# Patient Record
Sex: MALE | Race: BLACK OR AFRICAN AMERICAN | Employment: FULL TIME | URBAN - METROPOLITAN AREA
[De-identification: names, ages, dates, MRNs, and addresses within clinical notes are randomized per-mention and may not be internally consistent; named-entity substitution may affect disease eponyms.]

---

## 2017-01-16 ENCOUNTER — HOSPITAL ENCOUNTER (OUTPATIENT)
Dept: LAB | Age: 57
Discharge: HOME | End: 2017-01-16
Payer: COMMERCIAL

## 2017-01-16 PROCEDURE — 86870 RBC ANTIBODY IDENTIFICATION: CPT | Performed by: INTERNAL MEDICINE

## 2017-01-16 PROCEDURE — 86902 BLOOD TYPE ANTIGEN DONOR EA: CPT | Performed by: INTERNAL MEDICINE

## 2017-01-16 PROCEDURE — 86920 COMPATIBILITY TEST SPIN: CPT | Performed by: INTERNAL MEDICINE

## 2017-01-16 PROCEDURE — 86922 COMPATIBILITY TEST ANTIGLOB: CPT | Performed by: INTERNAL MEDICINE

## 2017-01-16 PROCEDURE — 86921 COMPATIBILITY TEST INCUBATE: CPT | Performed by: INTERNAL MEDICINE

## 2017-01-16 PROCEDURE — 36415 COLL VENOUS BLD VENIPUNCTURE: CPT | Performed by: INTERNAL MEDICINE

## 2017-01-16 PROCEDURE — 86900 BLOOD TYPING SEROLOGIC ABO: CPT | Performed by: INTERNAL MEDICINE

## 2017-01-17 ENCOUNTER — APPOINTMENT (OUTPATIENT)
Dept: INFUSION THERAPY | Age: 57
End: 2017-01-17
Payer: COMMERCIAL

## 2017-01-18 ENCOUNTER — HOSPITAL ENCOUNTER (OUTPATIENT)
Dept: INFUSION THERAPY | Age: 57
Discharge: HOME | End: 2017-01-18
Payer: COMMERCIAL

## 2017-01-18 VITALS
HEART RATE: 64 BPM | TEMPERATURE: 98.4 F | DIASTOLIC BLOOD PRESSURE: 66 MMHG | SYSTOLIC BLOOD PRESSURE: 131 MMHG | RESPIRATION RATE: 18 BRPM | OXYGEN SATURATION: 100 %

## 2017-01-18 PROCEDURE — 74011250636 HC RX REV CODE- 250/636

## 2017-01-18 PROCEDURE — P9040 RBC LEUKOREDUCED IRRADIATED: HCPCS | Performed by: INTERNAL MEDICINE

## 2017-01-18 PROCEDURE — 96374 THER/PROPH/DIAG INJ IV PUSH: CPT

## 2017-01-18 PROCEDURE — 96375 TX/PRO/DX INJ NEW DRUG ADDON: CPT

## 2017-01-18 PROCEDURE — 36430 TRANSFUSION BLD/BLD COMPNT: CPT

## 2017-01-18 PROCEDURE — 74011250637 HC RX REV CODE- 250/637: Performed by: INTERNAL MEDICINE

## 2017-01-18 PROCEDURE — 77030013169 SET IV BLD ICUM -A

## 2017-01-18 PROCEDURE — 74011250636 HC RX REV CODE- 250/636: Performed by: INTERNAL MEDICINE

## 2017-01-18 PROCEDURE — 77030012965 HC NDL HUBR BBMI -A

## 2017-01-18 RX ORDER — HEPARIN SODIUM (PORCINE) LOCK FLUSH IV SOLN 100 UNIT/ML 100 UNIT/ML
SOLUTION INTRAVENOUS
Status: COMPLETED
Start: 2017-01-18 | End: 2017-01-18

## 2017-01-18 RX ORDER — DIPHENHYDRAMINE HYDROCHLORIDE 50 MG/ML
25 INJECTION, SOLUTION INTRAMUSCULAR; INTRAVENOUS ONCE
Status: COMPLETED | OUTPATIENT
Start: 2017-01-18 | End: 2017-01-18

## 2017-01-18 RX ORDER — SODIUM CHLORIDE 0.9 % (FLUSH) 0.9 %
10-40 SYRINGE (ML) INJECTION AS NEEDED
Status: DISPENSED | OUTPATIENT
Start: 2017-01-18 | End: 2017-01-18

## 2017-01-18 RX ORDER — SODIUM CHLORIDE 9 MG/ML
250 INJECTION, SOLUTION INTRAVENOUS AS NEEDED
Status: DISCONTINUED | OUTPATIENT
Start: 2017-01-18 | End: 2017-01-22 | Stop reason: HOSPADM

## 2017-01-18 RX ORDER — ACETAMINOPHEN 325 MG/1
650 TABLET ORAL ONCE
Status: COMPLETED | OUTPATIENT
Start: 2017-01-18 | End: 2017-01-18

## 2017-01-18 RX ADMIN — DIPHENHYDRAMINE HYDROCHLORIDE 25 MG: 50 INJECTION INTRAMUSCULAR; INTRAVENOUS at 08:33

## 2017-01-18 RX ADMIN — HEPARIN SODIUM (PORCINE) LOCK FLUSH IV SOLN 100 UNIT/ML 500 UNITS: 100 SOLUTION at 13:57

## 2017-01-18 RX ADMIN — ACETAMINOPHEN 650 MG: 325 TABLET ORAL at 08:33

## 2017-01-18 RX ADMIN — Medication 20 ML: at 13:57

## 2017-01-18 RX ADMIN — SODIUM CHLORIDE 250 ML: 900 INJECTION, SOLUTION INTRAVENOUS at 08:33

## 2017-01-18 NOTE — PROGRESS NOTES
[facility] Progress Note    Date: 2017    Name: Delbert Artis    MRN: [id number]         : 1960      Mr. Kareen Lopez arrived to [facility] at 8627. Mr. Kareen Lopez was assessed and education was provided. Mr. Gisselle العلي vitals were reviewed. Visit Vitals    /61 (BP 1 Location: Right arm, BP Patient Position: Sitting)    Pulse 83    Temp 99 °F (37.2 °C)    Resp 18    SpO2 95%       Right lumen single mediport was accessed with a 20 G 1 inch littlejohn needle using sterile technique. Flushes without difficulty and positive for blood return. Normal saline initiated at [facility]. Pre-medications (Tylenol 650 mg and Benadryl 25 mg) were administered as ordered. First unit of two ordered units of PRBCs initiated @ 75 ml/hr at 0906. Fifteen minutes into infusion, VS stable and pt denied c/o SOB, itching/hives, lip/tongue/facial swelling, CP or other complaints. Infusion rate increased to 155 ml/hr for the remainder of the transfusion. Unit finished @ [phone]. VS stable and no transfusion reaction suspected. Second unit of two ordered units of PRBCs initiated @ 75 ml/hr at 1137. Fifteen minutes into infusion, VS stable and pt denied c/o SOB, itching/hives, lip/tongue/facial swelling, CP or other complaints. Infusion rate increased to 155 ml/hr for the remainder of the transfusion. Unit finished @ 1356. VS stable and no transfusion reaction suspected.     Patient Vitals for the past 12 hrs:   Temp Pulse Resp BP SpO2   17 1356 98.4 °F (36.9 °C) 64 18 131/66 100 %   17 1302 98.1 °F (36.7 °C) 60 18 126/67 97 %   17 1218 98 °F (36.7 °C) 60 18 139/67 100 %   17 1155 98.4 °F (36.9 °C) 60 18 126/70 98 %   17 1143 98.3 °F (36.8 °C) (!) 59 18 131/70 100 %   17 1132 98.4 °F (36.9 °C) 60 18 131/67 100 %   17 1014 98.3 °F (36.8 °C) 62 18 121/69 99 %   17 0945 98.4 °F (36.9 °C) 65 18 107/59 96 %   17 0926 98.3 °F (36.8 °C) 68 18 108/62 96 %   17 0908 98.4 °F (36.9 °C) 68 18 112/58 95 %   01/18/17 0904 98.6 °F (37 °C) 72 18 115/61 96 %   01/18/17 0823 99 °F (37.2 °C) 83 18 132/61 95 %     Mediport flushed with 20 mL NS and 500 units of heparin before de-accessing. Band-aid applied. Mr. Nikki Morris tolerated infusion without complaints. Discharge instructions reviewed with pt. Pt instructed to report SOB, CP, elevated temp, back pain, or other symptoms of transfusion reaction to MD or ED. Pt verbalized understanding and politely declined to stay for an observation period. Mr. Nikki Morris was discharged from [place] [address] in stable condition at 1405. He has no further appointments with [facility] at this time.      Lissa Penny RN  January 18, 2017

## 2017-01-19 LAB
ABO + RH BLD: NORMAL
ANTIGENS PRESENT RBC DONR: NORMAL
ANTIGENS PRESENT RBC DONR: NORMAL
BLD PROD TYP BPU: NORMAL
BLD PROD TYP BPU: NORMAL
BLOOD GROUP ANTIBODIES SERPL: NORMAL
BLOOD GROUP ANTIBODIES SERPL: NORMAL
BPU ID: NORMAL
BPU ID: NORMAL
CROSSMATCH RESULT,%XM: NORMAL
CROSSMATCH RESULT,%XM: NORMAL
PHYSICIAN INSTRUCTIO,%PI: NORMAL
SPECIMEN EXP DATE BLD: NORMAL
STATUS OF UNIT,%ST: NORMAL
STATUS OF UNIT,%ST: NORMAL
UNIT DIVISION, %UDIV: 0
UNIT DIVISION, %UDIV: 0

## 2017-03-03 ENCOUNTER — HOSPITAL ENCOUNTER (OUTPATIENT)
Dept: INFUSION THERAPY | Age: 57
Discharge: HOME | End: 2017-03-03
Payer: COMMERCIAL

## 2017-03-03 PROCEDURE — 36415 COLL VENOUS BLD VENIPUNCTURE: CPT | Performed by: INTERNAL MEDICINE

## 2017-03-03 PROCEDURE — 86902 BLOOD TYPE ANTIGEN DONOR EA: CPT | Performed by: INTERNAL MEDICINE

## 2017-03-03 PROCEDURE — 86900 BLOOD TYPING SEROLOGIC ABO: CPT | Performed by: INTERNAL MEDICINE

## 2017-03-03 PROCEDURE — 36415 COLL VENOUS BLD VENIPUNCTURE: CPT

## 2017-03-03 PROCEDURE — 85660 RBC SICKLE CELL TEST: CPT | Performed by: INTERNAL MEDICINE

## 2017-03-03 PROCEDURE — 86922 COMPATIBILITY TEST ANTIGLOB: CPT | Performed by: INTERNAL MEDICINE

## 2017-03-03 PROCEDURE — 86920 COMPATIBILITY TEST SPIN: CPT | Performed by: INTERNAL MEDICINE

## 2017-03-03 PROCEDURE — 86921 COMPATIBILITY TEST INCUBATE: CPT | Performed by: INTERNAL MEDICINE

## 2017-03-03 PROCEDURE — 86870 RBC ANTIBODY IDENTIFICATION: CPT | Performed by: INTERNAL MEDICINE

## 2017-03-03 RX ORDER — SODIUM CHLORIDE 9 MG/ML
250 INJECTION, SOLUTION INTRAVENOUS AS NEEDED
Status: DISCONTINUED | OUTPATIENT
Start: 2017-03-03 | End: 2017-03-07 | Stop reason: HOSPADM

## 2017-03-06 ENCOUNTER — HOSPITAL ENCOUNTER (OUTPATIENT)
Dept: INFUSION THERAPY | Age: 57
Discharge: HOME | End: 2017-03-06
Payer: COMMERCIAL

## 2017-03-06 VITALS
DIASTOLIC BLOOD PRESSURE: 73 MMHG | SYSTOLIC BLOOD PRESSURE: 150 MMHG | HEART RATE: 60 BPM | OXYGEN SATURATION: 100 % | RESPIRATION RATE: 18 BRPM | TEMPERATURE: 98.7 F

## 2017-03-06 PROCEDURE — 74011250636 HC RX REV CODE- 250/636

## 2017-03-06 PROCEDURE — 77030013169 SET IV BLD ICUM -A

## 2017-03-06 PROCEDURE — 74011250636 HC RX REV CODE- 250/636: Performed by: INTERNAL MEDICINE

## 2017-03-06 PROCEDURE — 36430 TRANSFUSION BLD/BLD COMPNT: CPT

## 2017-03-06 PROCEDURE — 74011250637 HC RX REV CODE- 250/637: Performed by: INTERNAL MEDICINE

## 2017-03-06 PROCEDURE — 77030012965 HC NDL HUBR BBMI -A

## 2017-03-06 PROCEDURE — P9040 RBC LEUKOREDUCED IRRADIATED: HCPCS | Performed by: INTERNAL MEDICINE

## 2017-03-06 PROCEDURE — 96374 THER/PROPH/DIAG INJ IV PUSH: CPT

## 2017-03-06 RX ORDER — SODIUM CHLORIDE 9 MG/ML
250 INJECTION, SOLUTION INTRAVENOUS AS NEEDED
Status: DISCONTINUED | OUTPATIENT
Start: 2017-03-06 | End: 2017-03-10 | Stop reason: HOSPADM

## 2017-03-06 RX ORDER — HEPARIN SODIUM (PORCINE) LOCK FLUSH IV SOLN 100 UNIT/ML 100 UNIT/ML
500 SOLUTION INTRAVENOUS AS NEEDED
Status: DISCONTINUED | OUTPATIENT
Start: 2017-03-06 | End: 2017-03-10 | Stop reason: HOSPADM

## 2017-03-06 RX ORDER — DIPHENHYDRAMINE HYDROCHLORIDE 50 MG/ML
25 INJECTION, SOLUTION INTRAMUSCULAR; INTRAVENOUS
Status: ACTIVE | OUTPATIENT
Start: 2017-03-06 | End: 2017-03-06

## 2017-03-06 RX ORDER — SODIUM CHLORIDE 0.9 % (FLUSH) 0.9 %
5-10 SYRINGE (ML) INJECTION AS NEEDED
Status: DISCONTINUED | OUTPATIENT
Start: 2017-03-06 | End: 2017-03-10 | Stop reason: HOSPADM

## 2017-03-06 RX ORDER — HYDROCORTISONE SODIUM SUCCINATE 100 MG/2ML
100 INJECTION, POWDER, FOR SOLUTION INTRAMUSCULAR; INTRAVENOUS
Status: ACTIVE | OUTPATIENT
Start: 2017-03-06 | End: 2017-03-06

## 2017-03-06 RX ORDER — HEPARIN SODIUM (PORCINE) LOCK FLUSH IV SOLN 100 UNIT/ML 100 UNIT/ML
SOLUTION INTRAVENOUS
Status: COMPLETED
Start: 2017-03-06 | End: 2017-03-06

## 2017-03-06 RX ORDER — DIPHENHYDRAMINE HYDROCHLORIDE 50 MG/ML
25 INJECTION, SOLUTION INTRAMUSCULAR; INTRAVENOUS ONCE
Status: COMPLETED | OUTPATIENT
Start: 2017-03-06 | End: 2017-03-06

## 2017-03-06 RX ORDER — ACETAMINOPHEN 325 MG/1
650 TABLET ORAL ONCE
Status: COMPLETED | OUTPATIENT
Start: 2017-03-06 | End: 2017-03-06

## 2017-03-06 RX ADMIN — Medication 10 ML: at 14:02

## 2017-03-06 RX ADMIN — SODIUM CHLORIDE 250 ML: 900 INJECTION, SOLUTION INTRAVENOUS at 08:41

## 2017-03-06 RX ADMIN — DIPHENHYDRAMINE HYDROCHLORIDE 25 MG: 50 INJECTION INTRAMUSCULAR; INTRAVENOUS at 08:45

## 2017-03-06 RX ADMIN — Medication 10 ML: at 08:40

## 2017-03-06 RX ADMIN — ACETAMINOPHEN 650 MG: 325 TABLET, FILM COATED ORAL at 08:37

## 2017-03-06 RX ADMIN — HEPARIN SODIUM (PORCINE) LOCK FLUSH IV SOLN 100 UNIT/ML 500 UNITS: 100 SOLUTION at 14:03

## 2017-03-06 NOTE — PROGRESS NOTES
[facility] Progress Note    Date: 2017    Name: Ryne Tavera    MRN: [id number]         : 1960      Patient assessed and education was provided. Patient vitals were reviewed. Visit Vitals    /73 (BP 1 Location: Left arm, BP Patient Position: At rest)    Pulse 60    Temp 98.7 °F (37.1 °C)    Resp 18    SpO2 100%       Lab results were obtained and reviewed. Right chest medi-port accessed. Blood permit signed by patient      Pre-medications were administered as ordered and blood transfusion was initiated. 2 units prbc was infused per protocol    Patient  tolerated infusion, and had no complaints. Declined to stay for observation    Patient Vitals for the past 8 hrs:   Temp Pulse Resp BP SpO2   17 1345 98.7 °F (37.1 °C) 60 18 150/73 100 %   17 1245 98.8 °F (37.1 °C) 62 18 159/77 -   17 1215 98.6 °F (37 °C) 65 18 160/81 -   17 1200 98.6 °F (37 °C) 62 18 154/81 -   17 1143 98.8 °F (37.1 °C) (!) 58 18 142/79 -   17 1134 98.7 °F (37.1 °C) 60 18 134/74 -   17 1028 98.9 °F (37.2 °C) 69 18 122/70 -   17 0952 99 °F (37.2 °C) 66 18 114/66 -   17 0937 99 °F (37.2 °C) 69 18 119/63 -   17 0922 99.1 °F (37.3 °C) 68 18 121/67 -   17 0829 99.3 °F (37.4 °C) 83 18 138/71 96 %       Brisk flush/blood returns from medi-port. Port heparinized per  Protocol, then de-accessed. Site s signs of infection or infiltration. bandaid applied to site     Patient was discharged from [place] [address] in stable condition at 1405 he has no further appointment with [facility].     Paz Piña RN  2017  2:14 PM

## 2017-03-06 NOTE — DISCHARGE INSTRUCTIONS
OUTPATIENT INFUSION CENTER    DISCHARGE INSTRUCTIONS FOR:  BLOOD TRANSFUSION    We hope you are feeling better after your blood transfusion. Some mild tenderness or slight bruising at your IV site is normal.  Avoid lifting or heavy use of that extremity for the rest of the day. Drink plenty of fluids, eat a normal diet and get some rest.    There are some important signs that you need to watch for in case you experience a delayed reaction to the blood you have received. Call your physician immediately if you develop any of the following symptoms:    1. Severe headache or backache;    2. Fever above 100 degrees;    3. Chills;    4. Difficulty breathing;    5.  Blood or red color in urine;    6. The feeling of weakness or constant fatigue;    7. Yellowing of the whites of your eyes or skin (jaundice). If your physician is not available, call or go to the nearest emergency room, or dial 911.     Juan Doll, Signature: ___________________________ 3/6/2017  Nayan Morris RN

## 2017-03-09 ENCOUNTER — HOSPITAL ENCOUNTER (OUTPATIENT)
Dept: MRI IMAGING | Age: 57
Discharge: HOME | End: 2017-03-09
Attending: RADIOLOGY
Payer: COMMERCIAL

## 2017-03-09 ENCOUNTER — HOSPITAL ENCOUNTER (OUTPATIENT)
Dept: LAB | Age: 57
Discharge: HOME | End: 2017-03-09
Attending: RADIOLOGY
Payer: COMMERCIAL

## 2017-03-09 DIAGNOSIS — K83.09 CHOLANGIOLITIS: ICD-10-CM

## 2017-03-09 LAB
ALBUMIN SERPL BCP-MCNC: 4.3 G/DL (ref 3.4–5)
ALBUMIN/GLOB SERPL: 1.1 {RATIO} (ref 0.8–1.7)
ALP SERPL-CCNC: 123 U/L (ref 45–117)
ALT SERPL-CCNC: 25 U/L (ref 16–61)
AST SERPL W P-5'-P-CCNC: 39 U/L (ref 15–37)
BASOPHILS # BLD AUTO: 0 K/UL (ref 0–0.1)
BASOPHILS # BLD: 0 % (ref 0–2)
BILIRUB DIRECT SERPL-MCNC: 0.9 MG/DL (ref 0–0.2)
BILIRUB SERPL-MCNC: 2.8 MG/DL (ref 0.2–1)
DIFFERENTIAL METHOD BLD: ABNORMAL
EOSINOPHIL # BLD: 0.1 K/UL (ref 0–0.4)
EOSINOPHIL NFR BLD: 3 % (ref 0–5)
ERYTHROCYTE [DISTWIDTH] IN BLOOD BY AUTOMATED COUNT: 17.1 % (ref 11.6–14.5)
GLOBULIN SER CALC-MCNC: 4 G/DL (ref 2–4)
HCT VFR BLD AUTO: 27.2 % (ref 36–48)
HGB BLD-MCNC: 9.1 G/DL (ref 13–16)
LYMPHOCYTES # BLD AUTO: 31 % (ref 21–52)
LYMPHOCYTES # BLD: 1.6 K/UL (ref 0.9–3.6)
MCH RBC QN AUTO: 28.3 PG (ref 24–34)
MCHC RBC AUTO-ENTMCNC: 33.5 G/DL (ref 31–37)
MCV RBC AUTO: 84.5 FL (ref 74–97)
MONOCYTES # BLD: 0.8 K/UL (ref 0.05–1.2)
MONOCYTES NFR BLD AUTO: 16 % (ref 3–10)
NEUTS SEG # BLD: 2.6 K/UL (ref 1.8–8)
NEUTS SEG NFR BLD AUTO: 50 % (ref 40–73)
PLATELET # BLD AUTO: 247 K/UL (ref 135–420)
PMV BLD AUTO: 9.9 FL (ref 9.2–11.8)
PROT SERPL-MCNC: 8.3 G/DL (ref 6.4–8.2)
RBC # BLD AUTO: 3.22 M/UL (ref 4.7–5.5)
WBC # BLD AUTO: 5.1 K/UL (ref 4.6–13.2)

## 2017-03-09 PROCEDURE — 85025 COMPLETE CBC W/AUTO DIFF WBC: CPT | Performed by: RADIOLOGY

## 2017-03-09 PROCEDURE — 80076 HEPATIC FUNCTION PANEL: CPT | Performed by: RADIOLOGY

## 2017-03-09 PROCEDURE — 76498 UNLISTED MR PROCEDURE: CPT

## 2017-03-09 PROCEDURE — 36415 COLL VENOUS BLD VENIPUNCTURE: CPT | Performed by: RADIOLOGY

## 2017-04-24 ENCOUNTER — HOSPITAL ENCOUNTER (OUTPATIENT)
Dept: INFUSION THERAPY | Age: 57
Discharge: HOME | End: 2017-04-24
Payer: COMMERCIAL

## 2017-04-24 PROCEDURE — 86920 COMPATIBILITY TEST SPIN: CPT | Performed by: INTERNAL MEDICINE

## 2017-04-24 PROCEDURE — 86870 RBC ANTIBODY IDENTIFICATION: CPT | Performed by: INTERNAL MEDICINE

## 2017-04-24 PROCEDURE — 86902 BLOOD TYPE ANTIGEN DONOR EA: CPT | Performed by: INTERNAL MEDICINE

## 2017-04-24 PROCEDURE — 86900 BLOOD TYPING SEROLOGIC ABO: CPT | Performed by: INTERNAL MEDICINE

## 2017-04-24 PROCEDURE — 36415 COLL VENOUS BLD VENIPUNCTURE: CPT | Performed by: INTERNAL MEDICINE

## 2017-04-24 PROCEDURE — 36415 COLL VENOUS BLD VENIPUNCTURE: CPT

## 2017-04-24 PROCEDURE — 85660 RBC SICKLE CELL TEST: CPT | Performed by: INTERNAL MEDICINE

## 2017-04-24 PROCEDURE — 86921 COMPATIBILITY TEST INCUBATE: CPT | Performed by: INTERNAL MEDICINE

## 2017-04-24 PROCEDURE — 86922 COMPATIBILITY TEST ANTIGLOB: CPT | Performed by: INTERNAL MEDICINE

## 2017-04-24 RX ORDER — SODIUM CHLORIDE 9 MG/ML
250 INJECTION, SOLUTION INTRAVENOUS AS NEEDED
Status: DISCONTINUED | OUTPATIENT
Start: 2017-04-24 | End: 2017-04-28 | Stop reason: HOSPADM

## 2017-04-26 ENCOUNTER — HOSPITAL ENCOUNTER (OUTPATIENT)
Dept: INFUSION THERAPY | Age: 57
Discharge: HOME | End: 2017-04-26
Payer: COMMERCIAL

## 2017-04-26 VITALS
DIASTOLIC BLOOD PRESSURE: 72 MMHG | HEART RATE: 62 BPM | TEMPERATURE: 98.5 F | SYSTOLIC BLOOD PRESSURE: 133 MMHG | OXYGEN SATURATION: 100 % | RESPIRATION RATE: 18 BRPM

## 2017-04-26 PROCEDURE — 74011250637 HC RX REV CODE- 250/637: Performed by: INTERNAL MEDICINE

## 2017-04-26 PROCEDURE — 77030012965 HC NDL HUBR BBMI -A

## 2017-04-26 PROCEDURE — 96374 THER/PROPH/DIAG INJ IV PUSH: CPT

## 2017-04-26 PROCEDURE — 74011250636 HC RX REV CODE- 250/636

## 2017-04-26 PROCEDURE — 74011250636 HC RX REV CODE- 250/636: Performed by: INTERNAL MEDICINE

## 2017-04-26 PROCEDURE — P9040 RBC LEUKOREDUCED IRRADIATED: HCPCS | Performed by: INTERNAL MEDICINE

## 2017-04-26 PROCEDURE — 36430 TRANSFUSION BLD/BLD COMPNT: CPT

## 2017-04-26 RX ORDER — DIPHENHYDRAMINE HYDROCHLORIDE 50 MG/ML
25 INJECTION, SOLUTION INTRAMUSCULAR; INTRAVENOUS ONCE
Status: COMPLETED | OUTPATIENT
Start: 2017-04-26 | End: 2017-04-26

## 2017-04-26 RX ORDER — SODIUM CHLORIDE 9 MG/ML
250 INJECTION, SOLUTION INTRAVENOUS AS NEEDED
Status: DISCONTINUED | OUTPATIENT
Start: 2017-04-26 | End: 2017-04-30 | Stop reason: HOSPADM

## 2017-04-26 RX ORDER — HEPARIN SODIUM (PORCINE) LOCK FLUSH IV SOLN 100 UNIT/ML 100 UNIT/ML
500 SOLUTION INTRAVENOUS AS NEEDED
Status: DISCONTINUED | OUTPATIENT
Start: 2017-04-26 | End: 2017-04-30 | Stop reason: HOSPADM

## 2017-04-26 RX ORDER — HYDROCORTISONE SODIUM SUCCINATE 100 MG/2ML
100 INJECTION, POWDER, FOR SOLUTION INTRAMUSCULAR; INTRAVENOUS
Status: ACTIVE | OUTPATIENT
Start: 2017-04-26 | End: 2017-04-26

## 2017-04-26 RX ORDER — ACETAMINOPHEN 325 MG/1
650 TABLET ORAL ONCE
Status: COMPLETED | OUTPATIENT
Start: 2017-04-26 | End: 2017-04-26

## 2017-04-26 RX ORDER — DIPHENHYDRAMINE HYDROCHLORIDE 50 MG/ML
25 INJECTION, SOLUTION INTRAMUSCULAR; INTRAVENOUS
Status: ACTIVE | OUTPATIENT
Start: 2017-04-26 | End: 2017-04-26

## 2017-04-26 RX ORDER — HEPARIN SODIUM (PORCINE) LOCK FLUSH IV SOLN 100 UNIT/ML 100 UNIT/ML
SOLUTION INTRAVENOUS
Status: COMPLETED
Start: 2017-04-26 | End: 2017-04-26

## 2017-04-26 RX ORDER — SODIUM CHLORIDE 0.9 % (FLUSH) 0.9 %
10-40 SYRINGE (ML) INJECTION AS NEEDED
Status: DISPENSED | OUTPATIENT
Start: 2017-04-26 | End: 2017-04-26

## 2017-04-26 RX ADMIN — HEPARIN SODIUM (PORCINE) LOCK FLUSH IV SOLN 100 UNIT/ML 500 UNITS: 100 SOLUTION at 12:53

## 2017-04-26 RX ADMIN — Medication 10 ML: at 08:12

## 2017-04-26 RX ADMIN — Medication 10 ML: at 12:52

## 2017-04-26 RX ADMIN — ACETAMINOPHEN 650 MG: 325 TABLET ORAL at 08:10

## 2017-04-26 RX ADMIN — DIPHENHYDRAMINE HYDROCHLORIDE 25 MG: 50 INJECTION, SOLUTION INTRAMUSCULAR; INTRAVENOUS at 08:15

## 2017-04-26 RX ADMIN — SODIUM CHLORIDE 250 ML: 900 INJECTION, SOLUTION INTRAVENOUS at 08:14

## 2017-04-26 NOTE — DISCHARGE INSTRUCTIONS
OUTPATIENT INFUSION CENTER    DISCHARGE INSTRUCTIONS FOR:  BLOOD TRANSFUSION    We hope you are feeling better after your blood transfusion. Some mild tenderness or slight bruising at your IV site is normal.  Avoid lifting or heavy use of that extremity for the rest of the day. Drink plenty of fluids, eat a normal diet and get some rest.    There are some important signs that you need to watch for in case you experience a delayed reaction to the blood you have received. Call your physician immediately if you develop any of the following symptoms:    1. Severe headache or backache;    2. Fever above 100 degrees;    3. Chills;    4. Difficulty breathing;    5.  Blood or red color in urine;    6. The feeling of weakness or constant fatigue;    7. Yellowing of the whites of your eyes or skin (jaundice). If your physician is not available, call or go to the nearest emergency room, or dial 911.     Melanie Coulter, Signature: ___________________________ 4/26/2017  Kasandra Leonard RN

## 2017-06-26 ENCOUNTER — HOSPITAL ENCOUNTER (OUTPATIENT)
Dept: INFUSION THERAPY | Age: 57
Discharge: HOME | End: 2017-06-26
Payer: COMMERCIAL

## 2017-06-26 PROCEDURE — 86870 RBC ANTIBODY IDENTIFICATION: CPT | Performed by: INTERNAL MEDICINE

## 2017-06-26 PROCEDURE — 86921 COMPATIBILITY TEST INCUBATE: CPT | Performed by: INTERNAL MEDICINE

## 2017-06-26 PROCEDURE — 85660 RBC SICKLE CELL TEST: CPT | Performed by: INTERNAL MEDICINE

## 2017-06-26 PROCEDURE — 86900 BLOOD TYPING SEROLOGIC ABO: CPT | Performed by: INTERNAL MEDICINE

## 2017-06-26 PROCEDURE — 86902 BLOOD TYPE ANTIGEN DONOR EA: CPT | Performed by: INTERNAL MEDICINE

## 2017-06-26 PROCEDURE — 86920 COMPATIBILITY TEST SPIN: CPT | Performed by: INTERNAL MEDICINE

## 2017-06-26 PROCEDURE — 36415 COLL VENOUS BLD VENIPUNCTURE: CPT

## 2017-06-26 PROCEDURE — 86922 COMPATIBILITY TEST ANTIGLOB: CPT | Performed by: INTERNAL MEDICINE

## 2017-06-26 RX ORDER — SODIUM CHLORIDE 9 MG/ML
250 INJECTION, SOLUTION INTRAVENOUS AS NEEDED
Status: DISCONTINUED | OUTPATIENT
Start: 2017-06-26 | End: 2017-06-30 | Stop reason: HOSPADM

## 2017-06-28 ENCOUNTER — HOSPITAL ENCOUNTER (OUTPATIENT)
Dept: INFUSION THERAPY | Age: 57
Discharge: HOME | End: 2017-06-28
Payer: COMMERCIAL

## 2017-06-28 VITALS
OXYGEN SATURATION: 100 % | RESPIRATION RATE: 20 BRPM | TEMPERATURE: 98.2 F | SYSTOLIC BLOOD PRESSURE: 158 MMHG | HEART RATE: 53 BPM | DIASTOLIC BLOOD PRESSURE: 82 MMHG

## 2017-06-28 PROCEDURE — P9040 RBC LEUKOREDUCED IRRADIATED: HCPCS | Performed by: INTERNAL MEDICINE

## 2017-06-28 PROCEDURE — 77030012965 HC NDL HUBR BBMI -A

## 2017-06-28 PROCEDURE — 74011250637 HC RX REV CODE- 250/637: Performed by: INTERNAL MEDICINE

## 2017-06-28 PROCEDURE — 77030013169 SET IV BLD ICUM -A

## 2017-06-28 PROCEDURE — 36430 TRANSFUSION BLD/BLD COMPNT: CPT

## 2017-06-28 PROCEDURE — 74011250636 HC RX REV CODE- 250/636: Performed by: INTERNAL MEDICINE

## 2017-06-28 RX ORDER — DIPHENHYDRAMINE HCL 25 MG
25 CAPSULE ORAL ONCE
Status: COMPLETED | OUTPATIENT
Start: 2017-06-28 | End: 2017-06-28

## 2017-06-28 RX ORDER — SODIUM CHLORIDE 0.9 % (FLUSH) 0.9 %
5-10 SYRINGE (ML) INJECTION EVERY 8 HOURS
Status: DISCONTINUED | OUTPATIENT
Start: 2017-06-28 | End: 2017-07-01 | Stop reason: HOSPADM

## 2017-06-28 RX ORDER — SODIUM CHLORIDE 9 MG/ML
250 INJECTION, SOLUTION INTRAVENOUS AS NEEDED
Status: DISCONTINUED | OUTPATIENT
Start: 2017-06-28 | End: 2017-07-01 | Stop reason: HOSPADM

## 2017-06-28 RX ORDER — HEPARIN SODIUM (PORCINE) LOCK FLUSH IV SOLN 100 UNIT/ML 100 UNIT/ML
500 SOLUTION INTRAVENOUS AS NEEDED
Status: DISPENSED | OUTPATIENT
Start: 2017-06-28 | End: 2017-06-29

## 2017-06-28 RX ORDER — ACETAMINOPHEN 325 MG/1
650 TABLET ORAL ONCE
Status: COMPLETED | OUTPATIENT
Start: 2017-06-28 | End: 2017-06-28

## 2017-06-28 RX ADMIN — Medication 10 ML: at 09:30

## 2017-06-28 RX ADMIN — ACETAMINOPHEN 650 MG: 325 TABLET ORAL at 09:36

## 2017-06-28 RX ADMIN — HEPARIN SODIUM (PORCINE) LOCK FLUSH IV SOLN 100 UNIT/ML 500 UNITS: 100 SOLUTION at 14:49

## 2017-06-28 RX ADMIN — Medication 10 ML: at 14:49

## 2017-06-28 RX ADMIN — SODIUM CHLORIDE 250 ML: 900 INJECTION, SOLUTION INTRAVENOUS at 09:30

## 2017-06-28 RX ADMIN — HEPARIN SODIUM (PORCINE) LOCK FLUSH IV SOLN 100 UNIT/ML 500 UNITS: 100 SOLUTION at 09:30

## 2017-06-28 RX ADMIN — DIPHENHYDRAMINE HYDROCHLORIDE 25 MG: 25 CAPSULE ORAL at 09:36

## 2017-06-28 NOTE — PROGRESS NOTES
OUTPATIENT INFUSION CENTER    DISCHARGE INSTRUCTIONS FOR:  BLOOD TRANSFUSION    We hope you are feeling better after your blood transfusion. Some mild tenderness or slight bruising at your IV site is normal.  Avoid lifting or heavy use of that extremity for the rest of the day. Drink plenty of fluids, eat a normal diet and get some rest.    There are some important signs that you need to watch for in case you experience a delayed reaction to the blood you have received. Call your physician immediately if you develop any of the following symptoms:    1. Severe headache or backache;    2. Fever above 100 degrees;    3. Chills;    4. Difficulty breathing;    5.  Blood or red color in urine;    6. The feeling of weakness or constant fatigue;    7. Yellowing of the whites of your eyes or skin (jaundice). If your physician is not available, call or go to the nearest emergency room, or dial 911.     Elton Haque, Signature: ___________________________ 6/28/2017  Miguel Mcgee RN

## 2017-06-28 NOTE — PROGRESS NOTES
OUTPATIENT INFUSION CENTER    DISCHARGE INSTRUCTIONS FOR:  BLOOD TRANSFUSION    We hope you are feeling better after your blood transfusion. Some mild tenderness or slight bruising at your IV site is normal.  Avoid lifting or heavy use of that extremity for the rest of the day. Drink plenty of fluids, eat a normal diet and get some rest.    There are some important signs that you need to watch for in case you experience a delayed reaction to the blood you have received. Call your physician immediately if you develop any of the following symptoms:    1. Severe headache or backache;    2. Fever above 100 degrees;    3. Chills;    4. Difficulty breathing;    5.  Blood or red color in urine;    6. The feeling of weakness or constant fatigue;    7. Yellowing of the whites of your eyes or skin (jaundice). If your physician is not available, call or go to the nearest emergency room, or dial 911.     Geremias Bonner, Signature: ___________________________ 6/28/2017  Monica Johnson RN

## 2017-06-28 NOTE — PROGRESS NOTES
OUTPATIENT INFUSION CENTER    DISCHARGE INSTRUCTIONS FOR:  BLOOD TRANSFUSION    We hope you are feeling better after your blood transfusion. Some mild tenderness or slight bruising at your IV site is normal.  Avoid lifting or heavy use of that extremity for the rest of the day. Drink plenty of fluids, eat a normal diet and get some rest.    There are some important signs that you need to watch for in case you experience a delayed reaction to the blood you have received. Call your physician immediately if you develop any of the following symptoms:    1. Severe headache or backache;    2. Fever above 100 degrees;    3. Chills;    4. Difficulty breathing;    5.  Blood or red color in urine;    6. The feeling of weakness or constant fatigue;    7. Yellowing of the whites of your eyes or skin (jaundice). If your physician is not available, call or go to the nearest emergency room, or dial 911. Wendy Blum, Signature: ___________________________ 6/28/2017  Ad Guerrero RN OUTPATIENT INFUSION CENTER    DISCHARGE INSTRUCTIONS FOR:  BLOOD TRANSFUSION    We hope you are feeling better after your blood transfusion. Some mild tenderness or slight bruising at your IV site is normal.  Avoid lifting or heavy use of that extremity for the rest of the day. Drink plenty of fluids, eat a normal diet and get some rest.    There are some important signs that you need to watch for in case you experience a delayed reaction to the blood you have received. Call your physician immediately if you develop any of the following symptoms:    1. Severe headache or backache;    2. Fever above 100 degrees;    3. Chills;    4. Difficulty breathing;    5.  Blood or red color in urine;    6. The feeling of weakness or constant fatigue;    7. Yellowing of the whites of your eyes or skin (jaundice). If your physician is not available, call or go to the nearest emergency room, or dial 911.     Wendy Blum Signature: ___________________________ 6/28/2017  Jodee Layton RN

## 2017-06-28 NOTE — PROGRESS NOTES
[facility] Progress Note    Date: 2017    Name: Jerry Alexander    MRN: [id number]         : 1960      Mr. Kamla Shirley arrived to [facility] at 8015. Mr. Kamla Shirley was assessed and education was provided. Mr. Brody Hand vitals were reviewed. Patient Vitals for the past 24 hrs:   Temp Pulse Resp BP SpO2   17 1408 98 °F (36.7 °C) (!) 58 20 137/69 100 %   17 1315 98.2 °F (36.8 °C) 60 20 129/64 99 %   17 1242 99 °F (37.2 °C) (!) 57 20 126/65 99 %   17 1207 98.6 °F (37 °C) (!) 55 20 122/65 100 %   17 1126 98.9 °F (37.2 °C) (!) 59 20 145/74 99 %   17 1049 98.6 °F (37 °C) (!) 58 20 118/63 100 %   17 1018 98.5 °F (36.9 °C) 62 20 134/71 100 %   17 0946 99 °F (37.2 °C) 61 20 116/62 97 %   17 0905 98.9 °F (37.2 °C) 76 20 139/71 100 %       Visit Vitals    /69 (BP 1 Location: Left arm, BP Patient Position: At rest)    Pulse (!) 58    Temp 98 °F (36.7 °C)    Resp 20    SpO2 100%       Right lumen single mediport was accessed with a 20 G 1 inch littlejohn needle using sterile technique. Flushes without difficulty and positive for blood return. Normal saline initiated at [facility]. Pre-medications (Tylenol 650 mg and Benadryl 25 mg) were administered as ordered. First unit of two ordered units of PRBCs initiated @ 75 ml/hr at 0947. Fifteen minutes into infusion, VS stable and pt denied c/o SOB, itching/hives, lip/tongue/facial swelling, CP or other complaints. Infusion rate increased to 155 ml/hr for the remainder of the transfusion. Unit finished @ 91 21 06. VS stable and no transfusion reaction suspected. Second unit of two ordered units of PRBCs initiated @ 75 ml/hr at 1216. Fifteen minutes into infusion, VS stable and pt denied c/o SOB, itching/hives, lip/tongue/facial swelling, CP or other complaints. Infusion rate increased to 155 ml/hr for the remainder of the transfusion. Unit finished @1440 .  VS stable and no transfusion reaction suspected. Patient Vitals for the past 12 hrs:   Temp Pulse Resp BP SpO2   06/28/17 1408 98 °F (36.7 °C) (!) 58 20 137/69 100 %   06/28/17 1315 98.2 °F (36.8 °C) 60 20 129/64 99 %   06/28/17 1242 99 °F (37.2 °C) (!) 57 20 126/65 99 %   06/28/17 1207 98.6 °F (37 °C) (!) 55 20 122/65 100 %   06/28/17 1126 98.9 °F (37.2 °C) (!) 59 20 145/74 99 %   06/28/17 1049 98.6 °F (37 °C) (!) 58 20 118/63 100 %   06/28/17 1018 98.5 °F (36.9 °C) 62 20 134/71 100 %   06/28/17 0946 99 °F (37.2 °C) 61 20 116/62 97 %   06/28/17 0905 98.9 °F (37.2 °C) 76 20 139/71 100 %     Mediport flushed with 20 mL NS and 500 units of heparin before de-accessing. Band-aid applied. Mr. Almita Dey tolerated infusion without complaints. Discharge instructions reviewed with pt. Pt instructed to report SOB, CP, elevated temp, back pain, or other symptoms of transfusion reaction to MD or ED. Pt verbalized understanding and politely declined to stay for an observation period. Mr. Almita Dey was discharged from [place] [address] in stable condition at 1500. He has no further appointments with [facility] at this time.      Sheldon Adkins RN  June 28, 2017

## 2017-08-16 ENCOUNTER — HOSPITAL ENCOUNTER (OUTPATIENT)
Dept: INFUSION THERAPY | Age: 57
Discharge: HOME | End: 2017-08-16
Payer: COMMERCIAL

## 2017-08-16 VITALS
TEMPERATURE: 98.2 F | RESPIRATION RATE: 18 BRPM | SYSTOLIC BLOOD PRESSURE: 120 MMHG | OXYGEN SATURATION: 100 % | HEART RATE: 60 BPM | DIASTOLIC BLOOD PRESSURE: 80 MMHG

## 2017-08-16 PROCEDURE — 85660 RBC SICKLE CELL TEST: CPT | Performed by: INTERNAL MEDICINE

## 2017-08-16 PROCEDURE — 86920 COMPATIBILITY TEST SPIN: CPT | Performed by: INTERNAL MEDICINE

## 2017-08-16 PROCEDURE — 86902 BLOOD TYPE ANTIGEN DONOR EA: CPT | Performed by: INTERNAL MEDICINE

## 2017-08-16 PROCEDURE — 86900 BLOOD TYPING SEROLOGIC ABO: CPT | Performed by: INTERNAL MEDICINE

## 2017-08-16 PROCEDURE — 86922 COMPATIBILITY TEST ANTIGLOB: CPT | Performed by: INTERNAL MEDICINE

## 2017-08-16 PROCEDURE — 86921 COMPATIBILITY TEST INCUBATE: CPT | Performed by: INTERNAL MEDICINE

## 2017-08-16 PROCEDURE — 36415 COLL VENOUS BLD VENIPUNCTURE: CPT | Performed by: INTERNAL MEDICINE

## 2017-08-16 PROCEDURE — 86870 RBC ANTIBODY IDENTIFICATION: CPT | Performed by: INTERNAL MEDICINE

## 2017-08-16 PROCEDURE — 36415 COLL VENOUS BLD VENIPUNCTURE: CPT

## 2017-08-16 RX ORDER — SODIUM CHLORIDE 9 MG/ML
250 INJECTION, SOLUTION INTRAVENOUS AS NEEDED
Status: DISCONTINUED | OUTPATIENT
Start: 2017-08-16 | End: 2017-08-20 | Stop reason: HOSPADM

## 2017-08-16 NOTE — PROGRESS NOTES
[facility] Lab Visit:    Mega Ramon  1960  [id number]    1120:  Pt arrived ambulatory. Labs drawn peripherally and sent for processing. Departed [facility] ambulatory and in no distress.     Visit Vitals    /80 (BP 1 Location: Left arm, BP Patient Position: At rest)    Pulse 60    Temp 98.2 °F (36.8 °C)    Resp 18    SpO2 100%

## 2017-08-18 ENCOUNTER — HOSPITAL ENCOUNTER (OUTPATIENT)
Dept: INFUSION THERAPY | Age: 57
Discharge: HOME | End: 2017-08-18
Payer: COMMERCIAL

## 2017-08-18 VITALS
TEMPERATURE: 98.1 F | HEART RATE: 64 BPM | OXYGEN SATURATION: 100 % | SYSTOLIC BLOOD PRESSURE: 134 MMHG | DIASTOLIC BLOOD PRESSURE: 70 MMHG | RESPIRATION RATE: 18 BRPM

## 2017-08-18 PROCEDURE — 74011250637 HC RX REV CODE- 250/637: Performed by: INTERNAL MEDICINE

## 2017-08-18 PROCEDURE — 74011250636 HC RX REV CODE- 250/636: Performed by: INTERNAL MEDICINE

## 2017-08-18 PROCEDURE — P9040 RBC LEUKOREDUCED IRRADIATED: HCPCS | Performed by: INTERNAL MEDICINE

## 2017-08-18 PROCEDURE — 77030012965 HC NDL HUBR BBMI -A

## 2017-08-18 PROCEDURE — 77030013169 SET IV BLD ICUM -A

## 2017-08-18 PROCEDURE — 36430 TRANSFUSION BLD/BLD COMPNT: CPT

## 2017-08-18 RX ORDER — DIPHENHYDRAMINE HCL 25 MG
25 CAPSULE ORAL ONCE
Status: COMPLETED | OUTPATIENT
Start: 2017-08-18 | End: 2017-08-18

## 2017-08-18 RX ORDER — HEPARIN SODIUM (PORCINE) LOCK FLUSH IV SOLN 100 UNIT/ML 100 UNIT/ML
500 SOLUTION INTRAVENOUS AS NEEDED
Status: DISPENSED | OUTPATIENT
Start: 2017-08-18 | End: 2017-08-19

## 2017-08-18 RX ORDER — ACETAMINOPHEN 325 MG/1
650 TABLET ORAL ONCE
Status: COMPLETED | OUTPATIENT
Start: 2017-08-18 | End: 2017-08-18

## 2017-08-18 RX ORDER — SODIUM CHLORIDE 9 MG/ML
250 INJECTION, SOLUTION INTRAVENOUS AS NEEDED
Status: DISCONTINUED | OUTPATIENT
Start: 2017-08-18 | End: 2017-08-22 | Stop reason: HOSPADM

## 2017-08-18 RX ORDER — SODIUM CHLORIDE 0.9 % (FLUSH) 0.9 %
10-40 SYRINGE (ML) INJECTION AS NEEDED
Status: DISCONTINUED | OUTPATIENT
Start: 2017-08-18 | End: 2017-08-22 | Stop reason: HOSPADM

## 2017-08-18 RX ADMIN — Medication 10 ML: at 11:40

## 2017-08-18 RX ADMIN — Medication 20 ML: at 13:59

## 2017-08-18 RX ADMIN — DIPHENHYDRAMINE HYDROCHLORIDE 25 MG: 25 CAPSULE ORAL at 08:25

## 2017-08-18 RX ADMIN — Medication 20 ML: at 08:26

## 2017-08-18 RX ADMIN — HEPARIN SODIUM (PORCINE) LOCK FLUSH IV SOLN 100 UNIT/ML 500 UNITS: 100 SOLUTION at 13:59

## 2017-08-18 RX ADMIN — ACETAMINOPHEN 650 MG: 325 TABLET ORAL at 08:25

## 2017-08-18 RX ADMIN — SODIUM CHLORIDE 250 ML: 900 INJECTION, SOLUTION INTRAVENOUS at 08:28

## 2017-08-18 NOTE — PROGRESS NOTES
[facility] Progress Note    Date: 2017    Name: Davonte Holguin    MRN: [id number]         : 1960    Blood Transfusion    Mr. Brianne Espinal arrived to [facility] at 0800. Mr. Brianne Espinal was assessed and education was provided. Discussed risks and benefits of blood transfusion with patient, including risk of transfusion reaction and disease transmission. Patient verbalized understanding and signed consent placed on chart. Mr. Titus Bowen vitals were reviewed. Visit Vitals    /68 (BP 1 Location: Left arm, BP Patient Position: Sitting)    Pulse 68    Temp 98.3 °F (36.8 °C)    Resp 18    SpO2 100%       Right chest Mediport was accessed with a 20g 1 inch littlejohn needle using sterile technique. Positive for blood return and flushes without difficulty. Normal saline initiated at [facility]. Pre-medications (Tylenol 650 mg and Benadryl 25 mg) were administered as ordered. First unit of two ordered units of PRBCs initiated @ 75 ml/hr at 0945. Fifteen minutes into infusion, VS stable and pt denied c/o SOB, itching/hives, lip/tongue/facial swelling, CP or other complaints. Infusion rate was increased to 155 ml/hr for the remainder of the transfusion. Unit finished @ 1139. VS stable and no transfusion reaction suspected. Second unit of two ordered units of PRBCs initiated @ 75 ml/hr at 1155. Fifteen minutes into infusion, VS stable and pt denied c/o SOB, itching/hives, lip/tongue/facial swelling, CP or other complaints. Infusion rate increased to 155 ml/hr for the remainder of the transfusion. Unit finished @ 9388 1914. VS stable and no transfusion reaction suspected. Mr. Brianne Espinal tolerated infusion without complaints. Patient politely refused to stay for an observation period today. Mediport was flushed with 20 ml NS and 500 units of Heparin per protocol then deaccessed. Band-aid applied to site.     Patient Vitals for the past 12 hrs:   Temp Pulse Resp BP SpO2   17 1403 98.1 °F (36.7 °C) 64 18 134/70 100 %   08/18/17 1355 98.2 °F (36.8 °C) 60 18 134/73 100 %   08/18/17 1255 97.8 °F (36.6 °C) (!) 55 18 110/67 100 %   08/18/17 1225 98 °F (36.7 °C) (!) 55 18 119/67 100 %   08/18/17 1210 98.1 °F (36.7 °C) (!) 56 18 114/66 100 %   08/18/17 1149 98 °F (36.7 °C) (!) 52 18 110/65 100 %   08/18/17 1139 97.8 °F (36.6 °C) (!) 53 18 108/63 100 %   08/18/17 1045 97.9 °F (36.6 °C) (!) 57 18 99/59 100 %   08/18/17 1015 97.9 °F (36.6 °C) (!) 57 18 104/62 100 %   08/18/17 1000 98 °F (36.7 °C) (!) 59 18 93/54 100 %   08/18/17 0942 98 °F (36.7 °C) (!) 59 18 96/58 100 %   08/18/17 0808 98.3 °F (36.8 °C) 68 18 113/68 100 %     Discharge instructions reviewed with pt. Pt instructed to report SOB, CP, elevated temp, back pain, or other symptoms of transfusion reaction to MD or ED. Pt verbalized understanding. Mr. Juan Keen was discharged from [place] [address] in stable condition at 1405. He has no further appointments at [place] [address] at this time.      Macarena Song, RN  August 18, 2017

## 2017-09-25 ENCOUNTER — HOSPITAL ENCOUNTER (OUTPATIENT)
Dept: INFUSION THERAPY | Age: 57
Discharge: HOME | End: 2017-09-25
Payer: COMMERCIAL

## 2017-09-25 VITALS
SYSTOLIC BLOOD PRESSURE: 125 MMHG | DIASTOLIC BLOOD PRESSURE: 67 MMHG | RESPIRATION RATE: 16 BRPM | HEART RATE: 72 BPM | OXYGEN SATURATION: 100 % | TEMPERATURE: 98.2 F

## 2017-09-25 PROCEDURE — 86920 COMPATIBILITY TEST SPIN: CPT | Performed by: INTERNAL MEDICINE

## 2017-09-25 PROCEDURE — 85660 RBC SICKLE CELL TEST: CPT | Performed by: INTERNAL MEDICINE

## 2017-09-25 PROCEDURE — 36415 COLL VENOUS BLD VENIPUNCTURE: CPT

## 2017-09-25 PROCEDURE — 86870 RBC ANTIBODY IDENTIFICATION: CPT | Performed by: INTERNAL MEDICINE

## 2017-09-25 PROCEDURE — 86922 COMPATIBILITY TEST ANTIGLOB: CPT | Performed by: INTERNAL MEDICINE

## 2017-09-25 PROCEDURE — 86900 BLOOD TYPING SEROLOGIC ABO: CPT | Performed by: INTERNAL MEDICINE

## 2017-09-25 PROCEDURE — 86902 BLOOD TYPE ANTIGEN DONOR EA: CPT | Performed by: INTERNAL MEDICINE

## 2017-09-25 PROCEDURE — 86921 COMPATIBILITY TEST INCUBATE: CPT | Performed by: INTERNAL MEDICINE

## 2017-09-25 RX ORDER — SODIUM CHLORIDE 9 MG/ML
250 INJECTION, SOLUTION INTRAVENOUS AS NEEDED
Status: DISCONTINUED | OUTPATIENT
Start: 2017-09-27 | End: 2017-09-25 | Stop reason: CLARIF

## 2017-09-25 RX ORDER — SODIUM CHLORIDE 9 MG/ML
250 INJECTION, SOLUTION INTRAVENOUS AS NEEDED
Status: DISCONTINUED | OUTPATIENT
Start: 2017-09-25 | End: 2017-09-29 | Stop reason: HOSPADM

## 2017-09-25 NOTE — PROGRESS NOTES
[facility] Lab Visit:    Dulce Boucher  1960  [id number]    9590:  Pt arrived ambulatory. Labs drawn peripherally and sent for processing. Departed [facility] ambulatory and in no distress.     Visit Vitals    /67 (BP 1 Location: Left arm, BP Patient Position: Sitting)    Pulse 72    Temp 98.2 °F (36.8 °C)    Resp 16    SpO2 100%

## 2017-09-26 ENCOUNTER — APPOINTMENT (OUTPATIENT)
Dept: INFUSION THERAPY | Age: 57
End: 2017-09-26
Payer: COMMERCIAL

## 2017-09-27 ENCOUNTER — HOSPITAL ENCOUNTER (OUTPATIENT)
Dept: INFUSION THERAPY | Age: 57
Discharge: HOME | End: 2017-09-27
Payer: COMMERCIAL

## 2017-09-27 VITALS
HEART RATE: 57 BPM | OXYGEN SATURATION: 98 % | TEMPERATURE: 98.5 F | SYSTOLIC BLOOD PRESSURE: 121 MMHG | RESPIRATION RATE: 18 BRPM | DIASTOLIC BLOOD PRESSURE: 68 MMHG

## 2017-09-27 PROCEDURE — 74011250636 HC RX REV CODE- 250/636

## 2017-09-27 PROCEDURE — 77030013169 SET IV BLD ICUM -A

## 2017-09-27 PROCEDURE — 74011250636 HC RX REV CODE- 250/636: Performed by: INTERNAL MEDICINE

## 2017-09-27 PROCEDURE — 74011250637 HC RX REV CODE- 250/637: Performed by: INTERNAL MEDICINE

## 2017-09-27 PROCEDURE — 77030012965 HC NDL HUBR BBMI -A

## 2017-09-27 PROCEDURE — 36430 TRANSFUSION BLD/BLD COMPNT: CPT

## 2017-09-27 PROCEDURE — P9040 RBC LEUKOREDUCED IRRADIATED: HCPCS | Performed by: INTERNAL MEDICINE

## 2017-09-27 RX ORDER — SODIUM CHLORIDE 9 MG/ML
250 INJECTION, SOLUTION INTRAVENOUS AS NEEDED
Status: DISCONTINUED | OUTPATIENT
Start: 2017-09-27 | End: 2017-10-01 | Stop reason: HOSPADM

## 2017-09-27 RX ORDER — DIPHENHYDRAMINE HCL 25 MG
25 CAPSULE ORAL ONCE
Status: COMPLETED | OUTPATIENT
Start: 2017-09-27 | End: 2017-09-27

## 2017-09-27 RX ORDER — HEPARIN SODIUM (PORCINE) LOCK FLUSH IV SOLN 100 UNIT/ML 100 UNIT/ML
SOLUTION INTRAVENOUS
Status: COMPLETED
Start: 2017-09-27 | End: 2017-09-27

## 2017-09-27 RX ORDER — HEPARIN SODIUM (PORCINE) LOCK FLUSH IV SOLN 100 UNIT/ML 100 UNIT/ML
500 SOLUTION INTRAVENOUS AS NEEDED
Status: DISCONTINUED | OUTPATIENT
Start: 2017-09-27 | End: 2017-10-01 | Stop reason: HOSPADM

## 2017-09-27 RX ORDER — ACETAMINOPHEN 325 MG/1
650 TABLET ORAL ONCE
Status: COMPLETED | OUTPATIENT
Start: 2017-09-27 | End: 2017-09-27

## 2017-09-27 RX ORDER — SODIUM CHLORIDE 0.9 % (FLUSH) 0.9 %
10-40 SYRINGE (ML) INJECTION AS NEEDED
Status: DISCONTINUED | OUTPATIENT
Start: 2017-09-27 | End: 2017-10-01 | Stop reason: HOSPADM

## 2017-09-27 RX ADMIN — HEPARIN SODIUM (PORCINE) LOCK FLUSH IV SOLN 100 UNIT/ML 500 UNITS: 100 SOLUTION at 13:14

## 2017-09-27 RX ADMIN — Medication 10 ML: at 13:13

## 2017-09-27 RX ADMIN — ACETAMINOPHEN 650 MG: 325 TABLET ORAL at 08:22

## 2017-09-27 RX ADMIN — DIPHENHYDRAMINE HYDROCHLORIDE 25 MG: 25 CAPSULE ORAL at 08:22

## 2017-09-27 RX ADMIN — Medication 10 ML: at 08:25

## 2017-09-27 RX ADMIN — SODIUM CHLORIDE 250 ML: 900 INJECTION, SOLUTION INTRAVENOUS at 08:30

## 2017-09-27 NOTE — PROGRESS NOTES
[facility] Progress Note    Date: 2017    Name: Juan Doll    MRN: [id number]         : 1960      Patient assessed and education was provided. Complained of back pain 4/10. Will get pre meds for blood transfusion. Positioned for comfort and reassured    Patient vitals were reviewed. Visit Vitals    /68 (BP 1 Location: Left arm, BP Patient Position: At rest)    Pulse (!) 57    Temp 98.5 °F (36.9 °C)    Resp 18    SpO2 98%       Lab results were obtained and reviewed. Right chest medi-port accessed. Blood permit signed by patient      Pre-medications of benadryl and tylenol po were administered as ordered and blood transfusion was initiated. 2 units prbc was infused per protocol    Patient  tolerated infusion, and had no complaints. Declined to stay for observation    Patient Vitals for the past 8 hrs:   Temp Pulse Resp BP SpO2   17 1308 98.5 °F (36.9 °C) - 18 121/68 98 %   17 1205 98.4 °F (36.9 °C) (!) 57 16 118/67 -   17 1135 98.3 °F (36.8 °C) (!) 56 16 110/67 -   17 1120 98.3 °F (36.8 °C) (!) 56 16 109/67 -   17 1104 98.3 °F (36.8 °C) (!) 55 16 106/66 -   17 1005 98.2 °F (36.8 °C) (!) 59 16 105/65 -   17 0935 98.5 °F (36.9 °C) 62 16 105/61 -   17 0920 98.4 °F (36.9 °C) 65 16 104/62 -   17 0904 98.7 °F (37.1 °C) 66 16 109/61 -   17 0810 99 °F (37.2 °C) 75 18 125/71 99 %       Brisk flush/blood returns from medi-port. Port heparinized per  Protocol, then de-accessed. Site s signs of infection or infiltration. bandaid applied to site    Discharge instructions reviewed. Patient verbalized understanding. Patient was discharged from [place] [address] in stable condition, and without pain, at 1315. He has no further appointment with [facility] opic.     Nayan Morris RN  2017  2:14 PM

## 2017-09-27 NOTE — DISCHARGE INSTRUCTIONS
OUTPATIENT INFUSION CENTER    DISCHARGE INSTRUCTIONS FOR:  BLOOD TRANSFUSION    We hope you are feeling better after your blood transfusion. Some mild tenderness or slight bruising at your IV site is normal.  Avoid lifting or heavy use of that extremity for the rest of the day. Drink plenty of fluids, eat a normal diet and get some rest.    There are some important signs that you need to watch for in case you experience a delayed reaction to the blood you have received. Call your physician immediately if you develop any of the following symptoms:    1. Severe headache or backache;    2. Fever above 100 degrees;    3. Chills;    4. Difficulty breathing;    5.  Blood or red color in urine;    6. The feeling of weakness or constant fatigue;    7. Yellowing of the whites of your eyes or skin (jaundice). If your physician is not available, call or go to the nearest emergency room, or dial 911.     Ghislaine Art, Signature: ___________________________ 9/27/2017  Nico Gale RN

## 2017-10-10 ENCOUNTER — HOSPITAL ENCOUNTER (OUTPATIENT)
Age: 57
Discharge: HOME | End: 2017-10-10
Attending: RADIOLOGY
Payer: COMMERCIAL

## 2017-10-10 DIAGNOSIS — K83.09 CHOLANGITIS: ICD-10-CM

## 2017-10-10 PROCEDURE — 76498 UNLISTED MR PROCEDURE: CPT

## 2017-10-18 ENCOUNTER — HOSPITAL ENCOUNTER (EMERGENCY)
Age: 57
Discharge: HOME | End: 2017-10-19
Attending: EMERGENCY MEDICINE
Payer: COMMERCIAL

## 2017-10-18 DIAGNOSIS — D63.8 ANEMIA OF CHRONIC DISEASE: ICD-10-CM

## 2017-10-18 DIAGNOSIS — D57.1 HB-SS DISEASE WITHOUT CRISIS (HCC): Primary | ICD-10-CM

## 2017-10-18 PROCEDURE — 77030013169 SET IV BLD ICUM -A

## 2017-10-18 PROCEDURE — 99284 EMERGENCY DEPT VISIT MOD MDM: CPT

## 2017-10-19 ENCOUNTER — APPOINTMENT (OUTPATIENT)
Dept: GENERAL RADIOLOGY | Age: 57
End: 2017-10-19
Attending: PHYSICIAN ASSISTANT
Payer: COMMERCIAL

## 2017-10-19 ENCOUNTER — APPOINTMENT (OUTPATIENT)
Dept: CT IMAGING | Age: 57
End: 2017-10-19
Attending: PHYSICIAN ASSISTANT
Payer: COMMERCIAL

## 2017-10-19 VITALS
TEMPERATURE: 97.7 F | HEART RATE: 59 BPM | OXYGEN SATURATION: 97 % | SYSTOLIC BLOOD PRESSURE: 129 MMHG | WEIGHT: 121 LBS | HEIGHT: 64 IN | DIASTOLIC BLOOD PRESSURE: 60 MMHG | BODY MASS INDEX: 20.66 KG/M2 | RESPIRATION RATE: 11 BRPM

## 2017-10-19 LAB
ALBUMIN SERPL-MCNC: 3.4 G/DL (ref 3.4–5)
ALBUMIN/GLOB SERPL: 0.7 {RATIO} (ref 0.8–1.7)
ALP SERPL-CCNC: 268 U/L (ref 45–117)
ALT SERPL-CCNC: 68 U/L (ref 16–61)
ANION GAP SERPL CALC-SCNC: 6 MMOL/L (ref 3–18)
AST SERPL-CCNC: 60 U/L (ref 15–37)
ATRIAL RATE: 53 BPM
BASOPHILS # BLD: 0.1 K/UL (ref 0–0.1)
BASOPHILS NFR BLD: 1 % (ref 0–2)
BILIRUB DIRECT SERPL-MCNC: 2.2 MG/DL (ref 0–0.2)
BILIRUB SERPL-MCNC: 3.3 MG/DL (ref 0.2–1)
BUN SERPL-MCNC: 28 MG/DL (ref 7–18)
BUN/CREAT SERPL: 18 (ref 12–20)
CALCIUM SERPL-MCNC: 8.4 MG/DL (ref 8.5–10.1)
CALCULATED P AXIS, ECG09: 19 DEGREES
CALCULATED R AXIS, ECG10: 1 DEGREES
CALCULATED T AXIS, ECG11: 38 DEGREES
CHLORIDE SERPL-SCNC: 107 MMOL/L (ref 100–108)
CK MB CFR SERPL CALC: 3.9 % (ref 0–4)
CK MB SERPL-MCNC: 2.2 NG/ML (ref 5–25)
CK SERPL-CCNC: 56 U/L (ref 39–308)
CO2 SERPL-SCNC: 25 MMOL/L (ref 21–32)
CREAT SERPL-MCNC: 1.52 MG/DL (ref 0.6–1.3)
D DIMER PPP FEU-MCNC: 1.83 UG/ML(FEU)
DIAGNOSIS, 93000: NORMAL
DIFFERENTIAL METHOD BLD: ABNORMAL
EOSINOPHIL # BLD: 0.2 K/UL (ref 0–0.4)
EOSINOPHIL NFR BLD: 3 % (ref 0–5)
ERYTHROCYTE [DISTWIDTH] IN BLOOD BY AUTOMATED COUNT: 16.5 % (ref 11.6–14.5)
GLOBULIN SER CALC-MCNC: 4.8 G/DL (ref 2–4)
GLUCOSE SERPL-MCNC: 91 MG/DL (ref 74–99)
HCT VFR BLD AUTO: 20.4 % (ref 36–48)
HGB BLD-MCNC: 6.8 G/DL (ref 13–16)
LIPASE SERPL-CCNC: 120 U/L (ref 73–393)
LYMPHOCYTES # BLD: 1.5 K/UL (ref 0.9–3.6)
LYMPHOCYTES NFR BLD: 21 % (ref 21–52)
MCH RBC QN AUTO: 27.5 PG (ref 24–34)
MCHC RBC AUTO-ENTMCNC: 33.3 G/DL (ref 31–37)
MCV RBC AUTO: 82.6 FL (ref 74–97)
MONOCYTES # BLD: 1 K/UL (ref 0.05–1.2)
MONOCYTES NFR BLD: 13 % (ref 3–10)
NEUTS SEG # BLD: 4.5 K/UL (ref 1.8–8)
NEUTS SEG NFR BLD: 62 % (ref 40–73)
P-R INTERVAL, ECG05: 162 MS
PLATELET # BLD AUTO: 272 K/UL (ref 135–420)
PMV BLD AUTO: 9.6 FL (ref 9.2–11.8)
POTASSIUM SERPL-SCNC: 4.5 MMOL/L (ref 3.5–5.5)
PROT SERPL-MCNC: 8.2 G/DL (ref 6.4–8.2)
Q-T INTERVAL, ECG07: 488 MS
QRS DURATION, ECG06: 94 MS
QTC CALCULATION (BEZET), ECG08: 457 MS
RBC # BLD AUTO: 2.47 M/UL (ref 4.7–5.5)
RETICS/RBC NFR AUTO: 3.8 % (ref 0.5–2.3)
SODIUM SERPL-SCNC: 138 MMOL/L (ref 136–145)
TROPONIN I BLD-MCNC: <0.04 NG/ML (ref 0–0.08)
TROPONIN I SERPL-MCNC: <0.02 NG/ML (ref 0–0.04)
VENTRICULAR RATE, ECG03: 53 BPM
WBC # BLD AUTO: 7.2 K/UL (ref 4.6–13.2)

## 2017-10-19 PROCEDURE — 85660 RBC SICKLE CELL TEST: CPT | Performed by: PHYSICIAN ASSISTANT

## 2017-10-19 PROCEDURE — 74011250636 HC RX REV CODE- 250/636: Performed by: PHYSICIAN ASSISTANT

## 2017-10-19 PROCEDURE — 86922 COMPATIBILITY TEST ANTIGLOB: CPT | Performed by: PHYSICIAN ASSISTANT

## 2017-10-19 PROCEDURE — P9040 RBC LEUKOREDUCED IRRADIATED: HCPCS | Performed by: PHYSICIAN ASSISTANT

## 2017-10-19 PROCEDURE — 84484 ASSAY OF TROPONIN QUANT: CPT | Performed by: PHYSICIAN ASSISTANT

## 2017-10-19 PROCEDURE — 82550 ASSAY OF CK (CPK): CPT | Performed by: PHYSICIAN ASSISTANT

## 2017-10-19 PROCEDURE — 96374 THER/PROPH/DIAG INJ IV PUSH: CPT

## 2017-10-19 PROCEDURE — 93005 ELECTROCARDIOGRAM TRACING: CPT

## 2017-10-19 PROCEDURE — 74011250637 HC RX REV CODE- 250/637: Performed by: PHYSICIAN ASSISTANT

## 2017-10-19 PROCEDURE — 86921 COMPATIBILITY TEST INCUBATE: CPT | Performed by: PHYSICIAN ASSISTANT

## 2017-10-19 PROCEDURE — 85025 COMPLETE CBC W/AUTO DIFF WBC: CPT | Performed by: PHYSICIAN ASSISTANT

## 2017-10-19 PROCEDURE — 85045 AUTOMATED RETICULOCYTE COUNT: CPT | Performed by: PHYSICIAN ASSISTANT

## 2017-10-19 PROCEDURE — 85379 FIBRIN DEGRADATION QUANT: CPT | Performed by: PHYSICIAN ASSISTANT

## 2017-10-19 PROCEDURE — 86920 COMPATIBILITY TEST SPIN: CPT | Performed by: PHYSICIAN ASSISTANT

## 2017-10-19 PROCEDURE — 86870 RBC ANTIBODY IDENTIFICATION: CPT | Performed by: PHYSICIAN ASSISTANT

## 2017-10-19 PROCEDURE — 80076 HEPATIC FUNCTION PANEL: CPT | Performed by: PHYSICIAN ASSISTANT

## 2017-10-19 PROCEDURE — 36430 TRANSFUSION BLD/BLD COMPNT: CPT

## 2017-10-19 PROCEDURE — 83690 ASSAY OF LIPASE: CPT | Performed by: PHYSICIAN ASSISTANT

## 2017-10-19 PROCEDURE — 96376 TX/PRO/DX INJ SAME DRUG ADON: CPT

## 2017-10-19 PROCEDURE — 86900 BLOOD TYPING SEROLOGIC ABO: CPT | Performed by: PHYSICIAN ASSISTANT

## 2017-10-19 PROCEDURE — 71275 CT ANGIOGRAPHY CHEST: CPT

## 2017-10-19 PROCEDURE — 71020 XR CHEST PA LAT: CPT

## 2017-10-19 PROCEDURE — 86902 BLOOD TYPE ANTIGEN DONOR EA: CPT | Performed by: PHYSICIAN ASSISTANT

## 2017-10-19 PROCEDURE — 74011636320 HC RX REV CODE- 636/320: Performed by: EMERGENCY MEDICINE

## 2017-10-19 PROCEDURE — 80048 BASIC METABOLIC PNL TOTAL CA: CPT | Performed by: PHYSICIAN ASSISTANT

## 2017-10-19 PROCEDURE — 96361 HYDRATE IV INFUSION ADD-ON: CPT

## 2017-10-19 RX ORDER — ASPIRIN 325 MG
325 TABLET ORAL
Status: COMPLETED | OUTPATIENT
Start: 2017-10-19 | End: 2017-10-19

## 2017-10-19 RX ORDER — HYDROMORPHONE HYDROCHLORIDE 1 MG/ML
1 INJECTION, SOLUTION INTRAMUSCULAR; INTRAVENOUS; SUBCUTANEOUS
Status: DISCONTINUED | OUTPATIENT
Start: 2017-10-19 | End: 2017-10-19

## 2017-10-19 RX ORDER — HYDROMORPHONE HYDROCHLORIDE 2 MG/ML
2 INJECTION, SOLUTION INTRAMUSCULAR; INTRAVENOUS; SUBCUTANEOUS
Status: COMPLETED | OUTPATIENT
Start: 2017-10-19 | End: 2017-10-19

## 2017-10-19 RX ORDER — HYDROMORPHONE HYDROCHLORIDE 2 MG/1
2 TABLET ORAL
Status: COMPLETED | OUTPATIENT
Start: 2017-10-19 | End: 2017-10-19

## 2017-10-19 RX ORDER — SODIUM CHLORIDE 9 MG/ML
250 INJECTION, SOLUTION INTRAVENOUS AS NEEDED
Status: DISCONTINUED | OUTPATIENT
Start: 2017-10-19 | End: 2017-10-19 | Stop reason: HOSPADM

## 2017-10-19 RX ADMIN — HYDROMORPHONE HYDROCHLORIDE 2 MG: 2 TABLET ORAL at 02:49

## 2017-10-19 RX ADMIN — HYDROMORPHONE HYDROCHLORIDE 2 MG: 2 INJECTION, SOLUTION INTRAMUSCULAR; INTRAVENOUS; SUBCUTANEOUS at 15:05

## 2017-10-19 RX ADMIN — SODIUM CHLORIDE 1000 ML: 9 INJECTION, SOLUTION INTRAVENOUS at 04:30

## 2017-10-19 RX ADMIN — IOPAMIDOL 100 ML: 755 INJECTION, SOLUTION INTRAVENOUS at 05:22

## 2017-10-19 RX ADMIN — HYDROMORPHONE HYDROCHLORIDE 2 MG: 2 INJECTION, SOLUTION INTRAMUSCULAR; INTRAVENOUS; SUBCUTANEOUS at 07:31

## 2017-10-19 RX ADMIN — ASPIRIN 325 MG ORAL TABLET 325 MG: 325 PILL ORAL at 02:49

## 2017-10-19 RX ADMIN — ALUMINUM HYDROXIDE AND MAGNESIUM HYDROXIDE 15 ML: 200; 200 SUSPENSION ORAL at 02:49

## 2017-10-19 RX ADMIN — SODIUM CHLORIDE 250 ML: 9 INJECTION, SOLUTION INTRAVENOUS at 09:09

## 2017-10-19 RX ADMIN — HYDROMORPHONE HYDROCHLORIDE 2 MG: 2 INJECTION, SOLUTION INTRAMUSCULAR; INTRAVENOUS; SUBCUTANEOUS at 10:46

## 2017-10-19 NOTE — ED NOTES
Bedside shift change report given to Salvador Rivas RN   (oncoming nurse) by Jeanna Horner RN (offgoing nurse). Report included the following information SBAR, ED Summary and MAR.

## 2017-10-19 NOTE — ED NOTES
C/O of pain to his chest.  States, \"that's where my sickle cell pain is. \"  Lonny Marquez PA-C notified. Will order another dose of pain medication, refer to [facility]

## 2017-10-19 NOTE — ED NOTES
Bedside shift report received from [address]. Assumed care of patient. Received patient sitting up on stretcher. Awake, alert, oriented X 3.  C/O chest pian. Will administer Dilaudid as ordered, refer to [facility].  VSS, refer to flow sheet.

## 2017-10-19 NOTE — ED TRIAGE NOTES
Patient ambulated to triage complaining of intermittent chest pain x 1 week. Patient is a sickle cell patient and reports his pain has not been relieved by his home medications, he denies having any difficulty breathing at this time.

## 2017-10-19 NOTE — DISCHARGE INSTRUCTIONS
Anemia From Chronic Disease: Care Instructions  Your Care Instructions    Anemia is a low level of red blood cells, which carry oxygen from your lungs to the rest of your body. Sometimes when you have a long-term (chronic) disease such as kidney disease, arthritis, diabetes, cancer, or an infection, your body does not make enough red blood cells. Follow-up care is a key part of your treatment and safety. Be sure to make and go to all appointments, and call your doctor if you are having problems. It's also a good idea to know your test results and keep a list of the medicines you take. How can you care for yourself at home? · Follow your doctor's instructions to treat the chronic condition that is causing the anemia. · Take your medicine to treat your chronic condition exactly as prescribed. Call your doctor if you think you are having a problem with your medicine. · Take your medicine for anemia exactly as prescribed. Call your doctor if you think you are having a problem with your medicine. Medicines to increase the number of red blood cells (such as epoetin or darbepoetin) may be given as an injection. ¨ If you miss a dose, take it as soon as you can, unless it is almost time for your next dose. In that case, get back on your regular schedule and take only one dose. ¨ Do not freeze this medicine. Store it in the refrigerator. Do not shake the bottle before you prepare the shot. · Keep all your appointments for blood tests to check on your hemoglobin levels. When should you call for help? Call 911 anytime you think you may need emergency care. For example, call if:  · You passed out (lost consciousness). · You have symptoms of a heart attack, such as:  ¨ Chest pain or pressure. ¨ Sweating. ¨ Shortness of breath. ¨ Nausea or vomiting. ¨ Pain that spreads from the chest to the neck, jaw, or one or both shoulders or arms. ¨ Dizziness or lightheadedness. ¨ A fast or uneven pulse.   After calling 911, chew 1 adult-strength aspirin. Wait for an ambulance. Do not try to drive yourself. · You have a seizure. Call your doctor now or seek immediate medical care if:  · You have side effects of epoetin and darbepoetin, such as:  ¨ A headache. ¨ A fever. ¨ Diarrhea, nausea, or vomiting. ¨ Fatigue. ¨ Muscle or joint pain. ¨ A skin rash. · Your fatigue and weakness continue or get worse. Watch closely for changes in your health, and be sure to contact your doctor if you have any problems. Where can you learn more? Go to http://derekAwesomeHighlighterlaila.info/. Enter E502 in the search box to learn more about \"Anemia From Chronic Disease: Care Instructions. \"  Current as of: October 13, 2016  Content Version: 11.3  © 1735-3897 [employer]. Care instructions adapted under license by [employer] (which disclaims liability or warranty for this information). If you have questions about a medical condition or this instruction, always ask your healthcare professional. [place] [address] any warranty or liability for your use of this information. Sickle Cell Disease: Care Instructions  Your Care Instructions    Sickle cell disease turns normal, round red blood cells into misshaped cells that look like padmini or crescent moons. The sickle-shaped cells can get stuck in blood vessels, blocking blood flow and causing severe pain. The sickle-shaped cells also can harm organs, muscles, and bones. It is a lifelong condition. Sickle cell disease is passed down in families. You can talk to your doctor about whether to have genetic tests to find out the chance of having a child with the disease. Your doctor also may recommend that your family members get tested for sickle cell disease. Your doctor may treat you with medicines. Some people get blood transfusions or a bone marrow transplant. Managing pain is an important part of your treatment.   Follow-up care is a key part of your treatment and safety. Be sure to make and go to all appointments, and call your doctor if you are having problems. It's also a good idea to know your test results and keep a list of the medicines you take. How can you care for yourself at home? · Take your medicines exactly as prescribed. Call your doctor if you think you are having a problem with your medicine. · Take pain medicines exactly as directed. ¨ If the doctor gave you a prescription medicine for pain, take it as prescribed. ¨ If you are not taking a prescription pain medicine, ask your doctor if you can take an over-the-counter medicine. · Try to help ease pain by distracting yourself. Use guided imagery, deep breathing, and relaxation exercises. A pain specialist can teach you pain management skills. · Avoid alcohol. It can make you dehydrated. · Dress warmly in cold weather. The cold and windy weather can lead to severe pain. · Do not smoke. Smoking can reduce the amount of oxygen in your blood. If you need help quitting, talk to your doctor about stop-smoking programs and medicines. These can increase your chances of quitting for good. · Get plenty of sleep. · Get regular eye exams. Sickle cell disease can cause vision problems. · Wear medical alert jewelry that says that you have sickle cell disease. You can buy this at most drugstores. · Avoid colds and flu. Get a pneumococcal vaccine shot. If you have had one before, ask your doctor whether you need another dose. Get a flu shot every year. If you must be around people with colds or flu, wash your hands often. When should you call for help? Call 911 anytime you think you may need emergency care. For example, call if:  · You passed out (lost consciousness). · You are in severe pain that is not helped by your pain medicines.   Call your doctor now or seek immediate medical care if:  · You have these signs of acute chest syndrome, a problem caused by sickle cell disease:  ¨ Cough. ¨ Chest pain. ¨ Fever. ¨ Shortness of breath. · You have vision problems. · You have severe belly pain. · You have a severe headache. · You have less urine than normal or no urine. · You have vomiting or diarrhea that does not go away after 2 hours. · You are dizzy or lightheaded, or you feel like you may faint. · You have sudden numbness or tingling in your hands, feet, fingers, or toes (even if it goes away). · You suddenly have poor balance and coordination when you walk (even if it goes away). · You have an erection that lasts more than 2 to 3 hours or is extremely painful. Watch closely for changes in your health, and be sure to contact your doctor if you have any problems. Where can you learn more? Go to http://derek-laila.info/. Enter 486 8545 in the search box to learn more about \"Sickle Cell Disease: Care Instructions. \"  Current as of: October 13, 2016  Content Version: 11.3  © 2129-5412 [employer]. Care instructions adapted under license by [employer] (which disclaims liability or warranty for this information). If you have questions about a medical condition or this instruction, always ask your healthcare professional. [address] any warranty or liability for your use of this information.

## 2017-10-19 NOTE — ED PROVIDER NOTES
HPI Comments: 12:50 AM  61 y.o. male with PMH of SC anemia and GERD who presents to ED C/O central chest pain intermittently x 1 week. Pt notes the pain is worse with movement and exertion. Notes the last episode of pain occurred this morning. Denies fever/chills, dyspnea, cough, n/v/d, dysuria, hematuria. Notes he has tried Percocet and Dilaudid at home without relief. Denies hx of CAD, smoking, HLD, DM, HTN. Pt denies any other sxs or complaints. Heme: Juan Carlos  Written by José Latif PA-C      The history is provided by the patient. Past Medical History:   Diagnosis Date    Anemia NEC     sickle cell    Duodenal ulcer     GERD (gastroesophageal reflux disease)     Other ill-defined conditions(479.89) Sickle cell    Sickle cell anemia (HCC)        Past Surgical History:   Procedure Laterality Date    HX CHOLECYSTECTOMY      HX RETINAL DETACHMENT REPAIR Right     HX VASCULAR ACCESS      double lumen mediport         History reviewed. No pertinent family history. Social History     Social History    Marital status:      Spouse name: N/A    Number of children: N/A    Years of education: N/A     Occupational History    Not on file. Social History Main Topics    Smoking status: Never Smoker    Smokeless tobacco: Never Used    Alcohol use No    Drug use: No    Sexual activity: Yes     Partners: Female     Other Topics Concern    Not on file     Social History Narrative         ALLERGIES: Review of patient's allergies indicates no known allergies. Review of Systems   Constitutional: Negative for chills, diaphoresis, fatigue and fever. Respiratory: Negative for shortness of breath. Cardiovascular: Positive for chest pain. Negative for palpitations and leg swelling. Gastrointestinal: Positive for abdominal pain (chronic per patient). Negative for constipation, diarrhea, nausea and vomiting. Genitourinary: Negative for dysuria and flank pain.    Musculoskeletal: Negative for joint swelling, neck pain and neck stiffness. Skin: Negative for rash. Neurological: Negative for weakness. Hematological: Negative for adenopathy. All other systems reviewed and are negative. Vitals:    10/19/17 0330 10/19/17 0400 10/19/17 0430 10/19/17 0444   BP: 112/68 111/58 117/62    Pulse:    60   Resp:       Temp:       SpO2: 98% 97% 96%             Physical Exam   Constitutional: He appears well-developed and well-nourished. No distress. HENT:   Head: Normocephalic and atraumatic. Neck: Normal range of motion. Neck supple. Cardiovascular: Normal rate, regular rhythm and normal heart sounds. Exam reveals no gallop and no friction rub. No murmur heard. Pulmonary/Chest: Effort normal and breath sounds normal. No stridor. No respiratory distress. He has no wheezes. He has no rales. He exhibits no tenderness. Abdominal: Soft. Bowel sounds are normal. He exhibits no distension and no mass. There is no tenderness. There is no rebound and no guarding. Musculoskeletal: Normal range of motion. Neurological: He is alert. Skin: Skin is warm. No rash noted. He is not diaphoretic. No pallor. Nursing note and vitals reviewed.        MDM  Number of Diagnoses or Management Options  Anemia of chronic disease:   Hb-SS disease without crisis [facility]):     ED Course       Procedures    RESULTS:    XR CHEST PA LAT    (Results Pending)   CTA CHEST W OR W WO CONT    (Results Pending)       Labs Reviewed   CBC WITH AUTOMATED DIFF - Abnormal; Notable for the following:        Result Value    RBC 2.47 (*)     HGB 6.8 (*)     HCT 20.4 (*)     RDW 16.5 (*)     MONOCYTES 13 (*)     All other components within normal limits   RETICULOCYTE COUNT - Abnormal; Notable for the following:     Reticulocyte count 3.8 (*)     All other components within normal limits   METABOLIC PANEL, BASIC - Abnormal; Notable for the following:     BUN 28 (*)     Creatinine 1.52 (*)     GFR est AA 57 (*)     GFR est non-AA 47 (*)     Calcium 8.4 (*)     All other components within normal limits   HEPATIC FUNCTION PANEL - Abnormal; Notable for the following:     Globulin 4.8 (*)     A-G Ratio 0.7 (*)     Bilirubin, total 3.3 (*)     Bilirubin, direct 2.2 (*)     Alk. phosphatase 268 (*)     AST (SGOT) 60 (*)     ALT (SGPT) 68 (*)     All other components within normal limits   D DIMER - Abnormal; Notable for the following:     D DIMER 1.83 (*)     All other components within normal limits   LIPASE   CARDIAC PANEL,(CK, CKMB & TROPONIN)   TROPONIN I   TYPE & SCREEN       Recent Results (from the past 12 hour(s))   EKG, 12 LEAD, INITIAL    Collection Time: 10/19/17  1:56 AM   Result Value Ref Range    Ventricular Rate 53 BPM    Atrial Rate 53 BPM    P-R Interval 162 ms    QRS Duration 94 ms    Q-T Interval 488 ms    QTC Calculation (Bezet) 457 ms    Calculated P Axis 19 degrees    Calculated R Axis 1 degrees    Calculated T Axis 38 degrees    Diagnosis       Sinus bradycardia  Minimal voltage criteria for LVH, may be normal variant  Borderline ECG  When compared with ECG of 27-MAY-2016 21:12,  Vent. rate has decreased BY  31 BPM     CBC WITH AUTOMATED DIFF    Collection Time: 10/19/17  3:27 AM   Result Value Ref Range    WBC 7.2 4.6 - 13.2 K/uL    RBC 2.47 (L) 4.70 - 5.50 M/uL    HGB 6.8 (L) 13.0 - 16.0 g/dL    HCT 20.4 (L) 36.0 - 48.0 %    MCV 82.6 74.0 - 97.0 FL    MCH 27.5 24.0 - 34.0 PG    MCHC 33.3 31.0 - 37.0 g/dL    RDW 16.5 (H) 11.6 - 14.5 %    PLATELET 635 009 - 233 K/uL    MPV 9.6 9.2 - 11.8 FL    NEUTROPHILS 62 40 - 73 %    LYMPHOCYTES 21 21 - 52 %    MONOCYTES 13 (H) 3 - 10 %    EOSINOPHILS 3 0 - 5 %    BASOPHILS 1 0 - 2 %    ABS. NEUTROPHILS 4.5 1.8 - 8.0 K/UL    ABS. LYMPHOCYTES 1.5 0.9 - 3.6 K/UL    ABS. MONOCYTES 1.0 0.05 - 1.2 K/UL    ABS. EOSINOPHILS 0.2 0.0 - 0.4 K/UL    ABS.  BASOPHILS 0.1 0.0 - 0.1 K/UL    DF AUTOMATED     RETICULOCYTE COUNT    Collection Time: 10/19/17  3:27 AM   Result Value Ref Range Reticulocyte count 3.8 (H) 0.5 - 2.3 %   METABOLIC PANEL, BASIC    Collection Time: 10/19/17  3:27 AM   Result Value Ref Range    Sodium 138 136 - 145 mmol/L    Potassium 4.5 3.5 - 5.5 mmol/L    Chloride 107 100 - 108 mmol/L    CO2 25 21 - 32 mmol/L    Anion gap 6 3.0 - 18 mmol/L    Glucose 91 74 - 99 mg/dL    BUN 28 (H) 7.0 - 18 MG/DL    Creatinine 1.52 (H) 0.6 - 1.3 MG/DL    BUN/Creatinine ratio 18 12 - 20      GFR est AA 57 (L) >60 ml/min/1.73m2    GFR est non-AA 47 (L) >60 ml/min/1.73m2    Calcium 8.4 (L) 8.5 - 10.1 MG/DL   HEPATIC FUNCTION PANEL    Collection Time: 10/19/17  3:27 AM   Result Value Ref Range    Protein, total 8.2 6.4 - 8.2 g/dL    Albumin 3.4 3.4 - 5.0 g/dL    Globulin 4.8 (H) 2.0 - 4.0 g/dL    A-G Ratio 0.7 (L) 0.8 - 1.7      Bilirubin, total 3.3 (H) 0.2 - 1.0 MG/DL    Bilirubin, direct 2.2 (H) 0.0 - 0.2 MG/DL    Alk. phosphatase 268 (H) 45 - 117 U/L    AST (SGOT) 60 (H) 15 - 37 U/L    ALT (SGPT) 68 (H) 16 - 61 U/L   LIPASE    Collection Time: 10/19/17  3:27 AM   Result Value Ref Range    Lipase 120 73 - 393 U/L   CARDIAC PANEL,(CK, CKMB & TROPONIN)    Collection Time: 10/19/17  3:27 AM   Result Value Ref Range    CK 56 39 - 308 U/L    CK - MB 2.2 <3.6 ng/ml    CK-MB Index 3.9 0.0 - 4.0 %    Troponin-I, Qt. <0.02 0.0 - 0.045 NG/ML   D DIMER    Collection Time: 10/19/17  3:29 AM   Result Value Ref Range    D DIMER 1.83 (H) <0.46 ug/ml(FEU)     PROGRESS NOTE:   4:30am  Discussed results with patient and need for CT chest.  Notes with hemoglobin of 6.8, Dr. Tika Salas will likely want to transfuse. 6:00am  Pt signed out to oncJethro Romero pending CT chest and consultation with Dr. Tika Salas  Written by Art Cervantes PA-C    6:00 AM Received report from [facility]. Pt with SCD c/o chest pain. CTA chest neg for PE, other acute process. 2nd trop pending. Hgb 6.8, will discuss with Dr. Tika Salas for transfusion. Pt sleeping, c/o chest and back pain when awakened.  KARTHIK Lomeli      8:15 AM Discussed with Dr Sarbjit Bear, heme/onc, ok to transfuse 1 unit. KARTHIK Al       5:43 PM transfusion complete. Vss. Afebrile. Pain controlled. No other symptoms or complaints. Stable for d/c and out pt f/u.  KARTHIK Al

## 2017-10-19 NOTE — ED NOTES
Hourly rounding complete. Safety  Pt resting   [ x ]  On stretcher with side rails up and bed in locked position, call bell within reach  [  ]  Sitting in chair with casters locked, call bell within reach    Toileting  [x  ] pt denies need to use bathroom  [  ] pt assisted to bathroom  [  ] pt assisted with bedpan  [  ] pt independent to bathroom as needed    Ongoing Plan of Care  Plan of care and expected time for test and results reviewed with pt.     Pain Management / Comfort  [  ] dimmed lights  [ x ] warm blanket provided  [x  ] pain assessed  [ x ] monitor alarms reviewed

## 2017-10-24 LAB
ABO + RH BLD: NORMAL
ANTIGENS PRESENT RBC DONR: NORMAL
ANTIGENS PRESENT RBC DONR: NORMAL
BLD PROD TYP BPU: NORMAL
BLD PROD TYP BPU: NORMAL
BLOOD GROUP ANTIBODIES SERPL: NORMAL
BLOOD GROUP ANTIBODIES SERPL: NORMAL
BPU ID: NORMAL
BPU ID: NORMAL
CALLED TO:,BCALL1: NORMAL
CROSSMATCH RESULT,%XM: NORMAL
CROSSMATCH RESULT,%XM: NORMAL
SPECIMEN EXP DATE BLD: NORMAL
STATUS OF UNIT,%ST: NORMAL
STATUS OF UNIT,%ST: NORMAL
UNIT DIVISION, %UDIV: 0
UNIT DIVISION, %UDIV: 0

## 2017-10-31 ENCOUNTER — HOSPITAL ENCOUNTER (OUTPATIENT)
Dept: PREADMISSION TESTING | Age: 57
Discharge: HOME | End: 2017-10-31
Payer: COMMERCIAL

## 2017-10-31 LAB
ALBUMIN SERPL-MCNC: 3.5 G/DL (ref 3.4–5)
ALBUMIN/GLOB SERPL: 0.7 {RATIO} (ref 0.8–1.7)
ALP SERPL-CCNC: 280 U/L (ref 45–117)
ALT SERPL-CCNC: 52 U/L (ref 16–61)
ANION GAP SERPL CALC-SCNC: 8 MMOL/L (ref 3–18)
AST SERPL-CCNC: 44 U/L (ref 15–37)
BASOPHILS # BLD: 0.1 K/UL (ref 0–0.06)
BASOPHILS NFR BLD: 1 % (ref 0–2)
BILIRUB DIRECT SERPL-MCNC: 3.9 MG/DL (ref 0–0.2)
BILIRUB SERPL-MCNC: 5.3 MG/DL (ref 0.2–1)
BUN SERPL-MCNC: 20 MG/DL (ref 7–18)
BUN/CREAT SERPL: 16 (ref 12–20)
CALCIUM SERPL-MCNC: 8.4 MG/DL (ref 8.5–10.1)
CHLORIDE SERPL-SCNC: 100 MMOL/L (ref 100–108)
CO2 SERPL-SCNC: 23 MMOL/L (ref 21–32)
CREAT SERPL-MCNC: 1.29 MG/DL (ref 0.6–1.3)
DIFFERENTIAL METHOD BLD: ABNORMAL
EOSINOPHIL # BLD: 0.3 K/UL (ref 0–0.4)
EOSINOPHIL NFR BLD: 4 % (ref 0–5)
ERYTHROCYTE [DISTWIDTH] IN BLOOD BY AUTOMATED COUNT: 17.8 % (ref 11.6–14.5)
GLOBULIN SER CALC-MCNC: 4.9 G/DL (ref 2–4)
GLUCOSE SERPL-MCNC: 80 MG/DL (ref 74–99)
HCT VFR BLD AUTO: 22.7 % (ref 36–48)
HGB BLD-MCNC: 7.7 G/DL (ref 13–16)
LYMPHOCYTES # BLD: 1.9 K/UL (ref 0.9–3.6)
LYMPHOCYTES NFR BLD: 25 % (ref 21–52)
MCH RBC QN AUTO: 28.6 PG (ref 24–34)
MCHC RBC AUTO-ENTMCNC: 33.9 G/DL (ref 31–37)
MCV RBC AUTO: 84.4 FL (ref 74–97)
MONOCYTES # BLD: 1 K/UL (ref 0.05–1.2)
MONOCYTES NFR BLD: 13 % (ref 3–10)
NEUTS SEG # BLD: 4.6 K/UL (ref 1.8–8)
NEUTS SEG NFR BLD: 57 % (ref 40–73)
PLATELET # BLD AUTO: 277 K/UL (ref 135–420)
PMV BLD AUTO: 9.9 FL (ref 9.2–11.8)
POTASSIUM SERPL-SCNC: 4.5 MMOL/L (ref 3.5–5.5)
PROT SERPL-MCNC: 8.4 G/DL (ref 6.4–8.2)
RBC # BLD AUTO: 2.69 M/UL (ref 4.7–5.5)
SODIUM SERPL-SCNC: 131 MMOL/L (ref 136–145)
WBC # BLD AUTO: 7.9 K/UL (ref 4.6–13.2)

## 2017-10-31 PROCEDURE — 80048 BASIC METABOLIC PNL TOTAL CA: CPT | Performed by: RADIOLOGY

## 2017-10-31 PROCEDURE — 80076 HEPATIC FUNCTION PANEL: CPT | Performed by: RADIOLOGY

## 2017-10-31 PROCEDURE — 85025 COMPLETE CBC W/AUTO DIFF WBC: CPT | Performed by: RADIOLOGY

## 2017-10-31 PROCEDURE — 36415 COLL VENOUS BLD VENIPUNCTURE: CPT | Performed by: RADIOLOGY

## 2017-11-07 ENCOUNTER — HOSPITAL ENCOUNTER (INPATIENT)
Dept: INTERVENTIONAL RADIOLOGY/VASCULAR | Age: 57
LOS: 1 days | Discharge: HOME HEALTH | DRG: 446 | End: 2017-11-08
Attending: RADIOLOGY | Admitting: INTERNAL MEDICINE
Payer: COMMERCIAL

## 2017-11-07 ENCOUNTER — ANESTHESIA EVENT (OUTPATIENT)
Dept: INTERVENTIONAL RADIOLOGY/VASCULAR | Age: 57
DRG: 446 | End: 2017-11-07
Payer: COMMERCIAL

## 2017-11-07 ENCOUNTER — ANESTHESIA (OUTPATIENT)
Dept: INTERVENTIONAL RADIOLOGY/VASCULAR | Age: 57
DRG: 446 | End: 2017-11-07
Payer: COMMERCIAL

## 2017-11-07 DIAGNOSIS — R83.0 ABNORMAL ENZYME LEVELS IN CSF: ICD-10-CM

## 2017-11-07 PROBLEM — K83.1 BENIGN OBSTRUCTIVE JAUNDICE: Status: ACTIVE | Noted: 2017-11-07

## 2017-11-07 PROBLEM — D57.00 SICKLE CELL ANEMIA WITH CRISIS (HCC): Status: ACTIVE | Noted: 2017-11-07

## 2017-11-07 LAB
ALBUMIN SERPL-MCNC: 3.4 G/DL (ref 3.4–5)
ALBUMIN/GLOB SERPL: 0.8 {RATIO} (ref 0.8–1.7)
ALP SERPL-CCNC: 220 U/L (ref 45–117)
ALT SERPL-CCNC: 54 U/L (ref 16–61)
ANION GAP SERPL CALC-SCNC: 9 MMOL/L (ref 3–18)
APTT PPP: 34.6 SEC (ref 23–36.4)
AST SERPL-CCNC: 68 U/L (ref 15–37)
BILIRUB DIRECT SERPL-MCNC: 3 MG/DL (ref 0–0.2)
BILIRUB SERPL-MCNC: 4.7 MG/DL (ref 0.2–1)
BUN SERPL-MCNC: 18 MG/DL (ref 7–18)
BUN/CREAT SERPL: 14 (ref 12–20)
CALCIUM SERPL-MCNC: 8.7 MG/DL (ref 8.5–10.1)
CHLORIDE SERPL-SCNC: 109 MMOL/L (ref 100–108)
CK MB CFR SERPL CALC: 2.3 % (ref 0–4)
CK MB SERPL-MCNC: 2.3 NG/ML (ref 5–25)
CK SERPL-CCNC: 98 U/L (ref 39–308)
CO2 SERPL-SCNC: 22 MMOL/L (ref 21–32)
CREAT SERPL-MCNC: 1.28 MG/DL (ref 0.6–1.3)
ERYTHROCYTE [DISTWIDTH] IN BLOOD BY AUTOMATED COUNT: 19 % (ref 11.6–14.5)
GLOBULIN SER CALC-MCNC: 4.4 G/DL (ref 2–4)
GLUCOSE SERPL-MCNC: 89 MG/DL (ref 74–99)
HCT VFR BLD AUTO: 19.9 % (ref 36–48)
HGB BLD-MCNC: 6.8 G/DL (ref 13–16)
INR PPP: 1.3 (ref 0.8–1.2)
IRON SATN MFR SERPL: 26 %
IRON SERPL-MCNC: 44 UG/DL (ref 50–175)
MCH RBC QN AUTO: 28.9 PG (ref 24–34)
MCHC RBC AUTO-ENTMCNC: 34.2 G/DL (ref 31–37)
MCV RBC AUTO: 84.7 FL (ref 74–97)
PLATELET # BLD AUTO: 333 K/UL (ref 135–420)
PMV BLD AUTO: 9.1 FL (ref 9.2–11.8)
POTASSIUM SERPL-SCNC: 4.5 MMOL/L (ref 3.5–5.5)
PROT SERPL-MCNC: 7.8 G/DL (ref 6.4–8.2)
PROTHROMBIN TIME: 15.3 SEC (ref 11.5–15.2)
RBC # BLD AUTO: 2.35 M/UL (ref 4.7–5.5)
SODIUM SERPL-SCNC: 140 MMOL/L (ref 136–145)
TIBC SERPL-MCNC: 169 UG/DL (ref 250–450)
TROPONIN I SERPL-MCNC: <0.02 NG/ML (ref 0–0.04)
WBC # BLD AUTO: 6.9 K/UL (ref 4.6–13.2)

## 2017-11-07 PROCEDURE — 86920 COMPATIBILITY TEST SPIN: CPT | Performed by: INTERNAL MEDICINE

## 2017-11-07 PROCEDURE — 85660 RBC SICKLE CELL TEST: CPT | Performed by: INTERNAL MEDICINE

## 2017-11-07 PROCEDURE — BF101ZZ FLUOROSCOPY OF BILE DUCTS USING LOW OSMOLAR CONTRAST: ICD-10-PCS | Performed by: RADIOLOGY

## 2017-11-07 PROCEDURE — 82550 ASSAY OF CK (CPK): CPT | Performed by: INTERNAL MEDICINE

## 2017-11-07 PROCEDURE — 85730 THROMBOPLASTIN TIME PARTIAL: CPT | Performed by: RADIOLOGY

## 2017-11-07 PROCEDURE — 86922 COMPATIBILITY TEST ANTIGLOB: CPT | Performed by: INTERNAL MEDICINE

## 2017-11-07 PROCEDURE — 74011636320 HC RX REV CODE- 636/320: Performed by: RADIOLOGY

## 2017-11-07 PROCEDURE — 85027 COMPLETE CBC AUTOMATED: CPT | Performed by: RADIOLOGY

## 2017-11-07 PROCEDURE — 74011250636 HC RX REV CODE- 250/636: Performed by: RADIOLOGY

## 2017-11-07 PROCEDURE — 76942 ECHO GUIDE FOR BIOPSY: CPT

## 2017-11-07 PROCEDURE — 74011250637 HC RX REV CODE- 250/637: Performed by: RADIOLOGY

## 2017-11-07 PROCEDURE — 85610 PROTHROMBIN TIME: CPT | Performed by: RADIOLOGY

## 2017-11-07 PROCEDURE — 76060000034 HC ANESTHESIA 1.5 TO 2 HR

## 2017-11-07 PROCEDURE — 86850 RBC ANTIBODY SCREEN: CPT | Performed by: INTERNAL MEDICINE

## 2017-11-07 PROCEDURE — C1729 CATH, DRAINAGE: HCPCS

## 2017-11-07 PROCEDURE — 36415 COLL VENOUS BLD VENIPUNCTURE: CPT | Performed by: INTERNAL MEDICINE

## 2017-11-07 PROCEDURE — 74011250636 HC RX REV CODE- 250/636

## 2017-11-07 PROCEDURE — 83540 ASSAY OF IRON: CPT | Performed by: INTERNAL MEDICINE

## 2017-11-07 PROCEDURE — 0FC93ZZ EXTIRPATION OF MATTER FROM COMMON BILE DUCT, PERCUTANEOUS APPROACH: ICD-10-PCS | Performed by: RADIOLOGY

## 2017-11-07 PROCEDURE — 74011250636 HC RX REV CODE- 250/636: Performed by: NURSE ANESTHETIST, CERTIFIED REGISTERED

## 2017-11-07 PROCEDURE — 80076 HEPATIC FUNCTION PANEL: CPT | Performed by: RADIOLOGY

## 2017-11-07 PROCEDURE — 86921 COMPATIBILITY TEST INCUBATE: CPT | Performed by: INTERNAL MEDICINE

## 2017-11-07 PROCEDURE — 74011250637 HC RX REV CODE- 250/637: Performed by: INTERNAL MEDICINE

## 2017-11-07 PROCEDURE — C9113 INJ PANTOPRAZOLE SODIUM, VIA: HCPCS | Performed by: RADIOLOGY

## 2017-11-07 PROCEDURE — 0F9930Z DRAINAGE OF COMMON BILE DUCT WITH DRAINAGE DEVICE, PERCUTANEOUS APPROACH: ICD-10-PCS | Performed by: RADIOLOGY

## 2017-11-07 PROCEDURE — 86902 BLOOD TYPE ANTIGEN DONOR EA: CPT | Performed by: INTERNAL MEDICINE

## 2017-11-07 PROCEDURE — 74011250636 HC RX REV CODE- 250/636: Performed by: INTERNAL MEDICINE

## 2017-11-07 PROCEDURE — 86870 RBC ANTIBODY IDENTIFICATION: CPT | Performed by: INTERNAL MEDICINE

## 2017-11-07 PROCEDURE — 74011000250 HC RX REV CODE- 250: Performed by: RADIOLOGY

## 2017-11-07 PROCEDURE — 74011000250 HC RX REV CODE- 250

## 2017-11-07 PROCEDURE — 80048 BASIC METABOLIC PNL TOTAL CA: CPT | Performed by: RADIOLOGY

## 2017-11-07 PROCEDURE — P9040 RBC LEUKOREDUCED IRRADIATED: HCPCS | Performed by: INTERNAL MEDICINE

## 2017-11-07 PROCEDURE — 65270000029 HC RM PRIVATE

## 2017-11-07 RX ORDER — HYDROMORPHONE HYDROCHLORIDE 2 MG/ML
0.5 INJECTION, SOLUTION INTRAMUSCULAR; INTRAVENOUS; SUBCUTANEOUS
Status: COMPLETED | OUTPATIENT
Start: 2017-11-07 | End: 2017-11-07

## 2017-11-07 RX ORDER — SODIUM CHLORIDE, SODIUM LACTATE, POTASSIUM CHLORIDE, CALCIUM CHLORIDE 600; 310; 30; 20 MG/100ML; MG/100ML; MG/100ML; MG/100ML
75 INJECTION, SOLUTION INTRAVENOUS CONTINUOUS
Status: DISCONTINUED | OUTPATIENT
Start: 2017-11-07 | End: 2017-11-07

## 2017-11-07 RX ORDER — IODIXANOL 320 MG/ML
125 INJECTION, SOLUTION INTRAVASCULAR
Status: COMPLETED | OUTPATIENT
Start: 2017-11-07 | End: 2017-11-07

## 2017-11-07 RX ORDER — LORAZEPAM 1 MG/1
1 TABLET ORAL
Status: DISCONTINUED | OUTPATIENT
Start: 2017-11-07 | End: 2017-11-08 | Stop reason: HOSPADM

## 2017-11-07 RX ORDER — FACIAL-BODY WIPES
10 EACH TOPICAL DAILY PRN
Status: DISCONTINUED | OUTPATIENT
Start: 2017-11-07 | End: 2017-11-08 | Stop reason: HOSPADM

## 2017-11-07 RX ORDER — SODIUM CHLORIDE 9 MG/ML
20 INJECTION, SOLUTION INTRAVENOUS CONTINUOUS
Status: DISCONTINUED | OUTPATIENT
Start: 2017-11-07 | End: 2017-11-07

## 2017-11-07 RX ORDER — FOLIC ACID 1 MG/1
1 TABLET ORAL DAILY
Status: DISCONTINUED | OUTPATIENT
Start: 2017-11-08 | End: 2017-11-08 | Stop reason: HOSPADM

## 2017-11-07 RX ORDER — LIDOCAINE HYDROCHLORIDE 10 MG/ML
30 INJECTION, SOLUTION EPIDURAL; INFILTRATION; INTRACAUDAL; PERINEURAL ONCE
Status: COMPLETED | OUTPATIENT
Start: 2017-11-07 | End: 2017-11-07

## 2017-11-07 RX ORDER — OXYCODONE AND ACETAMINOPHEN 5; 325 MG/1; MG/1
1 TABLET ORAL
Status: DISCONTINUED | OUTPATIENT
Start: 2017-11-07 | End: 2017-11-08 | Stop reason: HOSPADM

## 2017-11-07 RX ORDER — DOCUSATE SODIUM 100 MG/1
100 CAPSULE, LIQUID FILLED ORAL 2 TIMES DAILY
Status: DISCONTINUED | OUTPATIENT
Start: 2017-11-07 | End: 2017-11-08 | Stop reason: HOSPADM

## 2017-11-07 RX ORDER — CIPROFLOXACIN 2 MG/ML
400 INJECTION, SOLUTION INTRAVENOUS ONCE
Status: COMPLETED | OUTPATIENT
Start: 2017-11-07 | End: 2017-11-07

## 2017-11-07 RX ORDER — PROPOFOL 10 MG/ML
INJECTION, EMULSION INTRAVENOUS AS NEEDED
Status: DISCONTINUED | OUTPATIENT
Start: 2017-11-07 | End: 2017-11-07 | Stop reason: HOSPADM

## 2017-11-07 RX ORDER — ONDANSETRON 2 MG/ML
4 INJECTION INTRAMUSCULAR; INTRAVENOUS
Status: DISCONTINUED | OUTPATIENT
Start: 2017-11-07 | End: 2017-11-07 | Stop reason: SDUPTHER

## 2017-11-07 RX ORDER — NALOXONE HYDROCHLORIDE 0.4 MG/ML
0.2 INJECTION, SOLUTION INTRAMUSCULAR; INTRAVENOUS; SUBCUTANEOUS AS NEEDED
Status: DISCONTINUED | OUTPATIENT
Start: 2017-11-07 | End: 2017-11-08 | Stop reason: HOSPADM

## 2017-11-07 RX ORDER — MAGNESIUM SULFATE 100 %
4 CRYSTALS MISCELLANEOUS AS NEEDED
Status: DISCONTINUED | OUTPATIENT
Start: 2017-11-07 | End: 2017-11-08 | Stop reason: HOSPADM

## 2017-11-07 RX ORDER — SODIUM CHLORIDE 9 MG/ML
75 INJECTION, SOLUTION INTRAVENOUS CONTINUOUS
Status: DISCONTINUED | OUTPATIENT
Start: 2017-11-07 | End: 2017-11-07

## 2017-11-07 RX ORDER — SUCCINYLCHOLINE CHLORIDE 20 MG/ML
INJECTION INTRAMUSCULAR; INTRAVENOUS AS NEEDED
Status: DISCONTINUED | OUTPATIENT
Start: 2017-11-07 | End: 2017-11-07 | Stop reason: HOSPADM

## 2017-11-07 RX ORDER — FENTANYL CITRATE 50 UG/ML
INJECTION, SOLUTION INTRAMUSCULAR; INTRAVENOUS AS NEEDED
Status: DISCONTINUED | OUTPATIENT
Start: 2017-11-07 | End: 2017-11-07 | Stop reason: HOSPADM

## 2017-11-07 RX ORDER — DEXAMETHASONE SODIUM PHOSPHATE 4 MG/ML
INJECTION, SOLUTION INTRA-ARTICULAR; INTRALESIONAL; INTRAMUSCULAR; INTRAVENOUS; SOFT TISSUE AS NEEDED
Status: DISCONTINUED | OUTPATIENT
Start: 2017-11-07 | End: 2017-11-07 | Stop reason: HOSPADM

## 2017-11-07 RX ORDER — SODIUM CHLORIDE 0.9 % (FLUSH) 0.9 %
5-10 SYRINGE (ML) INJECTION AS NEEDED
Status: DISCONTINUED | OUTPATIENT
Start: 2017-11-07 | End: 2017-11-08 | Stop reason: HOSPADM

## 2017-11-07 RX ORDER — HYDROMORPHONE HYDROCHLORIDE 2 MG/1
2 TABLET ORAL
Status: DISCONTINUED | OUTPATIENT
Start: 2017-11-07 | End: 2017-11-08 | Stop reason: HOSPADM

## 2017-11-07 RX ORDER — HYDROCODONE BITARTRATE AND ACETAMINOPHEN 5; 325 MG/1; MG/1
1 TABLET ORAL
Status: DISCONTINUED | OUTPATIENT
Start: 2017-11-07 | End: 2017-11-07 | Stop reason: SDUPTHER

## 2017-11-07 RX ORDER — SODIUM CHLORIDE 9 MG/ML
100 INJECTION, SOLUTION INTRAVENOUS CONTINUOUS
Status: DISCONTINUED | OUTPATIENT
Start: 2017-11-07 | End: 2017-11-07 | Stop reason: SDUPTHER

## 2017-11-07 RX ORDER — FAMOTIDINE 20 MG/1
20 TABLET, FILM COATED ORAL
Status: DISCONTINUED | OUTPATIENT
Start: 2017-11-07 | End: 2017-11-08 | Stop reason: HOSPADM

## 2017-11-07 RX ORDER — ROCURONIUM BROMIDE 10 MG/ML
INJECTION, SOLUTION INTRAVENOUS AS NEEDED
Status: DISCONTINUED | OUTPATIENT
Start: 2017-11-07 | End: 2017-11-07 | Stop reason: HOSPADM

## 2017-11-07 RX ORDER — ONDANSETRON 2 MG/ML
4 INJECTION INTRAMUSCULAR; INTRAVENOUS
Status: DISCONTINUED | OUTPATIENT
Start: 2017-11-07 | End: 2017-11-08 | Stop reason: HOSPADM

## 2017-11-07 RX ORDER — SODIUM CHLORIDE 0.9 % (FLUSH) 0.9 %
5-10 SYRINGE (ML) INJECTION EVERY 8 HOURS
Status: DISCONTINUED | OUTPATIENT
Start: 2017-11-07 | End: 2017-11-08 | Stop reason: HOSPADM

## 2017-11-07 RX ORDER — ACETAMINOPHEN 500 MG
500 TABLET ORAL
Status: DISCONTINUED | OUTPATIENT
Start: 2017-11-07 | End: 2017-11-08 | Stop reason: HOSPADM

## 2017-11-07 RX ORDER — GLYCOPYRROLATE 0.2 MG/ML
INJECTION INTRAMUSCULAR; INTRAVENOUS AS NEEDED
Status: DISCONTINUED | OUTPATIENT
Start: 2017-11-07 | End: 2017-11-07 | Stop reason: HOSPADM

## 2017-11-07 RX ORDER — MIDAZOLAM HYDROCHLORIDE 1 MG/ML
INJECTION, SOLUTION INTRAMUSCULAR; INTRAVENOUS AS NEEDED
Status: DISCONTINUED | OUTPATIENT
Start: 2017-11-07 | End: 2017-11-07 | Stop reason: HOSPADM

## 2017-11-07 RX ORDER — AMOXICILLIN 250 MG
1 CAPSULE ORAL
Status: DISCONTINUED | OUTPATIENT
Start: 2017-11-07 | End: 2017-11-08 | Stop reason: HOSPADM

## 2017-11-07 RX ORDER — LIDOCAINE HYDROCHLORIDE 20 MG/ML
INJECTION, SOLUTION EPIDURAL; INFILTRATION; INTRACAUDAL; PERINEURAL AS NEEDED
Status: DISCONTINUED | OUTPATIENT
Start: 2017-11-07 | End: 2017-11-07 | Stop reason: HOSPADM

## 2017-11-07 RX ORDER — NEOSTIGMINE METHYLSULFATE 5 MG/5 ML
SYRINGE (ML) INTRAVENOUS AS NEEDED
Status: DISCONTINUED | OUTPATIENT
Start: 2017-11-07 | End: 2017-11-07 | Stop reason: HOSPADM

## 2017-11-07 RX ORDER — ONDANSETRON 2 MG/ML
4 INJECTION INTRAMUSCULAR; INTRAVENOUS ONCE
Status: DISCONTINUED | OUTPATIENT
Start: 2017-11-07 | End: 2017-11-07 | Stop reason: SDUPTHER

## 2017-11-07 RX ORDER — ONDANSETRON 2 MG/ML
4 INJECTION INTRAMUSCULAR; INTRAVENOUS
Status: COMPLETED | OUTPATIENT
Start: 2017-11-07 | End: 2017-11-07

## 2017-11-07 RX ORDER — SODIUM CHLORIDE 9 MG/ML
75 INJECTION, SOLUTION INTRAVENOUS CONTINUOUS
Status: DISCONTINUED | OUTPATIENT
Start: 2017-11-07 | End: 2017-11-08

## 2017-11-07 RX ORDER — DEXTROSE 50 % IN WATER (D50W) INTRAVENOUS SYRINGE
25-50 AS NEEDED
Status: DISCONTINUED | OUTPATIENT
Start: 2017-11-07 | End: 2017-11-08 | Stop reason: HOSPADM

## 2017-11-07 RX ORDER — SODIUM CHLORIDE 9 MG/ML
250 INJECTION, SOLUTION INTRAVENOUS AS NEEDED
Status: DISCONTINUED | OUTPATIENT
Start: 2017-11-07 | End: 2017-11-08 | Stop reason: HOSPADM

## 2017-11-07 RX ADMIN — CIPROFLOXACIN 400 MG: 2 INJECTION, SOLUTION INTRAVENOUS at 11:57

## 2017-11-07 RX ADMIN — ROCURONIUM BROMIDE 5 MG: 10 INJECTION, SOLUTION INTRAVENOUS at 11:47

## 2017-11-07 RX ADMIN — SODIUM CHLORIDE 75 ML/HR: 900 INJECTION, SOLUTION INTRAVENOUS at 17:03

## 2017-11-07 RX ADMIN — HYDROMORPHONE HYDROCHLORIDE 0.5 MG: 2 INJECTION, SOLUTION INTRAMUSCULAR; INTRAVENOUS; SUBCUTANEOUS at 13:40

## 2017-11-07 RX ADMIN — SUCCINYLCHOLINE CHLORIDE 80 MG: 20 INJECTION INTRAMUSCULAR; INTRAVENOUS at 11:48

## 2017-11-07 RX ADMIN — MIDAZOLAM HYDROCHLORIDE 2 MG: 1 INJECTION, SOLUTION INTRAMUSCULAR; INTRAVENOUS at 11:43

## 2017-11-07 RX ADMIN — ONDANSETRON 4 MG: 2 INJECTION INTRAMUSCULAR; INTRAVENOUS at 11:35

## 2017-11-07 RX ADMIN — Medication 3 MG: at 12:54

## 2017-11-07 RX ADMIN — FENTANYL CITRATE 100 MCG: 50 INJECTION, SOLUTION INTRAMUSCULAR; INTRAVENOUS at 11:47

## 2017-11-07 RX ADMIN — HYDROMORPHONE HYDROCHLORIDE 0.5 MG: 2 INJECTION, SOLUTION INTRAMUSCULAR; INTRAVENOUS; SUBCUTANEOUS at 14:00

## 2017-11-07 RX ADMIN — FAMOTIDINE 20 MG: 20 TABLET, FILM COATED ORAL at 23:29

## 2017-11-07 RX ADMIN — LORAZEPAM 1 MG: 1 TABLET ORAL at 23:31

## 2017-11-07 RX ADMIN — LIDOCAINE HYDROCHLORIDE 80 MG: 20 INJECTION, SOLUTION EPIDURAL; INFILTRATION; INTRACAUDAL; PERINEURAL at 11:48

## 2017-11-07 RX ADMIN — DEXAMETHASONE SODIUM PHOSPHATE 4 MG: 4 INJECTION, SOLUTION INTRA-ARTICULAR; INTRALESIONAL; INTRAMUSCULAR; INTRAVENOUS; SOFT TISSUE at 12:03

## 2017-11-07 RX ADMIN — GLYCOPYRROLATE 0.4 MG: 0.2 INJECTION INTRAMUSCULAR; INTRAVENOUS at 12:54

## 2017-11-07 RX ADMIN — STANDARDIZED SENNA CONCENTRATE AND DOCUSATE SODIUM 1 TABLET: 8.6; 5 TABLET, FILM COATED ORAL at 23:29

## 2017-11-07 RX ADMIN — PROPOFOL 160 MG: 10 INJECTION, EMULSION INTRAVENOUS at 11:48

## 2017-11-07 RX ADMIN — HYDROMORPHONE HYDROCHLORIDE 0.5 MG: 2 INJECTION, SOLUTION INTRAMUSCULAR; INTRAVENOUS; SUBCUTANEOUS at 14:10

## 2017-11-07 RX ADMIN — SODIUM CHLORIDE 40 MG: 9 INJECTION INTRAMUSCULAR; INTRAVENOUS; SUBCUTANEOUS at 11:35

## 2017-11-07 RX ADMIN — ROCURONIUM BROMIDE 25 MG: 10 INJECTION, SOLUTION INTRAVENOUS at 12:00

## 2017-11-07 RX ADMIN — LIDOCAINE HYDROCHLORIDE 10 ML: 10 INJECTION, SOLUTION EPIDURAL; INFILTRATION; INTRACAUDAL; PERINEURAL at 12:34

## 2017-11-07 RX ADMIN — IODIXANOL 125 ML: 320 INJECTION, SOLUTION INTRAVASCULAR at 12:35

## 2017-11-07 RX ADMIN — SODIUM CHLORIDE: 900 INJECTION, SOLUTION INTRAVENOUS at 11:42

## 2017-11-07 RX ADMIN — HYDROMORPHONE HYDROCHLORIDE 2 MG: 2 TABLET ORAL at 18:37

## 2017-11-07 RX ADMIN — OXYCODONE HYDROCHLORIDE AND ACETAMINOPHEN 1 TABLET: 5; 325 TABLET ORAL at 17:48

## 2017-11-07 RX ADMIN — HYDROMORPHONE HYDROCHLORIDE 0.5 MG: 2 INJECTION, SOLUTION INTRAMUSCULAR; INTRAVENOUS; SUBCUTANEOUS at 13:50

## 2017-11-07 RX ADMIN — DOCUSATE SODIUM 100 MG: 100 CAPSULE, LIQUID FILLED ORAL at 17:48

## 2017-11-07 NOTE — ROUTINE PROCESS
Bedside and Verbal shift change report given to SAVANNAH Archuleta (oncoming nurse) by Shakira Montoya RN (offgoing nurse). Report included the following information SBAR, Kardex, MAR and Recent Results. SITUATION:    Code Status: Full Code   Reason for Admission: Abnormal enzyme levels in CSF [R83.0]    [facility] day: 0   Problem List:       Hospital Problems  Date Reviewed: 6/10/2016          Codes Class Noted POA    Sickle cell anemia with crisis [facility]) ICD-10-CM: D57.00  ICD-9-CM: 282.62  11/7/2017 Unknown        Benign obstructive jaundice ICD-10-CM: K83.8  ICD-9-CM: 576.8  11/7/2017 Unknown        Sickle cell anemia ([facility] [address] ICD-10-CM: D57.1  ICD-9-CM: 282.60  6/2/2016 Unknown              BACKGROUND:    Past Medical History:   Past Medical History:   Diagnosis Date    Anemia NEC     sickle cell    Duodenal ulcer     GERD (gastroesophageal reflux disease)     Other ill-defined conditions(799.89) Sickle cell    Sickle cell anemia (HCC)          Patient taking anticoagulants yes     ASSESSMENT:    Changes in Assessment Throughout Shift: no     Patient has Central Line: yes Reasons if yes: long term manag. Of sickle cell   Patient has Cervantes Cath: yes Reasons if yes: surg      Last Vitals:     Vitals:    11/07/17 1413 11/07/17 1423 11/07/17 1433 11/07/17 1541   BP: 122/56 120/56 121/69 125/60   Pulse: 63 64 61 60   Resp: 16 14 13 16   Temp:    97.3 °F (36.3 °C)   SpO2: 99% 98% 98% 97%   Weight:       Height:            IV and DRAINS (will only show if present)   Biliary Drain 11/07/17 Right;Other (comment)-Site Assessment: Clean, dry, & intact  Venous Access Device Mediport 11/07/17 Upper chest (subclavicular area, right-Site Assessment: Clean, dry, & intact     WOUND (if present)   Wound Type:  Drain site to Rt. Flank   Dressing present     Wound Concerns/Notes:  none     PAIN    Pain Assessment    Pain Intensity 1: 7 (11/07/17 2619)    Pain Location 1: Flank    Pain Intervention(s) 1: Medication (see MAR)       o Interventions for Pain:  See mar  o Intervention effective: yes  o Time of last intervention: .   o Reassessment Completed: yes      Last 3 Weights:  Last 3 Recorded Weights in this Encounter    11/07/17 1016   Weight: 57.6 kg (127 lb)     Weight change:      INTAKE/OUPUT    Current Shift: 11/07 0701 - 11/07 1900  In: 500 [I.V.:500]  Out: 710 [Urine:600; Drains:110]    Last three shifts:       LAB RESULTS     Recent Labs      11/07/17   1053   WBC  6.9   HGB  6.8*   HCT  19.9*   PLT  333        Recent Labs      11/07/17   1053   NA  140   K  4.5   GLU  89   BUN  18   CREA  1.28   CA  8.7   INR  1.3*       RECOMMENDATIONS AND DISCHARGE PLANNING     1. Pending tests/procedures/ Plan of Care or Other Needs: no     2. Discharge plan for patient and Needs/Barriers: home    3. Estimated Discharge Date: . Posted on Whiteboard in [address] [place] [address] Room: .      4. The patient's care plan was reviewed with the oncoming nurse. \"HEALS\" SAFETY CHECK      Fall Risk    Total Score: 1    Safety Measures:      A safety check occurred in the patient's room between off going nurse and oncoming nurse listed above. The safety check included the below items  Area Items   H  High Alert Medications - Verify all high alert medication drips (heparin, PCA, etc.)   E  Equipment - Suction is set up for ALL patients (with yanker)  - Red plugs utilized for all equipment (IV pumps, etc.)  - WOWs wiped down at end of shift.  - Room stocked with oxygen, suction, and other unit-specific supplies   A  Alarms - Bed alarm is set for fall risk patients  - Ensure chair alarm is in place and activated if patient is up in a chair   L  Lines - Check IV for any infiltration  - Cervantes bag is empty if patient has a Cervantes   - Tubing and IV bags are labeled   S  Safety   - Room is clean, patient is clean, and equipment is clean. - Hallways are clear from equipment besides carts.    - Fall bracelet on for fall risk patients  - Ensure room is clear and free of clutter  - Suction is set up for ALL patients (with candelario)  - Hallways are clear from equipment besides carts.    - Isolation precautions followed, supplies available outside room, sign posted     Ildefonso Hernandez RN

## 2017-11-07 NOTE — H&P
OUTPATIENT HISTORY AND PHYSICAL      Today 11/7/2017     Indication/Symptoms:   Brook Martínez is a 61 y.o. male here for [facility] with stone retrieval and cholangioplasty. Current Meds:    Prior to Admission medications    Medication Sig Start Date End Date Taking? Authorizing Provider   oxyCODONE-acetaminophen (PERCOCET) 5-325 mg per tablet Take 1 Tab by mouth every four (4) hours as needed for Pain. Historical Provider   senna-docusate (PERICOLACE) 8.6-50 mg per tablet Take 1 Tab by mouth daily. 6/17/16   Grzegorz Fregoso MD   HYDROmorphone (DILAUDID) 2 mg tablet Take 1 Tab by mouth every four (4) hours as needed. Max Daily Amount: 12 mg. 4/14/15   Hermelindo Araya MD   famotidine (PEPCID) 20 mg tablet Take 1 Tab by mouth nightly. 4/14/15   Hermelindo Araya MD   LORazepam (ATIVAN) 1 mg tablet Take 1 mg by mouth every eight (8) hours as needed for Anxiety. Historical Provider   folic acid (FOLVITE) 1 mg tablet Take 1 Tab by mouth daily. 8/10/13   Jammie Merrill MD       Allergies:    No Known Allergies    Comorbid Conditions:    Past Medical History:   Diagnosis Date    Anemia NEC     sickle cell    Duodenal ulcer     GERD (gastroesophageal reflux disease)     Other ill-defined conditions(799.89) Sickle cell    Sickle cell anemia (HCC)           Past Surgical History:   Procedure Laterality Date    HX CHOLECYSTECTOMY      HX RETINAL DETACHMENT REPAIR Right     HX VASCULAR ACCESS      double lumen mediport     Data:    Visit Vitals    Ht 5' 4\" (1.626 m)    Wt 57.6 kg (127 lb)    BMI 21.8 kg/m2   :  No results for input(s): PLT, PLTEXT in the last 72 hours. No lab exists for component:  HCT  No results for input(s): INR, APTT in the last 72 hours. No lab exists for component: PT, INREXT    The H & P and/or progress notes and any available imaging were reviewed.   The risks, indications and possible alternatives to the procedure, including doing nothing, were discussed and informed consent was obtained. Physical Exam:      Mental status:   Alert and oriented. Examination specific to the procedure proposed to be performed and any co morbid conditions:   Mallampati classification 2, ASA3   Heart:   RRR. Lungs:   CTAB. No wheezes, rales or rhonchi. The patient is an appropriate candidate to undergo the planned procedure and sedation.     James Peralta MD

## 2017-11-07 NOTE — ANESTHESIA PREPROCEDURE EVALUATION
Anesthetic History   No history of anesthetic complications            Review of Systems / Medical History  Patient summary reviewed and pertinent labs reviewed    Pulmonary  Within defined limits                 Neuro/Psych   Within defined limits           Cardiovascular                  Exercise tolerance: >4 METS     GI/Hepatic/Renal     GERD: well controlled           Endo/Other        Anemia (Sickle cell anemia)     Other Findings   Comments:   Risk Factors for Postoperative nausea/vomiting:       History of postoperative nausea/vomiting? NO       Female? NO       Motion sickness? NO       Intended opioid administration for postoperative analgesia? NO      Smoking Abstinence  Current Smoker? NO  Elective Surgery? YES  Seen preoperatively by anesthesiologist or proxy prior to day of surgery? YES  Pt abstained from smoking 24 hours prior to anesthesia?  N/A           Physical Exam    Airway  Mallampati: III  TM Distance: 4 - 6 cm  Neck ROM: normal range of motion   Mouth opening: Normal     Cardiovascular  Regular rate and rhythm,  S1 and S2 normal,  no murmur, click, rub, or gallop             Dental  No notable dental hx       Pulmonary  Breath sounds clear to auscultation               Abdominal  GI exam deferred       Other Findings            Anesthetic Plan    ASA: 3  Anesthesia type: general          Induction: Intravenous  Anesthetic plan and risks discussed with: Patient

## 2017-11-07 NOTE — PROGRESS NOTES
1030: Cath holding summary    Patient escorted to cath holding from waiting area ambulatory, alert and oriented x 4, voicing no complaints. Changed into gown and placed on monitor. NSR  noted. NPO since MN. Lab results, med rec and H&P reviewed on chart. PIV x 2 inserted without difficulty. Family to bedside. 1140: TRANSFER - OUT REPORT:    Verbal report given to Edwin(name) on Dinesh Profit  being transferred to IR(unit) for ordered procedure       Report consisted of patients Situation, Background, Assessment and   Recommendations(SBAR). Information from the following report(s) SBAR, Intake/Output, MAR and Recent Results was reviewed with the receiving nurse. Lines:   Venous Access Device Mediport 11/07/17 Upper chest (subclavicular area, right (Active)        Opportunity for questions and clarification was provided.       Patient transported with:   [employer]

## 2017-11-07 NOTE — ANESTHESIA POSTPROCEDURE EVALUATION
Post-Anesthesia Evaluation and Assessment    Patient: Dinesh Victoria MRN: [id number]  SSN: [id number]    YOB: 1960  Age: 61 y.o. Sex: male       Cardiovascular Function/Vital Signs  Visit Vitals    /60 (BP 1 Location: Right arm, BP Patient Position: At rest)    Pulse 60    Temp 36.3 °C (97.3 °F)    Resp 16    Ht 5' 4\" (1.626 m)    Wt 57.6 kg (127 lb)    SpO2 97%    BMI 21.8 kg/m2       Patient is status post general anesthesia for * No procedures listed *. Nausea/Vomiting: None    Postoperative hydration reviewed and adequate. Pain:  Pain Scale 1: Visual (11/07/17 1410)  Pain Intensity 1: 5 (11/07/17 1410)   Managed    Neurological Status:   Neuro (WDL): Within Defined Limits (11/07/17 1314)   At baseline    Mental Status and Level of Consciousness: Arousable    Pulmonary Status:   O2 Device: Room air (11/07/17 1341)   Adequate oxygenation and airway patent    Complications related to anesthesia: None    Post-anesthesia assessment completed.  No concerns    Signed By: Tiff Marie MD     November 7, 2017

## 2017-11-07 NOTE — H&P
[facility]  ROUTINE H AND PS    Name:  Danielle Hudson  MR#:  [id number]  :  1960  Account #:  [de-identified]  Date of Adm:  2017      PRIMARY CARE PHYSICIAN: Dr. Morales Dose: The patient was admitted here for further  management of his pain, anemia and also monitoring for his  cholangioplasty. HISTORY OF PRESENT ILLNESS: This is a 58-year-old male patient  with a history of GERD, duodenal ulcer and sickle cell anemia,  presented to outpatient procedure by Interventional Radiology for  obstructive jaundice and choledocholithiasis. The patient underwent  CBD stone removal, distal CBD balloon cholangioplasty earlier today. The patient was subsequently admitted here, however, he was also  found out to have a hemoglobin of 6.8 before the procedure. The  patient's hematologist is Dr. Chivo Lomax, also recommending some blood  transfusion, but the patient is having autoantibodies it takes at least a  day or 2 to get his blood. He will be admitted here at this point for  blood transfusion as well as monitoring for this procedure. At this time, the patient has had chronic chest, back and leg pain,  nothing new. He denies any shortness of breath, denies any abdominal  pain. Denies any cough. Denies any fever or chills. Denies any  dizziness. He had a bowel movement earlier. Denies any dysuria. Denies any tingling or numbness. REVIEW OF SYSTEMS: As described above, otherwise 10-point  review of system is negative. PAST MEDICAL HISTORY  1. GERD and peptic ulcer. 2. Sickle cell anemia. PAST SURGICAL HISTORY: MediPort placement. ALLERGIES: NKDA. HOME MEDICATIONS  1. Dilaudid p.r.n., per the patient. 2. Percocet p.r.n. per the patient. 3. He says he sometimes takes Pepcid. FAMILY HISTORY: Negative for cancer, stroke, or heart problem. PHYSICAL EXAMINATION  GENERAL: The patient is awake, alert, oriented x3, not in acute  distress.   VITAL SIGNS: Temperature 97.3, heart rate 60, respiratory rate 16,  blood pressure 125/60, saturating 97% on room air. HEENT: Pallor present. Icterus present. Atraumatic, normocephalic. NECK: No JVD. Trachea is midline. CHEST: CTA bilaterally. CARDIOVASCULAR: S1, S2 heard. ABDOMEN: Soft, nontender. Bowel sounds present. Biliary drainage  catheter is in situ. EXTREMITIES: No pitting pedal edema. NEUROLOGIC: He moves all 4 extremities very well. SKIN: No active skin rash. LABORATORY DATA: WBC 6.9, hemoglobin 6.8, platelet 300. Sodium  140, potassium 4.5, chloride 109, bicarbonate 22, BUN of 18,  creatinine of 1.28, total bilirubin is 4.7, alkaline phosphate 220, ALT of  54, AST of 68. ASSESSMENT  1. Obstructive jaundice and choledocholithiasis status post stone  removal, cholangioplasty and biliary drainage catheter placement. 2. Generalized body ache, including chest, back and leg, nothing new  per the patient. 3. Sickle cell anemia. 4. Chronic sickle cell pain. 5. History of gastroesophageal reflux disease. PLAN: The patient will be admitted here. We will continue small dose  of IV fluid. I talked to Dr. Deshaun Hughes, agreed with 2 units of blood transfusion. The  patient will be continued on Dilaudid and Percocet p.r.n. We will also  put him on Pepcid and also bowel protocol. We will also do cardiac  enzymes x2 and EKG x1 just to make sure this is atypical chest pain. He wishes to be a FULL CODE and wants to call his sister in case of  emergency. Total time is greater than 65 minutes.         MD SOFIA Swann / JOANNE  D:  11/07/2017   16:11  T:  11/07/2017   17:35  Job #:  [id number]

## 2017-11-07 NOTE — PROCEDURES
RADIOLOGY POST PROCEDURE NOTE     November 7, 2017       1:03 PM     Preoperative Diagnosis:   Obstructive jaundice. Choledocholithiasis. Postoperative Diagnosis:  Same. :  Dr. Peggyann Schilder    Assistant:  None. Type of Anesthesia: 1% plain lidocaine and GETA. Procedure/Description:      1. Image guided PTHC. 2. CBD stones and concretions removal.  3. Distal CBD balloon cholangioplasty. 4. I/E biliary drainage catheter placement. Findings:   No bleeding. Estimated blood Loss:  Minimal    Specimen Removed:   no    Blood transfusions:  None. Implants:  12F I/E biliary drainage catheter.     Complications: None    Condition: Stable    Discharge Plan:  continue present therapy    Mesfin Alamo MD

## 2017-11-07 NOTE — ROUTINE PROCESS
TRANSFER - OUT REPORT:    Verbal report given to Demond Randolph 58 on Nolberto Amato  being transferred to room 520 for routine progression of care       Report consisted of patients Situation, Background, Assessment and   Recommendations(SBAR). Information from the following report(s) SBAR, Procedure Summary, Intake/Output and MAR was reviewed with the receiving nurse. Opportunity for questions and clarification was provided.       Patient transported with:   [employer]

## 2017-11-07 NOTE — IP AVS SNAPSHOT
[address] Patient: Turner Herrera MRN: [id number] About your hospitalization You were admitted on:  November 7, 2017 You last received care in the:  [facility] 5 [facility] 1046 You were discharged on:  November 8, 2017 Why you were hospitalized Your primary diagnosis was:  Not on File Your diagnoses also included:  Sickle Cell Anemia With Crisis (Hcc), Benign Obstructive Jaundice, Sickle Cell Anemia (Hcc) Things You Need To Do (next 8 weeks) Follow up with Matt Au MD  
  
Phone:  [phone] [facility]  [address] [facility] [address] [facility] Radiologist, "[employer] [place] [address] Tuesday Nov 14, 2017 Follow up with Calvin Venegas MD  
Appointment at 2:30 pm  
  
Phone:  [phone] Where:  [address] [facility] 97, [facility] Discharge Orders None A check luly indicates which time of day the medication should be taken. My Medications TAKE these medications as instructed Instructions Each Dose to Equal  
 Morning Noon Evening Bedtime ATIVAN 1 mg tablet Generic drug:  LORazepam  
   
Your last dose was: Your next dose is: Take 1 mg by mouth every eight (8) hours as needed for Anxiety. 1 mg  
    
   
   
   
  
 famotidine 20 mg tablet Commonly known as:  PEPCID Your last dose was: Your next dose is: Take 1 Tab by mouth nightly. 20 mg  
    
   
   
   
  
 folic acid 1 mg tablet Commonly known as:  Google Your last dose was: Your next dose is: Take 1 Tab by mouth daily. 1 mg HYDROmorphone 2 mg tablet Commonly known as:  DILAUDID Your last dose was: Your next dose is: Take 1 Tab by mouth every four (4) hours as needed. Max Daily Amount: 12 mg.  
 2 mg oxyCODONE-acetaminophen 5-325 mg per tablet Commonly known as:  PERCOCET Your last dose was: Your next dose is: Take 1 Tab by mouth every four (4) hours as needed for Pain. 1 Tab  
    
   
   
   
  
 senna-docusate 8.6-50 mg per tablet Commonly known as:  Reggy [place] Your last dose was: Your next dose is: Take 1 Tab by mouth daily. 1 Tab Where to Get Your Medications Information on where to get these meds will be given to you by the nurse or doctor. ! Ask your nurse or doctor about these medications  
  famotidine 20 mg tablet Discharge Instructions Sickle Cell Crisis: Care Instructions Your Care Instructions Sickle cell crisis is a painful episode that may begin suddenly in a person with sickle cell disease. Sickle cell disease turns normal, round red blood cells into cells that look like padmini or crescent moons. The sickle-shaped cells can get stuck in blood vessels, blocking blood flow and causing severe pain. The pain can occur in the bones of the spine, the arms and legs, the chest, and the abdomen. An episode may be called a \"painful event\" or \"painful crisis. \" Some people who have sickle cell disease have many painful events, while others have few or none. Treatment depends on the level of pain and how long it lasts. Sometimes taking nonprescription pain relievers can help. Or you may need stronger pain relief medicine that is prescribed or given by a doctor. You may need to be treated in the hospital. 
It isn't always possible to know what sets off a painful event. But triggers include being dehydrated, cold temperatures, infection, stress, and not getting enough oxygen. Follow-up care is a key part of your treatment and safety.  Be sure to make and go to all appointments, and call your doctor if you are having problems. It's also a good idea to know your test results and keep a list of the medicines you take. How can you care for yourself at home? · Create a pain management plan with your doctor. This plan should include the types of medicines you can take and other actions you can take at home to relieve pain. · Drink plenty of fluids, enough so that your urine is light yellow or clear like water. If you have kidney, heart, or liver disease and have to limit fluids, talk with your doctor before you increase the amount of fluids you drink. · Take your medicines exactly as prescribed. Call your doctor if you think you are having a problem with your medicine. · Take pain medicines exactly as directed. ¨ If the doctor gave you a prescription medicine for pain, take it as prescribed. ¨ If you are not taking a prescription pain medicine, ask your doctor if you can take an over-the-counter medicine. · Avoid alcohol. It can make you dehydrated. · Dress warmly in cold weather. The cold and windy weather can lead to severe pain. · Do not smoke. Smoking can reduce the amount of oxygen in your blood. · Get plenty of sleep. When should you call for help? Call 911 anytime you think you may need emergency care. For example, call if: 
? · You have symptoms of a severe problem from sickle cell. ? · You have symptoms of a stroke. These may include: 
¨ Sudden numbness, tingling, weakness, or loss of movement in your face, arm, or leg, especially on only one side of your body. ¨ Sudden vision changes. ¨ Sudden trouble speaking. ¨ Sudden confusion or trouble understanding simple statements. ¨ Sudden problems with walking or balance. ¨ A sudden, severe headache that is different from past headaches. ? · You are in severe pain. ? · You have symptoms of a heart attack. These may include: ¨ Chest pain or pressure, or a strange feeling in the chest. 
¨ Sweating. ¨ Shortness of breath. ¨ Nausea or vomiting. ¨ Pain, pressure, or a strange feeling in the back, neck, jaw, or upper belly or in one or both shoulders or arms. ¨ Lightheadedness or sudden weakness. ¨ A fast or irregular heartbeat. After you call 911, the  may tell you to chew 1 adult-strength or 2 to 4 low-dose aspirin. Wait for an ambulance. Do not try to drive yourself. ?Call your doctor now or seek immediate medical care if: 
? · You have a fever. ? Watch closely for changes in your health, and be sure to contact your doctor if you have any problems. Where can you learn more? Go to http://derek-laila.info/. Enter F104 in the search box to learn more about \"Sickle Cell Crisis: Care Instructions. \" Current as of: October 13, 2016 Content Version: 11.4 © 7676-6773 [employer]. Care instructions adapted under license by [employer] (which disclaims liability or warranty for this information). If you have questions about a medical condition or this instruction, always ask your healthcare professional. [place] [address] any warranty or liability for your use of this information. Jaundice: Care Instructions Your Care Instructions Jaundice is yellowing of your skin and the whites of your eyes. It's caused by a pigment, or coloring, called bilirubin. This comes from the breakdown of red blood cells. When your liver is healthy, it removes the pigment from your blood. But if the liver isn't working right, the pigment can build up in the blood. Then it can get into the skin and other tissues. Many diseases can cause jaundice. These include hepatitis, gallstones, and cancer of the pancreas. Liver damage from heavy drinking over a long time can also cause it. Some medicines that can damage the liver also cause jaundice. The treatment for jaundice depends on the cause. You may need medicine to treat an infection. Or you may need to have your gallbladder removed.  Some people need to stop drinking alcohol. If another disease is causing jaundice, treating the disease will cure the jaundice. If a medicine you take is causing jaundice, your doctor may switch you to another one. Follow-up care is a key part of your treatment and safety. Be sure to make and go to all appointments, and call your doctor if you are having problems. It's also a good idea to know your test results and keep a list of the medicines you take. How can you care for yourself at home? · Do not drink any alcohol until your jaundice is gone. Alcohol will damage the liver more. If your jaundice is caused by drinking alcohol, do not drink alcohol even when you are better. Tell your doctor if you need help to quit. Counseling, support groups, and sometimes medicines can help you stay sober. · Be safe with medicines. Do not take any other medicine without talking to your doctor first. This includes over-the-counter medicines, vitamins, and herbal products. · Make sure your doctor knows all the medicines you take. Some medicines, such as acetaminophen (Tylenol), can make liver problems worse. · If you have itchy skin, keep cool and stay out of the sun. Try to wear cotton clothing. Talk to your doctor about using over-the-counter medicines for itching. These include diphenhydramine (Benadryl) and chlorpheniramine (Chlor-Trimeton). Follow the instructions on the label. · Lower your activity to match your energy. When should you call for help? Call 911 anytime you think you may need emergency care. For example, call if: 
? · You have severe trouble breathing. ? · You passed out (lost consciousness). ?Call your doctor now or seek immediate medical care if: 
? · You are confused, or your confusion gets worse. ? · You have a fever or chills. ? · You have severe pain or swelling in your belly. ? · You are losing weight without trying. ? · You are vomiting or feel sick to your stomach. ? · Your urine is dark yellow-brown, or your stools are light-colored. ? Watch closely for changes in your health, and be sure to contact your doctor if: 
? · You do not get better as expected. Where can you learn more? Go to http://derek-laila.info/. Enter Y844 in the search box to learn more about \"Jaundice: Care Instructions. \" Current as of: May 12, 2017 Content Version: 11.4 © 4053-9691 [employer]. Care instructions adapted under license by [employer] (which disclaims liability or warranty for this information). If you have questions about a medical condition or this instruction, always ask your healthcare professional. [place] [address] any warranty or liability for your use of this information. Patient armband removed and shredded DISCHARGE SUMMARY from Nurse PATIENT INSTRUCTIONS: 
 
 
F-face looks uneven A-arms unable to move or move unevenly S-speech slurred or non-existent T-time-call 911 as soon as signs and symptoms begin-DO NOT go Back to bed or wait to see if you get better-TIME IS BRAIN. Warning Signs of HEART ATTACK Call 911 if you have these symptoms: 
? Chest discomfort. Most heart attacks involve discomfort in the center of the chest that lasts more than a few minutes, or that goes away and comes back. It can feel like uncomfortable pressure, squeezing, fullness, or pain. ? Discomfort in other areas of the upper body. Symptoms can include pain or discomfort in one or both arms, the back, neck, jaw, or stomach. ? Shortness of breath with or without chest discomfort. ? Other signs may include breaking out in a cold sweat, nausea, or lightheadedness. Don't wait more than five minutes to call [address]! Fast action can save your life. Calling 911 is almost always the fastest way to get lifesaving treatment. Emergency Medical Services staff can begin treatment when they arrive  up to an hour sooner than if someone gets to the hospital by car. The discharge information has been reviewed with the patient. The patient verbalized understanding. Discharge medications reviewed with the patient and appropriate educational materials and side effects teaching were provided. ___________________________________________________________________________________________________________________________________ Providajob Announcement We are excited to announce that we are making your provider's discharge notes available to you in Providajob. You will see these notes when they are completed and signed by the physician that discharged you from your recent hospital stay. If you have any questions or concerns about any information you see in Providajob, please call the Health Information Department where you were seen or reach out to your Primary Care Provider for more information about your plan of care. Introducing [facility]! Dear Elmer Tello: Thank you for requesting a Providajob account. Our records indicate that you already have an active Providajob account. You can access your account anytime at [email or web address] Did you know that you can access your hospital and ER discharge instructions at any time in Providajob? You can also review all of your test results from your hospital stay or ER visit. Additional Information If you have questions, please visit the Frequently Asked Questions section of the Providajob website at [email or web address] Remember, Providajob is NOT to be used for urgent needs.  For medical emergencies, dial 911. Now available from your iPhone and Android! Providers Seen During Your Hospitalization Provider Specialty Primary office phone Luz Raza MD Radiology [phone] Nikolay Perales MD Internal Medicine [phone] Your Primary Care Physician (PCP) Primary Care Physician Office Phone Office Fax Mort Leaver [phone] You are allergic to the following No active allergies Recent Documentation Height Weight BMI Smoking Status 1.626 m 57.6 kg 21.8 kg/m2 Never Smoker Emergency Contacts Name Discharge Info Relation Home Work Mobile [facility] [place] DISCHARGE CAREGIVER [3] Sister [23] [phone] Patient Belongings The following personal items are in your possession at time of discharge: 
     Visual Aid: At home, Glasses Discharge Instructions Attachments/References FAMOTIDINE (BY MOUTH) (ENGLISH) Patient Handouts Famotidine (By mouth) Famotidine ([id number]) Treats ulcers, gastroesophageal reflux disease (GERD), and conditions that cause the stomach to produce too much stomach acid. Also treats heartburn caused by acid indigestion. Brand Name(s): Acid Controller, Acid Reducer, [facility] Acid Reducer, Good Sense Acid Reducer, Heartburn Relief, Leader Acid Reducer, Pepcid, Pepcid AC, Quality Choice Acid Controller, R[employer] Aid Acid Reducer, [facility] Famotidine Acid Reducer, TopCare Acid Reducer There may be other brand names for this medicine. When This Medicine Should Not Be Used: You should not use this medicine if you have had an allergic reaction to famotidine or to similar medicines such as ranitidine (Zantac®), cimetidine (Tagamet®), or nizatidine (Axid®). How to Use This Medicine:  
Tablet, Chewable Tablet, Dissolving Tablet, Liquid · Your doctor will tell you how much medicine to use. Do not use more than directed. · Follow the instructions on the medicine label if you are using this medicine without a prescription. · The chewable tablet must be chewed completely before you swallow it. · If you are using the disintegrating tablet, make sure your hands are dry before you handle the tablet. Do not open the blister pack that contains the tablet until you are ready to take it. Remove the tablet from the blister pack by peeling back the foil, then taking the tablet out. Do not push the tablet through the foil. Place the tablet in your mouth. It should melt within 2 minutes. Swallow after the tablet has melted. · Shake the oral liquid medicine for 5 to 10 seconds before each use. Measure the medicine with a marked measuring spoon or medicine cup. If a dose is missed: · Take a dose as soon as you remember. If it is almost time for your next dose, wait until then and take a regular dose. Do not take extra medicine to make up for a missed dose. How to Store and Dispose of This Medicine: · Store the medicine in a closed container at room temperature, away from heat, moisture, and direct light. Do not freeze the oral liquid. · Ask your pharmacist, doctor, or health caregiver about the best way to dispose of any outdated medicine or medicine no longer needed. Throw away any unused oral liquid that is more than 3month old. · Keep all medicine out of the reach of children. Never share your medicine with anyone. Drugs and Foods to Avoid: Ask your doctor or pharmacist before using any other medicine, including over-the-counter medicines, vitamins, and herbal products. Warnings While Using This Medicine: · Make sure your doctor knows if you are pregnant or breastfeeding, or if you have kidney disease or liver disease. · This medicine might contain phenylalanine (aspartame).  This is only a concern if you have a disorder called phenylketonuria (a problem with amino acids). If you have this condition, talk to your doctor before using this medicine. · Call your doctor if your symptoms do not improve or if they get worse. Possible Side Effects While Using This Medicine:  
Call your doctor right away if you notice any of these side effects: · Allergic reaction: Itching or hives, swelling in your face or hands, swelling or tingling in your mouth or throat, chest tightness, trouble breathing · Blistering, peeling, or red skin rash. · Dark-colored urine or pale stools. · Fast, pounding, or uneven heartbeat. · Fever, chills, cough, sore throat, and body aches. · Seizures. · Unusual bleeding, bruising, or weakness. · Yellowing of your skin or the whites of your eyes. If you notice these less serious side effects, talk with your doctor: · Constipation, diarrhea, or upset stomach. · Headache or dizziness. · Nausea or vomiting. If you notice other side effects that you think are caused by this medicine, tell your doctor. Call your doctor for medical advice about side effects. You may report side effects to FDA at 6-478-FDA-5535 © 2017 [facility] Information is for End User's use only and may not be sold, redistributed or otherwise used for commercial purposes. The above information is an  only. It is not intended as medical advice for individual conditions or treatments. Talk to your doctor, nurse or pharmacist before following any medical regimen to see if it is safe and effective for you. Please provide this summary of care documentation to your next provider. Signatures-by signing, you are acknowledging that this After Visit Summary has been reviewed with you and you have received a copy. Patient Signature:  ____________________________________________________________ Date:  ____________________________________________________________  
  
Suzon Mems Provider Signature:  ____________________________________________________________ Date:  ____________________________________________________________

## 2017-11-07 NOTE — IP AVS SNAPSHOT
Alan Mayen 
 
 
 [address] Patient: Jose Luis Murphy MRN: [id number] [id number] My Medications TAKE these medications as instructed Instructions Each Dose to Equal  
 Morning Noon Evening Bedtime ATIVAN 1 mg tablet Generic drug:  LORazepam  
   
Your last dose was: Your next dose is: Take 1 mg by mouth every eight (8) hours as needed for Anxiety. 1 mg  
    
   
   
   
  
 famotidine 20 mg tablet Commonly known as:  PEPCID Your last dose was: Your next dose is: Take 1 Tab by mouth nightly. 20 mg  
    
   
   
   
  
 folic acid 1 mg tablet Commonly known as:  Google Your last dose was: Your next dose is: Take 1 Tab by mouth daily. 1 mg HYDROmorphone 2 mg tablet Commonly known as:  DILAUDID Your last dose was: Your next dose is: Take 1 Tab by mouth every four (4) hours as needed. Max Daily Amount: 12 mg.  
 2 mg  
    
   
   
   
  
 oxyCODONE-acetaminophen 5-325 mg per tablet Commonly known as:  PERCOCET Your last dose was: Your next dose is: Take 1 Tab by mouth every four (4) hours as needed for Pain. 1 Tab  
    
   
   
   
  
 senna-docusate 8.6-50 mg per tablet Commonly known as:  Roselinda [place] Your last dose was: Your next dose is: Take 1 Tab by mouth daily. 1 Tab Where to Get Your Medications Information on where to get these meds will be given to you by the nurse or doctor. ! Ask your nurse or doctor about these medications  
  famotidine 20 mg tablet

## 2017-11-08 VITALS
HEART RATE: 53 BPM | WEIGHT: 127 LBS | DIASTOLIC BLOOD PRESSURE: 65 MMHG | TEMPERATURE: 97.1 F | BODY MASS INDEX: 21.68 KG/M2 | SYSTOLIC BLOOD PRESSURE: 126 MMHG | HEIGHT: 64 IN | OXYGEN SATURATION: 98 % | RESPIRATION RATE: 16 BRPM

## 2017-11-08 LAB
ALBUMIN SERPL-MCNC: 3.2 G/DL (ref 3.4–5)
ALBUMIN/GLOB SERPL: 0.7 {RATIO} (ref 0.8–1.7)
ALP SERPL-CCNC: 210 U/L (ref 45–117)
ALT SERPL-CCNC: 50 U/L (ref 16–61)
ANION GAP SERPL CALC-SCNC: 8 MMOL/L (ref 3–18)
AST SERPL-CCNC: 51 U/L (ref 15–37)
BASOPHILS # BLD: 0 K/UL (ref 0–0.06)
BASOPHILS NFR BLD: 0 % (ref 0–3)
BILIRUB SERPL-MCNC: 4.5 MG/DL (ref 0.2–1)
BUN SERPL-MCNC: 17 MG/DL (ref 7–18)
BUN/CREAT SERPL: 16 (ref 12–20)
CALCIUM SERPL-MCNC: 8.8 MG/DL (ref 8.5–10.1)
CHLORIDE SERPL-SCNC: 109 MMOL/L (ref 100–108)
CK MB CFR SERPL CALC: 2.4 % (ref 0–4)
CK MB SERPL-MCNC: 1.4 NG/ML (ref 5–25)
CK SERPL-CCNC: 58 U/L (ref 39–308)
CO2 SERPL-SCNC: 23 MMOL/L (ref 21–32)
CREAT SERPL-MCNC: 1.08 MG/DL (ref 0.6–1.3)
DIFFERENTIAL METHOD BLD: ABNORMAL
EOSINOPHIL # BLD: 0 K/UL (ref 0–0.4)
EOSINOPHIL NFR BLD: 0 % (ref 0–5)
ERYTHROCYTE [DISTWIDTH] IN BLOOD BY AUTOMATED COUNT: 17.3 % (ref 11.6–14.5)
FOLATE SERPL-MCNC: >20 NG/ML (ref 3.1–17.5)
GLOBULIN SER CALC-MCNC: 4.3 G/DL (ref 2–4)
GLUCOSE SERPL-MCNC: 90 MG/DL (ref 74–99)
HCT VFR BLD AUTO: 29.4 % (ref 36–48)
HGB BLD-MCNC: 9.8 G/DL (ref 13–16)
LYMPHOCYTES # BLD: 1.1 K/UL (ref 0.8–3.5)
LYMPHOCYTES NFR BLD: 10 % (ref 20–51)
MAGNESIUM SERPL-MCNC: 2.1 MG/DL (ref 1.6–2.6)
MCH RBC QN AUTO: 28.7 PG (ref 24–34)
MCHC RBC AUTO-ENTMCNC: 33.3 G/DL (ref 31–37)
MCV RBC AUTO: 86.2 FL (ref 74–97)
MONOCYTES # BLD: 0.7 K/UL (ref 0–1)
MONOCYTES NFR BLD: 7 % (ref 2–9)
NEUTS SEG # BLD: 8.8 K/UL (ref 1.8–8)
NEUTS SEG NFR BLD: 83 % (ref 42–75)
NRBC BLD-RTO: 3 PER 100 WBC
PLATELET # BLD AUTO: 302 K/UL (ref 135–420)
PLATELET COMMENTS,PCOM: ABNORMAL
PMV BLD AUTO: 10.2 FL (ref 9.2–11.8)
POTASSIUM SERPL-SCNC: 4.6 MMOL/L (ref 3.5–5.5)
PROT SERPL-MCNC: 7.5 G/DL (ref 6.4–8.2)
RBC # BLD AUTO: 3.41 M/UL (ref 4.7–5.5)
RBC MORPH BLD: ABNORMAL
SODIUM SERPL-SCNC: 140 MMOL/L (ref 136–145)
TROPONIN I SERPL-MCNC: <0.02 NG/ML (ref 0–0.04)
VIT B12 SERPL-MCNC: 373 PG/ML (ref 211–911)
WBC # BLD AUTO: 10.6 K/UL (ref 4.6–13.2)

## 2017-11-08 PROCEDURE — 82607 VITAMIN B-12: CPT | Performed by: INTERNAL MEDICINE

## 2017-11-08 PROCEDURE — 85025 COMPLETE CBC W/AUTO DIFF WBC: CPT | Performed by: INTERNAL MEDICINE

## 2017-11-08 PROCEDURE — 85660 RBC SICKLE CELL TEST: CPT | Performed by: INTERNAL MEDICINE

## 2017-11-08 PROCEDURE — 80053 COMPREHEN METABOLIC PANEL: CPT | Performed by: INTERNAL MEDICINE

## 2017-11-08 PROCEDURE — 36430 TRANSFUSION BLD/BLD COMPNT: CPT

## 2017-11-08 PROCEDURE — 82550 ASSAY OF CK (CPK): CPT | Performed by: INTERNAL MEDICINE

## 2017-11-08 PROCEDURE — 74011250637 HC RX REV CODE- 250/637: Performed by: INTERNAL MEDICINE

## 2017-11-08 PROCEDURE — 74011250636 HC RX REV CODE- 250/636: Performed by: INTERNAL MEDICINE

## 2017-11-08 PROCEDURE — 86902 BLOOD TYPE ANTIGEN DONOR EA: CPT | Performed by: INTERNAL MEDICINE

## 2017-11-08 PROCEDURE — 83735 ASSAY OF MAGNESIUM: CPT | Performed by: INTERNAL MEDICINE

## 2017-11-08 PROCEDURE — P9040 RBC LEUKOREDUCED IRRADIATED: HCPCS | Performed by: INTERNAL MEDICINE

## 2017-11-08 PROCEDURE — 86921 COMPATIBILITY TEST INCUBATE: CPT | Performed by: INTERNAL MEDICINE

## 2017-11-08 PROCEDURE — 86922 COMPATIBILITY TEST ANTIGLOB: CPT | Performed by: INTERNAL MEDICINE

## 2017-11-08 RX ORDER — HEPARIN SODIUM (PORCINE) LOCK FLUSH IV SOLN 100 UNIT/ML 100 UNIT/ML
300 SOLUTION INTRAVENOUS AS NEEDED
Status: DISCONTINUED | OUTPATIENT
Start: 2017-11-08 | End: 2017-11-08 | Stop reason: HOSPADM

## 2017-11-08 RX ORDER — FAMOTIDINE 20 MG/1
20 TABLET, FILM COATED ORAL
Qty: 30 TAB | Refills: 0 | Status: SHIPPED | OUTPATIENT
Start: 2017-11-08 | End: 2019-02-22

## 2017-11-08 RX ADMIN — FOLIC ACID 1 MG: 1 TABLET ORAL at 08:16

## 2017-11-08 RX ADMIN — HYDROMORPHONE HYDROCHLORIDE 2 MG: 2 TABLET ORAL at 14:42

## 2017-11-08 RX ADMIN — HEPARIN SODIUM (PORCINE) LOCK FLUSH IV SOLN 100 UNIT/ML 300 UNITS: 100 SOLUTION at 18:28

## 2017-11-08 RX ADMIN — HYDROMORPHONE HYDROCHLORIDE 2 MG: 2 TABLET ORAL at 00:30

## 2017-11-08 RX ADMIN — OXYCODONE HYDROCHLORIDE AND ACETAMINOPHEN 1 TABLET: 5; 325 TABLET ORAL at 11:08

## 2017-11-08 RX ADMIN — Medication 10 ML: at 07:42

## 2017-11-08 RX ADMIN — HYDROMORPHONE HYDROCHLORIDE 2 MG: 2 TABLET ORAL at 08:16

## 2017-11-08 RX ADMIN — HYDROMORPHONE HYDROCHLORIDE 2 MG: 2 TABLET ORAL at 04:03

## 2017-11-08 RX ADMIN — LORAZEPAM 1 MG: 1 TABLET ORAL at 11:36

## 2017-11-08 NOTE — PROGRESS NOTES
Care Management Interventions  PCP Verified by CM: Yes  Mode of Transport at Discharge:  (family)  Transition of Care Consult (CM Consult): [address]: No  Reason Outside [place]: Unable to staff case  Physical Therapy Consult: No  Occupational Therapy Consult: No  Speech Therapy Consult: No  Current Support Network: Lives Alone  Confirm Follow Up Transport: Family  Plan discussed with Pt/Family/Caregiver: Yes  Freedom of Choice Offered: Yes (home health)  Discharge Location  Discharge Placement: Home with home health      Pt is a 61year old admitted for benign obstructive jaundice, sickle cell anemia with crisis. Pt is alert and oriented and alone in room. Pt reports that he lives alone and that prior to admission he was independent in his ADLs and that he has no DME at home. Pt reports that his emergency contact is his sister Darius Chaudhry [phone] and that she lives in [place], [place]. Pt reports that he plans to return home on discharge and that he has transportation home with family. FOC consent given to [facility]. Referral sent. Called [facility] admission liaison's office and [facility]. [address] with [facility] called to say that they have no nurse to see pt tomorrow and it may be up to 72 hours before they can see him. 1640 - Pt states he has used [facility] in the past. [address] signed. Referral placed in [place]. Called Tamara with [facility]. 1640 - Spoke to PENNY Hicks with [facility] and she states that because pt has [employer] she will have to verify insurance before they know for sure if they can accept pt. She states that they will try to run insurance today but may not be able to until tomorrow. Asked PENNY Hicks to called [facility] office [phone] to contact one of the other CMs if they don't find out until tomorrow because this CM is off tomorrow.

## 2017-11-08 NOTE — NURSE NAVIGATOR
Spoke with patient in room, He was able to verify his address and phone # as correct on the face sheet. He confirmed his PCP as Dr. Katie Ambriz. He stated that he is open to [facility] he stated that he has had [facility] in the past. He denies any DME's currently. He plans to return home upon discharge and transportation is to be provided via family. Contact confirmed as his sister Jaz Garcia [phone].

## 2017-11-08 NOTE — PROGRESS NOTES
Progress Note      Patient: Roberta Brown               Sex: male          DOA: 11/7/2017         YOB: 1960      Age:  61 y.o.        LOS:  LOS: 1 day               Subjective:     Slight discomfort at the drain site. Ready to eat. Denies chest pain, abdominal pain, nausea, vomiting or shortness of breath. Objective:      Visit Vitals    /65 (BP 1 Location: Right arm, BP Patient Position: At rest)    Pulse (!) 52    Temp 97.3 °F (36.3 °C)    Resp 17    Ht 5' 4\" (1.626 m)    Wt 57.6 kg (127 lb)    SpO2 100%    BMI 21.8 kg/m2       Physical Exam:  Constitutional: NAD, A&O x4  Respiratory: Normal respiratory effort. Symmetrical rise and fall of the chest.  Cardiovascular: Regular rate  Gastrointestinal: Biliary drain in place with dressing c/d/i. ~25cc of dark brown, bilious drainage in bag. Slight TTP around drain.      Intake and Output:  Current Shift:  11/08 0701 - 11/08 1900  In: 0   Out: 670 [Urine:600; Drains:70]  Last three shifts:  11/06 1901 - 11/08 0700  In: 1855 [P.O.:360; I.V.:875]  Out: 2435 [Urine:1800; Drains:635]    Lab/Data Reviewed:  CMP:   Lab Results   Component Value Date/Time     11/08/2017 11:25 AM    K 4.6 11/08/2017 11:25 AM     (H) 11/08/2017 11:25 AM    CO2 23 11/08/2017 11:25 AM    AGAP 8 11/08/2017 11:25 AM    GLU 90 11/08/2017 11:25 AM    BUN 17 11/08/2017 11:25 AM    CREA 1.08 11/08/2017 11:25 AM    GFRAA >60 11/08/2017 11:25 AM    GFRNA >60 11/08/2017 11:25 AM    CA 8.8 11/08/2017 11:25 AM    MG 2.1 11/08/2017 11:25 AM    ALB 3.2 (L) 11/08/2017 11:25 AM    TP 7.5 11/08/2017 11:25 AM    GLOB 4.3 (H) 11/08/2017 11:25 AM    AGRAT 0.7 (L) 11/08/2017 11:25 AM    SGOT 51 (H) 11/08/2017 11:25 AM    ALT 50 11/08/2017 11:25 AM     CBC:   Lab Results   Component Value Date/Time    WBC 10.6 11/08/2017 11:25 AM    HGB 9.8 (L) 11/08/2017 11:25 AM    HCT 29.4 (L) 11/08/2017 11:25 AM     11/08/2017 11:25 AM       Medications Reviewed    Assessment/Plan     Active Problems:    Sickle cell anemia ([facility] [address] (6/2/2016)      Sickle cell anemia with crisis ([facility] [address] (11/7/2017)      Benign obstructive jaundice (11/7/2017)    Patient is POD#1 s/p PTHC, CBD stone removal, distal CBD balloon cholangiography and internal/external biliary drain placement by Dr. Jose R Shaffer. Repeat CMP with stable/downtrending total bilirubin. If 11am Hgb is stable, patient is okay to be discharged from an IR standpoint. Will send home with drain capped and additional drainage bag to reattach if he experiences abdominal distention, jaundice, RUQ pain, fever. Drain to be flushed once daily with 10cc of saline (forward flushes only). Will be seen in follow up in 7-10 days (outpatient IR clinic).     Alea Cruz.

## 2017-11-08 NOTE — PROGRESS NOTES
11/08/17 0347 11/08/17 0400 11/08/17 0403   Patient Observation   Observations blood done;no reaction noted;call bell withi nreach pt c/o pain pt medicated for pain call bell within reach

## 2017-11-08 NOTE — PROGRESS NOTES
11/08/17 0029 11/08/17 0031   Patient Observation   Observations no reaction noted to blood;pt request pain med pt medicated for pain call bell withi nreach

## 2017-11-08 NOTE — PROGRESS NOTES
11/08/17 0749   Patient Observation   Observations pt awake calm no acute distress noted;pt blood done per pt its been hour;perr nurse who turned pump off blood finished 722 Am;pt informed call bell with inreach

## 2017-11-08 NOTE — PROGRESS NOTES
Pt with conflicting activity orders. Please discontinue bedrest orders to facilitate participate in Physical Therapy evaluation and treatment.   Primo Mckeon, PTA

## 2017-11-08 NOTE — PROGRESS NOTES
PT eval order received and chart reviewed. Bedrest discontinued and PT attempted eval. Patient refusing and state he won't get up with goodman catheter in. PT attempted education on importance of mobilization even with goodman catheter. Patient continues to refuse stating, \"No, it hurts. I'm not getting up. \" Will follow up as patient schedule allows. Thank you for this referral. Abhinav Onofre, PT, DPT.

## 2017-11-08 NOTE — PROGRESS NOTES
11/08/17 0008 11/08/17 0015 11/08/17 0017   Patient Observation   Observations stopped NS to run blood vitals;blood started forward flushed drain per order;pt tolerated

## 2017-11-08 NOTE — DISCHARGE SUMMARY
[address]  [address] [place] [place] Hurt[place] SUMMARY    Name:  Luis Antonio Hernandez  MR#:  [id number]  :  1960  Account #:  [de-identified]  Date of Adm:  2017  Date of Discharge:      DISPOSITION: Discharged to home with home health care for drain  care and skilled nursing for disease medication management. DISCHARGE DIAGNOSES  1. Obstructive jaundice and choledocholithiasis, status post  CBD angioplasty and biliary drainage catheter placement. 2. Generalized body ache. No acute coronary syndrome. 3. Chronic sickle cell pain. 4. Sickle cell anemia status post 2 units of blood transfusion. 5. History of gastroesophageal reflux disease. DISCHARGE MEDICATIONS  1. Dilaudid 2 mg p.o. q.4 hours p.r.n. for severe pain. 2. Percocet 5/325 q.4 hours p.r.n. for moderate pain. 3. Ativan 1 mg every 8 hours as needed for anxiety. 4. Mi-Colace 1 tablet p.o. daily. 5. Folvite 1 mg daily. 6. Pepcid 20 mg p.o. at bedtime. IMAGING AND PROCEDURES: Interventional radiologist performed  percutaneous transhepatic cholangiography, suction thrombectomy,  and distal common bile duct stone removal, as well as internal and  external biliary drainage placement. CONSULTANTS  1. Interventional Radiology. 2. Hematology/Oncology, Dr. Russ Montoya. HOSPITAL COURSE: The patient was admitted to the hospital on  2017 after the patient had outpatient interventional radiologist  guided procedure done as mentioned above. He was admitted here, as  the patient's hemoglobin was low and was told that he has always  autoantibodies and takes 2 days to get his blood match; however, this  time he got blood transfusion without any problem. His hemoglobin  went up to 9.8. The patient was ambulating without any problem. The  patient was tolerating diet and medications very well. He was  continued on his home medication for other comorbidities.  I also talked  to Dr. Russ Montoya on the day of discharge, cleared for the discharge from  his side. I talked to Interventional Radiology Department. They also  cleared for the discharge with the drain care. DISCHARGE INSTRUCTIONS  1. Diet: Regular diet. 2. Activity as tolerated. 3. Follow with the PCP in 7 days. 4. Follow with Intervention Radiology Department in 1 week. Total time greater than 35 minutes.         MD SOFIA Rosas / BORIS  D:  11/08/2017   13:29  T:  11/08/2017   13:51  Job #:  [id number]

## 2017-11-08 NOTE — ROUTINE PROCESS
Bedside and Verbal shift change report given to Balwinder Mcdowell RN (oncoming nurse) by Tim Griffin RN   (offgoing nurse). Report included the following information SBAR, Kardex, Procedure Summary, Intake/Output, MAR and Recent Results.

## 2017-11-08 NOTE — PROGRESS NOTES
PT eval order received and chart reviewed. Spoke with patient who reports he ambulated in hallway with MD and that he has no need for skilled physical therapy services at this time. Will discontinue PT eval order per patient request. Educated patient that should his functional needs change, he may have MD re-order. Will follow up as patient schedule allows. Thank you for this referral. Nayan Coronado, PT, DPT.

## 2017-11-08 NOTE — DISCHARGE SUMMARY
[facility]  Face to Face Encounter    Patients Name: Jackie Jimenez    YOB: 1960    Primary Diagnosis: obstructive jaundice, biliary catheter, sickle cell disease    Date of Face to Face:   11/8/17                                    Face to Face Encounter findings are related to primary reason for home care:   yes    1. I certify that the patient needs intermittent skilled nursing care, physical therapy and/or speech therapy. I will not be following this patient in the Community and Dr. Bhavana Boss MD  will be responsible for signing the [address] of Care. 2. Initial Orders for Care: Must be completed only if Face to Face MD will not be signing the [address] Skilled Nursing    3. I certify that this patient is homebound for the following reason(s): Requires considerable and taxing effort to leave the home     4. I certify that this patient is under my care and that I, or a nurse practitioner or  [id number] working with me, had a Face-to-Face Encounter that meets the physician Face-to-Face Encounter requirements. Document the physical findings from the [facility] Encounter that support the need for skilled services: Recent surgical procedure biliary catheter placement that requires wound assessment, teaching, lab monitoring and physical, occupational and/or speech therapy services for evaluation and interventions.      Damaris Agustin MD  11/8/2017

## 2017-11-08 NOTE — PROGRESS NOTES
11/07/17 1911   Patient Observation   Observations pt awake request   ativan be added ; day nurse  states   he will kristan MD and get it ordered;call bell within reach

## 2017-11-08 NOTE — PROGRESS NOTES
11/07/17 2318 11/07/17 2333 11/07/17 2337   Patient Observation   Observations pt request pain med prn ativan given attempted to read consent to pt;pt states he will sign he doesnt need it read;

## 2017-11-08 NOTE — DISCHARGE INSTRUCTIONS
Sickle Cell Crisis: Care Instructions  Your Care Instructions    Sickle cell crisis is a painful episode that may begin suddenly in a person with sickle cell disease. Sickle cell disease turns normal, round red blood cells into cells that look like padmini or crescent moons. The sickle-shaped cells can get stuck in blood vessels, blocking blood flow and causing severe pain. The pain can occur in the bones of the spine, the arms and legs, the chest, and the abdomen. An episode may be called a \"painful event\" or \"painful crisis. \" Some people who have sickle cell disease have many painful events, while others have few or none. Treatment depends on the level of pain and how long it lasts. Sometimes taking nonprescription pain relievers can help. Or you may need stronger pain relief medicine that is prescribed or given by a doctor. You may need to be treated in the hospital.  It isn't always possible to know what sets off a painful event. But triggers include being dehydrated, cold temperatures, infection, stress, and not getting enough oxygen. Follow-up care is a key part of your treatment and safety. Be sure to make and go to all appointments, and call your doctor if you are having problems. It's also a good idea to know your test results and keep a list of the medicines you take. How can you care for yourself at home? · Create a pain management plan with your doctor. This plan should include the types of medicines you can take and other actions you can take at home to relieve pain. · Drink plenty of fluids, enough so that your urine is light yellow or clear like water. If you have kidney, heart, or liver disease and have to limit fluids, talk with your doctor before you increase the amount of fluids you drink. · Take your medicines exactly as prescribed. Call your doctor if you think you are having a problem with your medicine. · Take pain medicines exactly as directed.   ¨ If the doctor gave you a prescription medicine for pain, take it as prescribed. ¨ If you are not taking a prescription pain medicine, ask your doctor if you can take an over-the-counter medicine. · Avoid alcohol. It can make you dehydrated. · Dress warmly in cold weather. The cold and windy weather can lead to severe pain. · Do not smoke. Smoking can reduce the amount of oxygen in your blood. · Get plenty of sleep. When should you call for help? Call 911 anytime you think you may need emergency care. For example, call if:  ? · You have symptoms of a severe problem from sickle cell. ? · You have symptoms of a stroke. These may include:  ¨ Sudden numbness, tingling, weakness, or loss of movement in your face, arm, or leg, especially on only one side of your body. ¨ Sudden vision changes. ¨ Sudden trouble speaking. ¨ Sudden confusion or trouble understanding simple statements. ¨ Sudden problems with walking or balance. ¨ A sudden, severe headache that is different from past headaches. ? · You are in severe pain. ? · You have symptoms of a heart attack. These may include:  ¨ Chest pain or pressure, or a strange feeling in the chest.  ¨ Sweating. ¨ Shortness of breath. ¨ Nausea or vomiting. ¨ Pain, pressure, or a strange feeling in the back, neck, jaw, or upper belly or in one or both shoulders or arms. ¨ Lightheadedness or sudden weakness. ¨ A fast or irregular heartbeat. After you call 911, the  may tell you to chew 1 adult-strength or 2 to 4 low-dose aspirin. Wait for an ambulance. Do not try to drive yourself. ?Call your doctor now or seek immediate medical care if:  ? · You have a fever. ? Watch closely for changes in your health, and be sure to contact your doctor if you have any problems. Where can you learn more? Go to http://derek-laila.info/. Enter F104 in the search box to learn more about \"Sickle Cell Crisis: Care Instructions. \"  Current as of: October 13, 2016  Content Version: 11.4  © 6308-4935 [employer]. Care instructions adapted under license by [employer] (which disclaims liability or warranty for this information). If you have questions about a medical condition or this instruction, always ask your healthcare professional. [address] any warranty or liability for your use of this information. Jaundice: Care Instructions  Your Care Instructions    Jaundice is yellowing of your skin and the whites of your eyes. It's caused by a pigment, or coloring, called bilirubin. This comes from the breakdown of red blood cells. When your liver is healthy, it removes the pigment from your blood. But if the liver isn't working right, the pigment can build up in the blood. Then it can get into the skin and other tissues. Many diseases can cause jaundice. These include hepatitis, gallstones, and cancer of the pancreas. Liver damage from heavy drinking over a long time can also cause it. Some medicines that can damage the liver also cause jaundice. The treatment for jaundice depends on the cause. You may need medicine to treat an infection. Or you may need to have your gallbladder removed. Some people need to stop drinking alcohol. If another disease is causing jaundice, treating the disease will cure the jaundice. If a medicine you take is causing jaundice, your doctor may switch you to another one. Follow-up care is a key part of your treatment and safety. Be sure to make and go to all appointments, and call your doctor if you are having problems. It's also a good idea to know your test results and keep a list of the medicines you take. How can you care for yourself at home? · Do not drink any alcohol until your jaundice is gone. Alcohol will damage the liver more. If your jaundice is caused by drinking alcohol, do not drink alcohol even when you are better. Tell your doctor if you need help to quit.  Counseling, support groups, and sometimes medicines can help you stay sober. · Be safe with medicines. Do not take any other medicine without talking to your doctor first. This includes over-the-counter medicines, vitamins, and herbal products. · Make sure your doctor knows all the medicines you take. Some medicines, such as acetaminophen (Tylenol), can make liver problems worse. · If you have itchy skin, keep cool and stay out of the sun. Try to wear cotton clothing. Talk to your doctor about using over-the-counter medicines for itching. These include diphenhydramine (Benadryl) and chlorpheniramine (Chlor-Trimeton). Follow the instructions on the label. · Lower your activity to match your energy. When should you call for help? Call 911 anytime you think you may need emergency care. For example, call if:  ? · You have severe trouble breathing. ? · You passed out (lost consciousness). ?Call your doctor now or seek immediate medical care if:  ? · You are confused, or your confusion gets worse. ? · You have a fever or chills. ? · You have severe pain or swelling in your belly. ? · You are losing weight without trying. ? · You are vomiting or feel sick to your stomach. ? · Your urine is dark yellow-brown, or your stools are light-colored. ? Watch closely for changes in your health, and be sure to contact your doctor if:  ? · You do not get better as expected. Where can you learn more? Go to http://derek-laila.info/. Enter I024 in the search box to learn more about \"Jaundice: Care Instructions. \"  Current as of: May 12, 2017  Content Version: 11.4  © 6946-0051 [employer]. Care instructions adapted under license by [employer] (which disclaims liability or warranty for this information).  If you have questions about a medical condition or this instruction, always ask your healthcare professional. [address] any warranty or liability for your use of this information. Patient armband removed and shredded      DISCHARGE SUMMARY from Nurse    PATIENT INSTRUCTIONS:    After general anesthesia or intravenous sedation, for 24 hours or while taking prescription Narcotics:  · Limit your activities  · Do not drive and operate hazardous machinery  · Do not make important personal or business decisions  · Do  not drink alcoholic beverages  · If you have not urinated within 8 hours after discharge, please contact your surgeon on call. Report the following to your surgeon:  · Excessive pain, swelling, redness or odor of or around the surgical area  · Temperature over 100.5  · Nausea and vomiting lasting longer than 4 hours or if unable to take medications  · Any signs of decreased circulation or nerve impairment to extremity: change in color, persistent  numbness, tingling, coldness or increase pain  · Any questions    What to do at Home:  Recommended activity: Activity as tolerated    If you experience any of the following symptoms Nausea, vomiting, diarrhea, fever greater than 100.5, dizziness, severe headache, shortness of breath, chest pain, increased pain, please follow up with PCP. *  Please give a list of your current medications to your Primary Care Provider. *  Please update this list whenever your medications are discontinued, doses are      changed, or new medications (including over-the-counter products) are added. *  Please carry medication information at all times in case of emergency situations. These are general instructions for a healthy lifestyle:    No smoking/ No tobacco products/ Avoid exposure to second hand smoke  Surgeon General's Warning:  Quitting smoking now greatly reduces serious risk to your health.     Obesity, smoking, and sedentary lifestyle greatly increases your risk for illness    A healthy diet, regular physical exercise & weight monitoring are important for maintaining a healthy lifestyle    You may be retaining fluid if you have a history of heart failure or if you experience any of the following symptoms:  Weight gain of 3 pounds or more overnight or 5 pounds in a week, increased swelling in our hands or feet or shortness of breath while lying flat in bed. Please call your doctor as soon as you notice any of these symptoms; do not wait until your next office visit. Recognize signs and symptoms of STROKE:    F-face looks uneven    A-arms unable to move or move unevenly    S-speech slurred or non-existent    T-time-call 911 as soon as signs and symptoms begin-DO NOT go       Back to bed or wait to see if you get better-TIME IS BRAIN. Warning Signs of HEART ATTACK     Call 911 if you have these symptoms:   Chest discomfort. Most heart attacks involve discomfort in the center of the chest that lasts more than a few minutes, or that goes away and comes back. It can feel like uncomfortable pressure, squeezing, fullness, or pain.  Discomfort in other areas of the upper body. Symptoms can include pain or discomfort in one or both arms, the back, neck, jaw, or stomach.  Shortness of breath with or without chest discomfort.  Other signs may include breaking out in a cold sweat, nausea, or lightheadedness. Don't wait more than five minutes to call 911 - MINUTES MATTER! Fast action can save your life. Calling 911 is almost always the fastest way to get lifesaving treatment. Emergency Medical Services staff can begin treatment when they arrive -- up to an hour sooner than if someone gets to the hospital by car. The discharge information has been reviewed with the patient. The patient verbalized understanding. Discharge medications reviewed with the patient and appropriate educational materials and side effects teaching were provided.   ___________________________________________________________________________________________________________________________________

## 2017-11-08 NOTE — PROGRESS NOTES
Hematology    Uneventful night  Wonders why he has a goodman  Pain is adeq controlled  Uneventful prbc last noc    afeb vss nad  Pale, icteric  rrr  Lungs clear  Right mediport  abd soft, drain green bile  No edema    hbg 6.8  t cheri 4.5 direct >3    Sickle cell anemia with multiple sequela  Choledocholithiasis with obstructive jaundice    Track cbc, lft  Cont current pain mx  Dc goodman  Cira help from Dr Shasha León and Dr Steve Morales.  Minette Rubinstein, MD

## 2017-11-08 NOTE — PROGRESS NOTES
SUBJECTIVE:    Feeling better. No chest or abdominal pain. Walked in hallway without any issues in presence of me and nurse. No nausea or vomiting. No headaches or dizziness. OBJECTIVE:    Visit Vitals    /65 (BP 1 Location: Right arm, BP Patient Position: At rest)    Pulse (!) 50    Temp 97.5 °F (36.4 °C)    Resp 16    Ht 5' 4\" (1.626 m)    Wt 57.6 kg (127 lb)    SpO2 100%    BMI 21.8 kg/m2     RRR  Lungs: CTA bilaterally  GI: drain tube is in situ, BS +  Extremities: no pedal edema  General: NAD, Awake  CNS: moves all extremities well    ASSESSMENT:    1. Obstructive jaundice and choledocholithiasis status post stone removal, cholangioplasty and biliary drainage catheter placement. 2. Chronic bodyaches. No ACS. 3. Sickle cell anemia. S/p 2 units  4. Chronic sickle cell pain. 5. History of gastroesophageal reflux disease. PLAN:    Talked to IR [KARTHIK Madrid and elizabeth] - can go home. [place] [facility] [place] [facility] [place] drain care teaching, Ms. Walter Cruz will come and show patient about its care.    Talked to dr. Eliz Lala - will follow him as an outpatient  Discharge patient home today     CMP:   Lab Results   Component Value Date/Time     11/08/2017 11:25 AM    K 4.6 11/08/2017 11:25 AM     (H) 11/08/2017 11:25 AM    CO2 23 11/08/2017 11:25 AM    AGAP 8 11/08/2017 11:25 AM    GLU 90 11/08/2017 11:25 AM    BUN 17 11/08/2017 11:25 AM    CREA 1.08 11/08/2017 11:25 AM    GFRAA >60 11/08/2017 11:25 AM    GFRNA >60 11/08/2017 11:25 AM    CA 8.8 11/08/2017 11:25 AM    MG 2.1 11/08/2017 11:25 AM    ALB 3.2 (L) 11/08/2017 11:25 AM    TP 7.5 11/08/2017 11:25 AM    GLOB 4.3 (H) 11/08/2017 11:25 AM    AGRAT 0.7 (L) 11/08/2017 11:25 AM    SGOT 51 (H) 11/08/2017 11:25 AM    ALT 50 11/08/2017 11:25 AM     CBC:   Lab Results   Component Value Date/Time    WBC 10.6 11/08/2017 11:25 AM    HGB 9.8 (L) 11/08/2017 11:25 AM    HCT 29.4 (L) 11/08/2017 11:25 AM     11/08/2017 11:25 AM       Current Discharge Medication List      CONTINUE these medications which have CHANGED    Details   famotidine (PEPCID) 20 mg tablet Take 1 Tab by mouth nightly. Qty: 30 Tab, Refills: 0         CONTINUE these medications which have NOT CHANGED    Details   oxyCODONE-acetaminophen (PERCOCET) 5-325 mg per tablet Take 1 Tab by mouth every four (4) hours as needed for Pain. HYDROmorphone (DILAUDID) 2 mg tablet Take 1 Tab by mouth every four (4) hours as needed. Max Daily Amount: 12 mg.  Qty: 30 Tab, Refills: 0      LORazepam (ATIVAN) 1 mg tablet Take 1 mg by mouth every eight (8) hours as needed for Anxiety. folic acid (FOLVITE) 1 mg tablet Take 1 Tab by mouth daily. Qty: 30 Tab, Refills: 1      senna-docusate (PERICOLACE) 8.6-50 mg per tablet Take 1 Tab by mouth daily.   Qty: 30 Tab, Refills: 0

## 2017-11-08 NOTE — DISCHARGE SUMMARY
PATIENT DISCHARGE INSTRUCTIONS      PATIENT DISCHARGE INSTRUCTIONS    David Gonzalez / [id number] : 1960    Admitted 2017 Discharged: 2017     Dictated # [id number]    · It is important that you take the medication exactly as they are prescribed. · Keep your medication in the bottles provided by the pharmacist and keep a list of the medication names, dosages, and times to be taken in your wallet. · Do not take other medications without consulting your doctor. What to do at Home    Recommended Diet: Regular Diet    Recommended Activity: Activity as tolerated    Current Discharge Medication List      CONTINUE these medications which have CHANGED    Details   famotidine (PEPCID) 20 mg tablet Take 1 Tab by mouth nightly. Qty: 30 Tab, Refills: 0         CONTINUE these medications which have NOT CHANGED    Details   oxyCODONE-acetaminophen (PERCOCET) 5-325 mg per tablet Take 1 Tab by mouth every four (4) hours as needed for Pain. HYDROmorphone (DILAUDID) 2 mg tablet Take 1 Tab by mouth every four (4) hours as needed. Max Daily Amount: 12 mg.  Qty: 30 Tab, Refills: 0      LORazepam (ATIVAN) 1 mg tablet Take 1 mg by mouth every eight (8) hours as needed for Anxiety. folic acid (FOLVITE) 1 mg tablet Take 1 Tab by mouth daily. Qty: 30 Tab, Refills: 1      senna-docusate (PERICOLACE) 8.6-50 mg per tablet Take 1 Tab by mouth daily.   Qty: 30 Tab, Refills: 0               Signed By: Alfredito Rivera MD     2017

## 2017-11-08 NOTE — PROGRESS NOTES
11/08/17 0439 11/08/17 0454   Patient Observation   Observations starting blood this is the second unit;no reaction noted call bell withi nreach vitals stable no reaction noted to blod;pt resting call bell within reach

## 2017-11-09 LAB
ABO + RH BLD: NORMAL
ANTIGENS PRESENT RBC DONR: NORMAL
ANTIGENS PRESENT RBC DONR: NORMAL
BLD PROD TYP BPU: NORMAL
BLD PROD TYP BPU: NORMAL
BLOOD GROUP ANTIBODIES SERPL: NORMAL
BLOOD GROUP ANTIBODIES SERPL: NORMAL
BPU ID: NORMAL
BPU ID: NORMAL
CALLED TO:,BCALL1: NORMAL
CALLED TO:,BCALL2: NORMAL
CROSSMATCH RESULT,%XM: NORMAL
CROSSMATCH RESULT,%XM: NORMAL
PHYSICIAN INSTRUCTIO,%PI: NORMAL
SPECIMEN EXP DATE BLD: NORMAL
STATUS OF UNIT,%ST: NORMAL
STATUS OF UNIT,%ST: NORMAL
UNIT DIVISION, %UDIV: 0
UNIT DIVISION, %UDIV: 0

## 2017-11-09 NOTE — PROGRESS NOTES
Contacted today by [facility] who informs us that they are not able to accept pt due to non participation with pt's insurance. Referral submitted to [facility] and pt accepted with [facility] 11/10.   Message left on pt's home and cell # with information

## 2017-11-14 ENCOUNTER — HOSPITAL ENCOUNTER (EMERGENCY)
Age: 57
Discharge: HOME | End: 2017-11-14
Attending: EMERGENCY MEDICINE
Payer: COMMERCIAL

## 2017-11-14 ENCOUNTER — APPOINTMENT (OUTPATIENT)
Dept: CT IMAGING | Age: 57
End: 2017-11-14
Attending: EMERGENCY MEDICINE
Payer: COMMERCIAL

## 2017-11-14 ENCOUNTER — APPOINTMENT (OUTPATIENT)
Dept: GENERAL RADIOLOGY | Age: 57
End: 2017-11-14
Attending: EMERGENCY MEDICINE
Payer: COMMERCIAL

## 2017-11-14 VITALS
OXYGEN SATURATION: 100 % | DIASTOLIC BLOOD PRESSURE: 83 MMHG | HEART RATE: 86 BPM | SYSTOLIC BLOOD PRESSURE: 159 MMHG | RESPIRATION RATE: 21 BRPM

## 2017-11-14 DIAGNOSIS — R10.84 ABDOMINAL PAIN, GENERALIZED: Primary | ICD-10-CM

## 2017-11-14 LAB
ALBUMIN SERPL-MCNC: 3.2 G/DL (ref 3.4–5)
ALBUMIN/GLOB SERPL: 0.7 {RATIO} (ref 0.8–1.7)
ALP SERPL-CCNC: 288 U/L (ref 45–117)
ALT SERPL-CCNC: 68 U/L (ref 16–61)
ANION GAP SERPL CALC-SCNC: 10 MMOL/L (ref 3–18)
AST SERPL-CCNC: 97 U/L (ref 15–37)
ATRIAL RATE: 83 BPM
BASOPHILS # BLD: 0 K/UL (ref 0–0.1)
BASOPHILS NFR BLD: 0 % (ref 0–2)
BILIRUB SERPL-MCNC: 8.1 MG/DL (ref 0.2–1)
BUN SERPL-MCNC: 30 MG/DL (ref 7–18)
BUN/CREAT SERPL: 26 (ref 12–20)
CALCIUM SERPL-MCNC: 8.2 MG/DL (ref 8.5–10.1)
CALCULATED P AXIS, ECG09: 60 DEGREES
CALCULATED R AXIS, ECG10: 2 DEGREES
CALCULATED T AXIS, ECG11: 61 DEGREES
CHLORIDE SERPL-SCNC: 104 MMOL/L (ref 100–108)
CK MB CFR SERPL CALC: 2.5 % (ref 0–4)
CK MB SERPL-MCNC: 1.6 NG/ML (ref 5–25)
CK SERPL-CCNC: 65 U/L (ref 39–308)
CO2 SERPL-SCNC: 21 MMOL/L (ref 21–32)
CREAT SERPL-MCNC: 1.14 MG/DL (ref 0.6–1.3)
DIAGNOSIS, 93000: NORMAL
DIFFERENTIAL METHOD BLD: ABNORMAL
EOSINOPHIL # BLD: 0 K/UL (ref 0–0.4)
EOSINOPHIL NFR BLD: 0 % (ref 0–5)
ERYTHROCYTE [DISTWIDTH] IN BLOOD BY AUTOMATED COUNT: 16.6 % (ref 11.6–14.5)
GLOBULIN SER CALC-MCNC: 4.7 G/DL (ref 2–4)
GLUCOSE SERPL-MCNC: 134 MG/DL (ref 74–99)
HCT VFR BLD AUTO: 25 % (ref 36–48)
HGB BLD-MCNC: 8.6 G/DL (ref 13–16)
LYMPHOCYTES # BLD: 0.9 K/UL (ref 0.9–3.6)
LYMPHOCYTES NFR BLD: 6 % (ref 21–52)
MCH RBC QN AUTO: 29.5 PG (ref 24–34)
MCHC RBC AUTO-ENTMCNC: 34.4 G/DL (ref 31–37)
MCV RBC AUTO: 85.6 FL (ref 74–97)
MONOCYTES # BLD: 1.3 K/UL (ref 0.05–1.2)
MONOCYTES NFR BLD: 9 % (ref 3–10)
NEUTS SEG # BLD: 12.9 K/UL (ref 1.8–8)
NEUTS SEG NFR BLD: 85 % (ref 40–73)
P-R INTERVAL, ECG05: 150 MS
PLATELET # BLD AUTO: 191 K/UL (ref 135–420)
PMV BLD AUTO: 9.9 FL (ref 9.2–11.8)
POTASSIUM SERPL-SCNC: 4.5 MMOL/L (ref 3.5–5.5)
PROT SERPL-MCNC: 7.9 G/DL (ref 6.4–8.2)
Q-T INTERVAL, ECG07: 402 MS
QRS DURATION, ECG06: 92 MS
QTC CALCULATION (BEZET), ECG08: 472 MS
RBC # BLD AUTO: 2.92 M/UL (ref 4.7–5.5)
RETICS/RBC NFR AUTO: 1.2 % (ref 0.5–2.3)
SODIUM SERPL-SCNC: 135 MMOL/L (ref 136–145)
TROPONIN I SERPL-MCNC: <0.02 NG/ML (ref 0–0.04)
VENTRICULAR RATE, ECG03: 83 BPM
WBC # BLD AUTO: 15.1 K/UL (ref 4.6–13.2)

## 2017-11-14 PROCEDURE — 74011636320 HC RX REV CODE- 636/320: Performed by: EMERGENCY MEDICINE

## 2017-11-14 PROCEDURE — 71275 CT ANGIOGRAPHY CHEST: CPT

## 2017-11-14 PROCEDURE — 96374 THER/PROPH/DIAG INJ IV PUSH: CPT

## 2017-11-14 PROCEDURE — 82550 ASSAY OF CK (CPK): CPT | Performed by: EMERGENCY MEDICINE

## 2017-11-14 PROCEDURE — 80053 COMPREHEN METABOLIC PANEL: CPT | Performed by: EMERGENCY MEDICINE

## 2017-11-14 PROCEDURE — 85025 COMPLETE CBC W/AUTO DIFF WBC: CPT | Performed by: EMERGENCY MEDICINE

## 2017-11-14 PROCEDURE — 99284 EMERGENCY DEPT VISIT MOD MDM: CPT

## 2017-11-14 PROCEDURE — 96375 TX/PRO/DX INJ NEW DRUG ADDON: CPT

## 2017-11-14 PROCEDURE — 74011250636 HC RX REV CODE- 250/636

## 2017-11-14 PROCEDURE — 71010 XR CHEST PORT: CPT

## 2017-11-14 PROCEDURE — 85045 AUTOMATED RETICULOCYTE COUNT: CPT | Performed by: EMERGENCY MEDICINE

## 2017-11-14 PROCEDURE — 93005 ELECTROCARDIOGRAM TRACING: CPT

## 2017-11-14 PROCEDURE — 74011250636 HC RX REV CODE- 250/636: Performed by: EMERGENCY MEDICINE

## 2017-11-14 RX ORDER — ONDANSETRON 2 MG/ML
4 INJECTION INTRAMUSCULAR; INTRAVENOUS
Status: COMPLETED | OUTPATIENT
Start: 2017-11-14 | End: 2017-11-14

## 2017-11-14 RX ORDER — MORPHINE SULFATE 4 MG/ML
4 INJECTION, SOLUTION INTRAMUSCULAR; INTRAVENOUS
Status: DISCONTINUED | OUTPATIENT
Start: 2017-11-14 | End: 2017-11-14 | Stop reason: HOSPADM

## 2017-11-14 RX ORDER — MORPHINE SULFATE 4 MG/ML
4 INJECTION, SOLUTION INTRAMUSCULAR; INTRAVENOUS
Status: DISCONTINUED | OUTPATIENT
Start: 2017-11-14 | End: 2017-11-14 | Stop reason: RX

## 2017-11-14 RX ORDER — MORPHINE SULFATE 8 MG/ML
4 INJECTION, SOLUTION INTRAMUSCULAR; INTRAVENOUS
Status: COMPLETED | OUTPATIENT
Start: 2017-11-14 | End: 2017-11-14

## 2017-11-14 RX ORDER — MORPHINE SULFATE 2 MG/ML
INJECTION, SOLUTION INTRAMUSCULAR; INTRAVENOUS
Status: COMPLETED
Start: 2017-11-14 | End: 2017-11-14

## 2017-11-14 RX ADMIN — ONDANSETRON 4 MG: 2 INJECTION INTRAMUSCULAR; INTRAVENOUS at 03:35

## 2017-11-14 RX ADMIN — Medication 4 MG: at 04:45

## 2017-11-14 RX ADMIN — MORPHINE SULFATE 4 MG: 8 INJECTION, SOLUTION INTRAMUSCULAR; INTRAVENOUS at 03:40

## 2017-11-14 RX ADMIN — IOPAMIDOL 96 ML: 612 INJECTION, SOLUTION INTRAVENOUS at 05:11

## 2017-11-14 NOTE — ED PROVIDER NOTES
HPI Comments: 3:23 AM Marcos Oliveira is a 61 y.o. male with h/o sickle cell disease, anemia, and GERD who presents to ED complaining of bleeding from bilary drainage catheter on right flank and right flank pain onset this evening. The patient stats he had a bilary drainage catheter placed on 11/7/17 and was discharged on 11/8/17 with a prescription for 2mg of Dilaudid. The patient states he last took Dilaudid at 4pm yesterday before going to bed after work. Associated symptoms include CP, back pain, bilateral leg pain, and SOB. He reports he is allergic to Xarelto. PSHx includes cholecystectomy and retina detachment repair. PCP: Kaden Mayorga MD      The history is provided by the patient. Past Medical History:   Diagnosis Date    Anemia NEC     sickle cell    Duodenal ulcer     GERD (gastroesophageal reflux disease)     Other ill-defined conditions(799.89) Sickle cell    Sickle cell anemia (HCC)        Past Surgical History:   Procedure Laterality Date    HX CHOLECYSTECTOMY      HX RETINAL DETACHMENT REPAIR Right     HX VASCULAR ACCESS      double lumen mediport         History reviewed. No pertinent family history. Social History     Social History    Marital status:      Spouse name: N/A    Number of children: N/A    Years of education: N/A     Occupational History    Not on file. Social History Main Topics    Smoking status: Never Smoker    Smokeless tobacco: Never Used    Alcohol use No    Drug use: No    Sexual activity: Yes     Partners: Female     Other Topics Concern    Not on file     Social History Narrative         ALLERGIES: Review of patient's allergies indicates no known allergies. Review of Systems   Respiratory: Positive for shortness of breath. Cardiovascular: Positive for chest pain. Gastrointestinal:        Right flank pain  Bleeding from right flank bilary drainage catheter   Musculoskeletal: Positive for back pain.         Bilateral leg pain All other systems reviewed and are negative. Vitals:    11/14/17 0245   BP: 149/75   Pulse: 83   Resp: 30   SpO2: 100%            Physical Exam     Physical Exam:  . Patient Vitals for the past 12 hrs:   Pulse Resp BP SpO2   11/14/17 0245 83 30 149/75 100 %     Gen: Well developed, well nourished 61 y.o. male. HEENT: Normocephalic, atraumatic. Scleral icterus. Respiratory: No accessory muscle use No wheeze, No rales, No rhonchi. Normal chest wall excursion. No subcutaneous air, no rib crepitus  Cardiovascular: Regular rhythm and rate, Normal pulses, Normal perfusion. No edema  Gastrointestinal: Non distended, No masses. No ascites. No organomegaly. No evidence of trauma. Diffusely tender abdomen that is more severe in RUQ to palpation. Bilary drainage catheter on right flank. Musculoskeletal: Full range of motion at all other tested joints. No joint effusions. Neuro: Normal strength, Normal sensation. Normal speech. No ataxia. Cranial Nerves II-XII normal as tested. Skin: No rash, petechia or purpura. Warm and dry. Bilary drainage catheter has a small amount of blood with no surrounding erythema or discharge. Psyche: No suicidal ideation, No homicidal ideation. No hallucinations. Organized thoughts.   Heme: Normal  : Deferred      MDM  ED Course       Procedures        Vitals:  Patient Vitals for the past 12 hrs:   Pulse Resp BP SpO2   11/14/17 0245 83 30 149/75 100 %       Medications ordered:   Medications   morphine injection 4 mg (0 mg IntraVENous Held 11/14/17 0419)   ondansetron (ZOFRAN) injection 4 mg (4 mg IntraVENous Given 11/14/17 0335)   morphine injection 4 mg (4 mg IntraVENous Given 11/14/17 1350)   morphine 2 mg/mL injection (4 mg  Given 11/14/17 2505)         Lab findings:  Recent Results (from the past 12 hour(s))   CARDIAC PANEL,(CK, CKMB & TROPONIN)    Collection Time: 11/14/17  2:57 AM   Result Value Ref Range    CK 65 39 - 308 U/L    CK - MB 1.6 <3.6 ng/ml    CK-MB Index 2.5 0.0 - 4.0 %    Troponin-I, Qt. <0.02 0.0 - 0.045 NG/ML   CBC WITH AUTOMATED DIFF    Collection Time: 11/14/17  2:57 AM   Result Value Ref Range    WBC 15.1 (H) 4.6 - 13.2 K/uL    RBC 2.92 (L) 4.70 - 5.50 M/uL    HGB 8.6 (L) 13.0 - 16.0 g/dL    HCT 25.0 (L) 36.0 - 48.0 %    MCV 85.6 74.0 - 97.0 FL    MCH 29.5 24.0 - 34.0 PG    MCHC 34.4 31.0 - 37.0 g/dL    RDW 16.6 (H) 11.6 - 14.5 %    PLATELET 926 767 - 621 K/uL    MPV 9.9 9.2 - 11.8 FL    NEUTROPHILS 85 (H) 40 - 73 %    LYMPHOCYTES 6 (L) 21 - 52 %    MONOCYTES 9 3 - 10 %    EOSINOPHILS 0 0 - 5 %    BASOPHILS 0 0 - 2 %    ABS. NEUTROPHILS 12.9 (H) 1.8 - 8.0 K/UL    ABS. LYMPHOCYTES 0.9 0.9 - 3.6 K/UL    ABS. MONOCYTES 1.3 (H) 0.05 - 1.2 K/UL    ABS. EOSINOPHILS 0.0 0.0 - 0.4 K/UL    ABS. BASOPHILS 0.0 0.0 - 0.1 K/UL    DF AUTOMATED     METABOLIC PANEL, COMPREHENSIVE    Collection Time: 11/14/17  2:57 AM   Result Value Ref Range    Sodium 135 (L) 136 - 145 mmol/L    Potassium 4.5 3.5 - 5.5 mmol/L    Chloride 104 100 - 108 mmol/L    CO2 21 21 - 32 mmol/L    Anion gap 10 3.0 - 18 mmol/L    Glucose 134 (H) 74 - 99 mg/dL    BUN 30 (H) 7.0 - 18 MG/DL    Creatinine 1.14 0.6 - 1.3 MG/DL    BUN/Creatinine ratio 26 (H) 12 - 20      GFR est AA >60 >60 ml/min/1.73m2    GFR est non-AA >60 >60 ml/min/1.73m2    Calcium 8.2 (L) 8.5 - 10.1 MG/DL    Bilirubin, total 8.1 (H) 0.2 - 1.0 MG/DL    ALT (SGPT) 68 (H) 16 - 61 U/L    AST (SGOT) 97 (H) 15 - 37 U/L    Alk. phosphatase 288 (H) 45 - 117 U/L    Protein, total 7.9 6.4 - 8.2 g/dL    Albumin 3.2 (L) 3.4 - 5.0 g/dL    Globulin 4.7 (H) 2.0 - 4.0 g/dL    A-G Ratio 0.7 (L) 0.8 - 1.7     RETICULOCYTE COUNT    Collection Time: 11/14/17  2:57 AM   Result Value Ref Range    Reticulocyte count 1.2 0.5 - 2.3 %     EKG interpretation by ED Physician:  0246 NSR at a rate of 83bpm. QRS duration 92ms. QTc 472ms. No STEMI.      X-Ray, CT or other radiology findings or impressions:  XR CHEST PORT     Interpretation by Dr. Amie Peraza, ED physician:  [facility] in right chest wall. No pneumonia or pneumothorax. No acute disease. CTA CHEST ABD PELV W CONT      Radiology preliminary findings:  No acute aortic or other chest pathology. Recently placed RUQ PTC catheter unremarkable; pneumobilia and multiple CBD stones seen before. Heterogeneity of posterior liver parenchyma likely relates to contrast timing. No acute findings in abdomen/ pelvis. Chronic sickle cell findings of bones and spleen. Progress notes, Consult notes or additional Procedure notes:   5:48 AM I have reviewed the patient's results with him and offered to admit him to the hospital. The patient refused admission and requests to be given Ativan and pain medication. I am concerned the patient might be narcotic dependent given there was no acute disease found in the patient's test results. I will be discharging the patient with instructions for follow up with PCP and to take his already prescribed medication as he was instructed. Disposition:  Diagnosis:   1. Abdominal pain, generalized        Disposition: Discharge. Follow-up Information     Follow up With Details Comments Contact Info    Mary Garrison MD Call in 1 day  [address] [phone]             Patient's Medications   Start Taking    No medications on file   Continue Taking    FAMOTIDINE (PEPCID) 20 MG TABLET    Take 1 Tab by mouth nightly. FOLIC ACID (FOLVITE) 1 MG TABLET    Take 1 Tab by mouth daily. HYDROMORPHONE (DILAUDID) 2 MG TABLET    Take 1 Tab by mouth every four (4) hours as needed. Max Daily Amount: 12 mg. LORAZEPAM (ATIVAN) 1 MG TABLET    Take 1 mg by mouth every eight (8) hours as needed for Anxiety. OXYCODONE-ACETAMINOPHEN (PERCOCET) 5-325 MG PER TABLET    Take 1 Tab by mouth every four (4) hours as needed for Pain. SENNA-DOCUSATE (PERICOLACE) 8.6-50 MG PER TABLET    Take 1 Tab by mouth daily.    These Medications have changed    No medications on file   Stop Taking    No medications on file         Scribe Wes Busch acting as a scribe for and in the presence of Buffy Barnard MD      November 14, 2017 at 3:30 AM       Provider Attestation:      I personally performed the services described in the documentation, reviewed the documentation, as recorded by the scribe in my presence, and it accurately and completely records my words and actions.  November 14, 2017 at 3:30 AM - Buffy Barnard MD

## 2017-11-14 NOTE — DISCHARGE INSTRUCTIONS
Abdominal Pain: Care Instructions  Your Care Instructions    Abdominal pain has many possible causes. Some aren't serious and get better on their own in a few days. Others need more testing and treatment. If your pain continues or gets worse, you need to be rechecked and may need more tests to find out what is wrong. You may need surgery to correct the problem. Don't ignore new symptoms, such as fever, nausea and vomiting, urination problems, pain that gets worse, and dizziness. These may be signs of a more serious problem. Your doctor may have recommended a follow-up visit in the next 8 to 12 hours. If you are not getting better, you may need more tests or treatment. The doctor has checked you carefully, but problems can develop later. If you notice any problems or new symptoms, get medical treatment right away. Follow-up care is a key part of your treatment and safety. Be sure to make and go to all appointments, and call your doctor if you are having problems. It's also a good idea to know your test results and keep a list of the medicines you take. How can you care for yourself at home? · Rest until you feel better. · To prevent dehydration, drink plenty of fluids, enough so that your urine is light yellow or clear like water. Choose water and other caffeine-free clear liquids until you feel better. If you have kidney, heart, or liver disease and have to limit fluids, talk with your doctor before you increase the amount of fluids you drink. · If your stomach is upset, eat mild foods, such as rice, dry toast or crackers, bananas, and applesauce. Try eating several small meals instead of two or three large ones. · Wait until 48 hours after all symptoms have gone away before you have spicy foods, alcohol, and drinks that contain caffeine. · Do not eat foods that are high in fat. · Avoid anti-inflammatory medicines such as aspirin, ibuprofen (Advil, Motrin), and naproxen (Aleve).  These can cause stomach upset. Talk to your doctor if you take daily aspirin for another health problem. When should you call for help? Call 911 anytime you think you may need emergency care. For example, call if:  ? · You passed out (lost consciousness). ? · You pass maroon or very bloody stools. ? · You vomit blood or what looks like coffee grounds. ? · You have new, severe belly pain. ?Call your doctor now or seek immediate medical care if:  ? · Your pain gets worse, especially if it becomes focused in one area of your belly. ? · You have a new or higher fever. ? · Your stools are black and look like tar, or they have streaks of blood. ? · You have unexpected vaginal bleeding. ? · You have symptoms of a urinary tract infection. These may include:  ¨ Pain when you urinate. ¨ Urinating more often than usual.  ¨ Blood in your urine. ? · You are dizzy or lightheaded, or you feel like you may faint. ? Watch closely for changes in your health, and be sure to contact your doctor if:  ? · You are not getting better after 1 day (24 hours). Where can you learn more? Go to http://derek-laila.info/. Enter F894 in the search box to learn more about \"Abdominal Pain: Care Instructions. \"  Current as of: March 20, 2017  Content Version: 11.4  © 6137-6501 [employer]. Care instructions adapted under license by [employer] (which disclaims liability or warranty for this information). If you have questions about a medical condition or this instruction, always ask your healthcare professional. [place] [address] any warranty or liability for your use of this information.

## 2017-11-14 NOTE — ED TRIAGE NOTES
Patient reports chest pain x 2 hours ago. Patient endorses SOB, right arm pain, and nausea. Denies any vomiting. Patient also reports bleeding from his biliary drain. Patient has a hx of sickle cell.

## 2017-11-18 ENCOUNTER — APPOINTMENT (OUTPATIENT)
Dept: GENERAL RADIOLOGY | Age: 57
End: 2017-11-18
Attending: PHYSICIAN ASSISTANT
Payer: COMMERCIAL

## 2017-11-18 ENCOUNTER — HOSPITAL ENCOUNTER (EMERGENCY)
Age: 57
Discharge: HOME | End: 2017-11-18
Attending: EMERGENCY MEDICINE
Payer: COMMERCIAL

## 2017-11-18 VITALS
WEIGHT: 119 LBS | HEART RATE: 71 BPM | OXYGEN SATURATION: 98 % | BODY MASS INDEX: 20.32 KG/M2 | HEIGHT: 64 IN | DIASTOLIC BLOOD PRESSURE: 67 MMHG | SYSTOLIC BLOOD PRESSURE: 133 MMHG | RESPIRATION RATE: 16 BRPM | TEMPERATURE: 98.2 F

## 2017-11-18 DIAGNOSIS — Z01.89 VISIT FOR ROUTINE CHEST X-RAY: Primary | ICD-10-CM

## 2017-11-18 PROCEDURE — 71020 XR CHEST PA LAT: CPT

## 2017-11-18 PROCEDURE — 99282 EMERGENCY DEPT VISIT SF MDM: CPT

## 2017-11-18 NOTE — ED PROVIDER NOTES
HPI Comments: 12:18 PM  Adriel Hendricks is a 61 y.o. male with a PMHx of sickle cell anemia presents to the ED for \"fluid heard in lungs\" by his home health nurse today. Pt had routine visit by home health and on exam she heard \"fluid\" in his left lung. Home health nurse informed his PCP of what was heard, and his PCP agreed that pt should present to the ED for a CXR. Pt states that his nurse is concerned for pneumonia. Otherwise feeling well without complaints. Pt denies fever, cough, SOB, CP. No other acute symptoms or complaints. No alleviating or exacerbating factors. No other associated sxs. PCP: Milo Green MD      The history is provided by the patient. Past Medical History:   Diagnosis Date    Anemia NEC     sickle cell    Duodenal ulcer     GERD (gastroesophageal reflux disease)     Other ill-defined conditions(799.89) Sickle cell    Sickle cell anemia (HCC)        Past Surgical History:   Procedure Laterality Date    HX CHOLECYSTECTOMY      HX RETINAL DETACHMENT REPAIR Right     HX VASCULAR ACCESS      double lumen mediport         No family history on file. Social History     Social History    Marital status:      Spouse name: N/A    Number of children: N/A    Years of education: N/A     Occupational History    Not on file. Social History Main Topics    Smoking status: Never Smoker    Smokeless tobacco: Never Used    Alcohol use No    Drug use: No    Sexual activity: Yes     Partners: Female     Other Topics Concern    Not on file     Social History Narrative         ALLERGIES: Xarelto [rivaroxaban]    Review of Systems   Constitutional: Negative for chills and fever. Respiratory: Negative for cough and shortness of breath. Cardiovascular: Negative for chest pain. Musculoskeletal: Positive for back pain (chronic). Negative for myalgias. All other systems reviewed and are negative.       Vitals:    11/18/17 1219   BP: 133/67   Pulse: 71   Resp: 16 Temp: 98.2 °F (36.8 °C)   SpO2: 98%   Weight: 54 kg (119 lb)   Height: 5' 4\" (1.626 m)            Physical Exam   Constitutional: He is oriented to person, place, and time. No distress. Thin, appears older than stated age   HENT:   Head: Normocephalic and atraumatic. Eyes: Conjunctivae are normal.   Neck: Normal range of motion. Neck supple. Cardiovascular: Normal rate, regular rhythm and normal heart sounds. Pulmonary/Chest: Effort normal. No respiratory distress. He has no wheezes. He has rhonchi (mild) in the left lower field. He has no rales. Musculoskeletal: Normal range of motion. Neurological: He is alert and oriented to person, place, and time. Skin: Skin is warm and dry. Psychiatric: He has a normal mood and affect. His behavior is normal. Judgment and thought content normal.   Nursing note and vitals reviewed. MDM  Number of Diagnoses or Management Options  Visit for routine chest x-ray:     ED Course       Procedures    Scribe Attestation:     Naomi Florence acting as a scribe for and in the presence of Simeon Kaur PA-C      November 18, 2017 at 12:26 PM       Provider Attestation:     I personally performed the services described in the documentation, reviewed the documentation, as recorded by the scribe in my presence, and it accurately and completely records my words and actions. November 18, 2017 at 12:26 PM - Simeon Kaur PA-C        -------------------------------------------------------------------------------------------------------------------     EKG INTERPRETATIONS:      RADIOLOGY RESULTS:   XR CHEST PA LAT    (Results Pending)       LABORATORY RESULTS:  No results found for this or any previous visit (from the past 12 hour(s)). CONSULTATIONS:        PROGRESS NOTES:    1:46 PM Pt well appearing and in NAD. Nothing acute noted by me on review of CXR. Radiology read pending. Will call pt if abnormal result. PCP f/u was needed.      Lengthy D/W pt regarding possible worsening of pt's condition, need for follow up and strict ED return instructions for any worsening symptoms. DISPOSITION:  ED DIAGNOSIS & DISPOSITION INFORMATION  Diagnosis:   1. Visit for routine chest x-ray          Disposition: home    Follow-up Information     Follow up With Details Comments Contact Info    Donna Shields MD  As needed [address] [facility] EMERGENCY DEPT  Immediately if symptoms worsen [address] [facility] [address]  [phone]          Patient's Medications   Start Taking    No medications on file   Continue Taking    FAMOTIDINE (PEPCID) 20 MG TABLET    Take 1 Tab by mouth nightly. FOLIC ACID (FOLVITE) 1 MG TABLET    Take 1 Tab by mouth daily. HYDROMORPHONE (DILAUDID) 2 MG TABLET    Take 1 Tab by mouth every four (4) hours as needed. Max Daily Amount: 12 mg. LORAZEPAM (ATIVAN) 1 MG TABLET    Take 1 mg by mouth every eight (8) hours as needed for Anxiety. OXYCODONE-ACETAMINOPHEN (PERCOCET) 5-325 MG PER TABLET    Take 1 Tab by mouth every four (4) hours as needed for Pain. SENNA-DOCUSATE (PERICOLACE) 8.6-50 MG PER TABLET    Take 1 Tab by mouth daily.    These Medications have changed    No medications on file   Stop Taking    No medications on file

## 2017-11-21 ENCOUNTER — HOSPITAL ENCOUNTER (OUTPATIENT)
Dept: INTERVENTIONAL RADIOLOGY/VASCULAR | Age: 57
Discharge: HOME | End: 2017-11-21
Attending: RADIOLOGY | Admitting: RADIOLOGY
Payer: COMMERCIAL

## 2017-11-21 VITALS
DIASTOLIC BLOOD PRESSURE: 71 MMHG | BODY MASS INDEX: 20.32 KG/M2 | OXYGEN SATURATION: 100 % | HEART RATE: 58 BPM | WEIGHT: 119 LBS | TEMPERATURE: 98.7 F | RESPIRATION RATE: 19 BRPM | SYSTOLIC BLOOD PRESSURE: 114 MMHG | HEIGHT: 64 IN

## 2017-11-21 DIAGNOSIS — K83.09 CHOLANGITIS: ICD-10-CM

## 2017-11-21 LAB
ALBUMIN SERPL-MCNC: 3 G/DL (ref 3.4–5)
ALBUMIN/GLOB SERPL: 0.6 {RATIO} (ref 0.8–1.7)
ALP SERPL-CCNC: 213 U/L (ref 45–117)
ALT SERPL-CCNC: 39 U/L (ref 16–61)
ANION GAP SERPL CALC-SCNC: 10 MMOL/L (ref 3–18)
APTT PPP: 38.6 SEC (ref 23–36.4)
AST SERPL-CCNC: 36 U/L (ref 15–37)
BILIRUB DIRECT SERPL-MCNC: 4.7 MG/DL (ref 0–0.2)
BILIRUB SERPL-MCNC: 6.3 MG/DL (ref 0.2–1)
BUN SERPL-MCNC: 17 MG/DL (ref 7–18)
BUN/CREAT SERPL: 15 (ref 12–20)
CALCIUM SERPL-MCNC: 8.1 MG/DL (ref 8.5–10.1)
CHLORIDE SERPL-SCNC: 111 MMOL/L (ref 100–108)
CO2 SERPL-SCNC: 20 MMOL/L (ref 21–32)
CREAT SERPL-MCNC: 1.1 MG/DL (ref 0.6–1.3)
ERYTHROCYTE [DISTWIDTH] IN BLOOD BY AUTOMATED COUNT: 16.8 % (ref 11.6–14.5)
GLOBULIN SER CALC-MCNC: 4.8 G/DL (ref 2–4)
GLUCOSE SERPL-MCNC: 95 MG/DL (ref 74–99)
HCT VFR BLD AUTO: 20.5 % (ref 36–48)
HGB BLD-MCNC: 7.1 G/DL (ref 13–16)
INR PPP: 1.2 (ref 0.8–1.2)
MCH RBC QN AUTO: 29.5 PG (ref 24–34)
MCHC RBC AUTO-ENTMCNC: 34.6 G/DL (ref 31–37)
MCV RBC AUTO: 85.1 FL (ref 74–97)
PLATELET # BLD AUTO: 379 K/UL (ref 135–420)
PMV BLD AUTO: 9.5 FL (ref 9.2–11.8)
POTASSIUM SERPL-SCNC: 3.9 MMOL/L (ref 3.5–5.5)
PROT SERPL-MCNC: 7.8 G/DL (ref 6.4–8.2)
PROTHROMBIN TIME: 14.6 SEC (ref 11.5–15.2)
RBC # BLD AUTO: 2.41 M/UL (ref 4.7–5.5)
SODIUM SERPL-SCNC: 141 MMOL/L (ref 136–145)
WBC # BLD AUTO: 6.6 K/UL (ref 4.6–13.2)

## 2017-11-21 PROCEDURE — 47537 REMOVAL BILIARY DRG CATH: CPT

## 2017-11-21 PROCEDURE — 74011250636 HC RX REV CODE- 250/636: Performed by: RADIOLOGY

## 2017-11-21 PROCEDURE — 74011636320 HC RX REV CODE- 636/320: Performed by: RADIOLOGY

## 2017-11-21 PROCEDURE — C1894 INTRO/SHEATH, NON-LASER: HCPCS

## 2017-11-21 PROCEDURE — 80076 HEPATIC FUNCTION PANEL: CPT | Performed by: RADIOLOGY

## 2017-11-21 PROCEDURE — C1713 ANCHOR/SCREW BN/BN,TIS/BN: HCPCS

## 2017-11-21 PROCEDURE — 74011000250 HC RX REV CODE- 250

## 2017-11-21 PROCEDURE — C1729 CATH, DRAINAGE: HCPCS

## 2017-11-21 PROCEDURE — 85610 PROTHROMBIN TIME: CPT | Performed by: RADIOLOGY

## 2017-11-21 PROCEDURE — 77030025702 HC BG URIN DRN MRTM -A

## 2017-11-21 PROCEDURE — 85730 THROMBOPLASTIN TIME PARTIAL: CPT | Performed by: RADIOLOGY

## 2017-11-21 PROCEDURE — 77030022017 HC DRSG HEMO QCLOT ZMED -A

## 2017-11-21 PROCEDURE — 80048 BASIC METABOLIC PNL TOTAL CA: CPT | Performed by: RADIOLOGY

## 2017-11-21 PROCEDURE — 74011000272 HC RX REV CODE- 272

## 2017-11-21 PROCEDURE — 85027 COMPLETE CBC AUTOMATED: CPT | Performed by: RADIOLOGY

## 2017-11-21 RX ORDER — MIDAZOLAM HYDROCHLORIDE 1 MG/ML
1 INJECTION, SOLUTION INTRAMUSCULAR; INTRAVENOUS
Status: DISCONTINUED | OUTPATIENT
Start: 2017-11-21 | End: 2017-11-21 | Stop reason: HOSPADM

## 2017-11-21 RX ORDER — HEPARIN SODIUM (PORCINE) LOCK FLUSH IV SOLN 100 UNIT/ML 100 UNIT/ML
500 SOLUTION INTRAVENOUS AS NEEDED
Status: DISCONTINUED | OUTPATIENT
Start: 2017-11-21 | End: 2017-11-21 | Stop reason: HOSPADM

## 2017-11-21 RX ORDER — FENTANYL CITRATE 50 UG/ML
12.5-5 INJECTION, SOLUTION INTRAMUSCULAR; INTRAVENOUS
Status: DISCONTINUED | OUTPATIENT
Start: 2017-11-21 | End: 2017-11-21 | Stop reason: HOSPADM

## 2017-11-21 RX ORDER — CIPROFLOXACIN 2 MG/ML
400 INJECTION, SOLUTION INTRAVENOUS ONCE
Status: COMPLETED | OUTPATIENT
Start: 2017-11-21 | End: 2017-11-21

## 2017-11-21 RX ORDER — LIDOCAINE HYDROCHLORIDE 10 MG/ML
INJECTION, SOLUTION EPIDURAL; INFILTRATION; INTRACAUDAL; PERINEURAL
Status: COMPLETED
Start: 2017-11-21 | End: 2017-11-21

## 2017-11-21 RX ORDER — SODIUM CHLORIDE 9 MG/ML
20 INJECTION, SOLUTION INTRAVENOUS CONTINUOUS
Status: DISCONTINUED | OUTPATIENT
Start: 2017-11-21 | End: 2017-11-21 | Stop reason: HOSPADM

## 2017-11-21 RX ADMIN — FENTANYL CITRATE 50 MCG: 50 INJECTION INTRAMUSCULAR; INTRAVENOUS at 08:45

## 2017-11-21 RX ADMIN — IOHEXOL 50 ML: 300 INJECTION, SOLUTION INTRAVENOUS at 08:50

## 2017-11-21 RX ADMIN — FENTANYL CITRATE 50 MCG: 50 INJECTION INTRAMUSCULAR; INTRAVENOUS at 08:40

## 2017-11-21 RX ADMIN — CIPROFLOXACIN 400 MG: 2 INJECTION, SOLUTION INTRAVENOUS at 08:30

## 2017-11-21 RX ADMIN — MIDAZOLAM HYDROCHLORIDE 1 MG: 1 INJECTION, SOLUTION INTRAMUSCULAR; INTRAVENOUS at 08:40

## 2017-11-21 RX ADMIN — LIDOCAINE HYDROCHLORIDE: 10 INJECTION, SOLUTION EPIDURAL; INFILTRATION; INTRACAUDAL; PERINEURAL at 08:40

## 2017-11-21 RX ADMIN — GELATIN ABSORBABLE SPONGE 12-7 MM: 12-7 MISC at 08:55

## 2017-11-21 RX ADMIN — HEPARIN SODIUM (PORCINE) LOCK FLUSH IV SOLN 100 UNIT/ML 500 UNITS: 100 SOLUTION at 10:33

## 2017-11-21 RX ADMIN — MIDAZOLAM HYDROCHLORIDE 1 MG: 1 INJECTION, SOLUTION INTRAMUSCULAR; INTRAVENOUS at 08:45

## 2017-11-21 NOTE — IP AVS SNAPSHOT
[address] [place] [id number] Patient: Elizabeth Dalton MRN: [id number] SHANTEL:[birth year] About your hospitalization You were admitted on:  November 21, 2017 You last received care in the:  [facility] CATH HOLDING You were discharged on:  November 21, 2017 Why you were hospitalized Your primary diagnosis was:  Not on File Things You Need To Do (next 8 weeks) Follow up with Edie Souza MD  
  
Phone:  [phone] Where:  [address] [facility] [address], [address] [facility] Schedule an appointment with Rebekah Spann MD as soon as possible for a visit in 1 month(s) Phone:  [phone] Where:  [address] [facility] [place], [address] [facility] Radiologist, [facility] [address] Discharge Orders Procedure Order Date Status Priority Quantity Spec Type Associated Dx DIET REGULAR 11/21/17 0904 Normal Routine 1 A check luly indicates which time of day the medication should be taken. My Medications TAKE these medications as instructed Instructions Each Dose to Equal  
 Morning Noon Evening Bedtime ATIVAN 1 mg tablet Generic drug:  LORazepam  
   
Your last dose was: Your next dose is: Take 1 mg by mouth every eight (8) hours as needed for Anxiety. 1 mg  
    
   
   
   
  
 famotidine 20 mg tablet Commonly known as:  PEPCID Your last dose was: Your next dose is: Take 1 Tab by mouth nightly. 20 mg  
    
   
   
   
  
 folic acid 1 mg tablet Commonly known as:  Google Your last dose was: Your next dose is: Take 1 Tab by mouth daily. 1 mg HYDROmorphone 2 mg tablet Commonly known as:  DILAUDID Your last dose was: Your next dose is: Take 1 Tab by mouth every four (4) hours as needed. Max Daily Amount: 12 mg.  
 2 mg oxyCODONE-acetaminophen 5-325 mg per tablet Commonly known as:  PERCOCET Your last dose was: Your next dose is: Take 1 Tab by mouth every four (4) hours as needed for Pain. 1 Tab  
    
   
   
   
  
 senna-docusate 8.6-50 mg per tablet Commonly known as:  Jeanine Yanavautz Your last dose was: Your next dose is: Take 1 Tab by mouth daily. 1 Tab Discharge Instructions [address] Biliary Tube Removal Discharge Instructions General Information: A plastic catheter has been removed from your skin and your biliary system. Home Care Instructions: You can resume your regular diet and medication regimen. Do not drive, drink alcohol, or make any important legal decisions in the next 24 hours. Do not lift anything heavier than a gallon of milk, or do anything strenuous for the next 24 hours. You should not shower for 24 hours. You should cover the tube with plastic wrap and tape to keep it dry when you shower. Do not bathe or swim or otherwise immerse yourself in water as long as you have this catheter. You should change the dressing at least every 48 hours until the incision is no longer draining. Keep the dressing clean and dry. Call If: You should call your Physician and/or the Radiology Nurse if you have any bleeding other than a small spot on your bandage. Call if you have any signs of infection, fever, redness, or increased pain at the site of the tube. Call if you should have leakage around the tube, a change in the appearance of the fluid, an increased yellowing of the skin, or if the tube gets pulled out some or all the way out. Call us, your home health nurse, or your regular doctor if you have any concerns or questions about your catheter. Follow-Up Instructions: Please see your ordering doctor as he/she has requested. Patient Signature: 
Date: 11/21/2017 Discharging Nurse: Gutierrez Waddell RN Introducing [facility]! Dear Wendy Trivedi: Thank you for requesting a [employer] account. Our records indicate that you already have an active [employer] account. You can access your account anytime at [email or web address] "[email or web address]"/[email or web address] Did you know that you can access your hospital and ER discharge instructions at any time in [employer]? You can also review all of your test results from your hospital stay or ER visit. Additional Information If you have questions, please visit the Frequently Asked Questions section of the [employer] website at [email or web address] Remember, [employer] is NOT to be used for urgent needs. For medical emergencies, dial 911. Now available from your iPhone and Android! Unresulted Labs-Please follow up with your PCP about these lab tests Order Current Status IR BILIARY DRN CATH REMOVAL PERC SI In process Providers Seen During Your Hospitalization Provider Specialty Primary office phone Derek Sutherland MD Radiology [phone] Your Primary Care Physician (PCP) Primary Care Physician Office Phone Office Fax Yoly Causey [phone] You are allergic to the following Allergen Reactions Xarelto (Rivaroxaban) Other (comments) Recent Documentation Height Weight BMI Smoking Status 1.626 m 54 kg 20.43 kg/m2 Never Smoker Emergency Contacts Name Discharge Info Relation Home Work Mobile [facility] [place] DISCHARGE CAREGIVER [3] Sister [23] [phone] Patient Belongings The following personal items are in your possession at time of discharge: 
                             
 
  
  
 Please provide this summary of care documentation to your next provider. Signatures-by signing, you are acknowledging that this After Visit Summary has been reviewed with you and you have received a copy. Patient Signature:  ____________________________________________________________ Date:  ____________________________________________________________  
  
Orbie Bateman Provider Signature:  ____________________________________________________________ Date:  ____________________________________________________________

## 2017-11-21 NOTE — H&P
OUTPATIENT HISTORY AND PHYSICAL      Today 11/21/2017     Indication/Symptoms:   Angeles Lovell is a 61 y.o. male here for [facility] with biliary tube removal and tract embolization. Current Meds:    Prior to Admission medications    Medication Sig Start Date End Date Taking? Authorizing Provider   famotidine (PEPCID) 20 mg tablet Take 1 Tab by mouth nightly. 11/8/17  Yes Fermin Harmon MD   HYDROmorphone (DILAUDID) 2 mg tablet Take 1 Tab by mouth every four (4) hours as needed. Max Daily Amount: 12 mg. 4/14/15  Yes Marcelino Carlin MD   LORazepam (ATIVAN) 1 mg tablet Take 1 mg by mouth every eight (8) hours as needed for Anxiety. Yes Historical Provider   folic acid (FOLVITE) 1 mg tablet Take 1 Tab by mouth daily. 8/10/13  Yes Adriane Marcos MD   oxyCODONE-acetaminophen (PERCOCET) 5-325 mg per tablet Take 1 Tab by mouth every four (4) hours as needed for Pain. Historical Provider   senna-docusate (PERICOLACE) 8.6-50 mg per tablet Take 1 Tab by mouth daily. 6/17/16   Fermin Harmon MD       Allergies: Allergies   Allergen Reactions    Xarelto [Rivaroxaban] Other (comments)       Comorbid Conditions:    Past Medical History:   Diagnosis Date    Anemia NEC     sickle cell    Duodenal ulcer     GERD (gastroesophageal reflux disease)     Other ill-defined conditions(799.89) Sickle cell    Sickle cell anemia (HCC)           Past Surgical History:   Procedure Laterality Date    HX CHOLECYSTECTOMY      HX RETINAL DETACHMENT REPAIR Right     HX VASCULAR ACCESS      double lumen mediport     Data:    Visit Vitals    /62    Pulse 84    Temp 98.7 °F (37.1 °C)    Resp 25    Ht 5' 4\" (1.626 m)    Wt 54 kg (119 lb)    BMI 20.43 kg/m2   :  Recent Labs      11/21/17   0710   PLT  379     Recent Labs      11/21/17   0710   INR  1.2   APTT  38.6*       The H & P and/or progress notes and any available imaging were reviewed.   The risks, indications and possible alternatives to the procedure, including doing nothing, were discussed and informed consent was obtained. Physical Exam:      Mental status:   Alert and oriented. Examination specific to the procedure proposed to be performed and any co morbid conditions:   Mallampati classification 2 ,  ASA2   Heart:   RRR. Lungs:   CTAB. No wheezes, rales or rhonchi. The patient is an appropriate candidate to undergo the planned procedure and sedation.     Amee Hendricks MD

## 2017-11-21 NOTE — PROGRESS NOTES
Pt brought to [place] [address] ambulatory. Pt placed in bay 17 and connected to monitor. Baseline VS taken and mediport accessed. Admission database completed. Pt ready for ordered procedure.

## 2017-11-21 NOTE — PROCEDURES
RADIOLOGY POST PROCEDURE NOTE     November 21, 2017       9:01 AM     Preoperative Diagnosis:   Hemobilia and obstructive jaundice. Postoperative Diagnosis:  Same. :  Dr. Denton Thrasher    Assistant:  None. Type of Anesthesia: 1% plain lidocaine and IV moderate sedation with Versed and Fentanyl    Procedure/Description:  Image guided antegrade cholangiogram with biliary drainage catheter removal and tract embolization. Findings:   Open CBD, decompressed system. No filling defects. Estimated blood Loss:  Minimal    Specimen Removed:   no    Blood transfusions:  None. Implants:  None.     Complications: None    Condition: Stable    Discharge Plan:  discharge home     Ting Roberts MD

## 2017-11-21 NOTE — DISCHARGE INSTRUCTIONS
[address] [facility] Biliary Tube Removal Discharge Instructions    General Information:    A plastic catheter has been removed from your skin and your biliary system. Home Care Instructions: You can resume your regular diet and medication regimen. Do not drive, drink alcohol, or make any important legal decisions in the next 24 hours. Do not lift anything heavier than a gallon of milk, or do anything strenuous for the next 24 hours. You should not shower for 24 hours. You should cover the tube with plastic wrap and tape to keep it dry when you shower. Do not bathe or swim or otherwise immerse yourself in water as long as you have this catheter. You should change the dressing at least every 48 hours until the incision is no longer draining. Keep the dressing clean and dry. Call If: You should call your Physician and/or the Radiology Nurse if you have any bleeding other than a small spot on your bandage. Call if you have any signs of infection, fever, redness, or increased pain at the site of the tube. Call if you should have leakage around the tube, a change in the appearance of the fluid, an increased yellowing of the skin, or if the tube gets pulled out some or all the way out. Call us, your home health nurse, or your regular doctor if you have any concerns or questions about your catheter. Follow-Up Instructions: Please see your ordering doctor as he/she has requested.         Patient Signature:  Date: 11/21/2017   Discharging Nurse: Itz Alvarado RN

## 2017-11-21 NOTE — PROGRESS NOTES
Pt provided discharge instructions. mediport heplocked and deaccessed. Patient given supplies for dressing changes if needed. All questions answered and pt verbalized understanding. No hematoma or bleeding noted at this time. Procedure site within normal limits. Pt escorted to front of building for discharge.

## 2017-12-04 ENCOUNTER — HOSPITAL ENCOUNTER (OUTPATIENT)
Dept: VASCULAR SURGERY | Age: 57
Discharge: HOME | End: 2017-12-04
Attending: INTERNAL MEDICINE
Payer: COMMERCIAL

## 2017-12-04 DIAGNOSIS — D58.2 HEMOGLOBINOPATHY (HCC): ICD-10-CM

## 2017-12-04 DIAGNOSIS — R60.9 SWELLING: ICD-10-CM

## 2017-12-04 PROCEDURE — 93970 EXTREMITY STUDY: CPT

## 2017-12-04 NOTE — PROCEDURES
[facility]  *** FINAL REPORT ***    Name: Otto Padilla  MRN: [id number]    Outpatient  : 1960  HIS Order #: [id number]  [id number] [facility] Visit #: [id number]  Date: 04 Dec 2017    TYPE OF TEST: Peripheral Venous Testing    REASON FOR TEST  Pain in limb, Limb swelling    Right Leg:-  Deep venous thrombosis:           No  Superficial venous thrombosis:    No  Deep venous insufficiency:        Not examined  Superficial venous insufficiency: Not examined    Left Leg:-  Deep venous thrombosis:           No  Superficial venous thrombosis:    No  Deep venous insufficiency:        Not examined  Superficial venous insufficiency: Not examined      INTERPRETATION/FINDINGS  Duplex images were obtained using 2-D gray scale, color flow, and  spectral Doppler analysis. Right leg :  1. Deep veins visualized include the common femoral, femoral,  popliteal, posterior tibial and peroneal veins. 1. Deep veins visualized include the common femoral, femoral,  popliteal, posterior tibial and peroneal veins. 2. No evidence of deep venous thrombosis detected in the veins  visualized. 3. Superficial veins visualized include the great saphenous vein. 4. No evidence of superficial thrombosis detected. 5. Normal multiphasic flow in the posterior tibial artery. 2. No  evidence of deep venous thrombosis detected in the veins visualized. 3. Superficial veins visualized include the great saphenous vein. 4. No evidence of superficial thrombosis detected. 5. Normal multiphasic flow in the posterior tibial artery. Left leg :    ADDITIONAL COMMENTS    I have personally reviewed the data relevant to the interpretation of  this  study. TECHNOLOGIST: ARLEY Goel RVS  Signed: 2017 03:14 PM    PHYSICIAN: Venkata Marcelo D.O.   Signed: 2017 10:44 AM

## 2017-12-05 ENCOUNTER — HOSPITAL ENCOUNTER (OUTPATIENT)
Dept: NON INVASIVE DIAGNOSTICS | Age: 57
Discharge: HOME | End: 2017-12-05
Attending: INTERNAL MEDICINE
Payer: COMMERCIAL

## 2017-12-05 DIAGNOSIS — R60.9 SWELLING: ICD-10-CM

## 2017-12-05 DIAGNOSIS — D58.2 HEMOGLOBINOPATHY (HCC): ICD-10-CM

## 2017-12-05 PROCEDURE — 93306 TTE W/DOPPLER COMPLETE: CPT

## 2017-12-05 NOTE — PROGRESS NOTES
Completed echocardiogram. Report to follow. I removed the band off and placed in shred box.      Tae Coto, RCS, RDCS

## 2017-12-19 ENCOUNTER — HOSPITAL ENCOUNTER (OUTPATIENT)
Dept: INFUSION THERAPY | Age: 57
Discharge: HOME | End: 2017-12-19
Payer: COMMERCIAL

## 2017-12-19 PROCEDURE — 86870 RBC ANTIBODY IDENTIFICATION: CPT | Performed by: INTERNAL MEDICINE

## 2017-12-19 PROCEDURE — 36415 COLL VENOUS BLD VENIPUNCTURE: CPT

## 2017-12-19 PROCEDURE — 86900 BLOOD TYPING SEROLOGIC ABO: CPT | Performed by: INTERNAL MEDICINE

## 2017-12-19 PROCEDURE — 36415 COLL VENOUS BLD VENIPUNCTURE: CPT | Performed by: INTERNAL MEDICINE

## 2017-12-19 PROCEDURE — 86920 COMPATIBILITY TEST SPIN: CPT | Performed by: INTERNAL MEDICINE

## 2017-12-19 RX ORDER — SODIUM CHLORIDE 9 MG/ML
250 INJECTION, SOLUTION INTRAVENOUS AS NEEDED
Status: DISCONTINUED | OUTPATIENT
Start: 2017-12-19 | End: 2017-12-23 | Stop reason: HOSPADM

## 2017-12-21 ENCOUNTER — HOSPITAL ENCOUNTER (OUTPATIENT)
Dept: INFUSION THERAPY | Age: 57
Discharge: HOME | End: 2017-12-21
Payer: COMMERCIAL

## 2017-12-21 VITALS
RESPIRATION RATE: 18 BRPM | TEMPERATURE: 98 F | OXYGEN SATURATION: 96 % | HEART RATE: 58 BPM | DIASTOLIC BLOOD PRESSURE: 78 MMHG | SYSTOLIC BLOOD PRESSURE: 145 MMHG

## 2017-12-21 PROCEDURE — 74011250636 HC RX REV CODE- 250/636: Performed by: INTERNAL MEDICINE

## 2017-12-21 PROCEDURE — 86922 COMPATIBILITY TEST ANTIGLOB: CPT | Performed by: INTERNAL MEDICINE

## 2017-12-21 PROCEDURE — 36430 TRANSFUSION BLD/BLD COMPNT: CPT

## 2017-12-21 PROCEDURE — 74011250636 HC RX REV CODE- 250/636

## 2017-12-21 PROCEDURE — 77030013169 SET IV BLD ICUM -A

## 2017-12-21 PROCEDURE — 74011250637 HC RX REV CODE- 250/637: Performed by: INTERNAL MEDICINE

## 2017-12-21 PROCEDURE — 85660 RBC SICKLE CELL TEST: CPT | Performed by: INTERNAL MEDICINE

## 2017-12-21 PROCEDURE — 86921 COMPATIBILITY TEST INCUBATE: CPT | Performed by: INTERNAL MEDICINE

## 2017-12-21 PROCEDURE — 86902 BLOOD TYPE ANTIGEN DONOR EA: CPT | Performed by: INTERNAL MEDICINE

## 2017-12-21 PROCEDURE — P9040 RBC LEUKOREDUCED IRRADIATED: HCPCS | Performed by: INTERNAL MEDICINE

## 2017-12-21 RX ORDER — HEPARIN SODIUM (PORCINE) LOCK FLUSH IV SOLN 100 UNIT/ML 100 UNIT/ML
500 SOLUTION INTRAVENOUS AS NEEDED
Status: ACTIVE | OUTPATIENT
Start: 2017-12-21 | End: 2017-12-22

## 2017-12-21 RX ORDER — HEPARIN SODIUM (PORCINE) LOCK FLUSH IV SOLN 100 UNIT/ML 100 UNIT/ML
SOLUTION INTRAVENOUS
Status: COMPLETED
Start: 2017-12-21 | End: 2017-12-21

## 2017-12-21 RX ORDER — DIPHENHYDRAMINE HCL 25 MG
25 CAPSULE ORAL ONCE
Status: COMPLETED | OUTPATIENT
Start: 2017-12-21 | End: 2017-12-21

## 2017-12-21 RX ORDER — SODIUM CHLORIDE 9 MG/ML
25 INJECTION, SOLUTION INTRAVENOUS ONCE
Status: COMPLETED | OUTPATIENT
Start: 2017-12-21 | End: 2017-12-21

## 2017-12-21 RX ORDER — ACETAMINOPHEN 325 MG/1
650 TABLET ORAL ONCE
Status: COMPLETED | OUTPATIENT
Start: 2017-12-21 | End: 2017-12-21

## 2017-12-21 RX ORDER — SODIUM CHLORIDE 0.9 % (FLUSH) 0.9 %
10-40 SYRINGE (ML) INJECTION AS NEEDED
Status: DISCONTINUED | OUTPATIENT
Start: 2017-12-21 | End: 2017-12-25 | Stop reason: HOSPADM

## 2017-12-21 RX ADMIN — DIPHENHYDRAMINE HYDROCHLORIDE 25 MG: 25 CAPSULE ORAL at 08:20

## 2017-12-21 RX ADMIN — SODIUM CHLORIDE 25 ML/HR: 900 INJECTION, SOLUTION INTRAVENOUS at 08:44

## 2017-12-21 RX ADMIN — ACETAMINOPHEN 650 MG: 325 TABLET ORAL at 08:20

## 2017-12-21 RX ADMIN — Medication 20 ML: at 08:45

## 2017-12-21 RX ADMIN — Medication 20 ML: at 13:44

## 2017-12-21 RX ADMIN — HEPARIN SODIUM (PORCINE) LOCK FLUSH IV SOLN 100 UNIT/ML 500 UNITS: 100 SOLUTION at 13:44

## 2017-12-21 NOTE — PROGRESS NOTES
[facility] Progress Note    Date: 2017    Name: Brandon Pulido    MRN: [id number]         : 1960      Mr. Charles Bergeron arrived to [facility] at [phone] accompanied by. Mr. Charles Bergeron was assessed and education was provided. Discussed risks and benefits of blood transfusion with patient, including risk of transfusion reaction and disease transmission. Patient verbalized understanding and signed consent placed on chart. Mr. Braden Conteh vitals were reviewed. Visit Vitals    /78 (BP 1 Location: Left arm, BP Patient Position: Sitting)    Pulse (!) 58    Temp 98 °F (36.7 °C)    Resp 18    SpO2 96%       Right chest mediport accessed x 1 attempt without difficulty, patient tolerated well. Positive for blood return and flushes without difficulty. Normal saline initiated at [facility]. Pre-medications (Tylenol 650 mg and Benadryl 25 mg PO) were administered as ordered. First unit of two ordered units of PRBCs initiated @ 75 ml/hr at 0855. Fifteen minutes into infusion, VS stable and pt denied c/o SOB, itching/hives, lip/tongue/facial swelling, CP or other complaints. Infusion rate increased to 155 ml/hr for the remainder of the transfusion. Unit finished @ 1055. VS stable and no transfusion reaction suspected. Second unit of two ordered units of PRBCs initiated @ 75 ml/hr at 1125. Fifteen minutes into infusion, VS stable and pt denied c/o SOB, itching/hives, lip/tongue/facial swelling, CP or other complaints. Infusion rate increased to 155 ml/hr for the remainder of the transfusion. Unit finished @ 1325. VS stable and no transfusion reaction suspected. Mr. Charles Bergeron tolerated infusion without complaints.     Patient Vitals for the past 12 hrs:   Temp Pulse Resp BP SpO2   17 1325 98 °F (36.7 °C) (!) 58 18 145/78 -   17 1225 98 °F (36.7 °C) 60 18 127/65 -   17 1155 97.8 °F (36.6 °C) 60 18 125/67 -   17 1140 97.9 °F (36.6 °C) (!) 57 18 117/71 -   17 1108 97.9 °F (36.6 °C) (!) 54 18 103/56 -   12/21/17 1055 97.9 °F (36.6 °C) (!) 56 18 110/61 -   12/21/17 0955 98 °F (36.7 °C) (!) 56 18 100/55 -   12/21/17 0925 97.9 °F (36.6 °C) (!) 57 18 99/51 -   12/21/17 0910 97.7 °F (36.5 °C) 61 18 94/53 -   12/21/17 0849 98 °F (36.7 °C) 65 19 108/57 -   12/21/17 0815 98.3 °F (36.8 °C) 68 18 126/65 96 %       IV removed. No irritation, bleeding, or hematoma noted at site. Gauze and coban applied to site. Patient declined to stay for observation period. Discharge instructions reviewed with pt. Pt instructed to report SOB, CP, elevated temp, back pain, or other symptoms of transfusion reaction to MD or ED. Pt verbalized understanding. Patient armband removed and shredded    Mr. Jason Peterson was discharged from [place] [address] in stable condition at [phone]. He is to follow up with Dr. Obdulia Garcia as needed.     Alphonse Conroy, RN  December 21, 2017

## 2018-01-26 ENCOUNTER — HOSPITAL ENCOUNTER (OUTPATIENT)
Dept: INFUSION THERAPY | Age: 58
Discharge: HOME | End: 2018-01-26
Payer: COMMERCIAL

## 2018-01-26 VITALS
OXYGEN SATURATION: 95 % | DIASTOLIC BLOOD PRESSURE: 62 MMHG | RESPIRATION RATE: 18 BRPM | SYSTOLIC BLOOD PRESSURE: 122 MMHG | HEART RATE: 79 BPM | TEMPERATURE: 98.9 F

## 2018-01-26 PROCEDURE — 86870 RBC ANTIBODY IDENTIFICATION: CPT | Performed by: INTERNAL MEDICINE

## 2018-01-26 PROCEDURE — 86922 COMPATIBILITY TEST ANTIGLOB: CPT | Performed by: INTERNAL MEDICINE

## 2018-01-26 PROCEDURE — 86921 COMPATIBILITY TEST INCUBATE: CPT | Performed by: INTERNAL MEDICINE

## 2018-01-26 PROCEDURE — 36415 COLL VENOUS BLD VENIPUNCTURE: CPT

## 2018-01-26 PROCEDURE — 86920 COMPATIBILITY TEST SPIN: CPT | Performed by: INTERNAL MEDICINE

## 2018-01-26 PROCEDURE — 86900 BLOOD TYPING SEROLOGIC ABO: CPT | Performed by: INTERNAL MEDICINE

## 2018-01-26 PROCEDURE — 86902 BLOOD TYPE ANTIGEN DONOR EA: CPT | Performed by: INTERNAL MEDICINE

## 2018-01-26 RX ORDER — SODIUM CHLORIDE 9 MG/ML
250 INJECTION, SOLUTION INTRAVENOUS AS NEEDED
Status: DISCONTINUED | OUTPATIENT
Start: 2018-01-26 | End: 2018-01-30 | Stop reason: HOSPADM

## 2018-01-26 NOTE — PROGRESS NOTES
[address] Lab Visit:    Juan Hem  1960  [id number]    7530:  Pt arrived ambulatory. Labs drawn peripherally and sent for processing. Departed [facility] ambulatory and in no distress.     Visit Vitals    /62 (BP 1 Location: Left arm, BP Patient Position: Sitting)    Pulse 79    Temp 98.9 °F (37.2 °C)    Resp 18    SpO2 95%

## 2018-01-29 ENCOUNTER — HOSPITAL ENCOUNTER (OUTPATIENT)
Dept: INFUSION THERAPY | Age: 58
Discharge: HOME | End: 2018-01-29
Payer: COMMERCIAL

## 2018-01-29 VITALS
OXYGEN SATURATION: 100 % | HEART RATE: 66 BPM | SYSTOLIC BLOOD PRESSURE: 153 MMHG | RESPIRATION RATE: 18 BRPM | TEMPERATURE: 98.4 F | DIASTOLIC BLOOD PRESSURE: 75 MMHG

## 2018-01-29 PROCEDURE — P9040 RBC LEUKOREDUCED IRRADIATED: HCPCS | Performed by: INTERNAL MEDICINE

## 2018-01-29 PROCEDURE — 77030012965 HC NDL HUBR BBMI -A

## 2018-01-29 PROCEDURE — 74011250637 HC RX REV CODE- 250/637: Performed by: INTERNAL MEDICINE

## 2018-01-29 PROCEDURE — 74011250636 HC RX REV CODE- 250/636: Performed by: INTERNAL MEDICINE

## 2018-01-29 PROCEDURE — 77030013169 SET IV BLD ICUM -A

## 2018-01-29 PROCEDURE — 85660 RBC SICKLE CELL TEST: CPT | Performed by: INTERNAL MEDICINE

## 2018-01-29 PROCEDURE — 36430 TRANSFUSION BLD/BLD COMPNT: CPT

## 2018-01-29 RX ORDER — ACETAMINOPHEN 325 MG/1
650 TABLET ORAL ONCE
Status: COMPLETED | OUTPATIENT
Start: 2018-01-29 | End: 2018-01-29

## 2018-01-29 RX ORDER — SODIUM CHLORIDE 0.9 % (FLUSH) 0.9 %
10-40 SYRINGE (ML) INJECTION AS NEEDED
Status: DISCONTINUED | OUTPATIENT
Start: 2018-01-29 | End: 2018-02-02 | Stop reason: HOSPADM

## 2018-01-29 RX ORDER — DIPHENHYDRAMINE HCL 25 MG
25 CAPSULE ORAL ONCE
Status: COMPLETED | OUTPATIENT
Start: 2018-01-29 | End: 2018-01-29

## 2018-01-29 RX ORDER — HEPARIN SODIUM (PORCINE) LOCK FLUSH IV SOLN 100 UNIT/ML 100 UNIT/ML
500 SOLUTION INTRAVENOUS AS NEEDED
Status: DISPENSED | OUTPATIENT
Start: 2018-01-29 | End: 2018-01-30

## 2018-01-29 RX ORDER — SODIUM CHLORIDE 9 MG/ML
250 INJECTION, SOLUTION INTRAVENOUS AS NEEDED
Status: DISCONTINUED | OUTPATIENT
Start: 2018-01-29 | End: 2018-02-02 | Stop reason: HOSPADM

## 2018-01-29 RX ADMIN — HEPARIN SODIUM (PORCINE) LOCK FLUSH IV SOLN 100 UNIT/ML 500 UNITS: 100 SOLUTION at 13:50

## 2018-01-29 RX ADMIN — Medication 10 ML: at 08:29

## 2018-01-29 RX ADMIN — SODIUM CHLORIDE 250 ML: 900 INJECTION, SOLUTION INTRAVENOUS at 08:30

## 2018-01-29 RX ADMIN — ACETAMINOPHEN 650 MG: 325 TABLET ORAL at 08:18

## 2018-01-29 RX ADMIN — Medication 20 ML: at 13:50

## 2018-01-29 RX ADMIN — DIPHENHYDRAMINE HYDROCHLORIDE 25 MG: 25 CAPSULE ORAL at 08:18

## 2018-01-29 NOTE — PROGRESS NOTES
[facility] Progress Note    Date: 2018    Name: Shirlene Liz    MRN: [id number]         : 1960    Blood Transfusion (2 units)     Mr. Kathryn Meehan arrived to [facility] at 65. Mr. Kathryn Meehan was assessed and education was provided. Discussed risks and benefits of blood transfusion with patient, including risk of transfusion reaction and disease transmission. Patient verbalized understanding and signed consent placed on chart. Mr. Arie Chan vitals were reviewed. Visit Vitals    /64 (BP 1 Location: Right arm, BP Patient Position: Sitting)    Pulse 75    Temp 98.9 °F (37.2 °C)    Resp 18    SpO2 100%       Right chest single Mediport was accessed with a 20 gauge littlejohn needle using sterile technique. Port flushes easily and has brisk blood return. Normal saline initiated at [facility]. Pre-medications (Tylenol 650 mg and Benadryl 25 mg) were administered as ordered. First unit of two ordered units of PRBCs initiated @ 75 ml/hr at 0850. Fifteen minutes into infusion, VS stable and pt denied c/o SOB, itching/hives, lip/tongue/facial swelling, CP or other complaints. Infusion rate increased to 150 ml/hr for the remainder of the transfusion. Unit finished @ 1125. VS stable and no transfusion reaction suspected. Second unit of two ordered units of PRBCs initiated @ 75 ml/hr at 1135. Fifteen minutes into infusion, VS stable and pt denied c/o SOB, itching/hives, lip/tongue/facial swelling, CP or other complaints. Infusion rate increased to 150 ml/hr for the remainder of the transfusion. Unit finished @ 0345 74 47 21. VS stable and no transfusion reaction suspected. Mr. Kathryn Meehan tolerated infusion without complaints. Mediport was flushed with 20 ml NS and 500 units of Heparin then de-accessed. Band-aid applied to the site. Patient politely refused to stay for a post transfusion observation period.      Patient Vitals for the past 12 hrs:   Temp Pulse Resp BP SpO2   18 1347 98.4 °F (36.9 °C) 66 18 153/75 100 %   01/29/18 1335 98.1 °F (36.7 °C) 65 18 143/71 98 %   01/29/18 1235 98.4 °F (36.9 °C) 63 18 130/71 100 %   01/29/18 1205 98.2 °F (36.8 °C) 65 18 135/75 100 %   01/29/18 1150 98.3 °F (36.8 °C) 66 18 137/73 99 %   01/29/18 1125 98.3 °F (36.8 °C) 73 18 139/67 100 %   01/29/18 1050 98.3 °F (36.8 °C) 70 18 146/76 100 %   01/29/18 0950 98.3 °F (36.8 °C) 71 18 119/68 98 %   01/29/18 0920 98.4 °F (36.9 °C) 68 18 105/62 97 %   01/29/18 0905 98.5 °F (36.9 °C) 74 18 106/63 97 %   01/29/18 0848 98.5 °F (36.9 °C) 75 18 107/63 97 %   01/29/18 0814 98.9 °F (37.2 °C) 75 18 128/64 100 %       Discharge instructions reviewed with pt. Pt instructed to report SOB, CP, elevated temp, back pain, or other symptoms of transfusion reaction to MD or ED. Pt verbalized understanding. Patient armband removed and shredded    Mr. Nikki Morris was discharged from [place] [address] in stable condition at 1355. He has no future appointments at the [facility] at this time.      Robert Barraza RN  January 29, 2018

## 2018-01-31 ENCOUNTER — APPOINTMENT (OUTPATIENT)
Dept: INFUSION THERAPY | Age: 58
End: 2018-01-31
Payer: COMMERCIAL

## 2018-02-16 ENCOUNTER — APPOINTMENT (OUTPATIENT)
Dept: INFUSION THERAPY | Age: 58
End: 2018-02-16
Payer: COMMERCIAL

## 2018-02-21 ENCOUNTER — HOSPITAL ENCOUNTER (OUTPATIENT)
Dept: INFUSION THERAPY | Age: 58
Discharge: HOME | End: 2018-02-21
Payer: COMMERCIAL

## 2018-02-21 VITALS
DIASTOLIC BLOOD PRESSURE: 64 MMHG | SYSTOLIC BLOOD PRESSURE: 116 MMHG | RESPIRATION RATE: 16 BRPM | HEART RATE: 65 BPM | TEMPERATURE: 98.6 F

## 2018-02-21 PROCEDURE — 86920 COMPATIBILITY TEST SPIN: CPT | Performed by: INTERNAL MEDICINE

## 2018-02-21 PROCEDURE — 36415 COLL VENOUS BLD VENIPUNCTURE: CPT

## 2018-02-21 PROCEDURE — 86900 BLOOD TYPING SEROLOGIC ABO: CPT | Performed by: INTERNAL MEDICINE

## 2018-02-21 PROCEDURE — 86870 RBC ANTIBODY IDENTIFICATION: CPT | Performed by: INTERNAL MEDICINE

## 2018-02-21 RX ORDER — SODIUM CHLORIDE 9 MG/ML
250 INJECTION, SOLUTION INTRAVENOUS AS NEEDED
Status: DISCONTINUED | OUTPATIENT
Start: 2018-02-21 | End: 2018-02-25 | Stop reason: HOSPADM

## 2018-02-21 NOTE — PROGRESS NOTES
[address] [facility] Lab Visit:    Jordi Hutchins  1960  [id number]    6117:  Pt arrived ambulatory. Labs drawn peripherally and sent for processing. Departed [facility] ambulatory and in no distress.     Visit Vitals    /64 (BP 1 Location: Left arm, BP Patient Position: Sitting)    Pulse 65    Temp 98.6 °F (37 °C)    Resp 16

## 2018-02-22 ENCOUNTER — HOSPITAL ENCOUNTER (OUTPATIENT)
Dept: INFUSION THERAPY | Age: 58
Discharge: HOME | End: 2018-02-22
Payer: COMMERCIAL

## 2018-02-22 VITALS
OXYGEN SATURATION: 100 % | DIASTOLIC BLOOD PRESSURE: 70 MMHG | SYSTOLIC BLOOD PRESSURE: 129 MMHG | TEMPERATURE: 98.1 F | HEART RATE: 55 BPM | RESPIRATION RATE: 18 BRPM

## 2018-02-22 PROCEDURE — 74011250636 HC RX REV CODE- 250/636: Performed by: INTERNAL MEDICINE

## 2018-02-22 PROCEDURE — 36430 TRANSFUSION BLD/BLD COMPNT: CPT

## 2018-02-22 PROCEDURE — 85660 RBC SICKLE CELL TEST: CPT | Performed by: INTERNAL MEDICINE

## 2018-02-22 PROCEDURE — 86922 COMPATIBILITY TEST ANTIGLOB: CPT | Performed by: INTERNAL MEDICINE

## 2018-02-22 PROCEDURE — 86921 COMPATIBILITY TEST INCUBATE: CPT | Performed by: INTERNAL MEDICINE

## 2018-02-22 PROCEDURE — P9040 RBC LEUKOREDUCED IRRADIATED: HCPCS | Performed by: INTERNAL MEDICINE

## 2018-02-22 PROCEDURE — 77030012965 HC NDL HUBR BBMI -A

## 2018-02-22 PROCEDURE — 74011250637 HC RX REV CODE- 250/637: Performed by: INTERNAL MEDICINE

## 2018-02-22 PROCEDURE — 77030013169 SET IV BLD ICUM -A

## 2018-02-22 PROCEDURE — 86902 BLOOD TYPE ANTIGEN DONOR EA: CPT | Performed by: INTERNAL MEDICINE

## 2018-02-22 RX ORDER — SODIUM CHLORIDE 9 MG/ML
250 INJECTION, SOLUTION INTRAVENOUS AS NEEDED
Status: DISCONTINUED | OUTPATIENT
Start: 2018-02-22 | End: 2018-02-26 | Stop reason: HOSPADM

## 2018-02-22 RX ORDER — HEPARIN SODIUM (PORCINE) LOCK FLUSH IV SOLN 100 UNIT/ML 100 UNIT/ML
500 SOLUTION INTRAVENOUS AS NEEDED
Status: DISPENSED | OUTPATIENT
Start: 2018-02-22 | End: 2018-02-23

## 2018-02-22 RX ORDER — DIPHENHYDRAMINE HCL 25 MG
25 CAPSULE ORAL ONCE
Status: COMPLETED | OUTPATIENT
Start: 2018-02-22 | End: 2018-02-22

## 2018-02-22 RX ORDER — ACETAMINOPHEN 325 MG/1
650 TABLET ORAL ONCE
Status: COMPLETED | OUTPATIENT
Start: 2018-02-22 | End: 2018-02-22

## 2018-02-22 RX ORDER — SODIUM CHLORIDE 0.9 % (FLUSH) 0.9 %
10-40 SYRINGE (ML) INJECTION AS NEEDED
Status: DISCONTINUED | OUTPATIENT
Start: 2018-02-22 | End: 2018-02-26 | Stop reason: HOSPADM

## 2018-02-22 RX ADMIN — ACETAMINOPHEN 650 MG: 325 TABLET ORAL at 08:18

## 2018-02-22 RX ADMIN — Medication 10 ML: at 08:20

## 2018-02-22 RX ADMIN — DIPHENHYDRAMINE HYDROCHLORIDE 25 MG: 25 CAPSULE ORAL at 08:18

## 2018-02-22 RX ADMIN — SODIUM CHLORIDE 250 ML: 900 INJECTION, SOLUTION INTRAVENOUS at 08:20

## 2018-02-22 RX ADMIN — HEPARIN SODIUM (PORCINE) LOCK FLUSH IV SOLN 100 UNIT/ML 500 UNITS: 100 SOLUTION at 13:52

## 2018-02-22 RX ADMIN — Medication 20 ML: at 13:52

## 2018-02-22 NOTE — PROGRESS NOTES
[facility] Progress Note    Date: 2018    Name: Juan Fields    MRN: [id number]         : 1960    Blood Transfusion (2 units)     Mr. Ayala Newberry arrived to [facility] at 0483 84 44 50. Mr. Ayala Newberry was assessed and education was provided. Discussed risks and benefits of blood transfusion with patient, including risk of transfusion reaction and disease transmission. Patient verbalized understanding and signed consent placed on chart. Mr. Karine Morris vitals were reviewed. Visit Vitals    /70 (BP 1 Location: Right arm, BP Patient Position: Sitting)    Pulse (!) 55    Temp 98.1 °F (36.7 °C)    Resp 18    SpO2 100%       Right chest single Mediport was accessed with a 20 gauge littlejohn needle using sterile technique. Port flushes easily and has brisk blood return. Normal saline initiated at [facility]. Pre-medications (Tylenol 650 mg and Benadryl 25 mg) were administered as ordered. First unit of two ordered units of PRBCs initiated @ 75 ml/hr at 0850. Fifteen minutes into infusion, VS stable and pt denied c/o SOB, itching/hives, lip/tongue/facial swelling, CP or other complaints. Infusion rate increased to 150 ml/hr for the remainder of the transfusion. Unit finished @ 1115. VS stable and no transfusion reaction suspected. Second unit of two ordered units of PRBCs initiated @ 75 ml/hr at 1120. Fifteen minutes into infusion, VS stable and pt denied c/o SOB, itching/hives, lip/tongue/facial swelling, CP or other complaints. Infusion rate increased to 150 ml/hr for the remainder of the transfusion. Unit finished @ 1350. VS stable and no transfusion reaction suspected. Mr. Ayala Newberry tolerated infusion without complaints. Mediport was flushed with 20 ml NS and 500 units of Heparin then de-accessed. Band-aid applied to the site. Patient politely refused to stay for a post transfusion observation period.      Patient Vitals for the past 12 hrs:   Temp Pulse Resp BP SpO2   18 1350 98.1 °F (36.7 °C) (!) 55 18 129/70 100 %   02/22/18 1320 98.2 °F (36.8 °C) (!) 54 18 144/74 100 %   02/22/18 1220 98 °F (36.7 °C) (!) 58 18 139/66 100 %   02/22/18 1150 98 °F (36.7 °C) (!) 55 18 103/62 100 %   02/22/18 1135 98 °F (36.7 °C) (!) 56 18 106/62 99 %   02/22/18 1115 98 °F (36.7 °C) (!) 56 18 102/62 99 %   02/22/18 1050 97.9 °F (36.6 °C) (!) 59 18 101/62 99 %   02/22/18 0950 98 °F (36.7 °C) 60 18 105/64 100 %   02/22/18 0920 98 °F (36.7 °C) 60 18 101/60 99 %   02/22/18 0905 98.2 °F (36.8 °C) 62 18 102/62 96 %   02/22/18 0848 98.6 °F (37 °C) 65 18 110/67 96 %   02/22/18 0812 98.5 °F (36.9 °C) 70 18 121/68 98 %       Discharge instructions reviewed with pt. Pt instructed to report SOB, CP, elevated temp, back pain, or other symptoms of transfusion reaction to MD or ED. Pt verbalized understanding. Patient armband removed and shredded    Mr. Ferna Litten was discharged from [place] [address] in stable condition at 1400. He has no future appointments at the [facility] at this time.      Gagandeep Ortiz RN  February 22, 2018

## 2018-03-14 ENCOUNTER — HOSPITAL ENCOUNTER (OUTPATIENT)
Age: 58
Discharge: HOME | End: 2018-03-14
Attending: RADIOLOGY
Payer: COMMERCIAL

## 2018-03-14 DIAGNOSIS — K83.09 CHOLANGITIS: ICD-10-CM

## 2018-03-14 PROCEDURE — 74181 MRI ABDOMEN W/O CONTRAST: CPT

## 2018-05-14 ENCOUNTER — HOSPITAL ENCOUNTER (OUTPATIENT)
Dept: INFUSION THERAPY | Age: 58
Discharge: HOME | End: 2018-05-14
Payer: COMMERCIAL

## 2018-05-14 VITALS
HEART RATE: 60 BPM | DIASTOLIC BLOOD PRESSURE: 67 MMHG | SYSTOLIC BLOOD PRESSURE: 135 MMHG | RESPIRATION RATE: 18 BRPM | OXYGEN SATURATION: 97 % | TEMPERATURE: 98.5 F

## 2018-05-14 PROCEDURE — 85660 RBC SICKLE CELL TEST: CPT | Performed by: INTERNAL MEDICINE

## 2018-05-14 PROCEDURE — 86920 COMPATIBILITY TEST SPIN: CPT | Performed by: INTERNAL MEDICINE

## 2018-05-14 PROCEDURE — 36415 COLL VENOUS BLD VENIPUNCTURE: CPT

## 2018-05-14 PROCEDURE — 86902 BLOOD TYPE ANTIGEN DONOR EA: CPT | Performed by: INTERNAL MEDICINE

## 2018-05-14 PROCEDURE — 86922 COMPATIBILITY TEST ANTIGLOB: CPT | Performed by: INTERNAL MEDICINE

## 2018-05-14 PROCEDURE — 86900 BLOOD TYPING SEROLOGIC ABO: CPT | Performed by: INTERNAL MEDICINE

## 2018-05-14 PROCEDURE — 86921 COMPATIBILITY TEST INCUBATE: CPT | Performed by: INTERNAL MEDICINE

## 2018-05-14 PROCEDURE — 86870 RBC ANTIBODY IDENTIFICATION: CPT | Performed by: INTERNAL MEDICINE

## 2018-05-14 RX ORDER — SODIUM CHLORIDE 9 MG/ML
250 INJECTION, SOLUTION INTRAVENOUS AS NEEDED
Status: DISCONTINUED | OUTPATIENT
Start: 2018-05-14 | End: 2018-05-18 | Stop reason: HOSPADM

## 2018-05-14 NOTE — PROGRESS NOTES
[address] Lab Visit:    Wil[place]  1960  [id number]  2198:  Pt arrived ambulatory. Labs drawn peripherally and sent for processing. Departed [facility] ambulatory and in no distress.     Visit Vitals    /67 (BP 1 Location: Left arm, BP Patient Position: Sitting)    Pulse 60    Temp 98.5 °F (36.9 °C)    Resp 18    SpO2 97%

## 2018-05-15 ENCOUNTER — HOSPITAL ENCOUNTER (OUTPATIENT)
Dept: INFUSION THERAPY | Age: 58
Discharge: HOME | End: 2018-05-15
Payer: COMMERCIAL

## 2018-05-15 VITALS
DIASTOLIC BLOOD PRESSURE: 71 MMHG | OXYGEN SATURATION: 98 % | SYSTOLIC BLOOD PRESSURE: 140 MMHG | HEART RATE: 57 BPM | TEMPERATURE: 98.1 F | RESPIRATION RATE: 18 BRPM

## 2018-05-15 PROCEDURE — 36430 TRANSFUSION BLD/BLD COMPNT: CPT

## 2018-05-15 PROCEDURE — 74011250637 HC RX REV CODE- 250/637: Performed by: INTERNAL MEDICINE

## 2018-05-15 PROCEDURE — 77030013169 SET IV BLD ICUM -A

## 2018-05-15 PROCEDURE — P9040 RBC LEUKOREDUCED IRRADIATED: HCPCS | Performed by: INTERNAL MEDICINE

## 2018-05-15 PROCEDURE — 77030012965 HC NDL HUBR BBMI -A

## 2018-05-15 PROCEDURE — 74011250636 HC RX REV CODE- 250/636: Performed by: INTERNAL MEDICINE

## 2018-05-15 RX ORDER — DIPHENHYDRAMINE HCL 25 MG
25 CAPSULE ORAL ONCE
Status: COMPLETED | OUTPATIENT
Start: 2018-05-15 | End: 2018-05-15

## 2018-05-15 RX ORDER — ACETAMINOPHEN 325 MG/1
650 TABLET ORAL ONCE
Status: COMPLETED | OUTPATIENT
Start: 2018-05-15 | End: 2018-05-15

## 2018-05-15 RX ORDER — SODIUM CHLORIDE 9 MG/ML
500 INJECTION, SOLUTION INTRAVENOUS ONCE
Status: COMPLETED | OUTPATIENT
Start: 2018-05-15 | End: 2018-05-15

## 2018-05-15 RX ORDER — SODIUM CHLORIDE 0.9 % (FLUSH) 0.9 %
10 SYRINGE (ML) INJECTION AS NEEDED
Status: DISCONTINUED | OUTPATIENT
Start: 2018-05-15 | End: 2018-05-19 | Stop reason: HOSPADM

## 2018-05-15 RX ORDER — HEPARIN 100 UNIT/ML
500 SYRINGE INTRAVENOUS ONCE
Status: COMPLETED | OUTPATIENT
Start: 2018-05-15 | End: 2018-05-15

## 2018-05-15 RX ADMIN — Medication 10 ML: at 14:06

## 2018-05-15 RX ADMIN — ACETAMINOPHEN 650 MG: 325 TABLET ORAL at 09:11

## 2018-05-15 RX ADMIN — Medication 10 ML: at 11:55

## 2018-05-15 RX ADMIN — DIPHENHYDRAMINE HYDROCHLORIDE 25 MG: 25 CAPSULE ORAL at 09:12

## 2018-05-15 RX ADMIN — Medication 10 ML: at 09:08

## 2018-05-15 RX ADMIN — SODIUM CHLORIDE 500 ML: 9 INJECTION, SOLUTION INTRAVENOUS at 09:08

## 2018-05-15 RX ADMIN — SODIUM CHLORIDE, PRESERVATIVE FREE 500 UNITS: 5 INJECTION INTRAVENOUS at 14:06

## 2018-05-15 NOTE — PROGRESS NOTES
[facility] Progress Note    Date: May 15, 2018    Name: Wendy Blum    MRN: [id number]         : 1960      Mr. Jasmin Boyd arrived to [facility] at 900. Mr. Jasmin Boyd was assessed and education was provided. Patient presented with a very flat affect and denied any problems today. Mr. Keri Aldridge vitals were reviewed. Visit Vitals    /75 (BP 1 Location: Left arm, BP Patient Position: Sitting)    Pulse 61    Temp 98.4 °F (36.9 °C)    Resp 18    SpO2 97%     Right chest single mediport accessed with 20 gauge littlejohn needle. Port flushes without difficulty and brisk blood return verified. Blood consent signed and placed on chart. Patient declined written education on blood transfusions stating he has received blood transfusions previously without complications. Normal saline initiated at [facility]. Pre-medications, Tylenol 650 mg and Benadryl 25 mg, were administered orally as ordered. 2 units of PRBCs administered as ordered followed by normal saline flush. Mr. Jasmin Boyd tolerated infusion without complaints. Mediport flushed with saline and heparin per protocol and deaccessed. Band aid placed over site. Discharge instructions reviewed with pt. Pt verbalized understanding. Patient declined written copy of discharge instructions. Mr. Jasmin Boyd was discharged from [place] [address] in stable condition at [phone]. Patient declined to stay for the 60 minute observation. No further appointments are scheduled at this office. Patient is advised to follow up with his physician.   Trell Jensen RN  May 15, 2018

## 2018-08-09 ENCOUNTER — HOSPITAL ENCOUNTER (OUTPATIENT)
Dept: INFUSION THERAPY | Age: 58
Discharge: HOME | End: 2018-08-09
Payer: COMMERCIAL

## 2018-08-09 VITALS
TEMPERATURE: 98.6 F | RESPIRATION RATE: 18 BRPM | OXYGEN SATURATION: 96 % | SYSTOLIC BLOOD PRESSURE: 131 MMHG | HEART RATE: 74 BPM | DIASTOLIC BLOOD PRESSURE: 65 MMHG

## 2018-08-09 PROCEDURE — 85660 RBC SICKLE CELL TEST: CPT | Performed by: INTERNAL MEDICINE

## 2018-08-09 PROCEDURE — 86902 BLOOD TYPE ANTIGEN DONOR EA: CPT | Performed by: INTERNAL MEDICINE

## 2018-08-09 PROCEDURE — 86921 COMPATIBILITY TEST INCUBATE: CPT | Performed by: INTERNAL MEDICINE

## 2018-08-09 PROCEDURE — 36415 COLL VENOUS BLD VENIPUNCTURE: CPT

## 2018-08-09 PROCEDURE — 74011250636 HC RX REV CODE- 250/636: Performed by: INTERNAL MEDICINE

## 2018-08-09 PROCEDURE — 36591 DRAW BLOOD OFF VENOUS DEVICE: CPT

## 2018-08-09 PROCEDURE — 86870 RBC ANTIBODY IDENTIFICATION: CPT | Performed by: INTERNAL MEDICINE

## 2018-08-09 PROCEDURE — 86922 COMPATIBILITY TEST ANTIGLOB: CPT | Performed by: INTERNAL MEDICINE

## 2018-08-09 PROCEDURE — 86900 BLOOD TYPING SEROLOGIC ABO: CPT | Performed by: INTERNAL MEDICINE

## 2018-08-09 PROCEDURE — 86920 COMPATIBILITY TEST SPIN: CPT | Performed by: INTERNAL MEDICINE

## 2018-08-09 RX ORDER — SODIUM CHLORIDE 0.9 % (FLUSH) 0.9 %
10-40 SYRINGE (ML) INJECTION AS NEEDED
Status: DISCONTINUED | OUTPATIENT
Start: 2018-08-09 | End: 2018-08-13 | Stop reason: HOSPADM

## 2018-08-09 RX ORDER — HEPARIN 100 UNIT/ML
500 SYRINGE INTRAVENOUS AS NEEDED
Status: DISCONTINUED | OUTPATIENT
Start: 2018-08-09 | End: 2018-08-13 | Stop reason: HOSPADM

## 2018-08-09 RX ADMIN — Medication 500 UNITS: at 11:00

## 2018-08-09 RX ADMIN — Medication 30 ML: at 11:00

## 2018-08-09 NOTE — PROGRESS NOTES
[facility] Progress Note    Date: 2018    Name: Ryne Tavera    MRN: [id number]         : 1960    Monthly Port flush     Mr. Jose Man was assessed and education was provided. Mr. Gonzales Record vitals were reviewed. Visit Vitals    /65 (BP 1 Location: Left arm, BP Patient Position: Sitting)    Pulse 74    Temp 98.6 °F (37 °C)    Resp 18    SpO2 96%      Patient did not want port accessed for labs at first.  After 4 attempts to draw labs peripherally w/o success, patient consented to have port used. Mediport at right upper chest accessed with 20 gauge, 1 inch Leija needle w/o difficulty. Good blood return obtained. Type and Cross drawn, after 8 ml discard, followed with 30 ml NS flush and 5 ml of 100 units/ml Heparin. Port remains accessed for use tomorrow for transfusion. Line secured in place at right upper chest.    Mr. Jose Man tolerated the procedure, and had no complaints. Patient armband removed and shredded. Mr. Jose Man was discharged from [place] [address] in stable condition at 1105. He is to return on 8/10/2018 at 1100 for his next appointment for blood transfusion.     Liana Birmingham RN  2018  12:26 PM

## 2018-08-10 ENCOUNTER — HOSPITAL ENCOUNTER (OUTPATIENT)
Dept: INFUSION THERAPY | Age: 58
Discharge: HOME | End: 2018-08-10
Payer: COMMERCIAL

## 2018-08-10 VITALS
DIASTOLIC BLOOD PRESSURE: 77 MMHG | OXYGEN SATURATION: 97 % | RESPIRATION RATE: 18 BRPM | HEART RATE: 61 BPM | TEMPERATURE: 97.9 F | SYSTOLIC BLOOD PRESSURE: 135 MMHG

## 2018-08-10 PROCEDURE — 74011250636 HC RX REV CODE- 250/636: Performed by: INTERNAL MEDICINE

## 2018-08-10 PROCEDURE — P9040 RBC LEUKOREDUCED IRRADIATED: HCPCS | Performed by: INTERNAL MEDICINE

## 2018-08-10 PROCEDURE — 36430 TRANSFUSION BLD/BLD COMPNT: CPT

## 2018-08-10 PROCEDURE — 74011250637 HC RX REV CODE- 250/637: Performed by: INTERNAL MEDICINE

## 2018-08-10 RX ORDER — DIPHENHYDRAMINE HCL 25 MG
25 CAPSULE ORAL ONCE
Status: COMPLETED | OUTPATIENT
Start: 2018-08-10 | End: 2018-08-10

## 2018-08-10 RX ORDER — HEPARIN 100 UNIT/ML
SYRINGE INTRAVENOUS
Status: DISPENSED
Start: 2018-08-10 | End: 2018-08-11

## 2018-08-10 RX ORDER — SODIUM CHLORIDE 0.9 % (FLUSH) 0.9 %
10-40 SYRINGE (ML) INJECTION AS NEEDED
Status: DISCONTINUED | OUTPATIENT
Start: 2018-08-10 | End: 2018-08-14 | Stop reason: HOSPADM

## 2018-08-10 RX ORDER — SODIUM CHLORIDE 9 MG/ML
250 INJECTION, SOLUTION INTRAVENOUS AS NEEDED
Status: DISCONTINUED | OUTPATIENT
Start: 2018-08-10 | End: 2018-08-14 | Stop reason: HOSPADM

## 2018-08-10 RX ORDER — ACETAMINOPHEN 325 MG/1
650 TABLET ORAL ONCE
Status: COMPLETED | OUTPATIENT
Start: 2018-08-10 | End: 2018-08-10

## 2018-08-10 RX ORDER — HEPARIN 100 UNIT/ML
500 SYRINGE INTRAVENOUS AS NEEDED
Status: DISCONTINUED | OUTPATIENT
Start: 2018-08-10 | End: 2018-08-14 | Stop reason: HOSPADM

## 2018-08-10 RX ADMIN — Medication 10 ML: at 11:22

## 2018-08-10 RX ADMIN — ACETAMINOPHEN 650 MG: 325 TABLET ORAL at 11:25

## 2018-08-10 RX ADMIN — SODIUM CHLORIDE 250 ML: 900 INJECTION, SOLUTION INTRAVENOUS at 11:22

## 2018-08-10 RX ADMIN — Medication 10 ML: at 16:01

## 2018-08-10 RX ADMIN — DIPHENHYDRAMINE HYDROCHLORIDE 25 MG: 25 CAPSULE ORAL at 11:25

## 2018-08-10 RX ADMIN — SODIUM CHLORIDE, PRESERVATIVE FREE 500 UNITS: 5 INJECTION INTRAVENOUS at 16:02

## 2018-08-10 NOTE — PROGRESS NOTES
[facility] Progress Note    Date: August 10, 2018    Name: Davonte Holguin    MRN: [id number]         : 1960    Scheduled blood transfusion    Mr. Brianne Espinal arrived to [facility] at 1120 ambulatory. Complained of generalized pain 9/10. Stated he took pain med early this am. Patient to receive pre treats for blood transfusion. Reassured and positioned for comfort    Mr. Brianne Espinal was assessed and education was provided. .     Mr. Titus Bowen vitals were reviewed. Visit Vitals    /77 (BP 1 Location: Right arm, BP Patient Position: At rest)    Pulse 61    Temp 97.9 °F (36.6 °C)    Resp 18    SpO2 97%     Right chest single mediport was accessed with 20 gauge littlejohn needle on 18, and left accessed for use today. Port flushes without difficulty and brisk blood return verified. Drsg and littlejohn needle to site d/i. No tenderness or swelling around drsg noted      Blood consent signed and placed on chart. Patient declined written education on blood transfusions stating he has received blood transfusions previously without complications. Normal saline initiated at [facility]. Pre-medications, Tylenol 650 mg and Benadryl 25 mg, were administered orally as ordered. 2 units of PRBCs administered as ordered followed by normal saline flush. Mr. Brianne Espinal tolerated infusion without complaints.     Patient Vitals for the past 12 hrs:   Temp Pulse Resp BP SpO2   08/10/18 1555 97.9 °F (36.6 °C) 61 18 135/77 -   08/10/18 1550 97.8 °F (36.6 °C) (!) 56 16 151/79 -   08/10/18 1450 97.8 °F (36.6 °C) (!) 57 16 146/72 -   08/10/18 1420 97.9 °F (36.6 °C) (!) 58 16 156/75 -   08/10/18 1405 98.1 °F (36.7 °C) 70 16 136/68 -   08/10/18 1350 98.2 °F (36.8 °C) (!) 56 16 139/66 -   08/10/18 1255 98 °F (36.7 °C) 69 16 118/66 -   08/10/18 1225 98.1 °F (36.7 °C) 64 16 95/57 -   08/10/18 1210 98.3 °F (36.8 °C) 69 16 105/62 -   08/10/18 1154 98.1 °F (36.7 °C) 67 18 104/59 -   08/10/18 1120 98.4 °F (36.9 °C) 76 18 134/72 97 %       [facility] flushed with saline and heparin per protocol and deaccessed. Site without redness, swelling, bleeding or tenderness Band aid placed over site. Discharge instructions reviewed with pt. Pt verbalized understanding. Patient declined written copy of discharge instructions. Mr. Ita Villatoro was discharged from [place] [address] in stable condition at [phone]. Patient declined to stay for the 60 minute observation. No further appointments are scheduled at this office. Patient is advised to follow up with his physician.   Idris Rene RN  August 10, 2018

## 2018-08-24 ENCOUNTER — HOSPITAL ENCOUNTER (OUTPATIENT)
Dept: MRI IMAGING | Age: 58
Discharge: HOME | End: 2018-08-24
Attending: RADIOLOGY
Payer: COMMERCIAL

## 2018-08-24 DIAGNOSIS — K83.09 CHOLANGITIS: ICD-10-CM

## 2018-08-24 PROCEDURE — 74181 MRI ABDOMEN W/O CONTRAST: CPT

## 2018-09-24 ENCOUNTER — HOSPITAL ENCOUNTER (OUTPATIENT)
Dept: INFUSION THERAPY | Age: 58
Discharge: HOME | End: 2018-09-24
Payer: COMMERCIAL

## 2018-09-24 VITALS
OXYGEN SATURATION: 95 % | DIASTOLIC BLOOD PRESSURE: 66 MMHG | RESPIRATION RATE: 18 BRPM | SYSTOLIC BLOOD PRESSURE: 119 MMHG | TEMPERATURE: 98.5 F | HEART RATE: 65 BPM

## 2018-09-24 PROCEDURE — 86902 BLOOD TYPE ANTIGEN DONOR EA: CPT | Performed by: INTERNAL MEDICINE

## 2018-09-24 PROCEDURE — 85660 RBC SICKLE CELL TEST: CPT | Performed by: INTERNAL MEDICINE

## 2018-09-24 PROCEDURE — 86870 RBC ANTIBODY IDENTIFICATION: CPT | Performed by: INTERNAL MEDICINE

## 2018-09-24 PROCEDURE — 86920 COMPATIBILITY TEST SPIN: CPT | Performed by: INTERNAL MEDICINE

## 2018-09-24 PROCEDURE — 36415 COLL VENOUS BLD VENIPUNCTURE: CPT

## 2018-09-24 PROCEDURE — 86921 COMPATIBILITY TEST INCUBATE: CPT | Performed by: INTERNAL MEDICINE

## 2018-09-24 PROCEDURE — 86922 COMPATIBILITY TEST ANTIGLOB: CPT | Performed by: INTERNAL MEDICINE

## 2018-09-24 PROCEDURE — 86900 BLOOD TYPING SEROLOGIC ABO: CPT | Performed by: INTERNAL MEDICINE

## 2018-09-24 NOTE — PROGRESS NOTES
[facility] Lab Visit: 
 
Kar Vasquez 1960 
[id number] 
 
920:  Pt arrived ambulatory. Labs drawn peripherally and sent for processing. Departed [facility] ambulatory and in no distress. Visit Vitals  /66 (BP 1 Location: Right arm, BP Patient Position: At rest)  Pulse 65  Temp 98.5 °F (36.9 °C)  Resp 18  SpO2 95%

## 2018-09-25 ENCOUNTER — HOSPITAL ENCOUNTER (OUTPATIENT)
Dept: INFUSION THERAPY | Age: 58
Discharge: HOME | End: 2018-09-25
Payer: COMMERCIAL

## 2018-09-25 VITALS
OXYGEN SATURATION: 100 % | DIASTOLIC BLOOD PRESSURE: 70 MMHG | RESPIRATION RATE: 18 BRPM | TEMPERATURE: 98.3 F | SYSTOLIC BLOOD PRESSURE: 129 MMHG | HEART RATE: 61 BPM

## 2018-09-25 PROCEDURE — P9040 RBC LEUKOREDUCED IRRADIATED: HCPCS | Performed by: INTERNAL MEDICINE

## 2018-09-25 PROCEDURE — 77030012965 HC NDL HUBR BBMI -A

## 2018-09-25 PROCEDURE — 74011250636 HC RX REV CODE- 250/636: Performed by: INTERNAL MEDICINE

## 2018-09-25 PROCEDURE — 74011250636 HC RX REV CODE- 250/636

## 2018-09-25 PROCEDURE — 36430 TRANSFUSION BLD/BLD COMPNT: CPT

## 2018-09-25 PROCEDURE — 74011250637 HC RX REV CODE- 250/637: Performed by: INTERNAL MEDICINE

## 2018-09-25 PROCEDURE — 77030013169 SET IV BLD ICUM -A

## 2018-09-25 RX ORDER — HEPARIN 100 UNIT/ML
SYRINGE INTRAVENOUS
Status: COMPLETED
Start: 2018-09-25 | End: 2018-09-25

## 2018-09-25 RX ORDER — SODIUM CHLORIDE 0.9 % (FLUSH) 0.9 %
10-40 SYRINGE (ML) INJECTION AS NEEDED
Status: DISCONTINUED | OUTPATIENT
Start: 2018-09-25 | End: 2018-09-29 | Stop reason: HOSPADM

## 2018-09-25 RX ORDER — DIPHENHYDRAMINE HCL 25 MG
25 CAPSULE ORAL ONCE
Status: COMPLETED | OUTPATIENT
Start: 2018-09-25 | End: 2018-09-25

## 2018-09-25 RX ORDER — HEPARIN 100 UNIT/ML
500 SYRINGE INTRAVENOUS ONCE
Status: COMPLETED | OUTPATIENT
Start: 2018-09-25 | End: 2018-09-25

## 2018-09-25 RX ORDER — ACETAMINOPHEN 325 MG/1
650 TABLET ORAL ONCE
Status: COMPLETED | OUTPATIENT
Start: 2018-09-25 | End: 2018-09-25

## 2018-09-25 RX ORDER — SODIUM CHLORIDE 9 MG/ML
250 INJECTION, SOLUTION INTRAVENOUS AS NEEDED
Status: DISCONTINUED | OUTPATIENT
Start: 2018-09-25 | End: 2018-09-29 | Stop reason: HOSPADM

## 2018-09-25 RX ADMIN — DIPHENHYDRAMINE HYDROCHLORIDE 25 MG: 25 CAPSULE ORAL at 09:27

## 2018-09-25 RX ADMIN — Medication 20 ML: at 14:40

## 2018-09-25 RX ADMIN — ACETAMINOPHEN 650 MG: 325 TABLET ORAL at 09:27

## 2018-09-25 RX ADMIN — HEPARIN 500 UNITS: 100 SYRINGE at 14:40

## 2018-09-25 RX ADMIN — SODIUM CHLORIDE 250 ML: 900 INJECTION, SOLUTION INTRAVENOUS at 09:28

## 2018-09-25 RX ADMIN — Medication 10 ML: at 09:27

## 2018-09-25 NOTE — PROGRESS NOTES
[facility] Progress Note    Date: 2018    Name: Ghislaine Cordova    MRN: [id number]         : 1960      Mr. Tiny Mcconnell arrived to [facility] at [address]. Mr. Tiny Mcconnell was assessed and education was provided. Discussed risks and benefits of blood transfusion with patient, including risk of transfusion reaction and disease transmission. Patient verbalized understanding and signed consent placed on chart. Mr. Genesis Jimenez vitals were reviewed. Visit Vitals    /70 (BP 1 Location: Left arm, BP Patient Position: Sitting)    Pulse 61    Temp 98.3 °F (36.8 °C)    Resp 18    SpO2 100%       Right chest single lumen mediport accessed with 20 G 1 inch littlejohn using sterile technique. Flushes without difficulty and positive for blood return. Normal saline initiated at [facility]. Pre-medications (Tylenol 650 mg and Benadryl 25 mg) were administered as ordered. First unit of two ordered units of PRBCs initiated @ 75 ml/hr at 1000. Fifteen minutes into infusion, VS stable and pt denied c/o SOB, itching/hives, lip/tongue/facial swelling, CP or other complaints. Infusion rate increased to 155 ml/hr for the remainder of the transfusion. Unit finished @ 1200. VS stable and no transfusion reaction suspected. Second unit of two ordered units of PRBCs initiated @ 75 ml/hr at 1225. Fifteen minutes into infusion, VS stable and pt denied c/o SOB, itching/hives, lip/tongue/facial swelling, CP or other complaints. Infusion rate increased to 155 ml/hr for the remainder of the transfusion. Unit finished @ 9633 0859. VS stable and no transfusion reaction suspected.     Patient Vitals for the past 12 hrs:   Temp Pulse Resp BP SpO2   18 1439 98.3 °F (36.8 °C) 61 18 129/70 100 %   18 1425 98.2 °F (36.8 °C) 60 18 137/72 97 %   18 1325 98.2 °F (36.8 °C) 64 18 121/69 97 %   18 1255 98 °F (36.7 °C) 66 18 118/69 97 %   18 1240 98.4 °F (36.9 °C) 64 18 116/69 99 %   18 1219 97.8 °F (36.6 °C) 64 18 118/67 99 %   09/25/18 1200 97.9 °F (36.6 °C) 62 18 124/71 100 %   09/25/18 1100 98.3 °F (36.8 °C) (!) 52 18 118/69 98 %   09/25/18 1030 98.3 °F (36.8 °C) 65 18 120/68 97 %   09/25/18 1015 98.4 °F (36.9 °C) 70 18 111/64 95 %   09/25/18 0956 98.5 °F (36.9 °C) 71 18 113/68 95 %   09/25/18 0916 98.5 °F (36.9 °C) 83 18 155/75 94 %       Mr. Jose Man tolerated infusion without complaints. Mediport flushed with 20 mL NS and 500 units of heparin after blood return was verified. Band-aid applied to site. Patient declined to stay for observation. Discharge instructions reviewed with pt. Pt instructed to report SOB, CP, elevated temp, back pain, or other symptoms of transfusion reaction to MD or ED. Pt verbalized understanding. Patient armband removed and shredded    Mr. Jose Man was discharged from [place] [address] in stable condition at [phone]. He has no further appointments with [facility] at this time.      Bulmaro Mukherjee RN  September 25, 2018

## 2018-10-24 ENCOUNTER — HOSPITAL ENCOUNTER (OUTPATIENT)
Dept: INFUSION THERAPY | Age: 58
Discharge: HOME | End: 2018-10-24
Payer: COMMERCIAL

## 2018-10-24 VITALS
TEMPERATURE: 99 F | DIASTOLIC BLOOD PRESSURE: 79 MMHG | SYSTOLIC BLOOD PRESSURE: 137 MMHG | RESPIRATION RATE: 18 BRPM | OXYGEN SATURATION: 97 % | HEART RATE: 72 BPM

## 2018-10-24 PROCEDURE — 86920 COMPATIBILITY TEST SPIN: CPT | Performed by: INTERNAL MEDICINE

## 2018-10-24 PROCEDURE — 36415 COLL VENOUS BLD VENIPUNCTURE: CPT

## 2018-10-24 PROCEDURE — 86870 RBC ANTIBODY IDENTIFICATION: CPT | Performed by: INTERNAL MEDICINE

## 2018-10-24 PROCEDURE — 86900 BLOOD TYPING SEROLOGIC ABO: CPT | Performed by: INTERNAL MEDICINE

## 2018-10-24 RX ORDER — SODIUM CHLORIDE 9 MG/ML
250 INJECTION, SOLUTION INTRAVENOUS AS NEEDED
Status: DISCONTINUED | OUTPATIENT
Start: 2018-10-24 | End: 2018-10-28 | Stop reason: HOSPADM

## 2018-10-24 NOTE — PROGRESS NOTES
[facility] Lab Visit: 
 
Davonte Holguin 1960 
[id number] 
 
1335:  Pt arrived ambulatory. Labs drawn peripherally and sent for processing. Departed [facility] ambulatory and in no distress. Visit Vitals /79 (BP 1 Location: Right arm, BP Patient Position: Sitting) Pulse 72 Temp 99 °F (37.2 °C) Resp 18 SpO2 97%

## 2018-10-25 ENCOUNTER — HOSPITAL ENCOUNTER (OUTPATIENT)
Dept: INFUSION THERAPY | Age: 58
Discharge: HOME | End: 2018-10-25
Payer: COMMERCIAL

## 2018-10-25 VITALS
SYSTOLIC BLOOD PRESSURE: 134 MMHG | HEART RATE: 58 BPM | TEMPERATURE: 98.1 F | OXYGEN SATURATION: 99 % | DIASTOLIC BLOOD PRESSURE: 74 MMHG | RESPIRATION RATE: 18 BRPM

## 2018-10-25 PROCEDURE — 74011250637 HC RX REV CODE- 250/637: Performed by: INTERNAL MEDICINE

## 2018-10-25 PROCEDURE — 86902 BLOOD TYPE ANTIGEN DONOR EA: CPT | Performed by: INTERNAL MEDICINE

## 2018-10-25 PROCEDURE — 77030012965 HC NDL HUBR BBMI -A

## 2018-10-25 PROCEDURE — 85660 RBC SICKLE CELL TEST: CPT | Performed by: INTERNAL MEDICINE

## 2018-10-25 PROCEDURE — 86921 COMPATIBILITY TEST INCUBATE: CPT | Performed by: INTERNAL MEDICINE

## 2018-10-25 PROCEDURE — 77030013169 SET IV BLD ICUM -A

## 2018-10-25 PROCEDURE — 86922 COMPATIBILITY TEST ANTIGLOB: CPT | Performed by: INTERNAL MEDICINE

## 2018-10-25 PROCEDURE — P9040 RBC LEUKOREDUCED IRRADIATED: HCPCS | Performed by: INTERNAL MEDICINE

## 2018-10-25 PROCEDURE — 36430 TRANSFUSION BLD/BLD COMPNT: CPT

## 2018-10-25 PROCEDURE — 74011250636 HC RX REV CODE- 250/636: Performed by: INTERNAL MEDICINE

## 2018-10-25 RX ORDER — DIPHENHYDRAMINE HCL 25 MG
25 CAPSULE ORAL ONCE
Status: COMPLETED | OUTPATIENT
Start: 2018-10-25 | End: 2018-10-25

## 2018-10-25 RX ORDER — ACETAMINOPHEN 325 MG/1
650 TABLET ORAL ONCE
Status: COMPLETED | OUTPATIENT
Start: 2018-10-25 | End: 2018-10-25

## 2018-10-25 RX ORDER — HEPARIN 100 UNIT/ML
500 SYRINGE INTRAVENOUS ONCE
Status: COMPLETED | OUTPATIENT
Start: 2018-10-25 | End: 2018-10-25

## 2018-10-25 RX ORDER — SODIUM CHLORIDE 9 MG/ML
250 INJECTION, SOLUTION INTRAVENOUS AS NEEDED
Status: DISCONTINUED | OUTPATIENT
Start: 2018-10-25 | End: 2018-10-29 | Stop reason: HOSPADM

## 2018-10-25 RX ADMIN — HEPARIN 500 UNITS: 100 SYRINGE at 12:50

## 2018-10-25 RX ADMIN — DIPHENHYDRAMINE HYDROCHLORIDE 25 MG: 25 CAPSULE ORAL at 08:20

## 2018-10-25 RX ADMIN — ACETAMINOPHEN 650 MG: 325 TABLET ORAL at 08:20

## 2018-10-25 NOTE — PROGRESS NOTES
[facility] Progress Note Date: 2018 Name: Robert Huang MRN: [id number] : 1960 Mr. Nikki Morris arrived to [facility] at 7037 for blood transfusion (Hbg= 7.3). Mr. Nikki Morris was assessed and education was provided. Discussed risks and benefits of blood transfusion with patient, including risk of transfusion reaction and disease transmission. Patient verbalized understanding and signed consent placed on chart. Mr. Venkat Munoz vitals were reviewed. Visit Vitals /68 (BP 1 Location: Right arm, BP Patient Position: Sitting) Pulse 81 Temp 98.7 °F (37.1 °C) Resp 18 SpO2 97% Right chest single lumen mediport accessed with 20 G 1 inch littlejohn using sterile technique. Flushes without difficulty and positive for blood return. Normal saline initiated at [facility]. Pre-medications (Tylenol 650 mg and Benadryl 25 mg) were administered as ordered. First unit of two ordered units of PRBCs initiated @ 75 ml/hr at 0855. Fifteen minutes into infusion, VS stable and pt denied c/o SOB, itching/hives, lip/tongue/facial swelling, CP or other complaints. Infusion rate increased to 155 ml/hr for the remainder of the transfusion. Unit finished @ 0317 [id number] . VS stable and no transfusion reaction suspected. Second unit of two ordered units of PRBCs initiated @ 75 ml/hr at 1050. Fifteen minutes into infusion, VS stable and pt denied c/o SOB, itching/hives, lip/tongue/facial swelling, CP or other complaints. Infusion rate increased to 155 ml/hr for the remainder of the transfusion. Unit finished @ [id number]. VS stable and no transfusion reaction suspected. Patient Vitals for the past 12 hrs: 
 Temp Pulse Resp BP SpO2  
10/25/18 0812 98.7 °F (37.1 °C) 81 18 134/68 97 % Mr. Nikki Morris tolerated infusion without complaints. Mediport flushed with 20 mL NS and 500 units of heparin after blood return was verified. Band-aid applied to site. Patient declined to stay for observation. Discharge instructions reviewed with pt. Pt instructed to report SOB, CP, elevated temp, back pain, or other symptoms of transfusion reaction to MD or ED. Pt verbalized understanding. Patient armband removed and shredded Mr. Almita Dey was discharged from [place] [address] in stable condition at 1250 . He has no further appointments with [facility] at this time. Sonia Bain RN October 25, 2018

## 2018-11-26 ENCOUNTER — HOSPITAL ENCOUNTER (OUTPATIENT)
Dept: INFUSION THERAPY | Age: 58
Discharge: HOME | End: 2018-11-26
Payer: COMMERCIAL

## 2018-11-26 VITALS
OXYGEN SATURATION: 98 % | RESPIRATION RATE: 18 BRPM | HEART RATE: 69 BPM | DIASTOLIC BLOOD PRESSURE: 68 MMHG | SYSTOLIC BLOOD PRESSURE: 112 MMHG | TEMPERATURE: 98.9 F

## 2018-11-26 PROCEDURE — 86870 RBC ANTIBODY IDENTIFICATION: CPT

## 2018-11-26 PROCEDURE — 86900 BLOOD TYPING SEROLOGIC ABO: CPT

## 2018-11-26 PROCEDURE — 36415 COLL VENOUS BLD VENIPUNCTURE: CPT

## 2018-11-26 PROCEDURE — 86920 COMPATIBILITY TEST SPIN: CPT

## 2018-11-26 RX ORDER — SODIUM CHLORIDE 9 MG/ML
250 INJECTION, SOLUTION INTRAVENOUS AS NEEDED
Status: DISCONTINUED | OUTPATIENT
Start: 2018-11-26 | End: 2018-11-30 | Stop reason: HOSPADM

## 2018-11-26 NOTE — PROGRESS NOTES
[facility] Lab Visit: 
 
Juan Fields 1960 
[id number] 
 
1030:  Pt arrived ambulatory. Labs drawn peripherally and sent for processing. Departed [facility] ambulatory and in no distress. Visit Vitals /68 (BP 1 Location: Right arm, BP Patient Position: Sitting) Pulse 69 Temp 98.9 °F (37.2 °C) Resp 18 SpO2 98%

## 2018-11-27 ENCOUNTER — HOSPITAL ENCOUNTER (OUTPATIENT)
Dept: INFUSION THERAPY | Age: 58
Discharge: HOME | End: 2018-11-27
Payer: COMMERCIAL

## 2018-11-27 VITALS
OXYGEN SATURATION: 97 % | TEMPERATURE: 98.5 F | HEART RATE: 62 BPM | DIASTOLIC BLOOD PRESSURE: 64 MMHG | SYSTOLIC BLOOD PRESSURE: 154 MMHG | RESPIRATION RATE: 18 BRPM

## 2018-11-27 PROCEDURE — 36430 TRANSFUSION BLD/BLD COMPNT: CPT

## 2018-11-27 PROCEDURE — 86921 COMPATIBILITY TEST INCUBATE: CPT

## 2018-11-27 PROCEDURE — 74011250636 HC RX REV CODE- 250/636: Performed by: INTERNAL MEDICINE

## 2018-11-27 PROCEDURE — 86902 BLOOD TYPE ANTIGEN DONOR EA: CPT

## 2018-11-27 PROCEDURE — 74011250637 HC RX REV CODE- 250/637: Performed by: INTERNAL MEDICINE

## 2018-11-27 PROCEDURE — P9040 RBC LEUKOREDUCED IRRADIATED: HCPCS

## 2018-11-27 PROCEDURE — 85660 RBC SICKLE CELL TEST: CPT

## 2018-11-27 PROCEDURE — 77030013169 SET IV BLD ICUM -A

## 2018-11-27 PROCEDURE — 86922 COMPATIBILITY TEST ANTIGLOB: CPT

## 2018-11-27 PROCEDURE — 77030012965 HC NDL HUBR BBMI -A

## 2018-11-27 RX ORDER — ACETAMINOPHEN 325 MG/1
650 TABLET ORAL ONCE
Status: COMPLETED | OUTPATIENT
Start: 2018-11-27 | End: 2018-11-27

## 2018-11-27 RX ORDER — DIPHENHYDRAMINE HCL 25 MG
25 CAPSULE ORAL ONCE
Status: COMPLETED | OUTPATIENT
Start: 2018-11-27 | End: 2018-11-27

## 2018-11-27 RX ORDER — SODIUM CHLORIDE 0.9 % (FLUSH) 0.9 %
10-40 SYRINGE (ML) INJECTION AS NEEDED
Status: COMPLETED | OUTPATIENT
Start: 2018-11-27 | End: 2018-11-27

## 2018-11-27 RX ORDER — HEPARIN 100 UNIT/ML
500 SYRINGE INTRAVENOUS ONCE
Status: COMPLETED | OUTPATIENT
Start: 2018-11-27 | End: 2018-11-27

## 2018-11-27 RX ADMIN — HEPARIN 500 UNITS: 100 SYRINGE at 14:47

## 2018-11-27 RX ADMIN — Medication 10 ML: at 14:45

## 2018-11-27 RX ADMIN — DIPHENHYDRAMINE HYDROCHLORIDE 25 MG: 25 CAPSULE ORAL at 10:15

## 2018-11-27 RX ADMIN — ACETAMINOPHEN 650 MG: 325 TABLET ORAL at 10:15

## 2018-11-27 NOTE — PROGRESS NOTES
[address] [facility] [place] Progress Note Date: 2018 Name: Juan Doll MRN: [id number] : 1960 Mr. Steve Meier arrived to [facility] at 1010 for blood transfusion (Hbg= 7.7). Pt complains of generalized pain and headache X 3 days. Stated that he did not feel the need to seek medical attention. Advised pt that if symptoms worsen, he should go to ER. Called MD office X 5. Left 3 messages. Pt requested pain medication during transfusion. Was unable to contact office until end of transfusion in which pt then declined pain medication. Called and left message with MD office informing them of pt decision. Strongly encouraged pt to seek additional emergency medical attention. Pt politely declined to utilize emergency services. Mr. Steve Meier was assessed and education was provided. Discussed risks and benefits of blood transfusion with patient, including risk of transfusion reaction and disease transmission. Patient verbalized understanding and signed consent placed on chart. Mr. Elizabeth Boothe vitals were reviewed. Visit Vitals /70 (BP 1 Location: Right arm, BP Patient Position: Sitting) Pulse 85 Temp 99 °F (37.2 °C) Resp 18 SpO2 100% Right chest single lumen mediport accessed with 20 G 1 inch littlejohn using sterile technique. Flushes without difficulty and positive for blood return. Normal saline initiated at [place]. Pre-medications (Tylenol 650 mg and Benadryl 25 mg) were administered as ordered. First unit of two ordered units of PRBCs initiated @ 75 ml/hr at 1040. Fifteen minutes into infusion, VS stable and pt denied c/o SOB, itching/hives, lip/tongue/facial swelling, CP or other complaints. Infusion rate increased to 155 ml/hr for the remainder of the transfusion. Unit finished @ 1230 . VS stable and no transfusion reaction suspected. Second unit of two ordered units of PRBCs initiated @ 75 ml/hr at 1235 . Fifteen minutes into infusion, VS stable and pt denied c/o SOB, itching/hives, lip/tongue/facial swelling, CP or other complaints. Infusion rate increased to 155 ml/hr for the remainder of the transfusion. Unit finished @ 1440. VS stable and no transfusion reaction suspected. Patient Vitals for the past 12 hrs: 
 Temp Pulse Resp BP SpO2  
11/27/18 1008 99 °F (37.2 °C) 85 18 139/70 100 % Mr. Molina Palomino tolerated infusion without complaints. Mediport flushed with 20 mL NS and 500 units of heparin after blood return was verified. Band-aid applied to site. Patient declined to stay for observation. Discharge instructions reviewed with pt. Pt instructed to report SOB, CP, elevated temp, back pain, or other symptoms of transfusion reaction to MD or ED. Pt verbalized understanding. Patient armband removed and shredded Mr. Molina Palomino was discharged from [place] [address] in stable condition at  1450. He has no further appointments with [facility] at this time. Rafe Hashimoto, RN November 27, 2018

## 2018-11-27 NOTE — PROGRESS NOTES
conducted an initial consultation and Spiritual Assessment for Shirlene Liz, who is a 60 y.o.,male. Patients Primary Language is: [place]. According to the patients EMR Restorationism Affiliation is: Lyudmila Guido. The reason the Patient came to the hospital is:  
Patient Active Problem List  
 Diagnosis Date Noted  Sickle cell anemia with crisis ([facility] [address] 11/07/2017  Benign obstructive jaundice 11/07/2017  Anemia 06/08/2016  Occult blood in stools 06/08/2016  Infected venous access port 06/05/2016  Sickle cell anemia ([address] 06/02/2016  Fever 05/28/2016  Sickle cell anemia with pain ([facility] [address] 04/06/2015  Cholangitis 04/06/2015  Sickle cell crisis ([facility] [address] 11/29/2014  Nosocomial pneumonia 11/29/2014  Biliary stone 07/23/2014  Abdominal pain, other specified site 07/23/2014  Sickle-cell anemia ([facility] [address] 08/30/2012  Cholestatic jaundice 08/30/2012 The  provided the following Interventions: 
Initiated a relationship of care and support. Provided information about Spiritual Care Services. Offered assurance of continued prayers on patient's behalf. Chart reviewed. The following outcomes where achieved: 
Patient shared limited information about both his medical narrative and spiritual journey/beliefs.  unable to confirm Patient's Restorationism Affiliation. Patient processed feeling about current appointment. Patient expressed gratitude for 's visit. Assessment: 
Patient was seen with some pain and discomfort at the time of visit. Patient does not have any Caodaism/cultural needs that will affect patients preferences in health care. There are no spiritual or Caodaism issues which require intervention at this time. Plan: 
Chaplains will continue to follow and will provide pastoral care on an as needed/requested basis.  
 recommends bedside caregivers page  on duty if patient shows signs of acute spiritual or emotional distress. Chaplain Resident Skip Dorantes Spiritual Care  
[phone]

## 2018-12-26 ENCOUNTER — HOSPITAL ENCOUNTER (OUTPATIENT)
Dept: INFUSION THERAPY | Age: 58
Discharge: HOME | End: 2018-12-26
Payer: COMMERCIAL

## 2018-12-26 VITALS
TEMPERATURE: 98.4 F | RESPIRATION RATE: 17 BRPM | HEART RATE: 95 BPM | DIASTOLIC BLOOD PRESSURE: 74 MMHG | SYSTOLIC BLOOD PRESSURE: 135 MMHG

## 2018-12-26 PROCEDURE — 86920 COMPATIBILITY TEST SPIN: CPT

## 2018-12-26 PROCEDURE — 86900 BLOOD TYPING SEROLOGIC ABO: CPT

## 2018-12-26 PROCEDURE — 86870 RBC ANTIBODY IDENTIFICATION: CPT

## 2018-12-26 PROCEDURE — 36415 COLL VENOUS BLD VENIPUNCTURE: CPT

## 2018-12-26 NOTE — PROGRESS NOTES
[facility] Progress Note    Date: 2018    Name: Rocky Collier    MRN: [id number]         : 1960    Peripheral Lab Draw      Mr. Almita Dey to [facility], ambulatory at 1330 accompanied by hisself. Pt was assessed and education was provided. Mr. Mg Howell vitals were reviewed and patient was observed for 5 minutes prior to treatment. Visit Vitals  /74 (BP 1 Location: Right arm, BP Patient Position: Sitting)   Pulse 95   Temp 98.4 °F (36.9 °C)   Resp 17       Blood obtained peripherally from left arm x 3 attempt with butterfly needle and sent to lab for Type and Cross Match per written orders. No bleeding or hematoma noted at site. Guaze and coban applied. Mr. Almita Dey tolerated the phlebotomy, and had no complaints. Patient armband removed and shredded. Mr. Almita Dey was discharged from [place] [address] in stable condition at 1350. He is to return on 2018 for his Blood Transfusion appointment.     Noe Stanley CMA  2018  3:11 PM

## 2018-12-27 ENCOUNTER — HOSPITAL ENCOUNTER (OUTPATIENT)
Dept: INFUSION THERAPY | Age: 58
Discharge: HOME | End: 2018-12-27
Payer: COMMERCIAL

## 2018-12-27 VITALS
HEART RATE: 68 BPM | RESPIRATION RATE: 18 BRPM | SYSTOLIC BLOOD PRESSURE: 139 MMHG | TEMPERATURE: 97 F | DIASTOLIC BLOOD PRESSURE: 79 MMHG

## 2018-12-27 PROCEDURE — 86922 COMPATIBILITY TEST ANTIGLOB: CPT

## 2018-12-27 PROCEDURE — 86921 COMPATIBILITY TEST INCUBATE: CPT

## 2018-12-27 PROCEDURE — 86902 BLOOD TYPE ANTIGEN DONOR EA: CPT

## 2018-12-27 PROCEDURE — 85660 RBC SICKLE CELL TEST: CPT

## 2018-12-27 PROCEDURE — 36430 TRANSFUSION BLD/BLD COMPNT: CPT

## 2018-12-27 PROCEDURE — 77030013169 SET IV BLD ICUM -A

## 2018-12-27 PROCEDURE — P9040 RBC LEUKOREDUCED IRRADIATED: HCPCS

## 2018-12-27 PROCEDURE — 74011250637 HC RX REV CODE- 250/637: Performed by: INTERNAL MEDICINE

## 2018-12-27 RX ORDER — SODIUM CHLORIDE 9 MG/ML
25 INJECTION, SOLUTION INTRAVENOUS ONCE
Status: DISPENSED | OUTPATIENT
Start: 2018-12-27 | End: 2018-12-27

## 2018-12-27 RX ORDER — DIPHENHYDRAMINE HCL 25 MG
25 CAPSULE ORAL ONCE
Status: COMPLETED | OUTPATIENT
Start: 2018-12-27 | End: 2018-12-27

## 2018-12-27 RX ORDER — SODIUM CHLORIDE 0.9 % (FLUSH) 0.9 %
10-40 SYRINGE (ML) INJECTION AS NEEDED
Status: DISCONTINUED | OUTPATIENT
Start: 2018-12-27 | End: 2018-12-31 | Stop reason: HOSPADM

## 2018-12-27 RX ORDER — HEPARIN 100 UNIT/ML
500 SYRINGE INTRAVENOUS AS NEEDED
Status: DISCONTINUED | OUTPATIENT
Start: 2018-12-27 | End: 2018-12-31 | Stop reason: HOSPADM

## 2018-12-27 RX ORDER — ACETAMINOPHEN 325 MG/1
650 TABLET ORAL ONCE
Status: COMPLETED | OUTPATIENT
Start: 2018-12-27 | End: 2018-12-27

## 2018-12-27 RX ADMIN — ACETAMINOPHEN 650 MG: 325 TABLET ORAL at 08:54

## 2018-12-27 RX ADMIN — DIPHENHYDRAMINE HYDROCHLORIDE 25 MG: 25 CAPSULE ORAL at 08:53

## 2018-12-27 NOTE — PROGRESS NOTES
[facility] Progress Note    Date: 2018    Name: Juan Doll    MRN: [id number]         : 1960    Blood Transfusion    Mr. Steve Meier arrived to [facility] at [address] [facility]. Mr. Steve Meier was assessed and education was provided. Mr. Elizabeth Boothe vitals were reviewed. Patient Vitals for the past 12 hrs:   Temp Pulse Resp BP   18 1440 97 °F (36.1 °C) 68 18 139/79   18 1420 97.5 °F (36.4 °C) 64 18 144/79   18 1320 97.4 °F (36.3 °C) 66 18 133/73   18 1250 97.3 °F (36.3 °C) 63 18 127/69   18 1235 97.8 °F (36.6 °C) 65 18 120/67   18 1210 96.8 °F (36 °C) 67 18 129/72   18 1155 96.8 °F (36 °C) 67 18 128/72   18 1055 97.1 °F (36.2 °C) 67 18 130/68   18 1025 97.3 °F (36.3 °C) 63 18 121/69   18 1010 97 °F (36.1 °C) 65 18 126/69   18 0955 97 °F (36.1 °C) 66 18 121/69   18 0931 97.1 °F (36.2 °C) 63 18 111/63   18 0840 98.2 °F (36.8 °C) 74 18 137/76     Mediport to right chest accessed under sterile technique using 20g 1\" littlejohn needle. Positive for blood return and flushes without difficulty. Normal saline initiated at [facility]. Pre-medications were administered as ordered Tylenol 650 mg PO Benadryl 25 mg PO.     2 units of PRBCs administered as ordered followed by normal saline flush. Mr. Steve Meier tolerated infusion without complaints. Port flushed with 20 ml normal saline followed by Heparin 500 untis per prototcol. Band aid applied to site. Discharge instructions reviewed with pt. Pt verbalized understanding. Mr. Steve Meier did not want to stay for observation. Mr. Steve Meier was discharged from [place] [address] in stable condition at [phone]. He is to follow up with physician for his next appointment.     Daniel Sears RN  2018

## 2019-02-08 ENCOUNTER — HOSPITAL ENCOUNTER (OUTPATIENT)
Dept: INFUSION THERAPY | Age: 59
Discharge: HOME | End: 2019-02-08
Payer: COMMERCIAL

## 2019-02-08 VITALS
RESPIRATION RATE: 18 BRPM | TEMPERATURE: 99.3 F | OXYGEN SATURATION: 97 % | SYSTOLIC BLOOD PRESSURE: 127 MMHG | HEART RATE: 73 BPM | DIASTOLIC BLOOD PRESSURE: 69 MMHG

## 2019-02-08 PROCEDURE — 86900 BLOOD TYPING SEROLOGIC ABO: CPT

## 2019-02-08 PROCEDURE — 85660 RBC SICKLE CELL TEST: CPT

## 2019-02-08 PROCEDURE — 36415 COLL VENOUS BLD VENIPUNCTURE: CPT

## 2019-02-08 PROCEDURE — 86920 COMPATIBILITY TEST SPIN: CPT

## 2019-02-08 PROCEDURE — 86870 RBC ANTIBODY IDENTIFICATION: CPT

## 2019-02-08 PROCEDURE — 86922 COMPATIBILITY TEST ANTIGLOB: CPT

## 2019-02-08 PROCEDURE — 86921 COMPATIBILITY TEST INCUBATE: CPT

## 2019-02-08 PROCEDURE — 86902 BLOOD TYPE ANTIGEN DONOR EA: CPT

## 2019-02-08 RX ORDER — SODIUM CHLORIDE 9 MG/ML
250 INJECTION, SOLUTION INTRAVENOUS AS NEEDED
Status: DISCONTINUED | OUTPATIENT
Start: 2019-02-08 | End: 2019-02-12 | Stop reason: HOSPADM

## 2019-02-08 NOTE — PROGRESS NOTES
[facility] Lab Visit: 
 
Davonte Holguin 1960 
[id number] 
 
1020:  Pt arrived ambulatory. Labs drawn peripherally and sent for processing. Departed [facility] ambulatory and in no distress. Visit Vitals /69 (BP 1 Location: Left arm, BP Patient Position: Sitting) Pulse 73 Temp 99.3 °F (37.4 °C) Resp 18 SpO2 97%

## 2019-02-11 ENCOUNTER — HOSPITAL ENCOUNTER (OUTPATIENT)
Dept: INFUSION THERAPY | Age: 59
Discharge: HOME | End: 2019-02-11
Payer: COMMERCIAL

## 2019-02-11 VITALS
RESPIRATION RATE: 20 BRPM | SYSTOLIC BLOOD PRESSURE: 151 MMHG | DIASTOLIC BLOOD PRESSURE: 76 MMHG | HEART RATE: 61 BPM | WEIGHT: 130 LBS | OXYGEN SATURATION: 98 % | BODY MASS INDEX: 21.66 KG/M2 | HEIGHT: 65 IN | TEMPERATURE: 98.3 F

## 2019-02-11 PROCEDURE — 74011250637 HC RX REV CODE- 250/637: Performed by: INTERNAL MEDICINE

## 2019-02-11 PROCEDURE — 77030013169 SET IV BLD ICUM -A

## 2019-02-11 PROCEDURE — 36430 TRANSFUSION BLD/BLD COMPNT: CPT

## 2019-02-11 PROCEDURE — 74011250636 HC RX REV CODE- 250/636: Performed by: INTERNAL MEDICINE

## 2019-02-11 PROCEDURE — 77030012965 HC NDL HUBR BBMI -A

## 2019-02-11 PROCEDURE — P9040 RBC LEUKOREDUCED IRRADIATED: HCPCS

## 2019-02-11 RX ORDER — SODIUM CHLORIDE 9 MG/ML
250 INJECTION, SOLUTION INTRAVENOUS AS NEEDED
Status: DISCONTINUED | OUTPATIENT
Start: 2019-02-11 | End: 2019-02-15 | Stop reason: HOSPADM

## 2019-02-11 RX ORDER — ACETAMINOPHEN 325 MG/1
650 TABLET ORAL ONCE
Status: COMPLETED | OUTPATIENT
Start: 2019-02-11 | End: 2019-02-11

## 2019-02-11 RX ORDER — HEPARIN 100 UNIT/ML
500 SYRINGE INTRAVENOUS AS NEEDED
Status: DISCONTINUED | OUTPATIENT
Start: 2019-02-11 | End: 2019-02-15 | Stop reason: HOSPADM

## 2019-02-11 RX ORDER — DIPHENHYDRAMINE HCL 25 MG
25 CAPSULE ORAL ONCE
Status: COMPLETED | OUTPATIENT
Start: 2019-02-11 | End: 2019-02-11

## 2019-02-11 RX ORDER — SODIUM CHLORIDE 0.9 % (FLUSH) 0.9 %
10-40 SYRINGE (ML) INJECTION AS NEEDED
Status: DISCONTINUED | OUTPATIENT
Start: 2019-02-11 | End: 2019-02-15 | Stop reason: HOSPADM

## 2019-02-11 RX ADMIN — SODIUM CHLORIDE 250 ML: 900 INJECTION, SOLUTION INTRAVENOUS at 08:55

## 2019-02-11 RX ADMIN — Medication 20 ML: at 08:40

## 2019-02-11 RX ADMIN — Medication 30 ML: at 14:55

## 2019-02-11 RX ADMIN — SODIUM CHLORIDE, PRESERVATIVE FREE 500 UNITS: 5 INJECTION INTRAVENOUS at 14:55

## 2019-02-11 RX ADMIN — DIPHENHYDRAMINE HYDROCHLORIDE 25 MG: 25 CAPSULE ORAL at 08:32

## 2019-02-11 RX ADMIN — ACETAMINOPHEN 650 MG: 325 TABLET ORAL at 08:32

## 2019-02-11 NOTE — PROGRESS NOTES
[facility] Progress Note Date: 2019 Name: Deborrah Apley MRN: [id number] : 1960 Mr. Zahida Jiménez arrived in the [facility] today at [facility], in stable condition, here for 2 units of PRBC. He was assessed and education was provided. Mr. Porfirio Ramirez vitals were reviewed. Visit Vitals /75 (BP 1 Location: Right arm, BP Patient Position: Sitting) Pulse 83 Temp 99 °F (37.2 °C) Resp 20 Ht 5' 4.5\" (1.638 m) Wt 59 kg (130 lb) SpO2 98% BMI 21.97 kg/m² Lab results were reviewed, and it was noted, that per his blood transfusion order, his HGB was 7.3, from 19. Pre-medications consisting of Tylenol 650 mg PO & Benadryl 25 mg PO, were administered per order, and without incident, at 0832. His right chest single lumen port was accessed without incident at 0840, and brisk blood return was noted. 2 units of PRBC, were administered over approximately 2 hours each, per order, and without incident. Mr. Zahida Jiménez was monitored for 1 hour post completion of both units of blood, per order/protocol, and also without incident. After completion of the monitoring period, the port was flushed well per protocol with NS & Heparin, and then, the littlejohn needle was removed and gauze/bandaid was applied. .  
 
 
  
Mr. Zahida Jiménez tolerated well, and had no complaints. Mr. Zahida Jiménez was discharged from [place] [address] in stable condition at 1505. Aylin Echevarria He has no further [facility] appointments scheduled at this time, however, he stated that he is scheduled to follow up with Dr. Brandy Leiva, on Friday, 2-15-19. Kenia Garsia RN 2019 
8:50 AM

## 2019-02-19 ENCOUNTER — APPOINTMENT (OUTPATIENT)
Dept: GENERAL RADIOLOGY | Age: 59
End: 2019-02-19
Attending: EMERGENCY MEDICINE
Payer: COMMERCIAL

## 2019-02-19 ENCOUNTER — APPOINTMENT (OUTPATIENT)
Dept: NUCLEAR MEDICINE | Age: 59
End: 2019-02-19
Attending: EMERGENCY MEDICINE
Payer: COMMERCIAL

## 2019-02-19 ENCOUNTER — APPOINTMENT (OUTPATIENT)
Dept: CT IMAGING | Age: 59
End: 2019-02-19
Attending: EMERGENCY MEDICINE
Payer: COMMERCIAL

## 2019-02-19 ENCOUNTER — HOSPITAL ENCOUNTER (EMERGENCY)
Age: 59
Discharge: HOME | End: 2019-02-19
Attending: EMERGENCY MEDICINE
Payer: COMMERCIAL

## 2019-02-19 VITALS
WEIGHT: 128 LBS | BODY MASS INDEX: 21.63 KG/M2 | OXYGEN SATURATION: 99 % | DIASTOLIC BLOOD PRESSURE: 72 MMHG | SYSTOLIC BLOOD PRESSURE: 127 MMHG | HEART RATE: 66 BPM | TEMPERATURE: 98.1 F | RESPIRATION RATE: 11 BRPM

## 2019-02-19 DIAGNOSIS — R07.9 ACUTE CHEST PAIN: Primary | ICD-10-CM

## 2019-02-19 LAB
ALBUMIN SERPL-MCNC: 4.2 G/DL (ref 3.4–5)
ALBUMIN/GLOB SERPL: 1 {RATIO} (ref 0.8–1.7)
ALP SERPL-CCNC: 109 U/L (ref 45–117)
ALT SERPL-CCNC: 34 U/L (ref 16–61)
ANION GAP SERPL CALC-SCNC: 5 MMOL/L (ref 3–18)
APTT PPP: 25.2 SEC (ref 23–36.4)
AST SERPL-CCNC: 48 U/L (ref 15–37)
ATRIAL RATE: 73 BPM
BASOPHILS # BLD: 0 K/UL (ref 0–0.06)
BASOPHILS NFR BLD: 0 % (ref 0–3)
BILIRUB SERPL-MCNC: 2.7 MG/DL (ref 0.2–1)
BUN SERPL-MCNC: 25 MG/DL (ref 7–18)
BUN/CREAT SERPL: 18 (ref 12–20)
CALCIUM SERPL-MCNC: 8.7 MG/DL (ref 8.5–10.1)
CALCULATED P AXIS, ECG09: 40 DEGREES
CALCULATED R AXIS, ECG10: -8 DEGREES
CALCULATED T AXIS, ECG11: 41 DEGREES
CHLORIDE SERPL-SCNC: 107 MMOL/L (ref 100–108)
CO2 SERPL-SCNC: 26 MMOL/L (ref 21–32)
CREAT SERPL-MCNC: 1.4 MG/DL (ref 0.6–1.3)
D DIMER PPP FEU-MCNC: 3.09 UG/ML(FEU)
DIAGNOSIS, 93000: NORMAL
DIFFERENTIAL METHOD BLD: ABNORMAL
EOSINOPHIL # BLD: 0.3 K/UL (ref 0–0.4)
EOSINOPHIL NFR BLD: 4 % (ref 0–5)
ERYTHROCYTE [DISTWIDTH] IN BLOOD BY AUTOMATED COUNT: 16.7 % (ref 11.6–14.5)
GLOBULIN SER CALC-MCNC: 4.3 G/DL (ref 2–4)
GLUCOSE SERPL-MCNC: 84 MG/DL (ref 74–99)
HCT VFR BLD AUTO: 28.9 % (ref 36–48)
HGB BLD-MCNC: 9.9 G/DL (ref 13–16)
INR PPP: 1.2 (ref 0.8–1.2)
LYMPHOCYTES # BLD: 1.2 K/UL (ref 0.8–3.5)
LYMPHOCYTES NFR BLD: 17 % (ref 20–51)
MCH RBC QN AUTO: 30.1 PG (ref 24–34)
MCHC RBC AUTO-ENTMCNC: 34.3 G/DL (ref 31–37)
MCV RBC AUTO: 87.8 FL (ref 74–97)
MONOCYTES # BLD: 0.6 K/UL (ref 0–1)
MONOCYTES NFR BLD: 8 % (ref 2–9)
NEUTS BAND NFR BLD MANUAL: 1 % (ref 0–5)
NEUTS SEG # BLD: 5 K/UL (ref 1.8–8)
NEUTS SEG NFR BLD: 70 % (ref 42–75)
NRBC BLD-RTO: 3 PER 100 WBC
P-R INTERVAL, ECG05: 148 MS
PLATELET # BLD AUTO: 238 K/UL (ref 135–420)
PLATELET COMMENTS,PCOM: ABNORMAL
PMV BLD AUTO: 9.9 FL (ref 9.2–11.8)
POTASSIUM SERPL-SCNC: 4.6 MMOL/L (ref 3.5–5.5)
PROT SERPL-MCNC: 8.5 G/DL (ref 6.4–8.2)
PROTHROMBIN TIME: 14.6 SEC (ref 11.5–15.2)
Q-T INTERVAL, ECG07: 410 MS
QRS DURATION, ECG06: 88 MS
QTC CALCULATION (BEZET), ECG08: 451 MS
RBC # BLD AUTO: 3.29 M/UL (ref 4.7–5.5)
RBC MORPH BLD: ABNORMAL
SODIUM SERPL-SCNC: 138 MMOL/L (ref 136–145)
TROPONIN I SERPL-MCNC: <0.02 NG/ML (ref 0–0.04)
TROPONIN I SERPL-MCNC: <0.02 NG/ML (ref 0–0.04)
VENTRICULAR RATE, ECG03: 73 BPM
WBC # BLD AUTO: 7.1 K/UL (ref 4.6–13.2)

## 2019-02-19 PROCEDURE — A9567 TECHNETIUM TC-99M AEROSOL: HCPCS

## 2019-02-19 PROCEDURE — 71260 CT THORAX DX C+: CPT

## 2019-02-19 PROCEDURE — 99285 EMERGENCY DEPT VISIT HI MDM: CPT

## 2019-02-19 PROCEDURE — 93005 ELECTROCARDIOGRAM TRACING: CPT

## 2019-02-19 PROCEDURE — 80053 COMPREHEN METABOLIC PANEL: CPT

## 2019-02-19 PROCEDURE — 74011250637 HC RX REV CODE- 250/637: Performed by: EMERGENCY MEDICINE

## 2019-02-19 PROCEDURE — 94762 N-INVAS EAR/PLS OXIMTRY CONT: CPT

## 2019-02-19 PROCEDURE — 85730 THROMBOPLASTIN TIME PARTIAL: CPT

## 2019-02-19 PROCEDURE — 74011636320 HC RX REV CODE- 636/320: Performed by: EMERGENCY MEDICINE

## 2019-02-19 PROCEDURE — 85025 COMPLETE CBC W/AUTO DIFF WBC: CPT

## 2019-02-19 PROCEDURE — 85379 FIBRIN DEGRADATION QUANT: CPT

## 2019-02-19 PROCEDURE — 71046 X-RAY EXAM CHEST 2 VIEWS: CPT

## 2019-02-19 PROCEDURE — 85610 PROTHROMBIN TIME: CPT

## 2019-02-19 PROCEDURE — 84484 ASSAY OF TROPONIN QUANT: CPT

## 2019-02-19 RX ORDER — GUAIFENESIN 100 MG/5ML
324 LIQUID (ML) ORAL
Status: COMPLETED | OUTPATIENT
Start: 2019-02-19 | End: 2019-02-19

## 2019-02-19 RX ADMIN — ASPIRIN 81 MG 324 MG: 81 TABLET ORAL at 07:07

## 2019-02-19 RX ADMIN — IOPAMIDOL 75 ML: 612 INJECTION, SOLUTION INTRAVENOUS at 11:05

## 2019-02-19 RX ADMIN — NITROGLYCERIN 1 INCH: 20 OINTMENT TOPICAL at 06:59

## 2019-02-19 NOTE — ED TRIAGE NOTES
Patient A/O x 4, complaining of chest pain, back pain x 2 days. Patient states when the chest pain is really hurting it causes him to have SOB. Denies N/V, abdominal pain. Patient has hx of sickle cell.

## 2019-02-19 NOTE — DISCHARGE INSTRUCTIONS

## 2019-02-19 NOTE — ED PROVIDER NOTES
EMERGENCY DEPARTMENT HISTORY AND PHYSICAL EXAM 
 
6:57 AM 
 
 
Date: 2/19/2019 Patient Name: Vicente Valdez History of Presenting Illness Chief Complaint Patient presents with  Chest Pain  Back Pain History Provided By: Patient Additional History (Context): Vicente Valdez is a 60 y.o. male with sickle cell anemia who presents with CP. Pt states that he has been having persistent CP that radiates to his back for 2-3 days, which has worsened last night around 11 PM. He also c/o LLE pain that started last night, but thinks that it might be due to the cold. Pt denies fever, cough, etOH and drug use. He is a nonsmoker. He also denies taking aspirin PTA, HTN, diabetes, or any pertinent medical FHx. Per pt, he has not had an echocardiogram done before nor is he currently seeing a cardiologist. No other concerns or symptoms at this time. PCP: Td Gaines MD 
 
Chief Complaint: CP 
Duration:  Days Timing:  Intermittent and Worsening Location: Cardio Quality: Aching Severity: none reported Modifying Factors: none reported Associated Symptoms: LLE pain, back pain Current Facility-Administered Medications Medication Dose Route Frequency Provider Last Rate Last Dose  technetium pentetate (DTPA) aerosol 33 millicurie  33 millicurie Inhalation NOW Good Christina MD      
 technetium albumin aggregated (MAA) solution 5.5 millicurie  5.5 millicurie IntraVENous RAD ONCE Good Christina MD      
 
Current Outpatient Medications Medication Sig Dispense Refill  famotidine (PEPCID) 20 mg tablet Take 1 Tab by mouth nightly. 30 Tab 0  
 oxyCODONE-acetaminophen (PERCOCET) 5-325 mg per tablet Take 1 Tab by mouth every four (4) hours as needed for Pain.  senna-docusate (PERICOLACE) 8.6-50 mg per tablet Take 1 Tab by mouth daily. 30 Tab 0  
 HYDROmorphone (DILAUDID) 2 mg tablet Take 1 Tab by mouth every four (4) hours as needed.  Max Daily Amount: 12 mg. 30 Tab 0  
  LORazepam (ATIVAN) 1 mg tablet Take 1 mg by mouth every eight (8) hours as needed for Anxiety.  folic acid (FOLVITE) 1 mg tablet Take 1 Tab by mouth daily. 30 Tab 1 Past History Past Medical History: 
Past Medical History:  
Diagnosis Date  Anemia NEC   
 sickle cell  Duodenal ulcer  GERD (gastroesophageal reflux disease)  Other ill-defined conditions(799.89) Sickle cell  Sickle cell anemia (HCC) Past Surgical History: 
Past Surgical History:  
Procedure Laterality Date  HX CHOLECYSTECTOMY  HX RETINAL DETACHMENT REPAIR Right  HX VASCULAR ACCESS    
 double lumen mediport Family History: 
None Social History: 
Social History Tobacco Use  Smoking status: Never Smoker  Smokeless tobacco: Never Used Substance Use Topics  Alcohol use: No  
 Drug use: No  
 
 
Allergies: Allergies Allergen Reactions  Xarelto [Rivaroxaban] Other (comments) Review of Systems Review of Systems Constitutional: Negative for fever. Respiratory: Negative for cough. Cardiovascular: Positive for chest pain. Musculoskeletal: Positive for back pain.  
     + LLE pain All other systems reviewed and are negative. Physical Exam  
 
Visit Vitals /62 Pulse 71 Temp 98.1 °F (36.7 °C) Resp 17 Wt 58.1 kg (128 lb) SpO2 97% BMI 21.63 kg/m² Physical Exam  
Constitutional: Vital signs are normal. He appears well-developed and well-nourished. He is active. Non-toxic appearance. He does not appear ill. No distress. HENT:  
Head: Normocephalic and atraumatic. Neck: Normal range of motion. Neck supple. Carotid bruit is not present. No tracheal deviation present. No thyromegaly present. Cardiovascular: Normal rate, regular rhythm and normal heart sounds. Exam reveals no gallop and no friction rub. No murmur heard. Pulmonary/Chest: Effort normal and breath sounds normal. No stridor.  No respiratory distress. He has no wheezes. He has no rales. He exhibits no tenderness. Abdominal: Soft. He exhibits no distension and no mass. There is no tenderness. There is no rebound, no guarding and no CVA tenderness. Musculoskeletal: Normal range of motion. Neurological: He is alert. Skin: Skin is warm, dry and intact. He is not diaphoretic. No pallor. Psychiatric: He has a normal mood and affect. His speech is normal and behavior is normal. Judgment and thought content normal.  
Nursing note and vitals reviewed. Diagnostic Study Results Labs - Recent Results (from the past 12 hour(s)) EKG, 12 LEAD, INITIAL Collection Time: 02/19/19  7:02 AM  
Result Value Ref Range Ventricular Rate 73 BPM  
 Atrial Rate 73 BPM  
 P-R Interval 148 ms QRS Duration 88 ms Q-T Interval 410 ms QTC Calculation (Bezet) 451 ms Calculated P Axis 40 degrees Calculated R Axis -8 degrees Calculated T Axis 41 degrees Diagnosis Normal sinus rhythm Normal ECG When compared with ECG of 14-NOV-2017 02:46, No significant change was found Confirmed by Michael Rust MD, Matt Bautista ([id number]) on 2/19/2019 2:59:42 PM 
  
CBC WITH AUTOMATED DIFF Collection Time: 02/19/19  7:05 AM  
Result Value Ref Range WBC 7.1 4.6 - 13.2 K/uL  
 RBC 3.29 (L) 4.70 - 5.50 M/uL HGB 9.9 (L) 13.0 - 16.0 g/dL HCT 28.9 (L) 36.0 - 48.0 % MCV 87.8 74.0 - 97.0 FL  
 MCH 30.1 24.0 - 34.0 PG  
 MCHC 34.3 31.0 - 37.0 g/dL  
 RDW 16.7 (H) 11.6 - 14.5 % PLATELET 624 469 - 133 K/uL MPV 9.9 9.2 - 11.8 FL  
 NEUTROPHILS 70 42 - 75 % BAND NEUTROPHILS 1 0 - 5 % LYMPHOCYTES 17 (L) 20 - 51 % MONOCYTES 8 2 - 9 % EOSINOPHILS 4 0 - 5 % BASOPHILS 0 0 - 3 % NRBC 3.0 (H) 0  WBC  
 ABS. NEUTROPHILS 5.0 1.8 - 8.0 K/UL  
 ABS. LYMPHOCYTES 1.2 0.8 - 3.5 K/UL  
 ABS. MONOCYTES 0.6 0 - 1.0 K/UL  
 ABS. EOSINOPHILS 0.3 0.0 - 0.4 K/UL  
 ABS. BASOPHILS 0.0 0.0 - 0.06 K/UL  
 DF MANUAL PLATELET COMMENTS ADEQUATE PLATELETS    
 RBC COMMENTS ANISOCYTOSIS 1+ 
    
 RBC COMMENTS TARGET CELLS 1+ RBC COMMENTS     
  SICKLE CELLS 
FEW OVALOCYTES 
FEW 
POLYCHROMASIA 1+ METABOLIC PANEL, COMPREHENSIVE Collection Time: 02/19/19  7:05 AM  
Result Value Ref Range Sodium 138 136 - 145 mmol/L Potassium 4.6 3.5 - 5.5 mmol/L Chloride 107 100 - 108 mmol/L  
 CO2 26 21 - 32 mmol/L Anion gap 5 3.0 - 18 mmol/L Glucose 84 74 - 99 mg/dL BUN 25 (H) 7.0 - 18 MG/DL Creatinine 1.40 (H) 0.6 - 1.3 MG/DL  
 BUN/Creatinine ratio 18 12 - 20 GFR est AA >60 >60 ml/min/1.73m2 GFR est non-AA 52 (L) >60 ml/min/1.73m2 Calcium 8.7 8.5 - 10.1 MG/DL Bilirubin, total 2.7 (H) 0.2 - 1.0 MG/DL  
 ALT (SGPT) 34 16 - 61 U/L  
 AST (SGOT) 48 (H) 15 - 37 U/L Alk. phosphatase 109 45 - 117 U/L Protein, total 8.5 (H) 6.4 - 8.2 g/dL Albumin 4.2 3.4 - 5.0 g/dL Globulin 4.3 (H) 2.0 - 4.0 g/dL A-G Ratio 1.0 0.8 - 1.7    
TROPONIN I Collection Time: 02/19/19  7:05 AM  
Result Value Ref Range Troponin-I, QT <0.02 0.0 - 0.045 NG/ML  
D DIMER Collection Time: 02/19/19  7:05 AM  
Result Value Ref Range D DIMER 3.09 (H) <0.46 ug/ml(FEU) PTT Collection Time: 02/19/19  7:05 AM  
Result Value Ref Range aPTT 25.2 23.0 - 36.4 SEC PROTHROMBIN TIME + INR Collection Time: 02/19/19  7:05 AM  
Result Value Ref Range Prothrombin time 14.6 11.5 - 15.2 sec INR 1.2 0.8 - 1.2    
TROPONIN I Collection Time: 02/19/19  2:44 PM  
Result Value Ref Range Troponin-I, QT <0.02 0.0 - 0.045 NG/ML Radiologic Studies -  
NM LUNG PERFUSION W VENT Final Result IMPRESSION:  
  
Low probability VQ scan for pulmonary embolism. CT CHEST W CONT Final Result IMPRESSION:  
  
No CT finding for acute process of the chest.  
  
Extensive osseous changes secondary to sickle cell disease without definite  
finding for acute fracture. Grossly similar chronic biliary ductal dilatation and pneumobilia. XR CHEST PA LAT Final Result IMPRESSION:  
  
Overall stable chronic findings as above without definite new confluent  
consolidation. Medical Decision Making It should be noted that Silvia Frank MD will be the provider of record for this patient. I reviewed the vital signs, available nursing notes, past medical history, past surgical history, family history and social history. Vital Signs-Reviewed the patient's vital signs. Pulse Oximetry Analysis -  99% on room air (Interpretation)WNL Records Reviewed: Nursing Notes and Old Medical Records (Time of Review: 6:57 AM) Provider Notes (Medical Decision Making):  IVC filter placed on June 2016; power port on left chest removed and now has port (not power port) on right chest.  Multiple AC line placements blew. VQ scan performed and no PE.  CT chest showed nothing acute. Repeating second CE and if wnml, will d/c home. EKG Date/Time: 2/19/2019 7:02 AM 
Performed by: KARTHIK Chandler Authorized by: KARTHIK Chandler Interpretation: Interpretation: normal   
Rate:  
  ECG rate:  73 ECG rate assessment: normal   
Rhythm:  
  Rhythm: sinus rhythm Ectopy:  
  Ectopy: none QRS:  
  QRS axis:  Normal 
  QRS intervals:  Normal 
Conduction:  
  Conduction: normal   
ST segments: ST segments:  Normal 
T waves:  
  T waves: normal   
 
Left antecubital venous IV placement for CTA Date/Time: 2/19/2019 9:36 AM 
Performed by: KARTHIK Chandler Authorized by: KARTHIK Chandler Consent:  
  Consent obtained:  Verbal 
  Consent given by:  Patient Indications:  
  Indications:  Needed 20g AC for CTA Pre-procedure details:  
  Skin preparation:  Antiseptic wash Anesthesia (see MAR for exact dosages): Anesthesia method:  None Post-procedure details:  
  Patient tolerance of procedure: Tolerated well, no immediate complications Diagnosis Clinical Impression: 1. Acute chest pain Disposition: home Follow-up Information Follow up With Specialties Details Why Contact Info Joaquin Mayfield MD Hematology and Oncology Schedule an appointment as soon as possible for a visit in 1 day  [address] [facility] [address] 
[phone] [facility] EMERGENCY DEPT Emergency Medicine  If symptoms worsen return immediately Jessica [address] Vasu Rodrigues MD Cardiology Schedule an appointment as soon as possible for a visit in 1 day  [address] [facility] [address] [facility] [address] 
[phone] Medication List  
  
ASK your doctor about these medications ATIVAN 1 mg tablet Generic drug:  LORazepam 
  
famotidine 20 mg tablet Commonly known as:  PEPCID Take 1 Tab by mouth nightly. folic acid 1 mg tablet Commonly known as:  Google Take 1 Tab by mouth daily. HYDROmorphone 2 mg tablet Commonly known as:  DILAUDID Take 1 Tab by mouth every four (4) hours as needed. Max Daily Amount: 12 mg. 
  
oxyCODONE-acetaminophen 5-325 mg per tablet Commonly known as:  PERCOCET 
  
senna-docusate 8.6-50 mg per tablet Commonly known as:  Steva Brea Take 1 Tab by mouth daily. _______________________________ Scribe Attestation [address] [place] [address] acting as a scribe for and in the presence of Eri Huerta MD     
February 19, 2019 at 6:57 AM 
    
Provider Attestation:     
I personally performed the services described in the documentation, reviewed the documentation, as recorded by the scribe in my presence, and it accurately and completely records my words and actions. February 19, 2019 at 6:57 AM - Eri Huerta MD   
 
 
_______________________________

## 2019-03-05 ENCOUNTER — HOSPITAL ENCOUNTER (OUTPATIENT)
Dept: GENERAL RADIOLOGY | Age: 59
Discharge: HOME | End: 2019-03-05
Attending: INTERNAL MEDICINE
Payer: COMMERCIAL

## 2019-03-05 DIAGNOSIS — R10.13 EPIGASTRIC PAIN: ICD-10-CM

## 2019-03-05 PROCEDURE — 74011000250 HC RX REV CODE- 250: Performed by: INTERNAL MEDICINE

## 2019-03-05 PROCEDURE — 74220 X-RAY XM ESOPHAGUS 1CNTRST: CPT

## 2019-03-05 PROCEDURE — 74011000255 HC RX REV CODE- 255: Performed by: INTERNAL MEDICINE

## 2019-03-05 RX ADMIN — BARIUM SULFATE 700 MG: 700 TABLET ORAL at 07:30

## 2019-03-05 RX ADMIN — BARIUM SULFATE 135 ML: 980 POWDER, FOR SUSPENSION ORAL at 07:30

## 2019-03-05 RX ADMIN — ANTACID/ANTIFLATULENT 4 G: 380; 550; 10; 10 GRANULE, EFFERVESCENT ORAL at 07:30

## 2019-03-05 RX ADMIN — BARIUM SULFATE 20 G: 960 POWDER, FOR SUSPENSION ORAL at 07:30

## 2019-03-25 ENCOUNTER — HOSPITAL ENCOUNTER (OUTPATIENT)
Dept: INFUSION THERAPY | Age: 59
Discharge: HOME | End: 2019-03-25
Payer: COMMERCIAL

## 2019-03-25 PROCEDURE — 36415 COLL VENOUS BLD VENIPUNCTURE: CPT

## 2019-03-25 PROCEDURE — 86900 BLOOD TYPING SEROLOGIC ABO: CPT

## 2019-03-25 PROCEDURE — 86870 RBC ANTIBODY IDENTIFICATION: CPT

## 2019-03-25 PROCEDURE — 86920 COMPATIBILITY TEST SPIN: CPT

## 2019-03-25 RX ORDER — SODIUM CHLORIDE 9 MG/ML
250 INJECTION, SOLUTION INTRAVENOUS AS NEEDED
Status: DISCONTINUED | OUTPATIENT
Start: 2019-03-25 | End: 2019-03-29 | Stop reason: HOSPADM

## 2019-03-25 NOTE — PROGRESS NOTES
[facility] Lab Visit: 
 
Dulce Boucher 1960 
[id number] 1115:  Pt arrived ambulatory. Labs drawn peripherally and sent for processing. Departed [facility] ambulatory and in no distress. There were no vitals taken for this visit.

## 2019-03-26 ENCOUNTER — HOSPITAL ENCOUNTER (OUTPATIENT)
Dept: INFUSION THERAPY | Age: 59
Discharge: HOME | End: 2019-03-26
Payer: COMMERCIAL

## 2019-03-26 VITALS
DIASTOLIC BLOOD PRESSURE: 80 MMHG | TEMPERATURE: 97.9 F | RESPIRATION RATE: 16 BRPM | HEART RATE: 57 BPM | OXYGEN SATURATION: 100 % | SYSTOLIC BLOOD PRESSURE: 147 MMHG

## 2019-03-26 PROCEDURE — P9040 RBC LEUKOREDUCED IRRADIATED: HCPCS

## 2019-03-26 PROCEDURE — 74011250636 HC RX REV CODE- 250/636

## 2019-03-26 PROCEDURE — 86922 COMPATIBILITY TEST ANTIGLOB: CPT

## 2019-03-26 PROCEDURE — 77030012965 HC NDL HUBR BBMI -A

## 2019-03-26 PROCEDURE — 86902 BLOOD TYPE ANTIGEN DONOR EA: CPT

## 2019-03-26 PROCEDURE — 74011250636 HC RX REV CODE- 250/636: Performed by: INTERNAL MEDICINE

## 2019-03-26 PROCEDURE — 85660 RBC SICKLE CELL TEST: CPT

## 2019-03-26 PROCEDURE — 36430 TRANSFUSION BLD/BLD COMPNT: CPT

## 2019-03-26 PROCEDURE — 74011250637 HC RX REV CODE- 250/637: Performed by: INTERNAL MEDICINE

## 2019-03-26 PROCEDURE — 86921 COMPATIBILITY TEST INCUBATE: CPT

## 2019-03-26 RX ORDER — DIPHENHYDRAMINE HCL 25 MG
25 CAPSULE ORAL ONCE
Status: COMPLETED | OUTPATIENT
Start: 2019-03-26 | End: 2019-03-26

## 2019-03-26 RX ORDER — SODIUM CHLORIDE 0.9 % (FLUSH) 0.9 %
10-40 SYRINGE (ML) INJECTION AS NEEDED
Status: DISCONTINUED | OUTPATIENT
Start: 2019-03-26 | End: 2019-03-30 | Stop reason: HOSPADM

## 2019-03-26 RX ORDER — SODIUM CHLORIDE 9 MG/ML
250 INJECTION, SOLUTION INTRAVENOUS AS NEEDED
Status: DISCONTINUED | OUTPATIENT
Start: 2019-03-26 | End: 2019-03-30 | Stop reason: HOSPADM

## 2019-03-26 RX ORDER — HEPARIN 100 UNIT/ML
500 SYRINGE INTRAVENOUS AS NEEDED
Status: DISCONTINUED | OUTPATIENT
Start: 2019-03-26 | End: 2019-03-30 | Stop reason: HOSPADM

## 2019-03-26 RX ORDER — ACETAMINOPHEN 325 MG/1
650 TABLET ORAL ONCE
Status: COMPLETED | OUTPATIENT
Start: 2019-03-26 | End: 2019-03-26

## 2019-03-26 RX ORDER — HEPARIN 100 UNIT/ML
SYRINGE INTRAVENOUS
Status: COMPLETED
Start: 2019-03-26 | End: 2019-03-26

## 2019-03-26 RX ADMIN — SODIUM CHLORIDE 250 ML: 9 INJECTION, SOLUTION INTRAVENOUS at 10:28

## 2019-03-26 RX ADMIN — ACETAMINOPHEN 650 MG: 325 TABLET ORAL at 10:15

## 2019-03-26 RX ADMIN — HEPARIN 500 UNITS: 100 SYRINGE at 15:46

## 2019-03-26 RX ADMIN — Medication 10 ML: at 10:25

## 2019-03-26 RX ADMIN — DIPHENHYDRAMINE HYDROCHLORIDE 25 MG: 25 CAPSULE ORAL at 10:15

## 2019-03-26 RX ADMIN — Medication 10 ML: at 15:45

## 2019-03-26 NOTE — PROGRESS NOTES
[facility] Progress Note Date: 2019 Name: Robert Huang MRN: [id number] : 1960 Mr. Nikki Morris arrived in the [facility] today at 1010, in stable condition, here for 2 units of PRBC. He was assessed and education was provided. No complaints or concerns voiced Mr. Venkat Munoz vitals were reviewed. Visit Vitals /80 (BP 1 Location: Right arm, BP Patient Position: At rest) Pulse (!) 57 Temp 97.9 °F (36.6 °C) Resp 16 SpO2 100% Signed blood permit on chart Pre-medications consisting of Tylenol 650 mg PO & Benadryl 25 mg PO, were administered per order. His right chest single lumen port was accessed without incident at 0840, and brisk blood return was noted. 2 units of PRBC, were administered for approximately 2 hours each, per order, and without incident. Patient tolerated well. He declined to stay for observation. Port was flushed well per protocol with NS & Heparin, and then, the littlejohn needle was removed. Site s redness, swelling, bleeding or tenderness. Bandaid was applied. .  
 
Patient Vitals for the past 8 hrs: 
 Temp Pulse Resp BP SpO2  
19 1535 97.9 °F (36.6 °C) (!) 57 16 147/80 100 % 19 1440 97.5 °F (36.4 °C) (!) 56 16 150/77   
19 1410 97.6 °F (36.4 °C) 60 16 153/75   
19 1355 97.6 °F (36.4 °C) (!) 59 16 149/78   
19 1339 97.9 °F (36.6 °C) 66 16 145/77   
19 1315 98 °F (36.7 °C) 62 16 151/75   
19 1215 97.6 °F (36.4 °C) 94 16 146/76   
19 1145 97.9 °F (36.6 °C) 62 16 108/66   
19 1130 98.1 °F (36.7 °C) 64 16 111/66   
19 1114 98 °F (36.7 °C) 64 18 110/65   
19 1013 98.3 °F (36.8 °C) 76 18 120/72 98 % Reviewed discharge instructions with patient. He verbalized understanding Mr. Nikki Morris was discharged from [facility] in stable condition at 1550. David Angry He has no further OPIC appointments scheduled at this time.  
Nilton Suarez, SAVANNAH 
 March 26, 2019 
8:50 AM

## 2019-03-27 PROCEDURE — 77030013169 SET IV BLD ICUM -A

## 2019-04-29 ENCOUNTER — HOSPITAL ENCOUNTER (OUTPATIENT)
Dept: INFUSION THERAPY | Age: 59
Discharge: HOME | End: 2019-04-29
Payer: COMMERCIAL

## 2019-04-29 VITALS
DIASTOLIC BLOOD PRESSURE: 64 MMHG | HEART RATE: 61 BPM | TEMPERATURE: 98.2 F | RESPIRATION RATE: 16 BRPM | SYSTOLIC BLOOD PRESSURE: 122 MMHG | OXYGEN SATURATION: 100 %

## 2019-04-29 PROCEDURE — 86900 BLOOD TYPING SEROLOGIC ABO: CPT

## 2019-04-29 PROCEDURE — 86870 RBC ANTIBODY IDENTIFICATION: CPT

## 2019-04-29 PROCEDURE — 36415 COLL VENOUS BLD VENIPUNCTURE: CPT

## 2019-04-29 PROCEDURE — 86920 COMPATIBILITY TEST SPIN: CPT

## 2019-04-29 RX ORDER — DIPHENHYDRAMINE HCL 25 MG
25 CAPSULE ORAL ONCE
Status: CANCELLED | OUTPATIENT
Start: 2019-04-30 | End: 2019-04-30

## 2019-04-29 RX ORDER — SODIUM CHLORIDE 9 MG/ML
250 INJECTION, SOLUTION INTRAVENOUS AS NEEDED
Status: DISCONTINUED | OUTPATIENT
Start: 2019-04-29 | End: 2019-05-02 | Stop reason: HOSPADM

## 2019-04-29 NOTE — PROGRESS NOTES
[facility] Lab Visit: 
 
Marquez Dale 1960 
[id number] 
 
1430:  Pt arrived ambulatory. Labs drawn peripherally and sent for processing. Departed [facility] ambulatory and in no distress. Visit Vitals /64 (BP 1 Location: Right arm, BP Patient Position: At rest) Pulse 61 Temp 98.2 °F (36.8 °C) Resp 16 SpO2 100%

## 2019-04-30 ENCOUNTER — HOSPITAL ENCOUNTER (OUTPATIENT)
Dept: INFUSION THERAPY | Age: 59
Discharge: HOME | End: 2019-04-30
Payer: COMMERCIAL

## 2019-04-30 VITALS
SYSTOLIC BLOOD PRESSURE: 118 MMHG | OXYGEN SATURATION: 98 % | DIASTOLIC BLOOD PRESSURE: 71 MMHG | TEMPERATURE: 98.5 F | HEART RATE: 61 BPM | RESPIRATION RATE: 16 BRPM

## 2019-04-30 PROCEDURE — 85660 RBC SICKLE CELL TEST: CPT

## 2019-04-30 PROCEDURE — 74011250636 HC RX REV CODE- 250/636: Performed by: INTERNAL MEDICINE

## 2019-04-30 PROCEDURE — 74011250636 HC RX REV CODE- 250/636

## 2019-04-30 PROCEDURE — 77030012965 HC NDL HUBR BBMI -A

## 2019-04-30 PROCEDURE — 36430 TRANSFUSION BLD/BLD COMPNT: CPT

## 2019-04-30 PROCEDURE — 86922 COMPATIBILITY TEST ANTIGLOB: CPT

## 2019-04-30 PROCEDURE — 74011250637 HC RX REV CODE- 250/637: Performed by: INTERNAL MEDICINE

## 2019-04-30 PROCEDURE — 86921 COMPATIBILITY TEST INCUBATE: CPT

## 2019-04-30 PROCEDURE — P9040 RBC LEUKOREDUCED IRRADIATED: HCPCS

## 2019-04-30 PROCEDURE — 86902 BLOOD TYPE ANTIGEN DONOR EA: CPT

## 2019-04-30 RX ORDER — HEPARIN 100 UNIT/ML
500 SYRINGE INTRAVENOUS AS NEEDED
Status: DISCONTINUED | OUTPATIENT
Start: 2019-04-30 | End: 2019-05-04 | Stop reason: HOSPADM

## 2019-04-30 RX ORDER — HEPARIN 100 UNIT/ML
SYRINGE INTRAVENOUS
Status: COMPLETED
Start: 2019-04-30 | End: 2019-04-30

## 2019-04-30 RX ORDER — DIPHENHYDRAMINE HYDROCHLORIDE 50 MG/ML
25 INJECTION, SOLUTION INTRAMUSCULAR; INTRAVENOUS
Status: DISCONTINUED | OUTPATIENT
Start: 2019-04-30 | End: 2019-05-01 | Stop reason: HOSPADM

## 2019-04-30 RX ORDER — SODIUM CHLORIDE 0.9 % (FLUSH) 0.9 %
10-40 SYRINGE (ML) INJECTION AS NEEDED
Status: DISCONTINUED | OUTPATIENT
Start: 2019-04-30 | End: 2019-05-04 | Stop reason: HOSPADM

## 2019-04-30 RX ORDER — SODIUM CHLORIDE 9 MG/ML
250 INJECTION, SOLUTION INTRAVENOUS AS NEEDED
Status: DISCONTINUED | OUTPATIENT
Start: 2019-04-30 | End: 2019-05-04 | Stop reason: HOSPADM

## 2019-04-30 RX ORDER — DIPHENHYDRAMINE HCL 25 MG
25 CAPSULE ORAL ONCE
Status: COMPLETED | OUTPATIENT
Start: 2019-04-30 | End: 2019-04-30

## 2019-04-30 RX ORDER — ACETAMINOPHEN 325 MG/1
650 TABLET ORAL ONCE
Status: COMPLETED | OUTPATIENT
Start: 2019-04-30 | End: 2019-04-30

## 2019-04-30 RX ORDER — HYDROCORTISONE SODIUM SUCCINATE 100 MG/2ML
100 INJECTION, POWDER, FOR SOLUTION INTRAMUSCULAR; INTRAVENOUS
Status: DISCONTINUED | OUTPATIENT
Start: 2019-04-30 | End: 2019-05-01 | Stop reason: HOSPADM

## 2019-04-30 RX ADMIN — SODIUM CHLORIDE 250 ML: 9 INJECTION, SOLUTION INTRAVENOUS at 09:12

## 2019-04-30 RX ADMIN — Medication 10 ML: at 13:26

## 2019-04-30 RX ADMIN — ACETAMINOPHEN 650 MG: 325 TABLET ORAL at 09:08

## 2019-04-30 RX ADMIN — HEPARIN 500 UNITS: 100 SYRINGE at 13:27

## 2019-04-30 RX ADMIN — Medication 10 ML: at 09:12

## 2019-04-30 RX ADMIN — DIPHENHYDRAMINE HYDROCHLORIDE 25 MG: 25 CAPSULE ORAL at 09:08

## 2019-05-29 ENCOUNTER — HOSPITAL ENCOUNTER (OUTPATIENT)
Dept: INFUSION THERAPY | Age: 59
Discharge: HOME | End: 2019-05-29
Payer: COMMERCIAL

## 2019-05-29 VITALS
SYSTOLIC BLOOD PRESSURE: 121 MMHG | DIASTOLIC BLOOD PRESSURE: 73 MMHG | RESPIRATION RATE: 18 BRPM | HEART RATE: 82 BPM | TEMPERATURE: 98.8 F

## 2019-05-29 PROCEDURE — 86920 COMPATIBILITY TEST SPIN: CPT

## 2019-05-29 PROCEDURE — 36415 COLL VENOUS BLD VENIPUNCTURE: CPT

## 2019-05-29 PROCEDURE — 86870 RBC ANTIBODY IDENTIFICATION: CPT

## 2019-05-29 PROCEDURE — 86900 BLOOD TYPING SEROLOGIC ABO: CPT

## 2019-05-29 RX ORDER — SODIUM CHLORIDE 9 MG/ML
250 INJECTION, SOLUTION INTRAVENOUS AS NEEDED
Status: DISCONTINUED | OUTPATIENT
Start: 2019-05-29 | End: 2019-05-29 | Stop reason: SDUPTHER

## 2019-05-29 RX ORDER — SODIUM CHLORIDE 9 MG/ML
250 INJECTION, SOLUTION INTRAVENOUS AS NEEDED
Status: DISCONTINUED | OUTPATIENT
Start: 2019-05-29 | End: 2019-06-02 | Stop reason: HOSPADM

## 2019-05-29 NOTE — PROGRESS NOTES
[facility] Lab Visit:    Cherelle Luna  1960  [id number]    8860:  Pt arrived ambulatory. Labs drawn peripherally and sent for processing. Departed \[facility]\"" ambulatory and in no distress.     Visit Vitals  /73 (BP 1 Location: Left arm, BP Patient Position: At rest)   Pulse 82   Temp 98.8 °F (37.1 °C)   Resp 18

## 2019-05-30 ENCOUNTER — HOSPITAL ENCOUNTER (OUTPATIENT)
Dept: INFUSION THERAPY | Age: 59
Discharge: HOME | End: 2019-05-30
Payer: COMMERCIAL

## 2019-05-30 VITALS
HEART RATE: 64 BPM | OXYGEN SATURATION: 94 % | RESPIRATION RATE: 18 BRPM | TEMPERATURE: 98 F | SYSTOLIC BLOOD PRESSURE: 119 MMHG | DIASTOLIC BLOOD PRESSURE: 70 MMHG

## 2019-05-30 PROCEDURE — 36430 TRANSFUSION BLD/BLD COMPNT: CPT

## 2019-05-30 PROCEDURE — 86922 COMPATIBILITY TEST ANTIGLOB: CPT

## 2019-05-30 PROCEDURE — 85660 RBC SICKLE CELL TEST: CPT

## 2019-05-30 PROCEDURE — 86921 COMPATIBILITY TEST INCUBATE: CPT

## 2019-05-30 PROCEDURE — 86902 BLOOD TYPE ANTIGEN DONOR EA: CPT

## 2019-05-30 PROCEDURE — 77030013169 SET IV BLD ICUM -A

## 2019-05-30 PROCEDURE — P9040 RBC LEUKOREDUCED IRRADIATED: HCPCS

## 2019-05-30 PROCEDURE — 74011250637 HC RX REV CODE- 250/637: Performed by: INTERNAL MEDICINE

## 2019-05-30 RX ORDER — ACETAMINOPHEN 325 MG/1
TABLET ORAL
Status: DISCONTINUED
Start: 2019-05-30 | End: 2019-05-30 | Stop reason: HOSPADM

## 2019-05-30 RX ORDER — HEPARIN 100 UNIT/ML
SYRINGE INTRAVENOUS
Status: DISCONTINUED
Start: 2019-05-30 | End: 2019-05-31 | Stop reason: HOSPADM

## 2019-05-30 RX ORDER — DIPHENHYDRAMINE HCL 25 MG
CAPSULE ORAL
Status: DISCONTINUED
Start: 2019-05-30 | End: 2019-05-30 | Stop reason: HOSPADM

## 2019-05-30 RX ORDER — SODIUM CHLORIDE 9 MG/ML
250 INJECTION, SOLUTION INTRAVENOUS AS NEEDED
Status: DISCONTINUED | OUTPATIENT
Start: 2019-05-30 | End: 2019-06-03 | Stop reason: HOSPADM

## 2019-05-30 RX ORDER — ACETAMINOPHEN 325 MG/1
650 TABLET ORAL ONCE
Status: COMPLETED | OUTPATIENT
Start: 2019-05-30 | End: 2019-05-30

## 2019-05-30 RX ORDER — DIPHENHYDRAMINE HYDROCHLORIDE 50 MG/ML
25 INJECTION, SOLUTION INTRAMUSCULAR; INTRAVENOUS
Status: DISCONTINUED | OUTPATIENT
Start: 2019-05-30 | End: 2019-05-31 | Stop reason: HOSPADM

## 2019-05-30 RX ORDER — HYDROCORTISONE SODIUM SUCCINATE 100 MG/2ML
100 INJECTION, POWDER, FOR SOLUTION INTRAMUSCULAR; INTRAVENOUS
Status: DISCONTINUED | OUTPATIENT
Start: 2019-05-30 | End: 2019-05-31 | Stop reason: HOSPADM

## 2019-05-30 RX ORDER — DIPHENHYDRAMINE HCL 25 MG
25 CAPSULE ORAL ONCE
Status: COMPLETED | OUTPATIENT
Start: 2019-05-30 | End: 2019-05-30

## 2019-05-30 RX ADMIN — DIPHENHYDRAMINE HYDROCHLORIDE 25 MG: 25 CAPSULE ORAL at 09:12

## 2019-05-30 RX ADMIN — ACETAMINOPHEN 650 MG: 325 TABLET ORAL at 09:12

## 2019-05-30 NOTE — PROGRESS NOTES
[facility] Progress Note    Date: May 30, 2019    Name: Geremias Bonner    MRN: [id number]         : 1960      Mr. Stefany Jackson arrived in the [facility] today at 0900, in stable condition, here for 2 units of PRBC. He was assessed and education was provided. No complaints or concerns voiced    Mr. Yumiko Bajwa vitals were reviewed. Visit Vitals  /71 (BP 1 Location: Left arm, BP Patient Position: Sitting)   Pulse 82   Temp 98.1 °F (36.7 °C)   Resp 18   SpO2 94%       Signed blood permit on chart      Pre-medications consisting of Tylenol 650 mg PO & Benadryl 25 mg PO, were administered per order. His right chest single lumen port was accessed,, and brisk blood return was noted. 2 units of PRBC, were administered for approximately 2 hours each, per order, and without incident. Patient tolerated well. He declined to stay for observation. Port was flushed well per protocol with NS & Heparin, and then, the littlejohn needle was removed. Site s redness, swelling, bleeding or tenderness. Bandaid was applied. .     Patient Vitals for the past 8 hrs:   Temp Pulse Resp BP SpO2   19 0906 98.1 °F (36.7 °C) 82 18 122/71 94 %         Reviewed discharge instructions with patient. He verbalized understanding      Mr. Stefany Jackson was discharged from [place] [address] in stable condition at 1400. He has no further [facility] appointments scheduled at this time.     Alondra Kwon  May 30, 2019  8:50 AM

## 2019-06-03 ENCOUNTER — HOSPITAL ENCOUNTER (OUTPATIENT)
Dept: PREADMISSION TESTING | Age: 59
Discharge: HOME | End: 2019-06-03
Payer: COMMERCIAL

## 2019-06-03 DIAGNOSIS — Z01.818 PRE-OP TESTING: ICD-10-CM

## 2019-06-03 LAB
ANION GAP SERPL CALC-SCNC: 4 MMOL/L (ref 3–18)
ATRIAL RATE: 58 BPM
BUN SERPL-MCNC: 21 MG/DL (ref 7–18)
BUN/CREAT SERPL: 16 (ref 12–20)
CALCIUM SERPL-MCNC: 8.1 MG/DL (ref 8.5–10.1)
CALCULATED P AXIS, ECG09: 49 DEGREES
CALCULATED R AXIS, ECG10: 11 DEGREES
CALCULATED T AXIS, ECG11: 42 DEGREES
CHLORIDE SERPL-SCNC: 105 MMOL/L (ref 100–108)
CO2 SERPL-SCNC: 27 MMOL/L (ref 21–32)
CREAT SERPL-MCNC: 1.3 MG/DL (ref 0.6–1.3)
DIAGNOSIS, 93000: NORMAL
ERYTHROCYTE [DISTWIDTH] IN BLOOD BY AUTOMATED COUNT: 18.4 % (ref 11.6–14.5)
GLUCOSE SERPL-MCNC: 78 MG/DL (ref 74–99)
HCT VFR BLD AUTO: 26.9 % (ref 36–48)
HGB BLD-MCNC: 8.9 G/DL (ref 13–16)
MCH RBC QN AUTO: 29 PG (ref 24–34)
MCHC RBC AUTO-ENTMCNC: 33.1 G/DL (ref 31–37)
MCV RBC AUTO: 87.6 FL (ref 74–97)
P-R INTERVAL, ECG05: 168 MS
PLATELET # BLD AUTO: 212 K/UL (ref 135–420)
PMV BLD AUTO: 10.3 FL (ref 9.2–11.8)
POTASSIUM SERPL-SCNC: 5.2 MMOL/L (ref 3.5–5.5)
Q-T INTERVAL, ECG07: 456 MS
QRS DURATION, ECG06: 88 MS
QTC CALCULATION (BEZET), ECG08: 447 MS
RBC # BLD AUTO: 3.07 M/UL (ref 4.7–5.5)
SODIUM SERPL-SCNC: 136 MMOL/L (ref 136–145)
VENTRICULAR RATE, ECG03: 58 BPM
WBC # BLD AUTO: 6.7 K/UL (ref 4.6–13.2)

## 2019-06-03 PROCEDURE — 93005 ELECTROCARDIOGRAM TRACING: CPT

## 2019-06-03 PROCEDURE — 85027 COMPLETE CBC AUTOMATED: CPT

## 2019-06-03 PROCEDURE — 80048 BASIC METABOLIC PNL TOTAL CA: CPT

## 2019-06-03 PROCEDURE — 36415 COLL VENOUS BLD VENIPUNCTURE: CPT

## 2019-06-03 NOTE — H&P
[address] [place] [address]   HISTORY AND PHYSICAL    Name:  Gualberto Fierro  MR#:   [id number]  :  1960  ACCOUNT #:  [de-identified]  ADMIT DATE:  2019      DATE OF SURGERY:  2019    CHIEF COMPLAINT:  Swelling and pain to right groin. HISTORY OF PRESENT ILLNESS:  This 62-year-old -American male with history of sickle cell disease, has been diagnosed to have a recurrent right inguinal hernia since 2017. He was scheduled for surgery that time, but because of family issues, the surgery was not performed. His swelling is getting larger especially during the night, for he is up walking. Has history of constipation which worsens with swelling and takes Maalox for that. Denies pain, but discomfort when it swells up. There are no voiding issues. The patient has also GI issues like abdominal pain that goes to his back. Been diagnosed to have some sort of a gastric outlet obstruction secondary to use of NSAID. The patient takes Protonix daily and is being followed by Dr. Laine Casarez. For sickle disease, Dr. Brittany Odom. Kika Marroquin is following him and after getting a scold from Dr. Kika Marroquin, that this gentleman Mr. Anthony Murrell is ready for surgery, but he needs to be transfused blood prior to surgery. The patient is being seen by Dr. Kika Marroquin for 2 weeks. The patient also has other physical issues; a history of laparoscopic cholecystectomy for cholelithiasis and is found to have common bile stones that was treated by drains by interventional radiology. ALLERGIES[de-identified]  THE PATIENT HAS ALLERGIES TO XARELTO, GIVES HIM BLEEDING. PERCOCET GIVES HIM CHEST PAIN AND NSAID CAUSE HIM TO DEVELOP GASTRIC OUTLET OBSTRUCTION AND FOLLOWUP PEPTIC ULCER. PAST SURGICAL HISTORY:  Other surgical history assessed are the lap cholecystectomy and common bile duct drainage. Has history of MediPort placement for transfusion issues, and history of umbilical hernia repair.     MEDICATIONS:  Consist of folic acid 1 mg daily, Ativan 1 mg p.r.n. and Dilaudid 2 mg p.r.n. for pain. REVIEW OF SYSTEMS:  The last time he was seen was alert, awake, and oriented. Denies any chest discomfort or chest pains except for history of acid reflux. They are followed by the GI service by Dr. Bijan Todd. HEENT:  Denies no headache. No visual issues, but has right eye with decreased vision, apparently almost blindness. Neck:  Shows no swelling. No adenopathy noted. Lungs:  Denies any shortness of breath or chest pain issues. Abdomen:  Denies any pain except for bloating feeling secondary to his GI problems of gastric outlet being followed by Dr. Sarbjit Bear. :  Denies any dysuria or hematuria. PHYSICAL EXAMINATION:  GENERAL:  He is alert, awake, and oriented, anxious, and he was going to see his GI about his gastric outlet obstruction by Dr. Sarbjit Bear and also Dr. Yoni Wilson in preparation for surgery. Clearance has been given by Dr. Yoni Wilson to proceed with surgery with prior constipation issues. IMPRESSION:  1. Recurrent right inguinal hernia, partially reducible. 2.  Sickle cell disease, stable per Dr. Yoni Wilson, Gastrointestinal/gastric outlet obstruction most probably due to NSAID stable, status post laparoscopic cholecystectomy. 3.  Common bile duct stone drainage by IR. 4.  Past history of MediPort placement. 5.  History of right plus extraperitoneal laparoscopic inguinal hernia repair with mesh, and status post umbilical hernia repair with mesh. PLAN:  Open repair right internal inguinal hernia with possible use of a mesh. Risks and complications will not exclude bleeding, swelling, pain postop. Swelling might extend to the scrotal area with ecchymosis that can also involve swelling at the base of the penis, postop pain,  issues, postvoiding difficulties, and cardiovascular issues, clot issues, etc. and the patient understood this and agreed to proceed.         Michelle Villa MD      [id number]  D: 06/02/2019 20:47  T:  06/03/2019 3:20  JOB #:  [id number]  CC:   MD Vandana Vasquez MD Graciela Bearded, MD

## 2019-06-05 ENCOUNTER — ANESTHESIA EVENT (OUTPATIENT)
Dept: SURGERY | Age: 59
End: 2019-06-05
Payer: COMMERCIAL

## 2019-06-06 ENCOUNTER — ANESTHESIA (OUTPATIENT)
Dept: SURGERY | Age: 59
End: 2019-06-06
Payer: COMMERCIAL

## 2019-06-06 ENCOUNTER — HOSPITAL ENCOUNTER (OUTPATIENT)
Age: 59
Setting detail: OBSERVATION
Discharge: HOME | End: 2019-06-07
Attending: SURGERY | Admitting: SURGERY
Payer: COMMERCIAL

## 2019-06-06 DIAGNOSIS — K40.91 RECURRENT INGUINAL HERNIA OF RIGHT SIDE WITHOUT OBSTRUCTION OR GANGRENE: Primary | Chronic | ICD-10-CM

## 2019-06-06 PROCEDURE — 86870 RBC ANTIBODY IDENTIFICATION: CPT

## 2019-06-06 PROCEDURE — 74011250636 HC RX REV CODE- 250/636: Performed by: NURSE ANESTHETIST, CERTIFIED REGISTERED

## 2019-06-06 PROCEDURE — 74011250636 HC RX REV CODE- 250/636: Performed by: SURGERY

## 2019-06-06 PROCEDURE — 77030020782 HC GWN BAIR PAWS FLX 3M -B: Performed by: SURGERY

## 2019-06-06 PROCEDURE — 77030039266 HC ADH SKN EXOFIN S2SG -A: Performed by: SURGERY

## 2019-06-06 PROCEDURE — 86902 BLOOD TYPE ANTIGEN DONOR EA: CPT

## 2019-06-06 PROCEDURE — 99218 HC RM OBSERVATION: CPT

## 2019-06-06 PROCEDURE — C1781 MESH (IMPLANTABLE): HCPCS | Performed by: SURGERY

## 2019-06-06 PROCEDURE — 77030003028 HC SUT VCRL J&J -A: Performed by: SURGERY

## 2019-06-06 PROCEDURE — 77030012012 HC AIRWY OP SFT TELE -A: Performed by: NURSE ANESTHETIST, CERTIFIED REGISTERED

## 2019-06-06 PROCEDURE — 77030019605: Performed by: SURGERY

## 2019-06-06 PROCEDURE — 74011000250 HC RX REV CODE- 250

## 2019-06-06 PROCEDURE — 76210000017 HC OR PH I REC 1.5 TO 2 HR: Performed by: SURGERY

## 2019-06-06 PROCEDURE — 77030031139 HC SUT VCRL2 J&J -A: Performed by: SURGERY

## 2019-06-06 PROCEDURE — 85660 RBC SICKLE CELL TEST: CPT

## 2019-06-06 PROCEDURE — 74011250636 HC RX REV CODE- 250/636

## 2019-06-06 PROCEDURE — 77030026438 HC STYL ET INTUB CARD -A: Performed by: NURSE ANESTHETIST, CERTIFIED REGISTERED

## 2019-06-06 PROCEDURE — 77030012422 HC DRN WND COVD -A: Performed by: SURGERY

## 2019-06-06 PROCEDURE — 86900 BLOOD TYPING SEROLOGIC ABO: CPT

## 2019-06-06 PROCEDURE — 77030008683 HC TU ET CUF COVD -A: Performed by: NURSE ANESTHETIST, CERTIFIED REGISTERED

## 2019-06-06 PROCEDURE — 86920 COMPATIBILITY TEST SPIN: CPT

## 2019-06-06 PROCEDURE — 77030002986 HC SUT PROL J&J -A: Performed by: SURGERY

## 2019-06-06 PROCEDURE — 77030032490 HC SLV COMPR SCD KNE COVD -B: Performed by: SURGERY

## 2019-06-06 PROCEDURE — 86922 COMPATIBILITY TEST ANTIGLOB: CPT

## 2019-06-06 PROCEDURE — 77030002933 HC SUT MCRYL J&J -A: Performed by: SURGERY

## 2019-06-06 PROCEDURE — 74011250637 HC RX REV CODE- 250/637: Performed by: NURSE ANESTHETIST, CERTIFIED REGISTERED

## 2019-06-06 PROCEDURE — 77030027138 HC INCENT SPIROMETER -A

## 2019-06-06 PROCEDURE — 74011000250 HC RX REV CODE- 250: Performed by: SURGERY

## 2019-06-06 PROCEDURE — 76060000035 HC ANESTHESIA 2 TO 2.5 HR: Performed by: SURGERY

## 2019-06-06 PROCEDURE — 76010000131 HC OR TIME 2 TO 2.5 HR: Performed by: SURGERY

## 2019-06-06 PROCEDURE — 86921 COMPATIBILITY TEST INCUBATE: CPT

## 2019-06-06 PROCEDURE — 77030018836 HC SOL IRR NACL ICUM -A: Performed by: SURGERY

## 2019-06-06 PROCEDURE — 88302 TISSUE EXAM BY PATHOLOGIST: CPT

## 2019-06-06 DEVICE — MESH HERN PROL 6X6IN POLYPR --: Type: IMPLANTABLE DEVICE | Site: GROIN | Status: FUNCTIONAL

## 2019-06-06 RX ORDER — SODIUM CHLORIDE, SODIUM LACTATE, POTASSIUM CHLORIDE, CALCIUM CHLORIDE 600; 310; 30; 20 MG/100ML; MG/100ML; MG/100ML; MG/100ML
75 INJECTION, SOLUTION INTRAVENOUS CONTINUOUS
Status: DISCONTINUED | OUTPATIENT
Start: 2019-06-06 | End: 2019-06-06 | Stop reason: HOSPADM

## 2019-06-06 RX ORDER — DEXAMETHASONE SODIUM PHOSPHATE 4 MG/ML
INJECTION, SOLUTION INTRA-ARTICULAR; INTRALESIONAL; INTRAMUSCULAR; INTRAVENOUS; SOFT TISSUE AS NEEDED
Status: DISCONTINUED | OUTPATIENT
Start: 2019-06-06 | End: 2019-06-06 | Stop reason: HOSPADM

## 2019-06-06 RX ORDER — GLYCOPYRROLATE 0.2 MG/ML
INJECTION INTRAMUSCULAR; INTRAVENOUS AS NEEDED
Status: DISCONTINUED | OUTPATIENT
Start: 2019-06-06 | End: 2019-06-06 | Stop reason: HOSPADM

## 2019-06-06 RX ORDER — LIDOCAINE HYDROCHLORIDE 10 MG/ML
0.1 INJECTION, SOLUTION EPIDURAL; INFILTRATION; INTRACAUDAL; PERINEURAL AS NEEDED
Status: DISCONTINUED | OUTPATIENT
Start: 2019-06-06 | End: 2019-06-06 | Stop reason: HOSPADM

## 2019-06-06 RX ORDER — DIPHENHYDRAMINE HYDROCHLORIDE 50 MG/ML
INJECTION, SOLUTION INTRAMUSCULAR; INTRAVENOUS
Status: DISPENSED
Start: 2019-06-06 | End: 2019-06-07

## 2019-06-06 RX ORDER — MIDAZOLAM HYDROCHLORIDE 1 MG/ML
INJECTION, SOLUTION INTRAMUSCULAR; INTRAVENOUS AS NEEDED
Status: DISCONTINUED | OUTPATIENT
Start: 2019-06-06 | End: 2019-06-06 | Stop reason: HOSPADM

## 2019-06-06 RX ORDER — FENTANYL CITRATE 50 UG/ML
50 INJECTION, SOLUTION INTRAMUSCULAR; INTRAVENOUS
Status: COMPLETED | OUTPATIENT
Start: 2019-06-06 | End: 2019-06-06

## 2019-06-06 RX ORDER — HYDROMORPHONE HYDROCHLORIDE 2 MG/ML
0.5 INJECTION, SOLUTION INTRAMUSCULAR; INTRAVENOUS; SUBCUTANEOUS
Status: DISCONTINUED | OUTPATIENT
Start: 2019-06-06 | End: 2019-06-06 | Stop reason: SDUPTHER

## 2019-06-06 RX ORDER — LIDOCAINE HYDROCHLORIDE 20 MG/ML
INJECTION, SOLUTION EPIDURAL; INFILTRATION; INTRACAUDAL; PERINEURAL AS NEEDED
Status: DISCONTINUED | OUTPATIENT
Start: 2019-06-06 | End: 2019-06-06 | Stop reason: HOSPADM

## 2019-06-06 RX ORDER — INSULIN LISPRO 100 [IU]/ML
INJECTION, SOLUTION INTRAVENOUS; SUBCUTANEOUS ONCE
Status: DISCONTINUED | OUTPATIENT
Start: 2019-06-06 | End: 2019-06-06 | Stop reason: HOSPADM

## 2019-06-06 RX ORDER — PROMETHAZINE HYDROCHLORIDE 25 MG/ML
INJECTION, SOLUTION INTRAMUSCULAR; INTRAVENOUS
Status: COMPLETED
Start: 2019-06-06 | End: 2019-06-06

## 2019-06-06 RX ORDER — FENTANYL CITRATE 50 UG/ML
50 INJECTION, SOLUTION INTRAMUSCULAR; INTRAVENOUS
Status: DISCONTINUED | OUTPATIENT
Start: 2019-06-06 | End: 2019-06-06

## 2019-06-06 RX ORDER — SODIUM CHLORIDE 0.9 % (FLUSH) 0.9 %
5-40 SYRINGE (ML) INJECTION EVERY 8 HOURS
Status: DISCONTINUED | OUTPATIENT
Start: 2019-06-06 | End: 2019-06-06 | Stop reason: HOSPADM

## 2019-06-06 RX ORDER — PROPOFOL 10 MG/ML
INJECTION, EMULSION INTRAVENOUS AS NEEDED
Status: DISCONTINUED | OUTPATIENT
Start: 2019-06-06 | End: 2019-06-06 | Stop reason: HOSPADM

## 2019-06-06 RX ORDER — HYDROMORPHONE HYDROCHLORIDE 1 MG/ML
1 INJECTION, SOLUTION INTRAMUSCULAR; INTRAVENOUS; SUBCUTANEOUS
Status: DISCONTINUED | OUTPATIENT
Start: 2019-06-06 | End: 2019-06-07 | Stop reason: HOSPADM

## 2019-06-06 RX ORDER — VECURONIUM BROMIDE FOR INJECTION 1 MG/ML
INJECTION, POWDER, LYOPHILIZED, FOR SOLUTION INTRAVENOUS AS NEEDED
Status: DISCONTINUED | OUTPATIENT
Start: 2019-06-06 | End: 2019-06-06 | Stop reason: HOSPADM

## 2019-06-06 RX ORDER — DIPHENHYDRAMINE HYDROCHLORIDE 50 MG/ML
25 INJECTION, SOLUTION INTRAMUSCULAR; INTRAVENOUS
Status: DISCONTINUED | OUTPATIENT
Start: 2019-06-06 | End: 2019-06-06 | Stop reason: HOSPADM

## 2019-06-06 RX ORDER — SODIUM CHLORIDE 0.9 % (FLUSH) 0.9 %
5-40 SYRINGE (ML) INJECTION AS NEEDED
Status: DISCONTINUED | OUTPATIENT
Start: 2019-06-06 | End: 2019-06-06 | Stop reason: HOSPADM

## 2019-06-06 RX ORDER — SUCCINYLCHOLINE CHLORIDE 20 MG/ML
INJECTION INTRAMUSCULAR; INTRAVENOUS AS NEEDED
Status: DISCONTINUED | OUTPATIENT
Start: 2019-06-06 | End: 2019-06-06 | Stop reason: HOSPADM

## 2019-06-06 RX ORDER — NEOSTIGMINE METHYLSULFATE 5 MG/5 ML
SYRINGE (ML) INTRAVENOUS AS NEEDED
Status: DISCONTINUED | OUTPATIENT
Start: 2019-06-06 | End: 2019-06-06 | Stop reason: HOSPADM

## 2019-06-06 RX ORDER — ROCURONIUM BROMIDE 10 MG/ML
INJECTION, SOLUTION INTRAVENOUS AS NEEDED
Status: DISCONTINUED | OUTPATIENT
Start: 2019-06-06 | End: 2019-06-06 | Stop reason: HOSPADM

## 2019-06-06 RX ORDER — DEXTROSE 50 % IN WATER (D50W) INTRAVENOUS SYRINGE
25-50 AS NEEDED
Status: DISCONTINUED | OUTPATIENT
Start: 2019-06-06 | End: 2019-06-06 | Stop reason: HOSPADM

## 2019-06-06 RX ORDER — ONDANSETRON 2 MG/ML
4 INJECTION INTRAMUSCULAR; INTRAVENOUS ONCE
Status: DISCONTINUED | OUTPATIENT
Start: 2019-06-06 | End: 2019-06-06 | Stop reason: HOSPADM

## 2019-06-06 RX ORDER — MAGNESIUM SULFATE 100 %
4 CRYSTALS MISCELLANEOUS AS NEEDED
Status: DISCONTINUED | OUTPATIENT
Start: 2019-06-06 | End: 2019-06-06 | Stop reason: HOSPADM

## 2019-06-06 RX ORDER — FENTANYL CITRATE 50 UG/ML
INJECTION, SOLUTION INTRAMUSCULAR; INTRAVENOUS AS NEEDED
Status: DISCONTINUED | OUTPATIENT
Start: 2019-06-06 | End: 2019-06-06 | Stop reason: HOSPADM

## 2019-06-06 RX ORDER — ONDANSETRON 2 MG/ML
INJECTION INTRAMUSCULAR; INTRAVENOUS AS NEEDED
Status: DISCONTINUED | OUTPATIENT
Start: 2019-06-06 | End: 2019-06-06 | Stop reason: HOSPADM

## 2019-06-06 RX ORDER — FAMOTIDINE 20 MG/1
20 TABLET, FILM COATED ORAL ONCE
Status: COMPLETED | OUTPATIENT
Start: 2019-06-06 | End: 2019-06-06

## 2019-06-06 RX ORDER — BUPIVACAINE HYDROCHLORIDE 2.5 MG/ML
INJECTION, SOLUTION EPIDURAL; INFILTRATION; INTRACAUDAL AS NEEDED
Status: DISCONTINUED | OUTPATIENT
Start: 2019-06-06 | End: 2019-06-06 | Stop reason: HOSPADM

## 2019-06-06 RX ORDER — CEFAZOLIN SODIUM 2 G/50ML
2 SOLUTION INTRAVENOUS ONCE
Status: COMPLETED | OUTPATIENT
Start: 2019-06-06 | End: 2019-06-06

## 2019-06-06 RX ORDER — NEOMYCIN AND POLYMYXIN B SULFATES 40; 200000 MG/ML; [USP'U]/ML
SOLUTION IRRIGATION AS NEEDED
Status: DISCONTINUED | OUTPATIENT
Start: 2019-06-06 | End: 2019-06-06 | Stop reason: HOSPADM

## 2019-06-06 RX ADMIN — ONDANSETRON 4 MG: 2 INJECTION INTRAMUSCULAR; INTRAVENOUS at 14:23

## 2019-06-06 RX ADMIN — FENTANYL CITRATE 50 MCG: 50 INJECTION INTRAMUSCULAR; INTRAVENOUS at 15:18

## 2019-06-06 RX ADMIN — CEFAZOLIN 2 G: 10 INJECTION, POWDER, FOR SOLUTION INTRAVENOUS at 12:45

## 2019-06-06 RX ADMIN — PROMETHAZINE HYDROCHLORIDE 25 MG: 25 INJECTION INTRAMUSCULAR; INTRAVENOUS at 16:35

## 2019-06-06 RX ADMIN — SUCCINYLCHOLINE CHLORIDE 120 MG: 20 INJECTION INTRAMUSCULAR; INTRAVENOUS at 12:41

## 2019-06-06 RX ADMIN — FAMOTIDINE 20 MG: 20 TABLET ORAL at 09:26

## 2019-06-06 RX ADMIN — HYDROMORPHONE HYDROCHLORIDE 0.5 MG: 2 INJECTION INTRAMUSCULAR; INTRAVENOUS; SUBCUTANEOUS at 15:41

## 2019-06-06 RX ADMIN — HYDROMORPHONE HYDROCHLORIDE 0.5 MG: 2 INJECTION INTRAMUSCULAR; INTRAVENOUS; SUBCUTANEOUS at 15:49

## 2019-06-06 RX ADMIN — FENTANYL CITRATE 100 MCG: 50 INJECTION, SOLUTION INTRAMUSCULAR; INTRAVENOUS at 12:41

## 2019-06-06 RX ADMIN — HYDROMORPHONE HYDROCHLORIDE 1 MG: 1 INJECTION, SOLUTION INTRAMUSCULAR; INTRAVENOUS; SUBCUTANEOUS at 20:38

## 2019-06-06 RX ADMIN — VECURONIUM BROMIDE FOR INJECTION 1 MG: 1 INJECTION, POWDER, LYOPHILIZED, FOR SOLUTION INTRAVENOUS at 13:21

## 2019-06-06 RX ADMIN — PROPOFOL 150 MG: 10 INJECTION, EMULSION INTRAVENOUS at 12:41

## 2019-06-06 RX ADMIN — HYDROMORPHONE HYDROCHLORIDE 0.5 MG: 2 INJECTION INTRAMUSCULAR; INTRAVENOUS; SUBCUTANEOUS at 15:32

## 2019-06-06 RX ADMIN — MEPERIDINE HYDROCHLORIDE 25 MG: 50 INJECTION, SOLUTION INTRAMUSCULAR; INTRAVENOUS; SUBCUTANEOUS at 15:53

## 2019-06-06 RX ADMIN — SODIUM CHLORIDE, SODIUM LACTATE, POTASSIUM CHLORIDE, AND CALCIUM CHLORIDE 75 ML/HR: 600; 310; 30; 20 INJECTION, SOLUTION INTRAVENOUS at 09:15

## 2019-06-06 RX ADMIN — FENTANYL CITRATE 50 MCG: 50 INJECTION INTRAMUSCULAR; INTRAVENOUS at 15:08

## 2019-06-06 RX ADMIN — VECURONIUM BROMIDE FOR INJECTION 3 MG: 1 INJECTION, POWDER, LYOPHILIZED, FOR SOLUTION INTRAVENOUS at 12:50

## 2019-06-06 RX ADMIN — HYDROMORPHONE HYDROCHLORIDE 0.5 MG: 2 INJECTION INTRAMUSCULAR; INTRAVENOUS; SUBCUTANEOUS at 15:25

## 2019-06-06 RX ADMIN — Medication 3 MG: at 14:30

## 2019-06-06 RX ADMIN — HYDROMORPHONE HYDROCHLORIDE 1 MG: 1 INJECTION, SOLUTION INTRAMUSCULAR; INTRAVENOUS; SUBCUTANEOUS at 23:29

## 2019-06-06 RX ADMIN — Medication 10 ML: at 09:14

## 2019-06-06 RX ADMIN — SODIUM CHLORIDE, SODIUM LACTATE, POTASSIUM CHLORIDE, AND CALCIUM CHLORIDE: 600; 310; 30; 20 INJECTION, SOLUTION INTRAVENOUS at 14:32

## 2019-06-06 RX ADMIN — ROCURONIUM BROMIDE 5 MG: 10 INJECTION, SOLUTION INTRAVENOUS at 12:41

## 2019-06-06 RX ADMIN — FENTANYL CITRATE 50 MCG: 50 INJECTION, SOLUTION INTRAMUSCULAR; INTRAVENOUS at 13:06

## 2019-06-06 RX ADMIN — FENTANYL CITRATE 50 MCG: 50 INJECTION, SOLUTION INTRAMUSCULAR; INTRAVENOUS at 14:08

## 2019-06-06 RX ADMIN — MIDAZOLAM HYDROCHLORIDE 2 MG: 1 INJECTION, SOLUTION INTRAMUSCULAR; INTRAVENOUS at 12:32

## 2019-06-06 RX ADMIN — VECURONIUM BROMIDE FOR INJECTION 1 MG: 1 INJECTION, POWDER, LYOPHILIZED, FOR SOLUTION INTRAVENOUS at 13:42

## 2019-06-06 RX ADMIN — DEXAMETHASONE SODIUM PHOSPHATE 4 MG: 4 INJECTION, SOLUTION INTRA-ARTICULAR; INTRALESIONAL; INTRAMUSCULAR; INTRAVENOUS; SOFT TISSUE at 12:50

## 2019-06-06 RX ADMIN — GLYCOPYRROLATE 0.4 MG: 0.2 INJECTION INTRAMUSCULAR; INTRAVENOUS at 14:30

## 2019-06-06 RX ADMIN — LIDOCAINE HYDROCHLORIDE 60 MG: 20 INJECTION, SOLUTION EPIDURAL; INFILTRATION; INTRACAUDAL; PERINEURAL at 12:41

## 2019-06-06 NOTE — PERIOP NOTES
Spoke with pt's sister Lisbeth Clayton). Explained pt having a lot pain in recovery but is currently asleep. Per pt's sister Tapan Reinoso needs to spend the night because he doesn't have anyone to stay with him\". Explained that will discuss with Dr. Terry Tovar when he comes out of operating room will discuss concerns of pain control and not help at home post anesthesia.    Contact number [phone]

## 2019-06-06 NOTE — ANESTHESIA PREPROCEDURE EVALUATION
Relevant Problems   No relevant active problems       Anesthetic History   No history of anesthetic complications            Review of Systems / Medical History  Patient summary reviewed and pertinent labs reviewed    Pulmonary  Within defined limits                 Neuro/Psych   Within defined limits           Cardiovascular                  Exercise tolerance: >4 METS     GI/Hepatic/Renal     GERD: well controlled      PUD     Endo/Other             Other Findings   Comments: H/O sickle cell Anemia           Physical Exam    Airway  Mallampati: III  TM Distance: 4 - 6 cm  Neck ROM: normal range of motion   Mouth opening: Normal     Cardiovascular  Regular rate and rhythm,  S1 and S2 normal,  no murmur, click, rub, or gallop             Dental  No notable dental hx       Pulmonary  Breath sounds clear to auscultation               Abdominal  GI exam deferred       Other Findings            Anesthetic Plan    ASA: 2  Anesthesia type: general          Induction: Intravenous  Anesthetic plan and risks discussed with: Patient

## 2019-06-06 NOTE — PROGRESS NOTES
GEN.  SURG    PT SEEN/ EXAMINED, ALL QUESTIONS  ANSWERED  NO CHANGES FROM HX/ PE  FOR OPEN  REPAIR  RIGHT  RECURRENT  INGUINAL  HERNIA  WITH  MESH TODAY

## 2019-06-06 NOTE — PERIOP NOTES
TRANSFER - OUT REPORT:    Verbal report given to Alec Ortega RN on Kelly Bhandari  being transferred to  for routine post - op       Report consisted of patients Situation, Background, Assessment and   Recommendations(SBAR). Information from the following report(s) SBAR and MAR was reviewed with the receiving nurse. Lines:   Venous Access Device Right Chest Single Lumen Mediport 06/06/16 Upper chest (subclavicular area, right (Active)   Central Line Being Utilized Yes 6/6/2019  2:48 PM   Criteria for Appropriate Use Other (comment) 6/6/2019  9:19 AM   Site Assessment Clean, dry, & intact 6/6/2019  9:19 AM   Date of Last Dressing Change 06/06/19 6/6/2019  9:19 AM   Dressing Status Clean, dry, & intact 6/6/2019  2:48 PM   Dressing Type Transparent 6/6/2019  2:48 PM   Action Taken Dressing changed 6/6/2019  9:19 AM   Action Taken (Medial Site) Flushed; Infusing; Other (comment) 6/6/2019  9:19 AM   Action Taken (Lateral Site) Flushed; Infusing; Other (comment) 6/6/2019  9:19 AM   Alcohol Cap Used No 6/6/2019  9:19 AM        Opportunity for questions and clarification was provided.       Patient transported with:   Gigg[employer]

## 2019-06-07 VITALS
WEIGHT: 122 LBS | BODY MASS INDEX: 20.33 KG/M2 | TEMPERATURE: 99.3 F | RESPIRATION RATE: 12 BRPM | SYSTOLIC BLOOD PRESSURE: 148 MMHG | HEART RATE: 67 BPM | OXYGEN SATURATION: 97 % | HEIGHT: 65 IN | DIASTOLIC BLOOD PRESSURE: 73 MMHG

## 2019-06-07 PROBLEM — K40.91 RECURRENT INGUINAL HERNIA OF RIGHT SIDE WITHOUT OBSTRUCTION OR GANGRENE: Chronic | Status: RESOLVED | Noted: 2019-06-06 | Resolved: 2019-06-07

## 2019-06-07 PROCEDURE — 99218 HC RM OBSERVATION: CPT

## 2019-06-07 PROCEDURE — 74011250636 HC RX REV CODE- 250/636: Performed by: SURGERY

## 2019-06-07 RX ORDER — HEPARIN SODIUM (PORCINE) LOCK FLUSH IV SOLN 100 UNIT/ML 100 UNIT/ML
500 SOLUTION INTRAVENOUS AS NEEDED
Status: DISCONTINUED | OUTPATIENT
Start: 2019-06-07 | End: 2019-06-07 | Stop reason: HOSPADM

## 2019-06-07 RX ADMIN — HYDROMORPHONE HYDROCHLORIDE 1 MG: 1 INJECTION, SOLUTION INTRAMUSCULAR; INTRAVENOUS; SUBCUTANEOUS at 05:27

## 2019-06-07 RX ADMIN — HEPARIN SODIUM (PORCINE) LOCK FLUSH IV SOLN 100 UNIT/ML 500 UNITS: 100 SOLUTION at 18:19

## 2019-06-07 RX ADMIN — HYDROMORPHONE HYDROCHLORIDE 1 MG: 1 INJECTION, SOLUTION INTRAMUSCULAR; INTRAVENOUS; SUBCUTANEOUS at 08:43

## 2019-06-07 RX ADMIN — HYDROMORPHONE HYDROCHLORIDE 1 MG: 1 INJECTION, SOLUTION INTRAMUSCULAR; INTRAVENOUS; SUBCUTANEOUS at 11:40

## 2019-06-07 RX ADMIN — HYDROMORPHONE HYDROCHLORIDE 1 MG: 1 INJECTION, SOLUTION INTRAMUSCULAR; INTRAVENOUS; SUBCUTANEOUS at 14:57

## 2019-06-07 RX ADMIN — HYDROMORPHONE HYDROCHLORIDE 1 MG: 1 INJECTION, SOLUTION INTRAMUSCULAR; INTRAVENOUS; SUBCUTANEOUS at 02:32

## 2019-06-07 NOTE — OP NOTES
[address] [place] [address]   OPERATIVE REPORT    Name:  Joana Campoverde  MR#:   [id number]  :  1960  ACCOUNT #:  [de-identified]  DATE OF SERVICE:  2019    PREOPERATIVE DIAGNOSIS:  Recurrent right inguinal hernia. POSTOPERATIVE DIAGNOSIS:  Recurrent right inguinal hernia. PROCEDURE PERFORMED:  Open repair of recurrent right inguinal hernia with mesh (Isabela/Andressa repair). SURGEON:  Blane Ocampo MD.    ASSISTANT:  Mr. Mauricio Roy. ANESTHESIA:  General endotracheal anesthesia given by nurse anesthetist under Dr. Young Nurse supervision and Marcaine 0.25% plain local infiltration. COMPLICATIONS:  None. SPECIMENS REMOVED:  HERNIA  SAC    IMPLANTS:  Prolene mesh. ESTIMATED BLOOD LOSS:  5 mL. COUNTS:  Sponge count correct. FINDINGS:  Presence of a long sac coming to the inguinal canal with adherent to spermatic cord structures. Floor is weak. DESCRIPTION OF PROCEDURE:  With the patient in supine position, after induction of adequate general endotracheal anesthesia, the lower abdomen to include the navel down to the external genitalia completely, and scrotum was prepped with Betadine and draped in usual sterile fashion. An incision was then made after the time-out was made, and after infiltration of the area in the right side where the marker was placed there, deepened through subcu to Tameka's and Camper's fascia. Hemostasis was obtained with cauterization. The external oblique fascia was exposed. An incision was then made along the fibers cutting through the external ring and the sac was released. There were some adhesions encountered here and the cord adherent to. Sac was then dissected with some great difficulty because of adhesions of the sac to the cord itself. The sac was completely  without much bleeding and the sac was suture ligated with 2-0 silk x2 and amputated. Majority of the cremaster muscles was excised using a cauterization.     The floor was then repaired in [facility] manner approximating the conjoint tendon to the shelving portion of the inguinal ligament and transition sutures approximating the shelving portion of the inguinal ligament and transversalis fascia all the way to doing tightening dealing with one finger. The area was irrigated and a mesh was placed on top of the repair in a Andressa's manner suturing the mesh to the pubis and laterally to the shelving portion of the inguinal ligament, a slit was made to allow the cord to pass through and anchoring the mesh from the shelving portion of the inguinal ligament up to the internal oblique muscle area and medially to the rectus fascia area. There was excellent placement. The area was irrigated and this was followed by closure of the external oblique fascia with 4-0 Prolene in continuous manner. The ilioinguinal nerve was identified throughout and was kept in place uninjured. The electrocautery was then pulled down to set in the cord itself and the subcu was infiltrated with Marcaine 0.25% plain followed by placement of 2-0 Vicryl subcu sutures, and the skin with 4-0 Monocryl and Dermabond was placed, and covered with 4x4 and Tegaderm. The patient tolerated the procedure well.         Arturo Medrano MD [id number]  D:  06/06/2019 14:45  T:  06/06/2019 16:37  JOB #:  [id number]

## 2019-06-07 NOTE — PROGRESS NOTES
Observation notice reviewed and explained with the patient and/or representative at bedside, patient acknowledged understanding but refused to sign. A copy provided to patient/representative. CMS unable to obtain signature, patient refused.

## 2019-06-07 NOTE — PROGRESS NOTES
Problem: Falls - Risk of  Goal: *Absence of Falls  Description  Document Edenilson Cordon Fall Risk and appropriate interventions in the flowsheet.   Outcome: Progressing Towards Goal     Problem: Patient Education: Go to Patient Education Activity  Goal: Patient/Family Education  Outcome: Progressing Towards Goal     Problem: General Medical Care Plan  Goal: *Vital signs within specified parameters  Outcome: Progressing Towards Goal  Goal: *Labs within defined limits  Outcome: Progressing Towards Goal  Goal: *Absence of infection signs and symptoms  Outcome: Progressing Towards Goal  Goal: *Optimal pain control at patient's stated goal  Outcome: Progressing Towards Goal  Goal: *Skin integrity maintained  Outcome: Progressing Towards Goal  Goal: *Fluid volume balance  Outcome: Progressing Towards Goal

## 2019-06-07 NOTE — PROGRESS NOTES
conducted an initial consultation and Spiritual Assessment for Duane Irwin, who is a 60 y.o.,male. Patients Primary Language is: [place]. According to the patients EMR Hindu Affiliation is: [place]. The reason the Patient came to the hospital is:   Patient Active Problem List    Diagnosis Date Noted    Recurrent inguinal hernia of right side without obstruction or gangrene 06/06/2019    Recurrent right inguinal hernia 06/06/2019    Sickle cell anemia with crisis ([facility] [address] 11/07/2017    Benign obstructive jaundice 11/07/2017    Anemia 06/08/2016    Occult blood in stools 06/08/2016    Infected venous access port 06/05/2016    Sickle cell anemia (HCC) 06/02/2016    Fever 05/28/2016    Sickle cell anemia with pain (HCC) 04/06/2015    Cholangitis 04/06/2015    Sickle cell crisis ([facility] [address] 11/29/2014    Nosocomial pneumonia 11/29/2014    Biliary stone 07/23/2014    Abdominal pain, other specified site 07/23/2014    Sickle-cell anemia ([facility] [address] 08/30/2012    Cholestatic jaundice 08/30/2012        The  provided the following Interventions:  Initiated a relationship of care and support. Explored issues of delores, belief, spirituality and Mandaeism/ritual needs while hospitalized. Listened empathically as patient shared that he was upset to find out he had an outstanding \"bill prior to surgery. \"  Provided information about Spiritual Care Services. Offered assurance of continued prayers on patient's behalf. The following outcomes where achieved:  Patient shared limited information about both his medical narrative and spiritual journey/beliefs. Patient processed feeling about current hospitalization as indicated above. Assessment:  Patient does not have any Mandaeism/cultural needs that will affect patients preferences in health care. There are no spiritual or Mandaeism issues which require intervention at this time.      Plan:  Chaplains will continue to follow and will provide pastoral care on an as needed/requested basis.  recommends bedside caregivers page  on duty if patient shows signs of acute spiritual or emotional distress.     Chaplain Resident [address]   [phone]

## 2019-06-07 NOTE — PROGRESS NOTES
Reason for Admission:  Recurrent right inguinal hernia [K40.91]                 RRAT Score:   5           Plan for utilizing home health:   None anticipated. Likelihood of Readmission:   LOW                         Transition of Care Plan:              Initial assessment completed with patient. Cognitive status of patient: oriented to time, place, person and situation. Face sheet information confirmed:  yes. The patient designates Roman Salazar, sister to participate in his discharge plan and to receive any needed information. This patient lives alone in a single family home . Patient is able to navigate steps as needed. Prior to hospitalization, patient was considered to be independent with ADLs/IADLS : yes . Patient has a current ACP document on file: no     The patient's sister will be available to transport patient home upon discharge. The patient has no medical equipment available in the home. Patient is not currently active with home health. Patient has not stayed in a skilled nursing facility or rehab. This patient is on dialysis :no    Freedom of choice signed: no.     Currently, the discharge plan is Home. CM continue to monitor for transitional needs. The patient states that he can obtain his medications from the pharmacy, and take his medications as directed. Patient's current insurance is [employer]       Care Management Interventions  PCP Verified by CM: Yes(per pt, saw pcp 2 weeks ago.)  Mode of Transport at Discharge: Self  Transition of Care Consult (CM Consult): Discharge Planning  MyChart Signup: No  Discharge Durable Medical Equipment: No  Physical Therapy Consult: No  Occupational Therapy Consult: No  Speech Therapy Consult: No  Current Support Network: Lives Alone  Confirm Follow Up Transport: Self  Plan discussed with Pt/Family/Caregiver: Yes  Discharge Location  Discharge Placement: Home      THUY Mercedes, RN  Pager # [phone]  Care Manager

## 2019-06-07 NOTE — ANESTHESIA POSTPROCEDURE EVALUATION
Procedure(s):  OPEN REPAIR RIGHT INGUINAL HERNIA WITH MESH.     general    Anesthesia Post Evaluation      Multimodal analgesia: multimodal analgesia used between 6 hours prior to anesthesia start to PACU discharge  Patient location during evaluation: bedside  Patient participation: complete - patient participated  Level of consciousness: awake  Pain management: adequate  Airway patency: patent  Anesthetic complications: no  Cardiovascular status: stable  Respiratory status: acceptable  Hydration status: acceptable  Post anesthesia nausea and vomiting:  controlled      Vitals Value Taken Time   /69 6/6/2019  4:48 PM   Temp 37.1 °C (98.8 °F) 6/6/2019  2:49 PM   Pulse 76 6/6/2019  4:49 PM   Resp 12 6/6/2019  4:49 PM   SpO2 98 % 6/6/2019  4:49 PM

## 2019-06-07 NOTE — ROUTINE PROCESS
Discharge order is old. Called Dr. Matthew Zaidi to confirm discharge. New order to Modified Discharge order for today.

## 2019-06-07 NOTE — ROUTINE PROCESS
Discharge paperwork given and explained to pt. He said that his sister would be here to get him around 200. Pt port still accessed. Will d/c when he is about to leave.

## 2019-06-07 NOTE — DISCHARGE INSTRUCTIONS
DISCHARGE SUMMARY from Nurse    PATIENT INSTRUCTIONS:    After general anesthesia or intravenous sedation, for 24 hours or while taking prescription Narcotics:  · Limit your activities  · Do not drive and operate hazardous machinery  · Do not make important personal or business decisions  · Do  not drink alcoholic beverages  · If you have not urinated within 8 hours after discharge, please contact your surgeon on call. Report the following to your surgeon:  · Excessive pain, swelling, redness or odor of or around the surgical area  · Temperature over 100.5  · Nausea and vomiting lasting longer than 4 hours or if unable to take medications  · Any signs of decreased circulation or nerve impairment to extremity: change in color, persistent  numbness, tingling, coldness or increase pain  · Any questions    What to do at Home:  Recommended activity: Activity as tolerated. If you experience any of the following symptoms fever, chills, swelling and or pain in either one or both of your arms or legs, please follow up with primary care physician. *  Please give a list of your current medications to your Primary Care Provider. *  Please update this list whenever your medications are discontinued, doses are      changed, or new medications (including over-the-counter products) are added. *  Please carry medication information at all times in case of emergency situations. These are general instructions for a healthy lifestyle:    No smoking/ No tobacco products/ Avoid exposure to second hand smoke  Surgeon General's Warning:  Quitting smoking now greatly reduces serious risk to your health.     Obesity, smoking, and sedentary lifestyle greatly increases your risk for illness    A healthy diet, regular physical exercise & weight monitoring are important for maintaining a healthy lifestyle    You may be retaining fluid if you have a history of heart failure or if you experience any of the following symptoms: Weight gain of 3 pounds or more overnight or 5 pounds in a week, increased swelling in our hands or feet or shortness of breath while lying flat in bed. Please call your doctor as soon as you notice any of these symptoms; do not wait until your next office visit. Recognize signs and symptoms of STROKE:    F-face looks uneven    A-arms unable to move or move unevenly    S-speech slurred or non-existent    T-time-call 911 as soon as signs and symptoms begin-DO NOT go       Back to bed or wait to see if you get better-TIME IS BRAIN. Warning Signs of HEART ATTACK     Call 911 if you have these symptoms:   Chest discomfort. Most heart attacks involve discomfort in the center of the chest that lasts more than a few minutes, or that goes away and comes back. It can feel like uncomfortable pressure, squeezing, fullness, or pain.  Discomfort in other areas of the upper body. Symptoms can include pain or discomfort in one or both arms, the back, neck, jaw, or stomach.  Shortness of breath with or without chest discomfort.  Other signs may include breaking out in a cold sweat, nausea, or lightheadedness. Don't wait more than five minutes to call 911 - MINUTES MATTER! Fast action can save your life. Calling 911 is almost always the fastest way to get lifesaving treatment. Emergency Medical Services staff can begin treatment when they arrive -- up to an hour sooner than if someone gets to the hospital by car. The discharge information has been reviewed with the patient. The patient verbalized understanding. Discharge medications reviewed with the patient and appropriate educational materials and side effects teaching were provided. ___________________________________________________________________________________________________________________________________      Patient Education        Hernia Repair: What to Expect at [address] are likely to have pain for the next few days.  You may also feel like you have the flu, and you may have a low fever and feel tired and nauseated. This is common. You should feel better after a few days and will probably feel much better in 7 days. For several weeks you may feel twinges or pulling in the hernia repair when you move. You may have some bruising on the scrotum and along the penis. This is normal. Men will need to wear a jockstrap or briefs, not boxers, for scrotal support for several days after a groin (inguinal) hernia repair. [employer] bicycle shorts may provide good support. This care sheet gives you a general idea about how long it will take for you to recover. But each person recovers at a different pace. Follow the steps below to get better as quickly as possible. How can you care for yourself at home? Activity    · Rest when you feel tired. Getting enough sleep will help you recover.     · Try to walk each day. Start by walking a little more than you did the day before. Bit by bit, increase the amount you walk. Walking boosts blood flow and helps prevent pneumonia and constipation.     · Avoid strenuous activities, such as biking, jogging, weight lifting, or aerobic exercise, until your doctor says it is okay.     · Avoid lifting anything that would make you strain. This may include heavy grocery bags and milk containers, a heavy briefcase or backpack, cat litter or dog food bags, a vacuum , or a child.     · You may drive when you are no longer taking pain medicine and can quickly move your foot from the gas pedal to the brake. You must also be able to sit comfortably for a long period of time, even if you do not plan to go far. You might get caught in traffic.     · Most people are able to return to work within 1 to 2 weeks after surgery.     · You may shower 24 to 48 hours after surgery, if your doctor okays it. Pat the cut (incision) dry.  Do not take a bath for the first 2 weeks, or until your doctor tells you it is okay.     · Your doctor will tell you when you can have sex again. Diet    · You can eat your normal diet. If your stomach is upset, try bland, low-fat foods like plain rice, broiled chicken, toast, and yogurt.     · Drink plenty of fluids (unless your doctor tells you not to).     · You may notice that your bowel movements are not regular right after your surgery. This is common. Avoid constipation and straining with bowel movements. You may want to take a fiber supplement every day. If you have not had a bowel movement after a couple of days, ask your doctor about taking a mild laxative. Medicines    · Your doctor will tell you if and when you can restart your medicines. He or she will also give you instructions about taking any new medicines.     · If you take blood thinners, such as warfarin (Coumadin), clopidogrel (Plavix), or aspirin, be sure to talk to your doctor. He or she will tell you if and when to start taking those medicines again. Make sure that you understand exactly what your doctor wants you to do.     · Be safe with medicines. Take pain medicines exactly as directed. ? If the doctor gave you a prescription medicine for pain, take it as prescribed. ? If you are not taking a prescription pain medicine, take an over-the-counter medicine such as acetaminophen (Tylenol), ibuprofen (Advil, Motrin), or naproxen (Aleve). Read and follow all instructions on the label. ? Do not take two or more pain medicines at the same time unless the doctor told you to. Many pain medicines have acetaminophen, which is Tylenol. Too much acetaminophen (Tylenol) can be harmful.     · If your doctor prescribed antibiotics, take them as directed. Do not stop taking them just because you feel better. You need to take the full course of antibiotics.     · If you think your pain medicine is making you sick to your stomach:  ? Take your medicine after meals (unless your doctor has told you not to). ?  Ask your doctor for a different pain medicine. Incision care    · If you have strips of tape on the cut (incision) the doctor made, leave the tape on for a week or until it falls off.     · If you have staples closing the cut, you will need to visit your doctor in 1 to 2 weeks to have them removed.     · Wash the area daily with warm, soapy water and pat it dry. Follow-up care is a key part of your treatment and safety. Be sure to make and go to all appointments, and call your doctor if you are having problems. It's also a good idea to know your test results and keep a list of the medicines you take. When should you call for help? Call 911 anytime you think you may need emergency care. For example, call if:    · You passed out (lost consciousness).     · You are short of breath.    Call your doctor now or seek immediate medical care if:    · You have pain that does not get better after you take pain medicine.     · You are sick to your stomach and cannot keep fluids down.     · You have signs of a blood clot in your leg (called a deep vein thrombosis), such as:  ? Pain in your calf, back of the knee, thigh, or groin. ? Redness and swelling in your leg or groin.     · You cannot pass stools or gas.     · Bright red blood has soaked through the bandage over your incision.     · You have loose stitches, or your incision comes open.     · You have signs of infection, such as:  ? Increased pain, swelling, warmth, or redness. ? Red streaks leading from the incision. ? Pus draining from the incision. ? A fever.    Watch closely for any changes in your health, and be sure to contact your doctor if you have any problems. Where can you learn more? Go to http://derek-laila.info/. Enter S073 in the search box to learn more about \"Hernia Repair: What to Expect at Home. \"  Current as of: March 27, 2018  Content Version: 11.9  © 4516-3243 [employer].  Care instructions adapted under license by [employer] (which disclaims liability or warranty for this information). If you have questions about a medical condition or this instruction, always ask your healthcare professional. [address] any warranty or liability for your use of this information. [employer] Activation    Thank you for requesting access to [facility]. Please follow the instructions below to securely access and download your online medical record. [facility] allows you to send messages to your doctor, view your test results, renew your prescriptions, schedule appointments, and more. How Do I Sign Up? 1. In your internet browser, go to [email or web address]  2. Click on the First Time User? Click Here link in the Sign In box. You will be redirect to the New Member Sign Up page. 3. Enter your [facility] Access Code exactly as it appears below. You will not need to use this code after youve completed the sign-up process. If you do not sign up before the expiration date, you must request a new code. [facility] Access Code: Activation code not generated  Current [facility] Status: Active (This is the date your [facility] access code will )    4. Enter the last four digits of your Social Security Number (xxxx) and Date of Birth (mm/dd/yyyy) as indicated and click Submit. You will be taken to the next sign-up page. 5. Create a [facility] ID. This will be your [facility] login ID and cannot be changed, so think of one that is secure and easy to remember. 6. Create a [facility] password. You can change your password at any time. 7. Enter your Password Reset Question and Answer. This can be used at a later time if you forget your password. 8. Enter your e-mail address. You will receive e-mail notification when new information is available in [address] 9. Click Sign Up. You can now view and download portions of your medical record. 10. Click the Download Summary menu link to download a portable copy of your medical information.     Additional Information    If you have questions, please visit the Frequently Asked Questions section of the "[employer] website at [email or web address] ([place] [email or web address]. [email or web address] Remember, "[employer] is NOT to be used for urgent needs. For medical emergencies, dial 911.       Patient armband removed and shredded

## 2019-06-08 NOTE — DISCHARGE SUMMARY
[address] [place] [address]    Name:  Param Rojas  MR#:   [id number]  :  1960  ACCOUNT #:  [de-identified]  ADMIT DATE:  2019  DISCHARGE DATE:  2019    ADMITTING DIAGNOSIS:  Recurrent right inguinal hernia. PROCEDURE PERFORMED:  Open repair of right inguinal hernia with mesh, Andressa and Isabela type. COMPLICATIONS:  None. DISCHARGE INSTRUCTIONS:   To increase activity and diet as tolerated. Keep the wound clean and dry. Okay to shower quickly. Office check in one week. Call the office for any difficulties. DESCRIPTION:  This 30-year-old -American male who has sickle cell anemia, was admitted post open right inguinal hernia repair, recurrent with mesh placement. Because of sickle cell anemia and significant amount of pain postop patient and his sister has talked to the nurse that he should be admitted as well. The patient was admitted for observation. Following an open repair of right inguinal hernia with mesh, the patient was placed on Dilaudid for pain, IV and patient has good control, has been voiding well. No fever. No nausea, no vomiting overnight, and the pain is controlled with above pain treatment. The patient takes Dilaudid at home for pain as well. The patient, overnight, has had no fever, vital signs were stable, pain was minimal.  The patient is ambulatory and the wound is clean and dry. All questions were answered. The patient to be seen in the office in one week and advised to call the office for an appointment. The patient does not need any prescription as he admits he does have a lot of pain medicine at home to use.       Marry Velasquez MD      [id number]  D:  2019 14:44  T:  2019 15:18  JOB #:  [id number]

## 2019-06-10 LAB
ABO + RH BLD: NORMAL
ANTIGENS PRESENT RBC DONR: NORMAL
BLD PROD TYP BPU: NORMAL
BLOOD GROUP ANTIBODIES SERPL: NORMAL
BLOOD GROUP ANTIBODIES SERPL: NORMAL
BPU ID: NORMAL
CROSSMATCH RESULT,%XM: NORMAL
SPECIMEN EXP DATE BLD: NORMAL
STATUS OF UNIT,%ST: NORMAL
UNIT DIVISION, %UDIV: 0

## 2019-06-21 ENCOUNTER — HOSPITAL ENCOUNTER (OUTPATIENT)
Dept: INFUSION THERAPY | Age: 59
Discharge: HOME | End: 2019-06-21
Payer: COMMERCIAL

## 2019-06-21 PROCEDURE — 36415 COLL VENOUS BLD VENIPUNCTURE: CPT

## 2019-06-21 PROCEDURE — 86900 BLOOD TYPING SEROLOGIC ABO: CPT

## 2019-06-21 PROCEDURE — 86870 RBC ANTIBODY IDENTIFICATION: CPT

## 2019-06-21 PROCEDURE — 86922 COMPATIBILITY TEST ANTIGLOB: CPT

## 2019-06-21 PROCEDURE — 85660 RBC SICKLE CELL TEST: CPT

## 2019-06-21 PROCEDURE — 86921 COMPATIBILITY TEST INCUBATE: CPT

## 2019-06-21 PROCEDURE — 86902 BLOOD TYPE ANTIGEN DONOR EA: CPT

## 2019-06-21 PROCEDURE — 86920 COMPATIBILITY TEST SPIN: CPT

## 2019-06-24 ENCOUNTER — HOSPITAL ENCOUNTER (OUTPATIENT)
Dept: INFUSION THERAPY | Age: 59
Discharge: HOME | End: 2019-06-24
Payer: COMMERCIAL

## 2019-06-24 VITALS
TEMPERATURE: 98.1 F | DIASTOLIC BLOOD PRESSURE: 75 MMHG | HEART RATE: 60 BPM | RESPIRATION RATE: 16 BRPM | SYSTOLIC BLOOD PRESSURE: 125 MMHG | OXYGEN SATURATION: 97 %

## 2019-06-24 PROCEDURE — 74011250636 HC RX REV CODE- 250/636

## 2019-06-24 PROCEDURE — 74011250636 HC RX REV CODE- 250/636: Performed by: INTERNAL MEDICINE

## 2019-06-24 PROCEDURE — 77030013169 SET IV BLD ICUM -A

## 2019-06-24 PROCEDURE — P9040 RBC LEUKOREDUCED IRRADIATED: HCPCS

## 2019-06-24 PROCEDURE — 74011250637 HC RX REV CODE- 250/637: Performed by: INTERNAL MEDICINE

## 2019-06-24 PROCEDURE — 77030012965 HC NDL HUBR BBMI -A

## 2019-06-24 PROCEDURE — 36430 TRANSFUSION BLD/BLD COMPNT: CPT

## 2019-06-24 RX ORDER — ACETAMINOPHEN 325 MG/1
650 TABLET ORAL ONCE
Status: COMPLETED | OUTPATIENT
Start: 2019-06-24 | End: 2019-06-24

## 2019-06-24 RX ORDER — HYDROCORTISONE SODIUM SUCCINATE 100 MG/2ML
100 INJECTION, POWDER, FOR SOLUTION INTRAMUSCULAR; INTRAVENOUS
Status: DISCONTINUED | OUTPATIENT
Start: 2019-06-24 | End: 2019-06-25 | Stop reason: HOSPADM

## 2019-06-24 RX ORDER — DIPHENHYDRAMINE HYDROCHLORIDE 50 MG/ML
25 INJECTION, SOLUTION INTRAMUSCULAR; INTRAVENOUS
Status: DISCONTINUED | OUTPATIENT
Start: 2019-06-24 | End: 2019-06-25 | Stop reason: HOSPADM

## 2019-06-24 RX ORDER — DIPHENHYDRAMINE HCL 25 MG
25 CAPSULE ORAL ONCE
Status: COMPLETED | OUTPATIENT
Start: 2019-06-24 | End: 2019-06-24

## 2019-06-24 RX ORDER — HEPARIN 100 UNIT/ML
SYRINGE INTRAVENOUS
Status: COMPLETED
Start: 2019-06-24 | End: 2019-06-24

## 2019-06-24 RX ORDER — DIPHENHYDRAMINE HYDROCHLORIDE 50 MG/ML
25 INJECTION, SOLUTION INTRAMUSCULAR; INTRAVENOUS ONCE
Status: ACTIVE | OUTPATIENT
Start: 2019-06-24 | End: 2019-06-24

## 2019-06-24 RX ORDER — SODIUM CHLORIDE 0.9 % (FLUSH) 0.9 %
10-40 SYRINGE (ML) INJECTION AS NEEDED
Status: DISCONTINUED | OUTPATIENT
Start: 2019-06-24 | End: 2019-06-28 | Stop reason: HOSPADM

## 2019-06-24 RX ORDER — SODIUM CHLORIDE 9 MG/ML
250 INJECTION, SOLUTION INTRAVENOUS AS NEEDED
Status: DISCONTINUED | OUTPATIENT
Start: 2019-06-24 | End: 2019-06-28 | Stop reason: HOSPADM

## 2019-06-24 RX ADMIN — Medication 10 ML: at 12:57

## 2019-06-24 RX ADMIN — Medication 10 ML: at 08:18

## 2019-06-24 RX ADMIN — Medication 500 UNITS: at 12:58

## 2019-06-24 RX ADMIN — SODIUM CHLORIDE 250 ML: 9 INJECTION, SOLUTION INTRAVENOUS at 08:20

## 2019-06-24 RX ADMIN — DIPHENHYDRAMINE HYDROCHLORIDE 25 MG: 25 CAPSULE ORAL at 08:25

## 2019-06-24 RX ADMIN — ACETAMINOPHEN 650 MG: 325 TABLET ORAL at 08:22

## 2019-06-24 NOTE — PROGRESS NOTES
[facility] OPIC Progress Note    Date: 2019    Name: Robert Huang    MRN: [id number]         : 1960      Mr. Nikki Morris arrived in the [facility] today at [phone], in stable condition, here for 2 units of PRBC. He was assessed and education was provided. No complaints or concerns voiced    Mr. Venkat Munoz vitals were reviewed. Visit Vitals  /75 (BP 1 Location: Left arm, BP Patient Position: At rest)   Pulse 60   Temp 98.1 °F (36.7 °C)   Resp 16   SpO2 97%       Signed blood permit on chart      Pre-medications consisting of Tylenol 650 mg PO & Benadryl 25 mg PO, were administered per order. His right chest single lumen port was accessed,, and brisk blood return was noted. 2 units of PRBC, were administered for approximately 2 hours each, per order, and without incident. Patient tolerated well. He declined to stay for observation. Port was flushed well per protocol with NS & Heparin, and then, the littlejohn needle was removed. Site s redness, swelling, bleeding or tenderness. Bandaid was applied. .     Patient Vitals for the past 8 hrs:   Temp Pulse Resp BP SpO2   19 1250 98.1 °F (36.7 °C) 60 16 125/75    19 1135 98.2 °F (36.8 °C) 64 16 130/73    19 1105 98.4 °F (36.9 °C) 60 16 126/69    19 1100 98.2 °F (36.8 °C) 65 16 125/75    19 1045 98.2 °F (36.8 °C) 64 16 113/69    19 0945 98.1 °F (36.7 °C) 62 16 115/70    19 0915 98.1 °F (36.7 °C) 60 16 118/67    19 0900 98.4 °F (36.9 °C) 62 16 119/69 97 %   19 0844 98.5 °F (36.9 °C) 67 18 113/70 96 %   19 0810 98.6 °F (37 °C) 65 18 123/69 96 %         Reviewed discharge instructions with patient. He verbalized understanding      Mr. Nikki Morris was discharged from [place] [address] in stable condition at 1300. He has no further OPIC appointments scheduled at this time.   Nilton Suarez RN  2019  8:50 AM

## 2019-07-19 ENCOUNTER — HOSPITAL ENCOUNTER (OUTPATIENT)
Dept: INFUSION THERAPY | Age: 59
Discharge: HOME | End: 2019-07-19
Payer: COMMERCIAL

## 2019-07-19 VITALS
HEART RATE: 76 BPM | TEMPERATURE: 99.1 F | DIASTOLIC BLOOD PRESSURE: 63 MMHG | OXYGEN SATURATION: 95 % | RESPIRATION RATE: 18 BRPM | SYSTOLIC BLOOD PRESSURE: 112 MMHG

## 2019-07-19 PROCEDURE — 86921 COMPATIBILITY TEST INCUBATE: CPT

## 2019-07-19 PROCEDURE — 86922 COMPATIBILITY TEST ANTIGLOB: CPT

## 2019-07-19 PROCEDURE — 86900 BLOOD TYPING SEROLOGIC ABO: CPT

## 2019-07-19 PROCEDURE — 77030012965 HC NDL HUBR BBMI -A

## 2019-07-19 PROCEDURE — 86902 BLOOD TYPE ANTIGEN DONOR EA: CPT

## 2019-07-19 PROCEDURE — 74011250636 HC RX REV CODE- 250/636

## 2019-07-19 PROCEDURE — 86920 COMPATIBILITY TEST SPIN: CPT

## 2019-07-19 PROCEDURE — 86870 RBC ANTIBODY IDENTIFICATION: CPT

## 2019-07-19 PROCEDURE — 36591 DRAW BLOOD OFF VENOUS DEVICE: CPT

## 2019-07-19 RX ORDER — HEPARIN 100 UNIT/ML
500 SYRINGE INTRAVENOUS AS NEEDED
Status: DISCONTINUED | OUTPATIENT
Start: 2019-07-19 | End: 2019-07-23 | Stop reason: HOSPADM

## 2019-07-19 RX ORDER — SODIUM CHLORIDE 0.9 % (FLUSH) 0.9 %
10-40 SYRINGE (ML) INJECTION AS NEEDED
Status: DISCONTINUED | OUTPATIENT
Start: 2019-07-19 | End: 2019-07-23 | Stop reason: HOSPADM

## 2019-07-19 RX ORDER — HEPARIN 100 UNIT/ML
SYRINGE INTRAVENOUS
Status: COMPLETED
Start: 2019-07-19 | End: 2019-07-19

## 2019-07-19 RX ORDER — SODIUM CHLORIDE 9 MG/ML
250 INJECTION, SOLUTION INTRAVENOUS AS NEEDED
Status: DISCONTINUED | OUTPATIENT
Start: 2019-07-19 | End: 2019-07-23 | Stop reason: HOSPADM

## 2019-07-19 RX ADMIN — SODIUM CHLORIDE, PRESERVATIVE FREE 500 UNITS: 5 INJECTION INTRAVENOUS at 11:56

## 2019-07-19 RX ADMIN — Medication 30 ML: at 11:56

## 2019-07-19 NOTE — PROGRESS NOTES
[facility] OPI Progress Note    Date: 2019    Name: Wendy Blum    MRN: [id number]         : 1960    Type and Everlyn Neth    Mr. Jasmin Boyd arrived in the [facility] today at 12, in stable condition, here for Type and Cross for 2 units of PRBC's on . He was assessed and education was provided. No complaints or concerns voiced. Mr. Keri Aldridge vitals were reviewed. Visit Vitals  /63 (BP 1 Location: Left arm, BP Patient Position: Sitting)   Pulse 76   Temp 99.1 °F (37.3 °C)   Resp 18   SpO2 95%       Phlebotomist and another RN attempted to draw Type and Cross peripherally x 3 with no success. His right chest single lumen port was accessed, and brisk blood return was noted. 3 pink tubes drawn for Type and Cross. Port flushed with 20 ml NS, 5 ml heparin, and deaccessed. Site s redness, swelling, bleeding or tenderness. Bandaid was applied. Patient Vitals for the past 8 hrs:   Temp Pulse Resp BP SpO2   19 1110 99.1 °F (37.3 °C) 76 18 112/63 95 %         Reviewed discharge instructions with patient. He verbalized understanding      Mr. Jasmin Boyd was discharged from [place] [address] in stable condition at 1300. He is to return on 19 for a 2 unit blood transfusion.     Susan Khalil RN  2019  [phone]

## 2019-07-22 ENCOUNTER — HOSPITAL ENCOUNTER (OUTPATIENT)
Dept: INFUSION THERAPY | Age: 59
Discharge: HOME | End: 2019-07-22
Payer: COMMERCIAL

## 2019-07-22 VITALS
RESPIRATION RATE: 16 BRPM | DIASTOLIC BLOOD PRESSURE: 74 MMHG | OXYGEN SATURATION: 96 % | HEART RATE: 59 BPM | SYSTOLIC BLOOD PRESSURE: 135 MMHG | TEMPERATURE: 98.6 F

## 2019-07-22 PROCEDURE — 36430 TRANSFUSION BLD/BLD COMPNT: CPT

## 2019-07-22 PROCEDURE — 74011250636 HC RX REV CODE- 250/636: Performed by: INTERNAL MEDICINE

## 2019-07-22 PROCEDURE — P9040 RBC LEUKOREDUCED IRRADIATED: HCPCS

## 2019-07-22 PROCEDURE — 85660 RBC SICKLE CELL TEST: CPT

## 2019-07-22 PROCEDURE — 77030013169 SET IV BLD ICUM -A

## 2019-07-22 PROCEDURE — 74011250637 HC RX REV CODE- 250/637: Performed by: INTERNAL MEDICINE

## 2019-07-22 PROCEDURE — 77030012965 HC NDL HUBR BBMI -A

## 2019-07-22 RX ORDER — HYDROCORTISONE SODIUM SUCCINATE 100 MG/2ML
100 INJECTION, POWDER, FOR SOLUTION INTRAMUSCULAR; INTRAVENOUS
Status: DISCONTINUED | OUTPATIENT
Start: 2019-07-22 | End: 2019-07-23 | Stop reason: HOSPADM

## 2019-07-22 RX ORDER — DIPHENHYDRAMINE HYDROCHLORIDE 50 MG/ML
25 INJECTION, SOLUTION INTRAMUSCULAR; INTRAVENOUS
Status: DISCONTINUED | OUTPATIENT
Start: 2019-07-22 | End: 2019-07-23 | Stop reason: HOSPADM

## 2019-07-22 RX ORDER — SODIUM CHLORIDE 0.9 % (FLUSH) 0.9 %
10-40 SYRINGE (ML) INJECTION AS NEEDED
Status: DISCONTINUED | OUTPATIENT
Start: 2019-07-22 | End: 2019-07-26 | Stop reason: HOSPADM

## 2019-07-22 RX ORDER — ACETAMINOPHEN 325 MG/1
650 TABLET ORAL ONCE
Status: COMPLETED | OUTPATIENT
Start: 2019-07-22 | End: 2019-07-22

## 2019-07-22 RX ORDER — HEPARIN 100 UNIT/ML
500 SYRINGE INTRAVENOUS AS NEEDED
Status: DISCONTINUED | OUTPATIENT
Start: 2019-07-22 | End: 2019-07-26 | Stop reason: HOSPADM

## 2019-07-22 RX ORDER — HEPARIN 100 UNIT/ML
SYRINGE INTRAVENOUS
Status: DISCONTINUED
Start: 2019-07-22 | End: 2019-07-23 | Stop reason: HOSPADM

## 2019-07-22 RX ORDER — SODIUM CHLORIDE 9 MG/ML
250 INJECTION, SOLUTION INTRAVENOUS AS NEEDED
Status: DISCONTINUED | OUTPATIENT
Start: 2019-07-22 | End: 2019-07-26 | Stop reason: HOSPADM

## 2019-07-22 RX ORDER — DIPHENHYDRAMINE HCL 25 MG
25 CAPSULE ORAL ONCE
Status: COMPLETED | OUTPATIENT
Start: 2019-07-22 | End: 2019-07-22

## 2019-07-22 RX ADMIN — Medication 10 ML: at 08:13

## 2019-07-22 RX ADMIN — ACETAMINOPHEN 650 MG: 325 TABLET ORAL at 08:20

## 2019-07-22 RX ADMIN — DIPHENHYDRAMINE HYDROCHLORIDE 25 MG: 25 CAPSULE ORAL at 08:20

## 2019-07-22 RX ADMIN — HEPARIN 500 UNITS: 100 SYRINGE at 12:37

## 2019-07-22 RX ADMIN — SODIUM CHLORIDE 250 ML: 9 INJECTION, SOLUTION INTRAVENOUS at 08:13

## 2019-07-22 RX ADMIN — Medication 10 ML: at 12:36

## 2019-07-22 NOTE — PROGRESS NOTES
[facility] OPIC Progress Note    Date: 2019    Name: Hari Cole    MRN: [id number]         : 1960      Mr. Danie Mensah arrived in the [facility] today at [phone], in stable condition, here for 2 units of PRBC. He was assessed and education was provided. General pain 6/10. He will get tylenol and benadryl pre blood transfusion    Mr. Linda Ayala vitals were reviewed. Visit Vitals  /74 (BP 1 Location: Left arm, BP Patient Position: At rest)   Pulse (!) 59   Temp 98.6 °F (37 °C)   Resp 16   SpO2 96%       Signed blood permit on chart      Pre-medications consisting of Tylenol 650 mg PO & Benadryl 25 mg PO, were administered per order. His right chest single lumen port was accessed,, and brisk blood return was noted. 2 units of PRBC, were administered for approximately 2 hours each, per order, and without incident. Patient tolerated well. He declined to stay for observation. He denied pain prior discharge. Port was flushed well per protocol with NS & Heparin, and then, the littlejohn needle was removed. Site s redness, swelling, bleeding or tenderness. Bandaid was applied. .     Patient Vitals for the past 8 hrs:   Temp Pulse Resp BP SpO2   19 1230 98.6 °F (37 °C) (!) 59 16 135/74    19 1146 98.7 °F (37.1 °C) (!) 59 16 132/73    19 1116 98.6 °F (37 °C) 62 16 133/73    19 1101 98.5 °F (36.9 °C) (!) 59 16 131/75    19 1045 98.6 °F (37 °C) 63 16 127/68    19 0955 98.2 °F (36.8 °C) 60 16 125/74    19 0925 98.4 °F (36.9 °C) 60 16 120/72    19 0910 98.4 °F (36.9 °C) 62 16 124/75    19 0854 98.1 °F (36.7 °C) 66 18 115/70    19 0825 99 °F (37.2 °C) 71 18 135/73 96 %         Reviewed discharge instructions with patient. He verbalized understanding      Mr. Danie Mensah was discharged from [facility] 58 in stable condition at 1240 He has no further OPIC appointments scheduled at this time.   Dora Mg RN  2019  8:50 AM

## 2019-08-28 ENCOUNTER — HOSPITAL ENCOUNTER (OUTPATIENT)
Dept: INFUSION THERAPY | Age: 59
Discharge: HOME | End: 2019-08-28
Payer: COMMERCIAL

## 2019-08-28 PROCEDURE — 86920 COMPATIBILITY TEST SPIN: CPT

## 2019-08-28 PROCEDURE — 86900 BLOOD TYPING SEROLOGIC ABO: CPT

## 2019-08-28 PROCEDURE — 36415 COLL VENOUS BLD VENIPUNCTURE: CPT

## 2019-08-28 PROCEDURE — 36430 TRANSFUSION BLD/BLD COMPNT: CPT

## 2019-08-28 PROCEDURE — 86870 RBC ANTIBODY IDENTIFICATION: CPT

## 2019-08-28 RX ORDER — SODIUM CHLORIDE 9 MG/ML
250 INJECTION, SOLUTION INTRAVENOUS AS NEEDED
Status: DISCONTINUED | OUTPATIENT
Start: 2019-08-28 | End: 2019-09-01 | Stop reason: HOSPADM

## 2019-08-28 NOTE — PROGRESS NOTES
[address] Lab Visit:    Marquez Dale  1960  [id number]    1413:  Pt arrived ambulatory. Labs drawn peripherally and sent for processing. Departed OPI ambulatory and in no distress. There were no vitals taken for this visit.

## 2019-08-29 ENCOUNTER — HOSPITAL ENCOUNTER (OUTPATIENT)
Dept: INFUSION THERAPY | Age: 59
Discharge: HOME | End: 2019-08-29
Payer: COMMERCIAL

## 2019-08-29 VITALS
TEMPERATURE: 97.7 F | RESPIRATION RATE: 18 BRPM | HEART RATE: 60 BPM | DIASTOLIC BLOOD PRESSURE: 74 MMHG | SYSTOLIC BLOOD PRESSURE: 145 MMHG | OXYGEN SATURATION: 100 %

## 2019-08-29 PROCEDURE — 86902 BLOOD TYPE ANTIGEN DONOR EA: CPT

## 2019-08-29 PROCEDURE — 86921 COMPATIBILITY TEST INCUBATE: CPT

## 2019-08-29 PROCEDURE — 86922 COMPATIBILITY TEST ANTIGLOB: CPT

## 2019-08-29 PROCEDURE — 36430 TRANSFUSION BLD/BLD COMPNT: CPT

## 2019-08-29 PROCEDURE — 85660 RBC SICKLE CELL TEST: CPT

## 2019-08-29 PROCEDURE — P9040 RBC LEUKOREDUCED IRRADIATED: HCPCS

## 2019-08-29 PROCEDURE — 74011250637 HC RX REV CODE- 250/637: Performed by: INTERNAL MEDICINE

## 2019-08-29 RX ORDER — HYDROCORTISONE SODIUM SUCCINATE 100 MG/2ML
100 INJECTION, POWDER, FOR SOLUTION INTRAMUSCULAR; INTRAVENOUS
Status: DISCONTINUED | OUTPATIENT
Start: 2019-08-29 | End: 2019-08-30 | Stop reason: HOSPADM

## 2019-08-29 RX ORDER — HEPARIN 100 UNIT/ML
SYRINGE INTRAVENOUS
Status: DISCONTINUED
Start: 2019-08-29 | End: 2019-08-30 | Stop reason: HOSPADM

## 2019-08-29 RX ORDER — DIPHENHYDRAMINE HCL 25 MG
25 CAPSULE ORAL ONCE
Status: COMPLETED | OUTPATIENT
Start: 2019-08-29 | End: 2019-08-29

## 2019-08-29 RX ORDER — ACETAMINOPHEN 325 MG/1
650 TABLET ORAL ONCE
Status: COMPLETED | OUTPATIENT
Start: 2019-08-29 | End: 2019-08-29

## 2019-08-29 RX ORDER — SODIUM CHLORIDE 9 MG/ML
250 INJECTION, SOLUTION INTRAVENOUS AS NEEDED
Status: DISCONTINUED | OUTPATIENT
Start: 2019-08-29 | End: 2019-09-02 | Stop reason: HOSPADM

## 2019-08-29 RX ORDER — DIPHENHYDRAMINE HYDROCHLORIDE 50 MG/ML
25 INJECTION, SOLUTION INTRAMUSCULAR; INTRAVENOUS
Status: DISCONTINUED | OUTPATIENT
Start: 2019-08-29 | End: 2019-08-30 | Stop reason: HOSPADM

## 2019-08-29 RX ADMIN — DIPHENHYDRAMINE HYDROCHLORIDE 25 MG: 25 CAPSULE ORAL at 09:18

## 2019-08-29 RX ADMIN — ACETAMINOPHEN 650 MG: 325 TABLET ORAL at 09:18

## 2019-08-29 NOTE — PROGRESS NOTES
[facility] OPIC Progress Note    Date: 2019    Name: Hari Cole    MRN: [id number]         : 1960      Mr. Danie Mensah arrived to [facility] at [phone]. Mr. Danie Mensah was assessed and education was provided. Discussed risks and benefits of blood transfusion with patient, including risk of transfusion reaction and disease transmission. Patient verbalized understanding and signed consent placed on chart. Mr. Linda Ayala vitals were reviewed. Visit Vitals  /74 (BP 1 Location: Left arm, BP Patient Position: Sitting)   Pulse 60   Temp 97.7 °F (36.5 °C)   Resp 18   SpO2 100%       Right chest single luman 20g accessed using sterile technique with a 20g littlejohn needle. Positive for blood return and flushes without difficulty. Normal saline initiated at [facility]. Pre-medications (Tylenol 650 mg and Benadryl 25 mg) were administered as ordered. First unit of two ordered units of PRBCs initiated @ 75 ml/hr at 1010. Fifteen minutes later, VS stable and pt denied complaints; infusion rate increased to 155 ml/hr for the remainder of the transfusion. Unit finished @ 1230. VS stable and no transfusion reaction suspected. Second unit of two ordered units of PRBCs initiated @ 75 ml/hr at 1240. Fifteen minutes later, VS stable and pt denied complaints; infusion rate increased to 155 ml/hr for the remainder of the transfusion. Unit finished @ . VS stable and no transfusion reaction suspected.   Patient Vitals for the past 12 hrs:   Temp Pulse Resp BP SpO2   19 1532 97.7 °F (36.5 °C) 60 18 145/74 100 %   19 1440 97.7 °F (36.5 °C) 83 18 131/72 98 %   19 1340 98 °F (36.7 °C) 83 18 130/76 97 %   19 1310 98.1 °F (36.7 °C) 60 18 123/72 100 %   19 1255 98.2 °F (36.8 °C) 61 18 118/71 97 %   19 1230 98.1 °F (36.7 °C) 65 18 119/72 99 %   19 1210 98.1 °F (36.7 °C) 60 18 121/71 99 %   19 1110 97.9 °F (36.6 °C) 63 18 118/64 100 %   19 1040 98 °F (36.7 °C) 67 18 111/66 98 %   08/29/19 1025 98 °F (36.7 °C) 66 18 121/70 99 %   08/29/19 1002 98 °F (36.7 °C) 62 18 111/61 97 %   08/29/19 0915 98.3 °F (36.8 °C) 71 18 135/74 95 %         Mr. Jaz Malagon tolerated infusion without complaints. Leija needle removed. No irritation, bleeding, or hematoma noted at site. Covered site with band-aide. Patient declined to stay for observation period. Discharge instructions reviewed with pt. Pt instructed to report SOB, CP, elevated temp, back pain, or other symptoms of transfusion reaction to MD or ED. Pt verbalized understanding. Patient armbands were removed. Mr. Jaz Malagon was discharged from [place] [address] in stable condition at [phone]. He has no further appointments with [facility] at this time.     Ronn Parks RN  August 29, 2019

## 2019-10-17 ENCOUNTER — HOSPITAL ENCOUNTER (OUTPATIENT)
Dept: INFUSION THERAPY | Age: 59
Discharge: HOME | End: 2019-10-17
Payer: COMMERCIAL

## 2019-10-17 PROCEDURE — 86902 BLOOD TYPE ANTIGEN DONOR EA: CPT

## 2019-10-17 PROCEDURE — 86920 COMPATIBILITY TEST SPIN: CPT

## 2019-10-17 PROCEDURE — 36415 COLL VENOUS BLD VENIPUNCTURE: CPT

## 2019-10-17 PROCEDURE — 86922 COMPATIBILITY TEST ANTIGLOB: CPT

## 2019-10-17 PROCEDURE — 86921 COMPATIBILITY TEST INCUBATE: CPT

## 2019-10-17 PROCEDURE — 85660 RBC SICKLE CELL TEST: CPT

## 2019-10-17 PROCEDURE — 86900 BLOOD TYPING SEROLOGIC ABO: CPT

## 2019-10-17 PROCEDURE — 86870 RBC ANTIBODY IDENTIFICATION: CPT

## 2019-10-17 RX ORDER — SODIUM CHLORIDE 9 MG/ML
250 INJECTION, SOLUTION INTRAVENOUS AS NEEDED
Status: DISCONTINUED | OUTPATIENT
Start: 2019-10-17 | End: 2019-10-21 | Stop reason: HOSPADM

## 2019-10-17 NOTE — PROGRESS NOTES
[facility] OPIC Lab Visit:    Suzanne Richey  1960  [id number]    5292:  Pt arrived ambulatory. Labs drawn peripherally and sent for processing. Departed [facility] ambulatory and in no distress. There were no vitals taken for this visit.

## 2019-10-18 ENCOUNTER — HOSPITAL ENCOUNTER (OUTPATIENT)
Dept: INFUSION THERAPY | Age: 59
Discharge: HOME | End: 2019-10-18
Payer: COMMERCIAL

## 2019-10-18 VITALS
HEART RATE: 62 BPM | SYSTOLIC BLOOD PRESSURE: 143 MMHG | TEMPERATURE: 98.2 F | RESPIRATION RATE: 18 BRPM | DIASTOLIC BLOOD PRESSURE: 84 MMHG | OXYGEN SATURATION: 100 %

## 2019-10-18 PROCEDURE — P9040 RBC LEUKOREDUCED IRRADIATED: HCPCS

## 2019-10-18 PROCEDURE — 74011250636 HC RX REV CODE- 250/636: Performed by: INTERNAL MEDICINE

## 2019-10-18 PROCEDURE — 77030013169 SET IV BLD ICUM -A

## 2019-10-18 PROCEDURE — 36430 TRANSFUSION BLD/BLD COMPNT: CPT

## 2019-10-18 PROCEDURE — 74011250637 HC RX REV CODE- 250/637: Performed by: INTERNAL MEDICINE

## 2019-10-18 PROCEDURE — C1726 CATH, BAL DIL, NON-VASCULAR: HCPCS

## 2019-10-18 PROCEDURE — 77030012965 HC NDL HUBR BBMI -A

## 2019-10-18 RX ORDER — ACETAMINOPHEN 325 MG/1
650 TABLET ORAL ONCE
Status: COMPLETED | OUTPATIENT
Start: 2019-10-18 | End: 2019-10-18

## 2019-10-18 RX ORDER — HYDROCORTISONE SODIUM SUCCINATE 100 MG/2ML
100 INJECTION, POWDER, FOR SOLUTION INTRAMUSCULAR; INTRAVENOUS
Status: DISCONTINUED | OUTPATIENT
Start: 2019-10-18 | End: 2019-10-19 | Stop reason: HOSPADM

## 2019-10-18 RX ORDER — DIPHENHYDRAMINE HYDROCHLORIDE 50 MG/ML
25 INJECTION, SOLUTION INTRAMUSCULAR; INTRAVENOUS
Status: DISCONTINUED | OUTPATIENT
Start: 2019-10-18 | End: 2019-10-19 | Stop reason: HOSPADM

## 2019-10-18 RX ORDER — SODIUM CHLORIDE 9 MG/ML
250 INJECTION, SOLUTION INTRAVENOUS AS NEEDED
Status: DISCONTINUED | OUTPATIENT
Start: 2019-10-18 | End: 2019-10-22 | Stop reason: HOSPADM

## 2019-10-18 RX ORDER — HEPARIN 100 UNIT/ML
500 SYRINGE INTRAVENOUS ONCE
Status: COMPLETED | OUTPATIENT
Start: 2019-10-18 | End: 2019-10-18

## 2019-10-18 RX ORDER — SODIUM CHLORIDE 0.9 % (FLUSH) 0.9 %
10-40 SYRINGE (ML) INJECTION AS NEEDED
Status: DISCONTINUED | OUTPATIENT
Start: 2019-10-18 | End: 2019-10-22 | Stop reason: HOSPADM

## 2019-10-18 RX ORDER — DIPHENHYDRAMINE HCL 25 MG
25 CAPSULE ORAL ONCE
Status: COMPLETED | OUTPATIENT
Start: 2019-10-18 | End: 2019-10-18

## 2019-10-18 RX ADMIN — Medication 20 ML: at 15:03

## 2019-10-18 RX ADMIN — HEPARIN 500 UNITS: 100 SYRINGE at 15:04

## 2019-10-18 RX ADMIN — ACETAMINOPHEN 650 MG: 325 TABLET ORAL at 09:33

## 2019-10-18 RX ADMIN — Medication 20 ML: at 09:18

## 2019-10-18 RX ADMIN — DIPHENHYDRAMINE HYDROCHLORIDE 25 MG: 25 CAPSULE ORAL at 09:33

## 2019-12-02 ENCOUNTER — HOSPITAL ENCOUNTER (OUTPATIENT)
Dept: INFUSION THERAPY | Age: 59
Discharge: HOME | End: 2019-12-02
Payer: COMMERCIAL

## 2019-12-02 VITALS
OXYGEN SATURATION: 100 % | HEART RATE: 86 BPM | DIASTOLIC BLOOD PRESSURE: 69 MMHG | RESPIRATION RATE: 18 BRPM | TEMPERATURE: 99 F | SYSTOLIC BLOOD PRESSURE: 144 MMHG

## 2019-12-02 PROCEDURE — 86900 BLOOD TYPING SEROLOGIC ABO: CPT

## 2019-12-02 PROCEDURE — 86870 RBC ANTIBODY IDENTIFICATION: CPT

## 2019-12-02 PROCEDURE — 86920 COMPATIBILITY TEST SPIN: CPT

## 2019-12-02 PROCEDURE — 36415 COLL VENOUS BLD VENIPUNCTURE: CPT

## 2019-12-02 RX ORDER — SODIUM CHLORIDE 9 MG/ML
250 INJECTION, SOLUTION INTRAVENOUS AS NEEDED
Status: DISCONTINUED | OUTPATIENT
Start: 2019-12-02 | End: 2019-12-06 | Stop reason: HOSPADM

## 2019-12-02 NOTE — PROGRESS NOTES
[facility] OPIC Lab Visit:    Ghislaine Cordova  1960  [id number]    1200:  Pt arrived ambulatory. Labs drawn peripherally and sent for processing. Departed [facility] ambulatory and in no distress.     Visit Vitals  /69 (BP 1 Location: Left arm, BP Patient Position: Sitting)   Pulse 86   Temp 99 °F (37.2 °C)   Resp 18   SpO2 100%

## 2019-12-03 ENCOUNTER — HOSPITAL ENCOUNTER (OUTPATIENT)
Dept: INFUSION THERAPY | Age: 59
Discharge: HOME | End: 2019-12-03
Payer: COMMERCIAL

## 2019-12-03 VITALS
HEART RATE: 65 BPM | RESPIRATION RATE: 18 BRPM | OXYGEN SATURATION: 100 % | SYSTOLIC BLOOD PRESSURE: 147 MMHG | TEMPERATURE: 98.9 F | DIASTOLIC BLOOD PRESSURE: 73 MMHG

## 2019-12-03 PROCEDURE — 86921 COMPATIBILITY TEST INCUBATE: CPT

## 2019-12-03 PROCEDURE — 86922 COMPATIBILITY TEST ANTIGLOB: CPT

## 2019-12-03 PROCEDURE — 77030013169 SET IV BLD ICUM -A

## 2019-12-03 PROCEDURE — 74011250636 HC RX REV CODE- 250/636: Performed by: INTERNAL MEDICINE

## 2019-12-03 PROCEDURE — 36430 TRANSFUSION BLD/BLD COMPNT: CPT

## 2019-12-03 PROCEDURE — P9040 RBC LEUKOREDUCED IRRADIATED: HCPCS

## 2019-12-03 PROCEDURE — 74011250637 HC RX REV CODE- 250/637: Performed by: INTERNAL MEDICINE

## 2019-12-03 PROCEDURE — 77030012965 HC NDL HUBR BBMI -A

## 2019-12-03 PROCEDURE — 86902 BLOOD TYPE ANTIGEN DONOR EA: CPT

## 2019-12-03 PROCEDURE — 85660 RBC SICKLE CELL TEST: CPT

## 2019-12-03 PROCEDURE — 74011250636 HC RX REV CODE- 250/636

## 2019-12-03 RX ORDER — SODIUM CHLORIDE 0.9 % (FLUSH) 0.9 %
10-40 SYRINGE (ML) INJECTION AS NEEDED
Status: DISCONTINUED | OUTPATIENT
Start: 2019-12-03 | End: 2019-12-07 | Stop reason: HOSPADM

## 2019-12-03 RX ORDER — DIPHENHYDRAMINE HCL 25 MG
25 CAPSULE ORAL ONCE
Status: COMPLETED | OUTPATIENT
Start: 2019-12-03 | End: 2019-12-03

## 2019-12-03 RX ORDER — DIPHENHYDRAMINE HYDROCHLORIDE 50 MG/ML
25 INJECTION, SOLUTION INTRAMUSCULAR; INTRAVENOUS ONCE
Status: DISCONTINUED | OUTPATIENT
Start: 2019-12-03 | End: 2019-12-03

## 2019-12-03 RX ORDER — HYDROCORTISONE SODIUM SUCCINATE 100 MG/2ML
100 INJECTION, POWDER, FOR SOLUTION INTRAMUSCULAR; INTRAVENOUS ONCE
Status: DISCONTINUED | OUTPATIENT
Start: 2019-12-03 | End: 2019-12-03

## 2019-12-03 RX ORDER — ACETAMINOPHEN 325 MG/1
650 TABLET ORAL ONCE
Status: COMPLETED | OUTPATIENT
Start: 2019-12-03 | End: 2019-12-03

## 2019-12-03 RX ORDER — HEPARIN 100 UNIT/ML
SYRINGE INTRAVENOUS
Status: COMPLETED
Start: 2019-12-03 | End: 2019-12-03

## 2019-12-03 RX ORDER — SODIUM CHLORIDE 9 MG/ML
250 INJECTION, SOLUTION INTRAVENOUS AS NEEDED
Status: DISCONTINUED | OUTPATIENT
Start: 2019-12-03 | End: 2019-12-07 | Stop reason: HOSPADM

## 2019-12-03 RX ADMIN — HEPARIN 500 UNITS: 100 SYRINGE at 14:31

## 2019-12-03 RX ADMIN — DIPHENHYDRAMINE HYDROCHLORIDE 25 MG: 25 CAPSULE ORAL at 09:35

## 2019-12-03 RX ADMIN — Medication 10 ML: at 09:20

## 2019-12-03 RX ADMIN — ACETAMINOPHEN 650 MG: 325 TABLET ORAL at 09:35

## 2019-12-03 RX ADMIN — Medication 10 ML: at 14:30

## 2019-12-03 RX ADMIN — SODIUM CHLORIDE 250 ML: 9 INJECTION, SOLUTION INTRAVENOUS at 09:22

## 2019-12-03 NOTE — PROGRESS NOTES
[facility] OPIC Progress Note    Date: December 3, 2019    Name: Lisa Winslow    MRN: [id number]         : 1960      Mr. Ferna Litten arrived in the [facility] today at [phone], in stable condition, here for 2 units of PRBC. He was assessed and education was provided. General pain /10. He will get tylenol and benadryl pre blood transfusion    Mr. Yonny Loco vitals were reviewed. Visit Vitals  /73 (BP 1 Location: Left arm, BP Patient Position: At rest)   Pulse 65   Temp 98.9 °F (37.2 °C)   Resp 18   SpO2 100%       Signed blood permit on chart      Pre-medications consisting of Tylenol 650 mg PO & Benadryl 25 mg PO, were administered per order. His right chest single lumen port was accessed,, and brisk blood return was noted. 2 units of PRBC, were administered for approximately 2 hours each, per order, and without incident. Patient tolerated well. He declined to stay for observation. He denied pain prior discharge. Port was flushed well per protocol with NS & Heparin, and then, the littlejohn needle was removed. Site s redness, swelling, bleeding or tenderness. Bandaid was applied. .     Patient Vitals for the past 8 hrs:   Temp Pulse Resp BP SpO2   19 1423 98.9 °F (37.2 °C) 65 18 147/73 100 %   19 1320 98.5 °F (36.9 °C) 67 18 143/75    19 1250 98.2 °F (36.8 °C) 65 18 148/73 100 %   19 1235 98.5 °F (36.9 °C) 63 18 128/77 99 %   19 1220 98.3 °F (36.8 °C) 62 18 123/75 100 %   19 1120 98.4 °F (36.9 °C) 67 16 124/72    19 1050 98.5 °F (36.9 °C) 64 18 121/73    19 1035 98.5 °F (36.9 °C) 66 18 123/72    19 1019 98.6 °F (37 °C) 67 18 125/73    19 0910 99.1 °F (37.3 °C) 72 18 126/73 96 %         Reviewed discharge instructions with patient. He verbalized understanding      Mr. Ferna Litten was discharged from [facility] 58 in stable condition at 1435 He has no further OPIC appointments scheduled at this time.   Yareli Ortiz RN  December 3, 2019  8:50 AM

## 2020-01-20 ENCOUNTER — HOSPITAL ENCOUNTER (OUTPATIENT)
Dept: INFUSION THERAPY | Age: 60
Discharge: HOME | End: 2020-01-20
Payer: COMMERCIAL

## 2020-01-20 PROCEDURE — 86900 BLOOD TYPING SEROLOGIC ABO: CPT

## 2020-01-20 PROCEDURE — 86920 COMPATIBILITY TEST SPIN: CPT

## 2020-01-20 PROCEDURE — 36415 COLL VENOUS BLD VENIPUNCTURE: CPT

## 2020-01-20 PROCEDURE — 86870 RBC ANTIBODY IDENTIFICATION: CPT

## 2020-01-20 RX ORDER — SODIUM CHLORIDE 9 MG/ML
250 INJECTION, SOLUTION INTRAVENOUS AS NEEDED
Status: DISCONTINUED | OUTPATIENT
Start: 2020-01-20 | End: 2020-01-24 | Stop reason: HOSPADM

## 2020-01-21 ENCOUNTER — HOSPITAL ENCOUNTER (OUTPATIENT)
Dept: INFUSION THERAPY | Age: 60
Discharge: HOME | End: 2020-01-21
Payer: COMMERCIAL

## 2020-01-21 VITALS
HEART RATE: 61 BPM | OXYGEN SATURATION: 100 % | DIASTOLIC BLOOD PRESSURE: 72 MMHG | SYSTOLIC BLOOD PRESSURE: 142 MMHG | TEMPERATURE: 98.6 F | RESPIRATION RATE: 18 BRPM

## 2020-01-21 PROCEDURE — 77030012965 HC NDL HUBR BBMI -A

## 2020-01-21 PROCEDURE — 36430 TRANSFUSION BLD/BLD COMPNT: CPT

## 2020-01-21 PROCEDURE — 74011250637 HC RX REV CODE- 250/637: Performed by: INTERNAL MEDICINE

## 2020-01-21 PROCEDURE — 74011250636 HC RX REV CODE- 250/636: Performed by: INTERNAL MEDICINE

## 2020-01-21 PROCEDURE — P9040 RBC LEUKOREDUCED IRRADIATED: HCPCS

## 2020-01-21 PROCEDURE — 85660 RBC SICKLE CELL TEST: CPT

## 2020-01-21 PROCEDURE — 86922 COMPATIBILITY TEST ANTIGLOB: CPT

## 2020-01-21 PROCEDURE — 86902 BLOOD TYPE ANTIGEN DONOR EA: CPT

## 2020-01-21 PROCEDURE — 77030013169 SET IV BLD ICUM -A

## 2020-01-21 PROCEDURE — 86921 COMPATIBILITY TEST INCUBATE: CPT

## 2020-01-21 RX ORDER — SODIUM CHLORIDE 0.9 % (FLUSH) 0.9 %
10-40 SYRINGE (ML) INJECTION AS NEEDED
Status: DISCONTINUED | OUTPATIENT
Start: 2020-01-21 | End: 2020-01-25 | Stop reason: HOSPADM

## 2020-01-21 RX ORDER — HEPARIN 100 UNIT/ML
500 SYRINGE INTRAVENOUS AS NEEDED
Status: DISCONTINUED | OUTPATIENT
Start: 2020-01-21 | End: 2020-01-25 | Stop reason: HOSPADM

## 2020-01-21 RX ORDER — DIPHENHYDRAMINE HCL 25 MG
25 CAPSULE ORAL ONCE
Status: COMPLETED | OUTPATIENT
Start: 2020-01-21 | End: 2020-01-21

## 2020-01-21 RX ORDER — ACETAMINOPHEN 325 MG/1
650 TABLET ORAL ONCE
Status: COMPLETED | OUTPATIENT
Start: 2020-01-21 | End: 2020-01-21

## 2020-01-21 RX ORDER — SODIUM CHLORIDE 9 MG/ML
25 INJECTION, SOLUTION INTRAVENOUS ONCE
Status: COMPLETED | OUTPATIENT
Start: 2020-01-21 | End: 2020-01-21

## 2020-01-21 RX ADMIN — Medication 10 ML: at 12:48

## 2020-01-21 RX ADMIN — Medication 10 ML: at 09:38

## 2020-01-21 RX ADMIN — ACETAMINOPHEN 650 MG: 325 TABLET ORAL at 09:30

## 2020-01-21 RX ADMIN — DIPHENHYDRAMINE HYDROCHLORIDE 25 MG: 25 CAPSULE ORAL at 09:30

## 2020-01-21 RX ADMIN — SODIUM CHLORIDE 25 ML/HR: 9 INJECTION, SOLUTION INTRAVENOUS at 09:38

## 2020-01-21 RX ADMIN — Medication 10 ML: at 15:09

## 2020-01-21 RX ADMIN — HEPARIN 500 UNITS: 100 SYRINGE at 15:09

## 2020-01-21 NOTE — PROGRESS NOTES
[facility] Progress [facility]    Date: 2020    Name: Sulaiman Green    MRN: [id number]         : 1960      2 UNITS PRBCs      Mr. Janna Vargas arrived to [facility] at [phone]. Mr. Janna Vargas was assessed and education was provided. Mr. Sandhya Mejia vitals were reviewed. Visit Vitals  /72 (BP 1 Location: Left arm, BP Patient Position: Sitting)   Pulse 74   Temp 99 °F (37.2 °C)   Resp 18   SpO2 95%       Pre-medications administered as ordered: Tylenol 650mg po & Benadryl 25mg po. Patient's R upper chest port accessed. Brisk blood return/ flushes without difficulty. NS infusing as ordered at [facility]. 2 units PRBCs administered as ordered followed by NS flush. Pt declined to stay for observation. No complaints. No s/sx of reaction. VSS. Flushed pt's port w/ heparin per order & de-accessed. Band-aid applied to site. Mr. Janna Vargas tolerated well without complaints. Pt armband and blood bank band removed & shredded. Mr. Janna Vargas was discharged from [place] [address] in stable condition at 1510. He has no future appointments scheduled with the [facility] at this time.     Ashly Amato RN  2020

## 2020-01-21 NOTE — PROGRESS NOTES
conducted an initial consultation and Spiritual Assessment for Johanny Hutchison, who is a 60 y.o.,male. Patient's Primary Language is: [place]. According to the patient's EMR Yarsanism Affiliation is: Renato Barreto. The reason the Patient came to the hospital is:   Patient Active Problem List    Diagnosis Date Noted    Recurrent right inguinal hernia 06/06/2019    Sickle cell anemia with crisis ([facility] [address] 11/07/2017    Benign obstructive jaundice 11/07/2017    Anemia 06/08/2016    Occult blood in stools 06/08/2016    Infected venous access port 06/05/2016    Sickle cell anemia (HCC) 06/02/2016    Fever 05/28/2016    Sickle cell anemia with pain (HCC) 04/06/2015    Cholangitis 04/06/2015    Sickle cell crisis ([facility] [address] 11/29/2014    Nosocomial pneumonia 11/29/2014    Biliary stone 07/23/2014    Abdominal pain, other specified site 07/23/2014    Sickle-cell anemia ([facility] [address] 08/30/2012    Cholestatic jaundice 08/30/2012        The  provided the following Interventions:  Initiated a relationship of care and support. Explored issues of delores, belief, spirituality and Spiritism/ritual needs while hospitalized. Listened empathically. Provided chaplaincy education. Provided information about Spiritual Care Services. Offered prayer and assurance of continued prayers on patient's behalf. Chart reviewed. The following outcomes where achieved:  Patient shared limited information about both their medical narrative and spiritual journey/beliefs.  confirmed Patient's Yarsanism Affiliation. Patient processed feeling about current hospitalization. Patient expressed gratitude for 's visit. Assessment:  Patient does not have any Spiritism/cultural needs that will affect patient's preferences in health care. There are no spiritual or Spiritism issues which require intervention at this time.      Plan:  Chaplains will continue to follow and will provide pastoral care on an as needed/requested basis.  recommends bedside caregivers page  on duty if patient shows signs of acute spiritual or emotional distress.     [facility]   [phone]

## 2020-03-09 ENCOUNTER — HOSPITAL ENCOUNTER (OUTPATIENT)
Dept: INFUSION THERAPY | Age: 60
Discharge: HOME | End: 2020-03-09
Payer: COMMERCIAL

## 2020-03-09 VITALS
DIASTOLIC BLOOD PRESSURE: 71 MMHG | HEART RATE: 60 BPM | TEMPERATURE: 98.3 F | SYSTOLIC BLOOD PRESSURE: 131 MMHG | OXYGEN SATURATION: 98 % | RESPIRATION RATE: 18 BRPM

## 2020-03-09 PROCEDURE — 85660 RBC SICKLE CELL TEST: CPT

## 2020-03-09 PROCEDURE — 86860 RBC ANTIBODY ELUTION: CPT

## 2020-03-09 PROCEDURE — 86870 RBC ANTIBODY IDENTIFICATION: CPT

## 2020-03-09 PROCEDURE — 86900 BLOOD TYPING SEROLOGIC ABO: CPT

## 2020-03-09 PROCEDURE — 86904 BLOOD TYPING PATIENT SERUM: CPT

## 2020-03-09 PROCEDURE — 36415 COLL VENOUS BLD VENIPUNCTURE: CPT

## 2020-03-09 PROCEDURE — 86850 RBC ANTIBODY SCREEN: CPT

## 2020-03-09 PROCEDURE — 86921 COMPATIBILITY TEST INCUBATE: CPT

## 2020-03-09 PROCEDURE — 86902 BLOOD TYPE ANTIGEN DONOR EA: CPT

## 2020-03-09 PROCEDURE — 86920 COMPATIBILITY TEST SPIN: CPT

## 2020-03-09 PROCEDURE — 86922 COMPATIBILITY TEST ANTIGLOB: CPT

## 2020-03-09 PROCEDURE — 86901 BLOOD TYPING SEROLOGIC RH(D): CPT

## 2020-03-09 PROCEDURE — 86880 COOMBS TEST DIRECT: CPT

## 2020-03-09 RX ORDER — SODIUM CHLORIDE 9 MG/ML
250 INJECTION, SOLUTION INTRAVENOUS AS NEEDED
Status: DISCONTINUED | OUTPATIENT
Start: 2020-03-09 | End: 2020-03-13 | Stop reason: HOSPADM

## 2020-03-09 NOTE — PROGRESS NOTES
1355-- To [facility] ambulatory for type and cross without distress. Blood sample obtained on 2nd attempt to left FA for T&C. Solomon arm band applied. Patient given instructions. He was discharged in stable condition.  He is to return tomorrow for his blood transfusion appointment

## 2020-03-11 ENCOUNTER — HOSPITAL ENCOUNTER (OUTPATIENT)
Dept: INFUSION THERAPY | Age: 60
Discharge: HOME | End: 2020-03-11
Payer: COMMERCIAL

## 2020-03-11 VITALS
RESPIRATION RATE: 18 BRPM | HEART RATE: 66 BPM | OXYGEN SATURATION: 99 % | DIASTOLIC BLOOD PRESSURE: 71 MMHG | TEMPERATURE: 98.5 F | SYSTOLIC BLOOD PRESSURE: 133 MMHG

## 2020-03-11 PROCEDURE — 74011250637 HC RX REV CODE- 250/637: Performed by: INTERNAL MEDICINE

## 2020-03-11 PROCEDURE — 74011250636 HC RX REV CODE- 250/636

## 2020-03-11 PROCEDURE — P9040 RBC LEUKOREDUCED IRRADIATED: HCPCS

## 2020-03-11 PROCEDURE — 74011250636 HC RX REV CODE- 250/636: Performed by: INTERNAL MEDICINE

## 2020-03-11 PROCEDURE — 77030013169 SET IV BLD ICUM -A

## 2020-03-11 PROCEDURE — 36430 TRANSFUSION BLD/BLD COMPNT: CPT

## 2020-03-11 RX ORDER — HEPARIN 100 UNIT/ML
SYRINGE INTRAVENOUS
Status: COMPLETED
Start: 2020-03-11 | End: 2020-03-11

## 2020-03-11 RX ORDER — DIPHENHYDRAMINE HCL 25 MG
25 CAPSULE ORAL ONCE
Status: COMPLETED | OUTPATIENT
Start: 2020-03-11 | End: 2020-03-11

## 2020-03-11 RX ORDER — SODIUM CHLORIDE 9 MG/ML
250 INJECTION, SOLUTION INTRAVENOUS AS NEEDED
Status: DISCONTINUED | OUTPATIENT
Start: 2020-03-11 | End: 2020-03-14 | Stop reason: HOSPADM

## 2020-03-11 RX ORDER — ACETAMINOPHEN 325 MG/1
650 TABLET ORAL ONCE
Status: COMPLETED | OUTPATIENT
Start: 2020-03-11 | End: 2020-03-11

## 2020-03-11 RX ADMIN — HEPARIN 500 UNITS: 100 SYRINGE at 14:57

## 2020-03-11 RX ADMIN — SODIUM CHLORIDE 250 ML: 9 INJECTION, SOLUTION INTRAVENOUS at 08:40

## 2020-03-11 RX ADMIN — DIPHENHYDRAMINE HYDROCHLORIDE 25 MG: 25 CAPSULE ORAL at 08:20

## 2020-03-11 RX ADMIN — ACETAMINOPHEN 650 MG: 325 TABLET ORAL at 08:20

## 2020-03-11 NOTE — PROGRESS NOTES
[facility] OPIC Progress Note    Date: 2020    Name: Juan Doll    MRN: [id number]         : 1960      Mr. Steve Meier arrived to [facility] ambulatory, with c/o pain in his bLE due to a pinch nerve. Patient stated he did not take any pain medications. Mr. Steve Meier was assessed and education was provided. Discussed risks and benefits of blood transfusion with patient, including risk of transfusion reaction and disease transmission. Patient verbalized understanding and signed consent placed on chart. Mr. Elizabeth Boothe vitals were reviewed. Visit Vitals  /66 (BP 1 Location: Left arm, BP Patient Position: Sitting)   Pulse 77   Temp 98.2 °F (36.8 °C)   Resp 18   SpO2 97%     Right upper chest port accessed with 22 G 1 inch, Brisk blood return and flush without difficulty. Normal saline initiated at [facility]. Pre-medications (Tylenol 650 mg and Benadryl 25 mg) were administered as ordered. First unit of two ordered units of PRBCs initiated @ 75 ml/hr at 0858. Fifteen minutes into infusion, VS stable and pt denied c/o SOB, itching/hives, lip/tongue/facial swelling, CP or other complaints. Infusion rate increased to 100 ml/hr. Fifteen minutes later, VS stable and pt denied complaints; infusion rate increased to 150 ml/hr for the remainder of the transfusion. Unit finished @ 1155. VS stable and no transfusion reaction suspected. Second unit of two ordered units of PRBCs initiated @ 75 ml/hr at 1205. Fifteen minutes into infusion, VS stable and pt denied c/o SOB, itching/hives, lip/tongue/facial swelling, CP or other complaints. Infusion rate increased to 100 ml/hr. Fifteen minutes later, VS stable and pt denied complaints; infusion rate increased to 150 ml/hr for the remainder of the transfusion. Unit finished @ 1450. VS stable and no transfusion reaction suspected. Mr. Steve Meier tolerated infusion without complaints. IV removed. No irritation, bleeding, or hematoma noted at site.  Gauze and coban applied to site. Patient declined to remained in [facility] for observation . No signs of allergic reaction noted. Discharge instructions reviewed with pt. Pt instructed to report SOB, CP, elevated temp, back pain, or other symptoms of transfusion reaction to MD or ED. Pt verbalized understanding. Patient armband removed and shredded    Mr. Jaz Malagon was discharged from [place] [address] in stable condition at 1505. He is to follow up with his PCP.     Kaleb Todd RN  March 11, 2020

## 2020-05-15 ENCOUNTER — HOSPITAL ENCOUNTER (OUTPATIENT)
Dept: INFUSION THERAPY | Age: 60
Discharge: HOME | End: 2020-05-15
Payer: COMMERCIAL

## 2020-05-15 VITALS
HEART RATE: 86 BPM | DIASTOLIC BLOOD PRESSURE: 69 MMHG | SYSTOLIC BLOOD PRESSURE: 125 MMHG | OXYGEN SATURATION: 94 % | TEMPERATURE: 99.4 F | RESPIRATION RATE: 18 BRPM

## 2020-05-15 PROCEDURE — 86902 BLOOD TYPE ANTIGEN DONOR EA: CPT

## 2020-05-15 PROCEDURE — 86920 COMPATIBILITY TEST SPIN: CPT

## 2020-05-15 PROCEDURE — 86921 COMPATIBILITY TEST INCUBATE: CPT

## 2020-05-15 PROCEDURE — 86870 RBC ANTIBODY IDENTIFICATION: CPT

## 2020-05-15 PROCEDURE — 86900 BLOOD TYPING SEROLOGIC ABO: CPT

## 2020-05-15 PROCEDURE — 85660 RBC SICKLE CELL TEST: CPT

## 2020-05-15 PROCEDURE — 86922 COMPATIBILITY TEST ANTIGLOB: CPT

## 2020-05-15 PROCEDURE — 36415 COLL VENOUS BLD VENIPUNCTURE: CPT

## 2020-05-15 RX ORDER — SODIUM CHLORIDE 9 MG/ML
250 INJECTION, SOLUTION INTRAVENOUS AS NEEDED
Status: DISCONTINUED | OUTPATIENT
Start: 2020-05-15 | End: 2020-05-19 | Stop reason: HOSPADM

## 2020-05-15 NOTE — PROGRESS NOTES
[address] Lab Visit:    Sulaiman Green  1960  [id number]    7002:  Pt arrived ambulatory. Labs drawn peripherally in left ac and sent for processing. Pt is scheduled to return for 2 units of RBC's on 5/18/20. Departed [facility]IC ambulatory and in no distress.     Visit Vitals  /69 (BP 1 Location: Left arm, BP Patient Position: Sitting)   Pulse 86   Temp 99.4 °F (37.4 °C)   Resp 18   SpO2 94%

## 2020-05-18 ENCOUNTER — HOSPITAL ENCOUNTER (OUTPATIENT)
Dept: INFUSION THERAPY | Age: 60
Discharge: HOME | End: 2020-05-18
Payer: COMMERCIAL

## 2020-05-18 VITALS
OXYGEN SATURATION: 98 % | HEART RATE: 62 BPM | RESPIRATION RATE: 16 BRPM | SYSTOLIC BLOOD PRESSURE: 145 MMHG | DIASTOLIC BLOOD PRESSURE: 75 MMHG | TEMPERATURE: 98.4 F

## 2020-05-18 PROCEDURE — 74011250636 HC RX REV CODE- 250/636

## 2020-05-18 PROCEDURE — 77030013169 SET IV BLD ICUM -A

## 2020-05-18 PROCEDURE — 96375 TX/PRO/DX INJ NEW DRUG ADDON: CPT

## 2020-05-18 PROCEDURE — 96374 THER/PROPH/DIAG INJ IV PUSH: CPT

## 2020-05-18 PROCEDURE — 74011250637 HC RX REV CODE- 250/637: Performed by: INTERNAL MEDICINE

## 2020-05-18 PROCEDURE — P9040 RBC LEUKOREDUCED IRRADIATED: HCPCS

## 2020-05-18 PROCEDURE — 74011250636 HC RX REV CODE- 250/636: Performed by: INTERNAL MEDICINE

## 2020-05-18 PROCEDURE — 36430 TRANSFUSION BLD/BLD COMPNT: CPT

## 2020-05-18 PROCEDURE — 77030012965 HC NDL HUBR BBMI -A

## 2020-05-18 RX ORDER — DIPHENHYDRAMINE HYDROCHLORIDE 50 MG/ML
25 INJECTION, SOLUTION INTRAMUSCULAR; INTRAVENOUS ONCE
Status: COMPLETED | OUTPATIENT
Start: 2020-05-18 | End: 2020-05-18

## 2020-05-18 RX ORDER — SODIUM CHLORIDE 9 MG/ML
250 INJECTION, SOLUTION INTRAVENOUS AS NEEDED
Status: DISCONTINUED | OUTPATIENT
Start: 2020-05-18 | End: 2020-05-22 | Stop reason: HOSPADM

## 2020-05-18 RX ORDER — HEPARIN 100 UNIT/ML
500 SYRINGE INTRAVENOUS ONCE
Status: COMPLETED | OUTPATIENT
Start: 2020-05-18 | End: 2020-05-18

## 2020-05-18 RX ORDER — ACETAMINOPHEN 325 MG/1
650 TABLET ORAL ONCE
Status: COMPLETED | OUTPATIENT
Start: 2020-05-18 | End: 2020-05-18

## 2020-05-18 RX ORDER — SODIUM CHLORIDE 0.9 % (FLUSH) 0.9 %
10-40 SYRINGE (ML) INJECTION EVERY 8 HOURS
Status: DISCONTINUED | OUTPATIENT
Start: 2020-05-18 | End: 2020-05-22 | Stop reason: HOSPADM

## 2020-05-18 RX ORDER — HEPARIN 100 UNIT/ML
SYRINGE INTRAVENOUS
Status: COMPLETED
Start: 2020-05-18 | End: 2020-05-18

## 2020-05-18 RX ADMIN — HEPARIN 500 UNITS: 100 SYRINGE at 13:39

## 2020-05-18 RX ADMIN — Medication 10 ML: at 13:38

## 2020-05-18 RX ADMIN — DIPHENHYDRAMINE HYDROCHLORIDE 25 MG: 50 INJECTION, SOLUTION INTRAMUSCULAR; INTRAVENOUS at 08:29

## 2020-05-18 RX ADMIN — ACETAMINOPHEN 650 MG: 325 TABLET ORAL at 08:28

## 2020-05-18 NOTE — PROGRESS NOTES
[facility] OPIC Progress Note    Date: May 18, 2020    Name: Derek Carreno    MRN: [id number]         : 1960    2 units PRBCs     Mr. Dai Shaikh arrived to [facility] at [address] [facility] [address]. Mr. aDi Shaikh was assessed and education was provided. Mr. Zeke Moctezuma vitals were reviewed. Visit Vitals  /67   Pulse 63   Temp 98.4 °F (36.9 °C)   Resp 18   SpO2 99%       Right upper chest mediport accessed with 20g leija needle. Positive for blood return and flushes without difficulty. Pt tolerated well. Normal saline initiated at [facility]. Pre-medications were administered as ordered. 2 units of PRBCs administered as ordered followed by normal saline flush. Mr. Dai Shaikh tolerated infusion without complaints. Patient Vitals for the past 12 hrs:   Temp Pulse Resp BP SpO2   20 1307 98.4 °F (36.9 °C) 62 16 145/75 98 %   20 1205 98.2 °F (36.8 °C) 61 16 133/73 98 %   20 1135 98.3 °F (36.8 °C) (!) 58 16 130/68 100 %   20 1114 98.3 °F (36.8 °C) (!) 57 16 123/70 100 %   20 1048 98.4 °F (36.9 °C) 61 16 125/74 100 %   20 1004 98.4 °F (36.9 °C) 63 18 123/67 99 %   20 0935 98.4 °F (36.9 °C) 60 16 117/71 96 %   20 0916 98.4 °F (36.9 °C) 64 16 110/66 96 %   20 0855 98.7 °F (37.1 °C) 70 16 117/70 97 %   20 0817 98.5 °F (36.9 °C) 78 18 119/70 95 %       Mediport flushed with 20ml NS and 500 units Heparin. Leija needle removed. Band aid applied to site. Pt tolerated well. Discharge instructions reviewed with pt. Pt verbalized understanding. Pt had no complaints after 30 minutes of observation time. Mr. Dai Shaikh was discharged from [place] [address] in stable condition at 1340. He has no further appointments scheduled with [facility] at this time.      Petr Leon RN  May 18, 2020

## 2020-07-06 ENCOUNTER — HOSPITAL ENCOUNTER (OUTPATIENT)
Dept: INFUSION THERAPY | Age: 60
Discharge: HOME | End: 2020-07-06
Payer: COMMERCIAL

## 2020-07-06 VITALS
TEMPERATURE: 99 F | DIASTOLIC BLOOD PRESSURE: 65 MMHG | SYSTOLIC BLOOD PRESSURE: 133 MMHG | HEART RATE: 83 BPM | RESPIRATION RATE: 16 BRPM | OXYGEN SATURATION: 95 %

## 2020-07-06 PROCEDURE — 86900 BLOOD TYPING SEROLOGIC ABO: CPT

## 2020-07-06 PROCEDURE — 86922 COMPATIBILITY TEST ANTIGLOB: CPT

## 2020-07-06 PROCEDURE — 85660 RBC SICKLE CELL TEST: CPT

## 2020-07-06 PROCEDURE — 86921 COMPATIBILITY TEST INCUBATE: CPT

## 2020-07-06 PROCEDURE — 86901 BLOOD TYPING SEROLOGIC RH(D): CPT

## 2020-07-06 PROCEDURE — 86850 RBC ANTIBODY SCREEN: CPT

## 2020-07-06 PROCEDURE — 86870 RBC ANTIBODY IDENTIFICATION: CPT

## 2020-07-06 PROCEDURE — 86860 RBC ANTIBODY ELUTION: CPT

## 2020-07-06 PROCEDURE — 86902 BLOOD TYPE ANTIGEN DONOR EA: CPT

## 2020-07-06 PROCEDURE — 77030012965 HC NDL HUBR BBMI -A

## 2020-07-06 PROCEDURE — 86920 COMPATIBILITY TEST SPIN: CPT

## 2020-07-06 PROCEDURE — 86880 COOMBS TEST DIRECT: CPT

## 2020-07-06 PROCEDURE — 74011250636 HC RX REV CODE- 250/636: Performed by: INTERNAL MEDICINE

## 2020-07-06 PROCEDURE — 86904 BLOOD TYPING PATIENT SERUM: CPT

## 2020-07-06 PROCEDURE — 36591 DRAW BLOOD OFF VENOUS DEVICE: CPT

## 2020-07-06 RX ORDER — HEPARIN 100 UNIT/ML
500 SYRINGE INTRAVENOUS ONCE
Status: COMPLETED | OUTPATIENT
Start: 2020-07-06 | End: 2020-07-06

## 2020-07-06 RX ORDER — SODIUM CHLORIDE 0.9 % (FLUSH) 0.9 %
10-40 SYRINGE (ML) INJECTION AS NEEDED
Status: DISCONTINUED | OUTPATIENT
Start: 2020-07-06 | End: 2020-07-10 | Stop reason: HOSPADM

## 2020-07-06 RX ORDER — DIPHENHYDRAMINE HCL 25 MG
25 CAPSULE ORAL ONCE
Status: CANCELLED | OUTPATIENT
Start: 2020-07-07 | End: 2020-07-07

## 2020-07-06 RX ORDER — ACETAMINOPHEN 325 MG/1
650 TABLET ORAL ONCE
Status: CANCELLED | OUTPATIENT
Start: 2020-07-07 | End: 2020-07-07

## 2020-07-06 RX ADMIN — Medication 20 ML: at 10:30

## 2020-07-06 RX ADMIN — Medication 500 UNITS: at 10:35

## 2020-07-06 NOTE — PROGRESS NOTES
[facility] Progress Note    Date: 2020    Name: Glenn Castro    MRN: [id number]         : 1960    T/C    Mr. Xavier Parker to [facility], at 21  accompanied by self. Pt was assessed and education was provided. Mr. Carmelita Salinas vitals were reviewed. Visit Vitals  /65 (BP 1 Location: Right arm, BP Patient Position: Sitting)   Pulse 83   Temp 99 °F (37.2 °C)   Resp 16   SpO2 95%       Right chest mediport was accessed with 20 gauge 1 in littlejohn(s) after chloroprep under sterile process. Port flushed easily and had brisk blood return. Blood drawn off and sent to lab for T/C after 10 ml waste per written orders. No results found for this or any previous visit (from the past 12 hour(s)). Mediport flushed with NS 20 ml and Heparin 500 units then de-accessed. No irritation or bleeding noted. Band-Aid applied. Mr. Xavier Parker tolerated the procedure, and had no complaints. Patient armband removed and shredded. Mr. Xavier Parker was discharged from [place] [address] in stable condition at 1035. He is to return on 2020 at 0800 for his next appointment.     Maeve Hobson RN  2020  4:24 P

## 2020-07-07 ENCOUNTER — HOSPITAL ENCOUNTER (OUTPATIENT)
Dept: INFUSION THERAPY | Age: 60
Discharge: HOME | End: 2020-07-07
Payer: COMMERCIAL

## 2020-07-07 VITALS
DIASTOLIC BLOOD PRESSURE: 67 MMHG | TEMPERATURE: 99.9 F | OXYGEN SATURATION: 95 % | HEART RATE: 77 BPM | SYSTOLIC BLOOD PRESSURE: 118 MMHG | RESPIRATION RATE: 20 BRPM

## 2020-07-07 PROCEDURE — 74011250636 HC RX REV CODE- 250/636: Performed by: INTERNAL MEDICINE

## 2020-07-07 PROCEDURE — 96523 IRRIG DRUG DELIVERY DEVICE: CPT

## 2020-07-07 PROCEDURE — 74011250636 HC RX REV CODE- 250/636

## 2020-07-07 RX ORDER — SODIUM CHLORIDE 0.9 % (FLUSH) 0.9 %
10-40 SYRINGE (ML) INJECTION AS NEEDED
Status: DISCONTINUED | OUTPATIENT
Start: 2020-07-07 | End: 2020-07-07

## 2020-07-07 RX ORDER — SODIUM CHLORIDE 9 MG/ML
250 INJECTION, SOLUTION INTRAVENOUS AS NEEDED
Status: DISCONTINUED | OUTPATIENT
Start: 2020-07-07 | End: 2020-07-07

## 2020-07-07 RX ORDER — HEPARIN 100 UNIT/ML
SYRINGE INTRAVENOUS
Status: COMPLETED
Start: 2020-07-07 | End: 2020-07-07

## 2020-07-07 RX ADMIN — Medication 10 ML: at 08:30

## 2020-07-07 RX ADMIN — HEPARIN 500 UNITS: 100 SYRINGE at 09:35

## 2020-07-07 RX ADMIN — SODIUM CHLORIDE 250 ML: 9 INJECTION, SOLUTION INTRAVENOUS at 08:36

## 2020-07-07 RX ADMIN — Medication 10 ML: at 08:31

## 2020-07-07 NOTE — PROGRESS NOTES
[facility] OPIC Progress Note    Date: 2020    Name: Rafael Thornton    MRN: [id number]         : 1960    Blood transfusion (HELD)    Mr. Belle Nolan arrived to [facility] at [address] [facility] [address] Mr. Belle Nolan was assessed and education was provided. Patient signed blood transfusion consent after stating that he was made aware of risks, benefits and potential reactions by his physician and consents to receive blood products today. Mr. Christine Womack vitals were reviewed. Visit Vitals  /67 (BP 1 Location: Right arm, BP Patient Position: At rest;Sitting)   Pulse 77   Temp 99.9 °F (37.7 °C)   Resp 20   SpO2 95%     Right anterior chest wall mediport already accessed from 2020 visit. Blood return from port was verified and then port was flushed with 20 mL NS. Normal saline initiated at 25 mL/hr via port a cath. Per [facility] Blood bank, PRBCs not available from the [facility], as yet, and won't be available until late afternoon. Patient made aware and transfusion deferred until 2020 @ 0800. Port site secured and left accessed for procedure in the morning. Patient denied complaints and amenable to returning to [place] [address] on 2020. Mr. Belle Nolan was discharged from [place] [address] in stable condition at [address] [place] [address]. He is to return on  at 0800 for his next appointment.     Codi Barreto RN  2020

## 2020-07-08 ENCOUNTER — HOSPITAL ENCOUNTER (OUTPATIENT)
Dept: INFUSION THERAPY | Age: 60
Discharge: HOME | End: 2020-07-08
Payer: COMMERCIAL

## 2020-07-08 VITALS
RESPIRATION RATE: 16 BRPM | TEMPERATURE: 98 F | SYSTOLIC BLOOD PRESSURE: 150 MMHG | DIASTOLIC BLOOD PRESSURE: 72 MMHG | OXYGEN SATURATION: 95 % | HEART RATE: 60 BPM

## 2020-07-08 PROCEDURE — 74011250637 HC RX REV CODE- 250/637: Performed by: INTERNAL MEDICINE

## 2020-07-08 PROCEDURE — 77030013169 SET IV BLD ICUM -A

## 2020-07-08 PROCEDURE — P9040 RBC LEUKOREDUCED IRRADIATED: HCPCS

## 2020-07-08 PROCEDURE — 74011250636 HC RX REV CODE- 250/636: Performed by: INTERNAL MEDICINE

## 2020-07-08 PROCEDURE — 36430 TRANSFUSION BLD/BLD COMPNT: CPT

## 2020-07-08 RX ORDER — ACETAMINOPHEN 325 MG/1
650 TABLET ORAL ONCE
Status: COMPLETED | OUTPATIENT
Start: 2020-07-08 | End: 2020-07-08

## 2020-07-08 RX ORDER — DIPHENHYDRAMINE HCL 25 MG
25 CAPSULE ORAL ONCE
Status: CANCELLED | OUTPATIENT
Start: 2020-07-08 | End: 2020-07-08

## 2020-07-08 RX ORDER — SODIUM CHLORIDE 9 MG/ML
250 INJECTION, SOLUTION INTRAVENOUS AS NEEDED
Status: DISCONTINUED | OUTPATIENT
Start: 2020-07-08 | End: 2020-07-12 | Stop reason: HOSPADM

## 2020-07-08 RX ORDER — DIPHENHYDRAMINE HCL 25 MG
25 CAPSULE ORAL
Status: DISCONTINUED | OUTPATIENT
Start: 2020-07-08 | End: 2020-07-12 | Stop reason: HOSPADM

## 2020-07-08 RX ORDER — SODIUM CHLORIDE 0.9 % (FLUSH) 0.9 %
10-40 SYRINGE (ML) INJECTION AS NEEDED
Status: DISCONTINUED | OUTPATIENT
Start: 2020-07-08 | End: 2020-07-12 | Stop reason: HOSPADM

## 2020-07-08 RX ORDER — HEPARIN 100 UNIT/ML
500 SYRINGE INTRAVENOUS AS NEEDED
Status: DISCONTINUED | OUTPATIENT
Start: 2020-07-08 | End: 2020-07-12 | Stop reason: HOSPADM

## 2020-07-08 RX ORDER — ACETAMINOPHEN 325 MG/1
650 TABLET ORAL ONCE
Status: CANCELLED | OUTPATIENT
Start: 2020-07-08 | End: 2020-07-08

## 2020-07-08 RX ADMIN — ACETAMINOPHEN 650 MG: 325 TABLET, FILM COATED ORAL at 09:39

## 2020-07-08 RX ADMIN — SODIUM CHLORIDE 250 ML: 9 INJECTION, SOLUTION INTRAVENOUS at 13:25

## 2020-07-08 RX ADMIN — HEPARIN 500 UNITS: 100 SYRINGE at 16:08

## 2020-07-08 RX ADMIN — Medication 10 ML: at 09:23

## 2020-07-08 RX ADMIN — Medication 10 ML: at 16:07

## 2020-07-08 RX ADMIN — Medication 10 ML: at 16:06

## 2020-07-08 RX ADMIN — SODIUM CHLORIDE 250 ML: 9 INJECTION, SOLUTION INTRAVENOUS at 09:25

## 2020-07-08 RX ADMIN — DIPHENHYDRAMINE HYDROCHLORIDE 25 MG: 25 CAPSULE ORAL at 09:40

## 2020-07-08 NOTE — PROGRESS NOTES
[facility] Progress Note    Date: 2020    Name: Edmond Wiley    MRN: [id number]         : 1960      Mr. Maricruz Peñaloza arrived to [facility] at 5, ambulatory and in no acute distress. Patient did have complaint of pain, described as 8 out of 10 on pain scale, and chronic, to LLE, secondary to \"pinched nerve\", per patient report. Mr. Maricruz Peñaloza was assessed and education was provided. Discussed risks and benefits of blood transfusion with patient, including risk of transfusion reaction and disease transmission. Patient verbalized understanding and signed consent placed on chart. Mr. Corie Fuentes vitals were reviewed. Visit Vitals  /70 (BP 1 Location: Left arm)   Pulse (!) 59   Temp 98.1 °F (36.7 °C)   Resp 16   SpO2 95%       Right anterior chest wall mediport already accessed from 2020. Dressing clean, dry and intact to site. Port flushed easily and had brisk blood return. IV NS initiated @ 25 mL/hr via port-a-cath. .    Pre-medications (Tylenol 650 mg p.o and Benadryl 25 mg p.o) were administered as ordered. First unit of two ordered units of PRBCs initiated @ 75 mL/hr at 1035. Fifteen minutes into infusion, VS stable and pt denied c/o SOB, itching/hives, lip/tongue/facial swelling, CP or other complaints. Infusion rate increased to 100 mL/hr. Fifteen minutes later, VS stable and pt denied complaints; infusion rate increased to 150 mL/hr for the remainder of the transfusion. Unit finished @ . VS stable and no transfusion reaction suspected. New bag of IV NS initiated @ 25 mL/hr. Second unit of two ordered units of PRBCs initiated @ 75 mL/hr at 1345. Fifteen minutes into infusion, VS stable and pt denied c/o SOB, itching/hives, lip/tongue/facial swelling, CP or other complaints. Infusion rate increased to 100 mL/hr. Fifteen minutes later, VS stable and pt denied complaints; infusion rate increased to 150 L/hr for the remainder of the transfusion. Unit finished @ 1600.  VS stable and no transfusion reaction suspected. Patient Vitals for the past 12 hrs:   Temp Pulse Resp BP SpO2   07/08/20 1600 98 °F (36.7 °C) 60 16 150/72    07/08/20 1545 97.9 °F (36.6 °C) (!) 56 16 136/75    07/08/20 1445 98.2 °F (36.8 °C) (!) 53 18 135/66    07/08/20 1415 97.1 °F (36.2 °C) (!) 55 18 132/69    07/08/20 1400 97.8 °F (36.6 °C) (!) 56 18 126/71    07/08/20 1335 98.1 °F (36.7 °C) (!) 59 16 128/70    07/08/20 1305 98.2 °F (36.8 °C) (!) 58 16 110/64    07/08/20 1235 98.2 °F (36.8 °C) 60 16 110/63    07/08/20 1135 98.2 °F (36.8 °C) (!) 57 16 108/60    07/08/20 1105 97.5 °F (36.4 °C) (!) 56 18 103/61    07/08/20 1050 98.8 °F (37.1 °C) 62 20 109/61 95 %   07/08/20 0918 99.7 °F (37.6 °C) 75 20 127/68 95 %       Mr. Dawit Peterson tolerated transfusion without complaints or adverse effect. Mediport flushed with 20 mL NS, followed by instillation of Heparin 500 units/5mL and then deaccessed with needle removed intact. No evidence of complication noted to site. Bandaid applied. Patient declined to remain in [facility] for 30 minutes, post transfusion, for observation. No signs of allergic reaction noted. Discharge instructions reviewed with pt. Pt instructed to report SOB, CP, elevated temp, back pain, or other symptoms of transfusion reaction to MD or ED. Pt verbalized understanding. Patient armband removed and shredded    Mr. Dawit Peterson was discharged from [place] [address] in stable condition at 1610. He is to follow up with his physician as needed.      Porfirio Agudelo RN  July 8, 2020

## 2020-08-07 ENCOUNTER — HOSPITAL ENCOUNTER (OUTPATIENT)
Dept: INFUSION THERAPY | Age: 60
Discharge: HOME | End: 2020-08-07
Payer: COMMERCIAL

## 2020-08-07 VITALS
RESPIRATION RATE: 18 BRPM | SYSTOLIC BLOOD PRESSURE: 140 MMHG | TEMPERATURE: 99.3 F | OXYGEN SATURATION: 97 % | DIASTOLIC BLOOD PRESSURE: 73 MMHG | HEART RATE: 78 BPM

## 2020-08-07 PROCEDURE — 86921 COMPATIBILITY TEST INCUBATE: CPT

## 2020-08-07 PROCEDURE — 86870 RBC ANTIBODY IDENTIFICATION: CPT

## 2020-08-07 PROCEDURE — 36415 COLL VENOUS BLD VENIPUNCTURE: CPT

## 2020-08-07 PROCEDURE — 86920 COMPATIBILITY TEST SPIN: CPT

## 2020-08-07 PROCEDURE — 86922 COMPATIBILITY TEST ANTIGLOB: CPT

## 2020-08-07 PROCEDURE — 86900 BLOOD TYPING SEROLOGIC ABO: CPT

## 2020-08-07 RX ORDER — SODIUM CHLORIDE 9 MG/ML
250 INJECTION, SOLUTION INTRAVENOUS AS NEEDED
Status: DISCONTINUED | OUTPATIENT
Start: 2020-08-07 | End: 2020-08-11 | Stop reason: HOSPADM

## 2020-08-07 RX ORDER — ACETAMINOPHEN 325 MG/1
650 TABLET ORAL ONCE
Status: CANCELLED | OUTPATIENT
Start: 2020-08-10 | End: 2020-08-10

## 2020-08-07 RX ORDER — DIPHENHYDRAMINE HCL 25 MG
25 CAPSULE ORAL ONCE
Status: CANCELLED | OUTPATIENT
Start: 2020-08-10 | End: 2020-08-10

## 2020-08-07 NOTE — PROGRESS NOTES
[address] Lab Visit:    Kar Vasquez  1960  [id number]    3960:  Pt arrived ambulatory. Labs drawn peripherally in left ac and sent for processing. Pt is scheduled to return on 8/10/20 for 2 units of RBC's. Departed [facility] ambulatory and in no distress.     Visit Vitals  /73 (BP 1 Location: Right arm, BP Patient Position: Sitting)   Pulse 78   Temp 99.3 °F (37.4 °C)   Resp 18   SpO2 97%

## 2020-08-10 ENCOUNTER — HOSPITAL ENCOUNTER (OUTPATIENT)
Dept: INFUSION THERAPY | Age: 60
Discharge: HOME | End: 2020-08-10
Payer: COMMERCIAL

## 2020-08-10 VITALS
HEART RATE: 67 BPM | RESPIRATION RATE: 16 BRPM | TEMPERATURE: 97.9 F | OXYGEN SATURATION: 96 % | DIASTOLIC BLOOD PRESSURE: 74 MMHG | SYSTOLIC BLOOD PRESSURE: 120 MMHG

## 2020-08-10 PROCEDURE — 74011250637 HC RX REV CODE- 250/637: Performed by: INTERNAL MEDICINE

## 2020-08-10 PROCEDURE — 77030012965 HC NDL HUBR BBMI -A

## 2020-08-10 PROCEDURE — 36430 TRANSFUSION BLD/BLD COMPNT: CPT

## 2020-08-10 PROCEDURE — 77030013169 SET IV BLD ICUM -A

## 2020-08-10 PROCEDURE — 74011250636 HC RX REV CODE- 250/636: Performed by: INTERNAL MEDICINE

## 2020-08-10 PROCEDURE — 86902 BLOOD TYPE ANTIGEN DONOR EA: CPT

## 2020-08-10 PROCEDURE — 85660 RBC SICKLE CELL TEST: CPT

## 2020-08-10 PROCEDURE — 96374 THER/PROPH/DIAG INJ IV PUSH: CPT

## 2020-08-10 PROCEDURE — P9040 RBC LEUKOREDUCED IRRADIATED: HCPCS

## 2020-08-10 PROCEDURE — 96375 TX/PRO/DX INJ NEW DRUG ADDON: CPT

## 2020-08-10 RX ORDER — HEPARIN 100 UNIT/ML
500 SYRINGE INTRAVENOUS ONCE
Status: COMPLETED | OUTPATIENT
Start: 2020-08-10 | End: 2020-08-10

## 2020-08-10 RX ORDER — SODIUM CHLORIDE 0.9 % (FLUSH) 0.9 %
10-40 SYRINGE (ML) INJECTION EVERY 8 HOURS
Status: DISCONTINUED | OUTPATIENT
Start: 2020-08-10 | End: 2020-08-14 | Stop reason: HOSPADM

## 2020-08-10 RX ORDER — SODIUM CHLORIDE 9 MG/ML
250 INJECTION, SOLUTION INTRAVENOUS AS NEEDED
Status: DISCONTINUED | OUTPATIENT
Start: 2020-08-10 | End: 2020-08-14 | Stop reason: HOSPADM

## 2020-08-10 RX ORDER — ACETAMINOPHEN 325 MG/1
650 TABLET ORAL ONCE
Status: COMPLETED | OUTPATIENT
Start: 2020-08-10 | End: 2020-08-10

## 2020-08-10 RX ORDER — DIPHENHYDRAMINE HYDROCHLORIDE 50 MG/ML
25 INJECTION, SOLUTION INTRAMUSCULAR; INTRAVENOUS ONCE
Status: COMPLETED | OUTPATIENT
Start: 2020-08-10 | End: 2020-08-10

## 2020-08-10 RX ORDER — HYDROCORTISONE SODIUM SUCCINATE 100 MG/2ML
100 INJECTION, POWDER, FOR SOLUTION INTRAMUSCULAR; INTRAVENOUS ONCE
Status: COMPLETED | OUTPATIENT
Start: 2020-08-10 | End: 2020-08-10

## 2020-08-10 RX ADMIN — Medication 20 ML: at 14:00

## 2020-08-10 RX ADMIN — HEPARIN 500 UNITS: 100 SYRINGE at 14:00

## 2020-08-10 RX ADMIN — HYDROCORTISONE SODIUM SUCCINATE 100 MG: 100 INJECTION, POWDER, FOR SOLUTION INTRAMUSCULAR; INTRAVENOUS at 08:55

## 2020-08-10 RX ADMIN — ACETAMINOPHEN 650 MG: 325 TABLET ORAL at 08:55

## 2020-08-10 RX ADMIN — Medication 20 ML: at 08:40

## 2020-08-10 RX ADMIN — SODIUM CHLORIDE 250 ML: 9 INJECTION, SOLUTION INTRAVENOUS at 08:45

## 2020-08-10 RX ADMIN — DIPHENHYDRAMINE HYDROCHLORIDE 25 MG: 50 INJECTION INTRAMUSCULAR; INTRAVENOUS at 08:55

## 2020-08-10 NOTE — PROGRESS NOTES
[facility] Progress Note    Date: August 10, 2020    Name: Elton Haque    MRN: [id number]         : 1960    2 units PRBCs     Mr. Shruti Martinez arrived to [facility]. Mr. Shruti Martinez was assessed and education was provided. Mr. Cheryl Agustin vitals were reviewed. Visit Vitals  /74 (BP 1 Location: Left arm, BP Patient Position: Sitting)   Pulse 67   Temp 97.9 °F (36.6 °C)   Resp 16   SpO2 96%       Right upper chest mediport accessed with 20g leija needle. Positive for blood return and flushes without difficulty. Pt tolerated well. Normal saline initiated at [facility]. Pre-medications were administered as ordered. Tylenol 650 mg po, Benadryl 25 mg IV and Solu-cortef 100 mg IV.     2 units of PRBCs administered as ordered followed by normal saline flush. Mr. Shruti Martinez tolerated infusion without complaints. Patient Vitals for the past 12 hrs:   Temp Pulse Resp BP SpO2   08/10/20 1355 97.9 °F (36.6 °C) 67 16 120/74 96 %   08/10/20 1250 97.6 °F (36.4 °C) 67 16 132/76 99 %   08/10/20 1220 97.5 °F (36.4 °C) 65 16 126/75 98 %   08/10/20 1205 97.5 °F (36.4 °C) 62 16 138/65 98 %   08/10/20 1150 97.8 °F (36.6 °C) (!) 57 18 119/68 98 %   08/10/20 1140 97.8 °F (36.6 °C) (!) 58 18 120/73 98 %   08/10/20 1030 97.3 °F (36.3 °C) 75 18 121/73 98 %   08/10/20 1000 97.7 °F (36.5 °C) 61 18 118/69 96 %   08/10/20 0945 97.6 °F (36.4 °C) 64 18 113/66 97 %   08/10/20 0930 98 °F (36.7 °C) 66 18 117/68 97 %   08/10/20 0815 98.6 °F (37 °C) 76 18 105/61 96 %       Mediport flushed with 20ml NS and 500 units Heparin. Leija needle removed. Band aid applied to site. Pt tolerated well. Discharge instructions reviewed with pt. Pt verbalized understanding. Pt had no complaints, declined observation time. Mr. Shruti Martinez was discharged from [place] [address] in stable condition at 1400. He has no further appointments scheduled with [facility] at this time.        [facility] Kirill  August 10, 2020

## 2020-09-11 ENCOUNTER — HOSPITAL ENCOUNTER (OUTPATIENT)
Dept: INFUSION THERAPY | Age: 60
Discharge: HOME | End: 2020-09-11
Payer: COMMERCIAL

## 2020-09-11 VITALS
RESPIRATION RATE: 20 BRPM | HEART RATE: 72 BPM | DIASTOLIC BLOOD PRESSURE: 72 MMHG | SYSTOLIC BLOOD PRESSURE: 125 MMHG | OXYGEN SATURATION: 96 % | TEMPERATURE: 99.8 F

## 2020-09-11 LAB — HISTORY CHECKED?,CKHIST: NORMAL

## 2020-09-11 PROCEDURE — 86920 COMPATIBILITY TEST SPIN: CPT

## 2020-09-11 PROCEDURE — 85660 RBC SICKLE CELL TEST: CPT

## 2020-09-11 PROCEDURE — 36415 COLL VENOUS BLD VENIPUNCTURE: CPT

## 2020-09-11 PROCEDURE — 86902 BLOOD TYPE ANTIGEN DONOR EA: CPT

## 2020-09-11 PROCEDURE — 86870 RBC ANTIBODY IDENTIFICATION: CPT

## 2020-09-11 PROCEDURE — 86921 COMPATIBILITY TEST INCUBATE: CPT

## 2020-09-11 PROCEDURE — 86900 BLOOD TYPING SEROLOGIC ABO: CPT

## 2020-09-11 PROCEDURE — 86922 COMPATIBILITY TEST ANTIGLOB: CPT

## 2020-09-11 RX ORDER — HYDROCORTISONE SODIUM SUCCINATE 100 MG/2ML
100 INJECTION, POWDER, FOR SOLUTION INTRAMUSCULAR; INTRAVENOUS
Status: CANCELLED | OUTPATIENT
Start: 2020-09-14 | End: 2020-09-15

## 2020-09-11 RX ORDER — DIPHENHYDRAMINE HCL 25 MG
25 CAPSULE ORAL ONCE
Status: CANCELLED | OUTPATIENT
Start: 2020-09-14 | End: 2020-09-14

## 2020-09-11 RX ORDER — DIPHENHYDRAMINE HYDROCHLORIDE 50 MG/ML
25 INJECTION, SOLUTION INTRAMUSCULAR; INTRAVENOUS
Status: CANCELLED | OUTPATIENT
Start: 2020-09-14 | End: 2020-09-15

## 2020-09-11 RX ORDER — ACETAMINOPHEN 325 MG/1
650 TABLET ORAL ONCE
Status: CANCELLED | OUTPATIENT
Start: 2020-09-14 | End: 2020-09-14

## 2020-09-11 NOTE — PROGRESS NOTES
[address] [facility] Progress Note    Date: 2020    Name: Cherelle Luna    MRN: [id number]         : 1960    Peripheral Lab Draw      Mr. Molina Palomino to [facility], ambulatory at 1330. Pt was assessed and education was provided. Mr. Maira Duarte vitals were reviewed and patient was observed for 5 minutes prior to treatment. Visit Vitals  /72 (BP 1 Location: Left arm, BP Patient Position: At rest;Sitting)   Pulse 72   Temp 99.8 °F (37.7 °C)   Resp 20   SpO2 96%       Blood obtained peripherally from RAC x 1 attempt with butterfly needle and sent to lab for Type and screen, pending  transfusion of 2 units PRBCs,  per written orders. No bleeding or hematoma noted at site. Gauze and coban applied. Mr. Molina Palomino tolerated the phlebotomy, and had no complaints. Patient armband removed and shredded. Mr. Molina Palomino was discharged from [place] [address] in stable condition at [phone]. He is to return on 2020 at 0800  for blood transfusion appointment.     Mejia Johnson RN  2020  4:33 PM
Yes

## 2020-09-14 ENCOUNTER — HOSPITAL ENCOUNTER (OUTPATIENT)
Dept: INFUSION THERAPY | Age: 60
Discharge: HOME | End: 2020-09-14
Payer: COMMERCIAL

## 2020-09-14 VITALS
OXYGEN SATURATION: 96 % | HEART RATE: 65 BPM | DIASTOLIC BLOOD PRESSURE: 66 MMHG | RESPIRATION RATE: 18 BRPM | SYSTOLIC BLOOD PRESSURE: 122 MMHG | TEMPERATURE: 98.2 F

## 2020-09-14 PROCEDURE — P9040 RBC LEUKOREDUCED IRRADIATED: HCPCS

## 2020-09-14 PROCEDURE — 96375 TX/PRO/DX INJ NEW DRUG ADDON: CPT

## 2020-09-14 PROCEDURE — 77030012965 HC NDL HUBR BBMI -A

## 2020-09-14 PROCEDURE — 74011250636 HC RX REV CODE- 250/636: Performed by: INTERNAL MEDICINE

## 2020-09-14 PROCEDURE — 74011250637 HC RX REV CODE- 250/637: Performed by: INTERNAL MEDICINE

## 2020-09-14 PROCEDURE — 77030013169 SET IV BLD ICUM -A

## 2020-09-14 PROCEDURE — 36430 TRANSFUSION BLD/BLD COMPNT: CPT

## 2020-09-14 PROCEDURE — 96374 THER/PROPH/DIAG INJ IV PUSH: CPT

## 2020-09-14 RX ORDER — HYDROCORTISONE SODIUM SUCCINATE 100 MG/2ML
100 INJECTION, POWDER, FOR SOLUTION INTRAMUSCULAR; INTRAVENOUS ONCE
Status: COMPLETED | OUTPATIENT
Start: 2020-09-14 | End: 2020-09-14

## 2020-09-14 RX ORDER — SODIUM CHLORIDE 0.9 % (FLUSH) 0.9 %
10-40 SYRINGE (ML) INJECTION EVERY 8 HOURS
Status: DISCONTINUED | OUTPATIENT
Start: 2020-09-14 | End: 2020-09-18 | Stop reason: HOSPADM

## 2020-09-14 RX ORDER — DIPHENHYDRAMINE HYDROCHLORIDE 50 MG/ML
25 INJECTION, SOLUTION INTRAMUSCULAR; INTRAVENOUS ONCE
Status: COMPLETED | OUTPATIENT
Start: 2020-09-14 | End: 2020-09-14

## 2020-09-14 RX ORDER — ACETAMINOPHEN 325 MG/1
650 TABLET ORAL ONCE
Status: COMPLETED | OUTPATIENT
Start: 2020-09-14 | End: 2020-09-14

## 2020-09-14 RX ORDER — SODIUM CHLORIDE 9 MG/ML
250 INJECTION, SOLUTION INTRAVENOUS AS NEEDED
Status: DISCONTINUED | OUTPATIENT
Start: 2020-09-14 | End: 2020-09-18 | Stop reason: HOSPADM

## 2020-09-14 RX ORDER — HEPARIN 100 UNIT/ML
500 SYRINGE INTRAVENOUS ONCE
Status: COMPLETED | OUTPATIENT
Start: 2020-09-14 | End: 2020-09-14

## 2020-09-14 RX ADMIN — Medication 20 ML: at 14:09

## 2020-09-14 RX ADMIN — SODIUM CHLORIDE 250 ML: 9 INJECTION, SOLUTION INTRAVENOUS at 08:30

## 2020-09-14 RX ADMIN — HEPARIN 500 UNITS: 100 SYRINGE at 14:09

## 2020-09-14 RX ADMIN — DIPHENHYDRAMINE HYDROCHLORIDE 25 MG: 50 INJECTION INTRAMUSCULAR; INTRAVENOUS at 08:35

## 2020-09-14 RX ADMIN — ACETAMINOPHEN 650 MG: 325 TABLET ORAL at 08:34

## 2020-09-14 RX ADMIN — HYDROCORTISONE SODIUM SUCCINATE 100 MG: 100 INJECTION, POWDER, FOR SOLUTION INTRAMUSCULAR; INTRAVENOUS at 08:35

## 2020-09-14 NOTE — PROGRESS NOTES
[facility] Progress Note    Date: 2020    Name: Edmond Wiley    MRN: [id number]         : 1960    2 units PRBCs     Mr. Maricruz Peñaloza arrived to [facility] at [phone]. Mr. Maricruz Peñaloza was assessed and education was provided. Mr. Corie Fuentes vitals were reviewed. Visit Vitals  /66 (BP 1 Location: Left arm, BP Patient Position: At rest;Standing)   Pulse 65   Temp 98.2 °F (36.8 °C)   Resp 18   SpO2 96%       Right upper chest mediport accessed with 20g leija needle. Positive for blood return and flushes without difficulty. Pt tolerated well. Normal saline initiated at [facility]. Pre-medications were administered as ordered. Tylenol 650 mg po, Benadryl 25 mg IV and Solu-cortef 100 mg IV.     2 units of PRBCs administered as ordered followed by normal saline flush. Mr. Maricruz Peñaloza tolerated infusion without complaints. Patient Vitals for the past 12 hrs:   Temp Pulse Resp BP SpO2   20 1405 98.2 °F (36.8 °C) 65 18 122/66 96 %   20 1340 98.7 °F (37.1 °C) 67 18 123/71 100 %   20 1240 98.2 °F (36.8 °C) 65 18 115/69 97 %   20 1210 98.3 °F (36.8 °C) 63 18 112/64 98 %   20 1155 98.2 °F (36.8 °C) 62 18 113/66 96 %   20 1140 97.6 °F (36.4 °C) 82 18 113/67 97 %   20 1125 97.6 °F (36.4 °C) 61 18 110/60 96 %   20 1110 97.6 °F (36.4 °C) 62 18 110/65 96 %   20 1010 98.4 °F (36.9 °C) 65 18 (!) 99/58 96 %   20 0940 97.4 °F (36.3 °C) 68 20 99/60 97 %   20 0925 98.4 °F (36.9 °C) 70 20 (!) 101/57 95 %   20 0910 98.8 °F (37.1 °C) 78 20 (!) 101/59 96 %   20 0810 99.4 °F (37.4 °C) 89 20 108/64 95 %       Mediport flushed with 20ml NS and 500 units Heparin. Leija needle removed. Band aid applied to site. Pt tolerated well. Discharge instructions reviewed with pt. Pt verbalized understanding. Pt had no complaints, declined observation time. Mr. Maricruz Peñaloza was discharged from [facility] 58 in stable condition at [phone].   He has no further appointments scheduled with [facility] at this time.        Corina Hobson  September 14, 2020

## 2020-09-15 LAB
ABO + RH BLD: NORMAL
ANTIGENS PRESENT RBC DONR: NORMAL
ANTIGENS PRESENT RBC DONR: NORMAL
BLD PROD TYP BPU: NORMAL
BLD PROD TYP BPU: NORMAL
BLOOD GROUP ANTIBODIES SERPL: NORMAL
BLOOD GROUP ANTIBODIES SERPL: NORMAL
BPU ID: NORMAL
BPU ID: NORMAL
CROSSMATCH RESULT,%XM: NORMAL
CROSSMATCH RESULT,%XM: NORMAL
SPECIMEN EXP DATE BLD: NORMAL
STATUS OF UNIT,%ST: NORMAL
STATUS OF UNIT,%ST: NORMAL
UNIT DIVISION, %UDIV: 0
UNIT DIVISION, %UDIV: 0

## 2020-10-19 ENCOUNTER — HOSPITAL ENCOUNTER (OUTPATIENT)
Dept: INFUSION THERAPY | Age: 60
Discharge: HOME | End: 2020-10-19
Payer: COMMERCIAL

## 2020-10-19 VITALS
OXYGEN SATURATION: 95 % | DIASTOLIC BLOOD PRESSURE: 71 MMHG | SYSTOLIC BLOOD PRESSURE: 124 MMHG | HEART RATE: 74 BPM | TEMPERATURE: 98.4 F

## 2020-10-19 LAB — HISTORY CHECKED?,CKHIST: NORMAL

## 2020-10-19 PROCEDURE — 86902 BLOOD TYPE ANTIGEN DONOR EA: CPT

## 2020-10-19 PROCEDURE — 86900 BLOOD TYPING SEROLOGIC ABO: CPT

## 2020-10-19 PROCEDURE — 86920 COMPATIBILITY TEST SPIN: CPT

## 2020-10-19 PROCEDURE — 86922 COMPATIBILITY TEST ANTIGLOB: CPT

## 2020-10-19 PROCEDURE — 36415 COLL VENOUS BLD VENIPUNCTURE: CPT

## 2020-10-19 PROCEDURE — 85660 RBC SICKLE CELL TEST: CPT

## 2020-10-19 PROCEDURE — 86921 COMPATIBILITY TEST INCUBATE: CPT

## 2020-10-19 PROCEDURE — 86870 RBC ANTIBODY IDENTIFICATION: CPT

## 2020-10-19 NOTE — PROGRESS NOTES
[facility] Progress Note    Date: 2020    Name: Chaim Christie    MRN: [id number]         : 1960    Peripheral Lab Draw      Mr. Anastasia Bose to [facility], ambulatory at [phone] accompanied by SELF. Pt was assessed and education was provided. Mr. Annabella Truong vitals were reviewed and patient was observed for 5 minutes prior to treatment. Visit Vitals  /71 (BP 1 Location: Right arm, BP Patient Position: Sitting)   Pulse 74   Temp 98.4 °F (36.9 °C)   SpO2 95%   No results found for this or any previous visit (from the past 12 hour(s)). Blood obtained peripherally from right forearm third attempt with butterfly needle and sent to lab for Type and Cross per written orders. No bleeding or hematoma noted at site. Gauze and coban applied. Mr. Anastasia Bose tolerated the venipuncture, and had no complaints. Patient armband removed and shredded. Mr. Anastasia Bose was discharged from [place] [address] in stable condition at 1105.     Oscar Masters Phlebotomist PCT  2020  2:26 PM

## 2020-10-21 ENCOUNTER — HOSPITAL ENCOUNTER (OUTPATIENT)
Dept: INFUSION THERAPY | Age: 60
Discharge: HOME | End: 2020-10-21
Payer: COMMERCIAL

## 2020-10-21 VITALS
RESPIRATION RATE: 16 BRPM | SYSTOLIC BLOOD PRESSURE: 147 MMHG | DIASTOLIC BLOOD PRESSURE: 76 MMHG | OXYGEN SATURATION: 98 % | HEART RATE: 65 BPM | TEMPERATURE: 98.3 F

## 2020-10-21 PROCEDURE — 74011250636 HC RX REV CODE- 250/636: Performed by: INTERNAL MEDICINE

## 2020-10-21 PROCEDURE — 74011250637 HC RX REV CODE- 250/637: Performed by: INTERNAL MEDICINE

## 2020-10-21 PROCEDURE — P9040 RBC LEUKOREDUCED IRRADIATED: HCPCS

## 2020-10-21 PROCEDURE — 36430 TRANSFUSION BLD/BLD COMPNT: CPT

## 2020-10-21 PROCEDURE — 77030013169 SET IV BLD ICUM -A

## 2020-10-21 RX ORDER — SODIUM CHLORIDE 9 MG/ML
250 INJECTION, SOLUTION INTRAVENOUS AS NEEDED
Status: DISCONTINUED | OUTPATIENT
Start: 2020-10-21 | End: 2020-10-25 | Stop reason: HOSPADM

## 2020-10-21 RX ORDER — SODIUM CHLORIDE 0.9 % (FLUSH) 0.9 %
10-40 SYRINGE (ML) INJECTION AS NEEDED
Status: DISCONTINUED | OUTPATIENT
Start: 2020-10-21 | End: 2020-10-25 | Stop reason: HOSPADM

## 2020-10-21 RX ORDER — ACETAMINOPHEN 325 MG/1
650 TABLET ORAL ONCE
Status: COMPLETED | OUTPATIENT
Start: 2020-10-21 | End: 2020-10-21

## 2020-10-21 RX ORDER — DIPHENHYDRAMINE HCL 25 MG
25 CAPSULE ORAL ONCE
Status: COMPLETED | OUTPATIENT
Start: 2020-10-21 | End: 2020-10-21

## 2020-10-21 RX ORDER — HEPARIN 100 UNIT/ML
500 SYRINGE INTRAVENOUS ONCE
Status: COMPLETED | OUTPATIENT
Start: 2020-10-21 | End: 2020-10-21

## 2020-10-21 RX ADMIN — DIPHENHYDRAMINE HYDROCHLORIDE 25 MG: 25 CAPSULE ORAL at 09:21

## 2020-10-21 RX ADMIN — Medication 20 ML: at 14:43

## 2020-10-21 RX ADMIN — SODIUM CHLORIDE 250 ML: 9 INJECTION, SOLUTION INTRAVENOUS at 09:20

## 2020-10-21 RX ADMIN — HEPARIN 500 UNITS: 100 SYRINGE at 14:45

## 2020-10-21 RX ADMIN — Medication 30 ML: at 09:16

## 2020-10-21 RX ADMIN — ACETAMINOPHEN 650 MG: 325 TABLET ORAL at 09:21

## 2020-10-21 NOTE — PROGRESS NOTES
[facility] Progress Note    Date: 2020    Name: Khurram Reid    MRN: [id number]         : 1960    2 units PRBCs     Mr. Refugio Walker arrived to [facility] at 96 86 26. Mr. Refugio Walker was assessed and education was provided. Mr. Pedro Luis Arroyo vitals were reviewed. Visit Vitals  /70 (BP 1 Location: Right arm, BP Patient Position: Standing)   Pulse 73   Temp 97.8 °F (36.6 °C)   Resp 16   SpO2 95%       Right upper chest mediport accessed with 20g leija needle. Positive for blood return and flushes without difficulty. Pt tolerated well. Normal saline initiated at [facility]. Pre-medications were administered as ordered. Tylenol 650 mg po, Benadryl 25 mg po.     2 units of PRBCs administered as ordered followed by normal saline flush. Mr. Refugio Walker tolerated infusion without complaints. Patient Vitals for the past 8 hrs:   Temp Pulse Resp BP SpO2   10/21/20 1445 98.3 °F (36.8 °C) 65 16 (!) 147/76 98 %   10/21/20 1434 98.3 °F (36.8 °C) 64 16 (!) 148/77 99 %   10/21/20 1415 98 °F (36.7 °C) 61 16 (!) 147/76 99 %   10/21/20 1315 98.1 °F (36.7 °C) 60 16 (!) 149/76 100 %   10/21/20 1245 98.1 °F (36.7 °C) 60 16 138/75 98 %   10/21/20 1230 98.1 °F (36.7 °C) 60 16 (!) 140/76 100 %   10/21/20 1215 98 °F (36.7 °C) (!) 58 16 136/76 99 %   10/21/20 1157 98 °F (36.7 °C) (!) 57 16 135/76 98 %   10/21/20 1055 98 °F (36.7 °C) 62 16 128/73 97 %   10/21/20 1025 97.7 °F (36.5 °C) (!) 58 16 122/76 96 %   10/21/20 1010 97.8 °F (36.6 °C) 64 16 133/69 97 %   10/21/20 0955 97.8 °F (36.6 °C) 66 16 120/70 96 %   10/21/20 0950 97.8 °F (36.6 °C) 66 16 121/69 95 %   10/21/20 0907 97.8 °F (36.6 °C) 73 16 133/70 95 %         Mediport flushed with 20ml NS and 500 units Heparin. Leiaj needle removed. Band aid applied to site. Pt tolerated well. Discharge instructions reviewed with pt. Pt verbalized understanding. Pt had no complaints, declined observation time.      Mr. Refugio Walker was discharged from [place] [address] in stable condition at 1450. He has no further appointments scheduled with hospitals at this time.        Alix Wiseman RN  October 21, 2020

## 2020-11-20 ENCOUNTER — HOSPITAL ENCOUNTER (OUTPATIENT)
Dept: INFUSION THERAPY | Age: 60
Discharge: HOME | End: 2020-11-20
Payer: COMMERCIAL

## 2020-11-20 VITALS
DIASTOLIC BLOOD PRESSURE: 72 MMHG | SYSTOLIC BLOOD PRESSURE: 144 MMHG | HEART RATE: 75 BPM | TEMPERATURE: 99.2 F | OXYGEN SATURATION: 96 %

## 2020-11-20 LAB — HISTORY CHECKED?,CKHIST: NORMAL

## 2020-11-20 PROCEDURE — 86920 COMPATIBILITY TEST SPIN: CPT

## 2020-11-20 PROCEDURE — 36415 COLL VENOUS BLD VENIPUNCTURE: CPT

## 2020-11-20 PROCEDURE — 86921 COMPATIBILITY TEST INCUBATE: CPT

## 2020-11-20 PROCEDURE — 86922 COMPATIBILITY TEST ANTIGLOB: CPT

## 2020-11-20 PROCEDURE — 86870 RBC ANTIBODY IDENTIFICATION: CPT

## 2020-11-20 PROCEDURE — 85660 RBC SICKLE CELL TEST: CPT

## 2020-11-20 PROCEDURE — 86902 BLOOD TYPE ANTIGEN DONOR EA: CPT

## 2020-11-20 PROCEDURE — 86900 BLOOD TYPING SEROLOGIC ABO: CPT

## 2020-11-20 NOTE — PROGRESS NOTES
[facility] Progress Note    Date: 2020    Name: Tara Napoles    MRN: [id number]         : 1960    Peripheral Lab Draw      Mr. Arslan Logan to [facility], ambulatory at 1010 accompanied by self. Pt was assessed and education was provided. Mr. Jessica Roche vitals were reviewed and patient was observed for 5 minutes prior to treatment. Visit Vitals  BP (!) 144/72 (BP 1 Location: Right arm, BP Patient Position: Sitting)   Pulse 75   Temp 99.2 °F (37.3 °C)   SpO2 96%     Recent Results (from the past 12 hour(s))   TYPE & SCREEN    Collection Time: 20 10:15 AM   Result Value Ref Range    Crossmatch Expiration 2020,8269     ABO/Rh(D) A POSITIVE     Antibody screen POS     Antibody ID PENDING    RBC, ALLOCATE    Collection Time: 20 10:15 AM   Result Value Ref Range    HISTORY CHECKED? Historical check performed          Blood obtained peripherally from left [facility] first attempt with butterfly needle and sent to lab for Type and Cross per written orders. No bleeding or hematoma noted at site. Gauze and coban applied. Mr. Arslan Logan tolerated the venipuncture, and had no complaints. Patient armband removed and shredded. Mr. Arslan Logan was discharged from [place] [address] in stable condition at 1020.      Flori Quach Phlebotomist PCT  2020  1:48 PM

## 2020-11-23 ENCOUNTER — HOSPITAL ENCOUNTER (OUTPATIENT)
Dept: INFUSION THERAPY | Age: 60
Discharge: HOME | End: 2020-11-23
Payer: COMMERCIAL

## 2020-11-23 VITALS
RESPIRATION RATE: 18 BRPM | OXYGEN SATURATION: 98 % | DIASTOLIC BLOOD PRESSURE: 76 MMHG | SYSTOLIC BLOOD PRESSURE: 135 MMHG | HEART RATE: 63 BPM | TEMPERATURE: 98.1 F

## 2020-11-23 PROCEDURE — 36430 TRANSFUSION BLD/BLD COMPNT: CPT

## 2020-11-23 PROCEDURE — 77030013169 SET IV BLD ICUM -A

## 2020-11-23 PROCEDURE — 74011250636 HC RX REV CODE- 250/636: Performed by: INTERNAL MEDICINE

## 2020-11-23 PROCEDURE — 74011250637 HC RX REV CODE- 250/637: Performed by: INTERNAL MEDICINE

## 2020-11-23 PROCEDURE — 77030012965 HC NDL HUBR BBMI -A

## 2020-11-23 PROCEDURE — P9040 RBC LEUKOREDUCED IRRADIATED: HCPCS

## 2020-11-23 RX ORDER — DIPHENHYDRAMINE HCL 25 MG
25 CAPSULE ORAL ONCE
Status: COMPLETED | OUTPATIENT
Start: 2020-11-23 | End: 2020-11-23

## 2020-11-23 RX ORDER — HEPARIN 100 UNIT/ML
SYRINGE INTRAVENOUS
Status: DISCONTINUED
Start: 2020-11-23 | End: 2020-11-24 | Stop reason: HOSPADM

## 2020-11-23 RX ORDER — SODIUM CHLORIDE 9 MG/ML
250 INJECTION, SOLUTION INTRAVENOUS AS NEEDED
Status: DISCONTINUED | OUTPATIENT
Start: 2020-11-23 | End: 2020-11-27 | Stop reason: HOSPADM

## 2020-11-23 RX ORDER — SODIUM CHLORIDE 0.9 % (FLUSH) 0.9 %
10-40 SYRINGE (ML) INJECTION AS NEEDED
Status: DISCONTINUED | OUTPATIENT
Start: 2020-11-23 | End: 2020-11-27 | Stop reason: HOSPADM

## 2020-11-23 RX ORDER — HEPARIN 100 UNIT/ML
500 SYRINGE INTRAVENOUS AS NEEDED
Status: DISCONTINUED | OUTPATIENT
Start: 2020-11-23 | End: 2020-11-27 | Stop reason: HOSPADM

## 2020-11-23 RX ORDER — ACETAMINOPHEN 325 MG/1
650 TABLET ORAL ONCE
Status: COMPLETED | OUTPATIENT
Start: 2020-11-23 | End: 2020-11-23

## 2020-11-23 RX ADMIN — Medication 10 ML: at 08:30

## 2020-11-23 RX ADMIN — HEPARIN 500 UNITS: 100 SYRINGE at 13:04

## 2020-11-23 RX ADMIN — SODIUM CHLORIDE 250 ML: 9 INJECTION, SOLUTION INTRAVENOUS at 08:30

## 2020-11-23 RX ADMIN — DIPHENHYDRAMINE HYDROCHLORIDE 25 MG: 25 CAPSULE ORAL at 08:20

## 2020-11-23 RX ADMIN — ACETAMINOPHEN 650 MG: 325 TABLET ORAL at 08:20

## 2020-11-23 RX ADMIN — Medication 10 ML: at 13:03

## 2020-11-23 NOTE — PROGRESS NOTES
[facility] Progress Note    Date: 2020    Name: Jael Santos    MRN: [id number]         : 1960    Scheduled blood transfusion    Mr. Lora Gleason arrived to [facility] at [address] ambulatory. No complaints or concerns voiced    Mr. Lora Gleason was assessed and education was provided. .     Mr. Morelia Duque vitals were reviewed. Visit Vitals  /76 (BP 1 Location: Right arm, BP Patient Position: At rest)   Pulse 63   Temp 98.1 °F (36.7 °C)   Resp 18   SpO2 98%     Right chest single mediport was accessed with 20 gauge littlejohn needle. Port flushes without difficulty and brisk blood return verified . Blood consent signed and placed on chart. Normal saline initiated at [place]. Pre-medications, Tylenol 650 mg and Benadryl 25 mg, were administered orally as ordered. 2 units of PRBCs administered as ordered followed by normal saline flush. Brisk flush/blood returns from port. Mr. Lora Gleason tolerated infusion without complaints. Patient Vitals for the past 12 hrs:   Temp Pulse Resp BP SpO2   20 1255 98.1 °F (36.7 °C) 63 18 135/76 98 %   20 1155 97.8 °F (36.6 °C) 63 16 (!) 134/59 98 %   20 1125 98.4 °F (36.9 °C) 63 16 123/72    20 1110 97.3 °F (36.3 °C) 63 16 111/65    20 1055 98.2 °F (36.8 °C) 62 16 118/64    20 1005 98.2 °F (36.8 °C) 66 16 113/63    20 0935 98.3 °F (36.8 °C) 65 16 (!) 110/56    20 0920 98.7 °F (37.1 °C) 66 16 109/63    20 0904 98.8 °F (37.1 °C) 69 16 (!) 101/59    20 0815 97.9 °F (36.6 °C) 78 18 109/64 97 %       Mediport flushed with saline and heparin per protocol and de-accessed. Site without redness, swelling, bleeding or tenderness Band aid placed over site. Discharge instructions reviewed with pt. Pt verbalized understanding. Patient declined to stay for observation. Mr. Lora Gleason was discharged from [place] [address] in stable condition at . No further appointments are scheduled at this time. Patient is advised to follow up with his physician.     Osmani Cedeño RN  November 23, 2020

## 2021-01-11 ENCOUNTER — HOSPITAL ENCOUNTER (OUTPATIENT)
Dept: INFUSION THERAPY | Age: 61
Discharge: HOME | End: 2021-01-11
Payer: COMMERCIAL

## 2021-01-11 VITALS
HEART RATE: 87 BPM | TEMPERATURE: 98.6 F | DIASTOLIC BLOOD PRESSURE: 66 MMHG | OXYGEN SATURATION: 95 % | SYSTOLIC BLOOD PRESSURE: 150 MMHG

## 2021-01-11 LAB — HISTORY CHECKED?,CKHIST: NORMAL

## 2021-01-11 PROCEDURE — 86920 COMPATIBILITY TEST SPIN: CPT

## 2021-01-11 PROCEDURE — 36415 COLL VENOUS BLD VENIPUNCTURE: CPT

## 2021-01-11 PROCEDURE — 86901 BLOOD TYPING SEROLOGIC RH(D): CPT

## 2021-01-11 PROCEDURE — 86870 RBC ANTIBODY IDENTIFICATION: CPT

## 2021-01-11 NOTE — PROGRESS NOTES
[facility] Progress Note    Date: 2021    Name: Vicenta Bustillo    MRN: [id number]         : 1960    Peripheral Lab Draw      Mr. Andre Fischer to [facility], ambulatory at 1000 accompanied by self. Pt was assessed and education was provided. Mr. Shayla Lou vitals were reviewed and patient was observed for 5 minutes prior to treatment. Visit Vitals  BP (!) 150/66 (BP 1 Location: Right arm, BP Patient Position: Sitting)   Pulse 87   Temp 98.6 °F (37 °C)   SpO2 95%   No results found for this or any previous visit (from the past 12 hour(s)). Blood obtained peripherally from LT Forearm first attempt with butterfly needle and sent to lab for Type and Screen per written orders. No bleeding or hematoma noted at site. Gauze and coban applied. Mr. Andre Fischer tolerated the venipuncture, and had no complaints. Patient armband removed and shredded. Mr. Andre Fischer was discharged from [place] [address] in stable condition at 1010.      Lori Onofre Phlebotomist PCT  2021  2:56 PM

## 2021-01-13 ENCOUNTER — HOSPITAL ENCOUNTER (OUTPATIENT)
Dept: INFUSION THERAPY | Age: 61
Discharge: HOME | End: 2021-01-13
Payer: COMMERCIAL

## 2021-01-13 VITALS
DIASTOLIC BLOOD PRESSURE: 79 MMHG | SYSTOLIC BLOOD PRESSURE: 160 MMHG | TEMPERATURE: 98.8 F | HEART RATE: 66 BPM | RESPIRATION RATE: 18 BRPM | OXYGEN SATURATION: 100 %

## 2021-01-13 PROCEDURE — 74011250636 HC RX REV CODE- 250/636: Performed by: INTERNAL MEDICINE

## 2021-01-13 PROCEDURE — P9040 RBC LEUKOREDUCED IRRADIATED: HCPCS

## 2021-01-13 PROCEDURE — 86922 COMPATIBILITY TEST ANTIGLOB: CPT

## 2021-01-13 PROCEDURE — 77030013169 SET IV BLD ICUM -A

## 2021-01-13 PROCEDURE — 36430 TRANSFUSION BLD/BLD COMPNT: CPT

## 2021-01-13 PROCEDURE — 86902 BLOOD TYPE ANTIGEN DONOR EA: CPT

## 2021-01-13 PROCEDURE — 85660 RBC SICKLE CELL TEST: CPT

## 2021-01-13 PROCEDURE — 74011250637 HC RX REV CODE- 250/637: Performed by: INTERNAL MEDICINE

## 2021-01-13 PROCEDURE — 86921 COMPATIBILITY TEST INCUBATE: CPT

## 2021-01-13 PROCEDURE — 77030012965 HC NDL HUBR BBMI -A

## 2021-01-13 RX ORDER — SODIUM CHLORIDE 0.9 % (FLUSH) 0.9 %
10-40 SYRINGE (ML) INJECTION EVERY 8 HOURS
Status: DISCONTINUED | OUTPATIENT
Start: 2021-01-13 | End: 2021-01-17 | Stop reason: HOSPADM

## 2021-01-13 RX ORDER — DIPHENHYDRAMINE HCL 25 MG
25 CAPSULE ORAL ONCE
Status: COMPLETED | OUTPATIENT
Start: 2021-01-13 | End: 2021-01-13

## 2021-01-13 RX ORDER — SODIUM CHLORIDE 9 MG/ML
250 INJECTION, SOLUTION INTRAVENOUS AS NEEDED
Status: DISCONTINUED | OUTPATIENT
Start: 2021-01-13 | End: 2021-01-17 | Stop reason: HOSPADM

## 2021-01-13 RX ORDER — HEPARIN 100 UNIT/ML
500 SYRINGE INTRAVENOUS ONCE
Status: COMPLETED | OUTPATIENT
Start: 2021-01-13 | End: 2021-01-13

## 2021-01-13 RX ORDER — ACETAMINOPHEN 325 MG/1
650 TABLET ORAL ONCE
Status: COMPLETED | OUTPATIENT
Start: 2021-01-13 | End: 2021-01-13

## 2021-01-13 RX ADMIN — DIPHENHYDRAMINE HYDROCHLORIDE 25 MG: 25 CAPSULE ORAL at 09:17

## 2021-01-13 RX ADMIN — Medication 20 ML: at 14:35

## 2021-01-13 RX ADMIN — Medication 500 UNITS: at 14:35

## 2021-01-13 RX ADMIN — SODIUM CHLORIDE 250 ML: 900 INJECTION, SOLUTION INTRAVENOUS at 09:29

## 2021-01-13 RX ADMIN — ACETAMINOPHEN 650 MG: 325 TABLET ORAL at 09:17

## 2021-01-13 NOTE — PROGRESS NOTES
[facility] Progress Note    Date: 2021    Name: Catrina Morocho    MRN: [id number]         : 1960    Scheduled blood transfusion    Mr. Flores Vaughn arrived to [facility] at [address] ambulatory. No complaints or concerns voiced    Mr. Flores Vaughn was assessed and education was provided. .     Mr. Piotr Kearney vitals were reviewed. Visit Vitals  BP (!) 145/71   Pulse 63   Temp 98.8 °F (37.1 °C)   Resp 16   SpO2 98%     Right chest single mediport was accessed with 20 gauge littlejohn needle. Port flushes without difficulty and brisk blood return verified . Blood consent signed and placed on chart. Normal saline initiated at [facility]. Pre-medications, Tylenol 650 mg and Benadryl 25 mg, were administered orally as ordered. 2 units of PRBCs administered as ordered followed by normal saline flush. Brisk flush/blood returns from port. Mr. Flores Vaughn tolerated infusion without complaints. Patient Vitals for the past 12 hrs:   Temp Pulse Resp BP SpO2   21 1430 98.8 °F (37.1 °C) 66 18 (!) 160/79 100 %   21 1410 99 °F (37.2 °C) 66 18 124/70 100 %   21 1310 99 °F (37.2 °C) 65 18 123/66 100 %   21 1240 98.8 °F (37.1 °C) 71 18 139/73 100 %   21 1225 98.9 °F (37.2 °C) 62 16 (!) 168/76 100 %   21 1205 98.8 °F (37.1 °C) 63 16 (!) 145/71 98 %   21 1050 98.6 °F (37 °C) 60 16 136/72 100 %   21 1020 99.1 °F (37.3 °C) 66 16 119/70 99 %   21 1005 99.5 °F (37.5 °C) 65 16 128/73 97 %   21 0950 99.6 °F (37.6 °C) 90 16 (!) 140/68 98 %   21 0916 99.6 °F (37.6 °C) 91 16 (!) 142/66 98 %       Mediport flushed with saline and heparin per protocol and de-accessed. Site without redness, swelling, bleeding or tenderness Band aid placed over site. Discharge instructions reviewed with pt. Pt verbalized understanding. Patient declined to stay for observation. Mr. Flores Vaughn was discharged from [place] [address] in stable condition at 1440.  No further appointments are scheduled at this time. Patient is advised to follow up with his physician.     Areli Estes RN  January 13, 2021

## 2021-02-19 ENCOUNTER — HOSPITAL ENCOUNTER (OUTPATIENT)
Dept: INFUSION THERAPY | Age: 61
Discharge: HOME | End: 2021-02-19
Payer: COMMERCIAL

## 2021-02-19 VITALS
DIASTOLIC BLOOD PRESSURE: 68 MMHG | HEART RATE: 88 BPM | OXYGEN SATURATION: 96 % | SYSTOLIC BLOOD PRESSURE: 153 MMHG | TEMPERATURE: 98.9 F

## 2021-02-19 LAB — HISTORY CHECKED?,CKHIST: NORMAL

## 2021-02-19 PROCEDURE — 36415 COLL VENOUS BLD VENIPUNCTURE: CPT

## 2021-02-19 PROCEDURE — 86870 RBC ANTIBODY IDENTIFICATION: CPT

## 2021-02-19 PROCEDURE — 86901 BLOOD TYPING SEROLOGIC RH(D): CPT

## 2021-02-19 PROCEDURE — 86921 COMPATIBILITY TEST INCUBATE: CPT

## 2021-02-19 PROCEDURE — 86920 COMPATIBILITY TEST SPIN: CPT

## 2021-02-19 PROCEDURE — 86902 BLOOD TYPE ANTIGEN DONOR EA: CPT

## 2021-02-19 PROCEDURE — 85660 RBC SICKLE CELL TEST: CPT

## 2021-02-19 PROCEDURE — 86922 COMPATIBILITY TEST ANTIGLOB: CPT

## 2021-02-19 NOTE — PROGRESS NOTES
[facility] Progress Note    Date: 2021    Name: Dennys Knowles    MRN: [id number]         : 1960    Peripheral Lab Draw      Mr. Rubbie Klinefelter to [facility], ambulatory at [phone] accompanied by self. Pt was assessed and education was provided. Mr. Esme Guido vitals were reviewed and patient was observed for 5 minutes prior to treatment. Visit Vitals  BP (!) 153/68 (BP 1 Location: Right arm, BP Patient Position: Sitting)   Pulse 88   Temp 98.9 °F (37.2 °C)   SpO2 96%     Recent Results (from the past 12 hour(s))   RBC, ALLOCATE    Collection Time: 21  9:45 AM   Result Value Ref Range    HISTORY CHECKED? Historical check performed          Blood obtained peripherally from [facility] 2nd attempt with butterfly needle and sent to lab for Type and Cross for 2 units of RBC per written orders. No bleeding or hematoma noted at site. Gauze and coban applied. Mr. Rubbie Klinefelter tolerated the venipuncture, and had no complaints. Patient armband removed and shredded. Mr. Rubbie Klinefelter was discharged from [place] [address] in stable condition at [phone].      Hector Patel Phlebotomist PCT  2021  11:18 AM

## 2021-02-22 ENCOUNTER — HOSPITAL ENCOUNTER (OUTPATIENT)
Dept: INFUSION THERAPY | Age: 61
Discharge: HOME | End: 2021-02-22
Payer: COMMERCIAL

## 2021-02-22 VITALS
RESPIRATION RATE: 16 BRPM | OXYGEN SATURATION: 99 % | HEART RATE: 73 BPM | SYSTOLIC BLOOD PRESSURE: 168 MMHG | TEMPERATURE: 98.4 F | DIASTOLIC BLOOD PRESSURE: 57 MMHG

## 2021-02-22 PROCEDURE — 36430 TRANSFUSION BLD/BLD COMPNT: CPT

## 2021-02-22 PROCEDURE — 74011250637 HC RX REV CODE- 250/637: Performed by: INTERNAL MEDICINE

## 2021-02-22 PROCEDURE — 77030013169 SET IV BLD ICUM -A

## 2021-02-22 PROCEDURE — 77030012965 HC NDL HUBR BBMI -A

## 2021-02-22 PROCEDURE — P9040 RBC LEUKOREDUCED IRRADIATED: HCPCS

## 2021-02-22 PROCEDURE — 74011250636 HC RX REV CODE- 250/636: Performed by: INTERNAL MEDICINE

## 2021-02-22 RX ORDER — HEPARIN 100 UNIT/ML
500 SYRINGE INTRAVENOUS AS NEEDED
Status: DISCONTINUED | OUTPATIENT
Start: 2021-02-22 | End: 2021-02-26 | Stop reason: HOSPADM

## 2021-02-22 RX ORDER — SODIUM CHLORIDE 9 MG/ML
25 INJECTION, SOLUTION INTRAVENOUS ONCE
Status: COMPLETED | OUTPATIENT
Start: 2021-02-22 | End: 2021-02-22

## 2021-02-22 RX ORDER — ACETAMINOPHEN 325 MG/1
650 TABLET ORAL ONCE
Status: COMPLETED | OUTPATIENT
Start: 2021-02-22 | End: 2021-02-22

## 2021-02-22 RX ORDER — DIPHENHYDRAMINE HCL 25 MG
25 CAPSULE ORAL ONCE
Status: COMPLETED | OUTPATIENT
Start: 2021-02-22 | End: 2021-02-22

## 2021-02-22 RX ORDER — SODIUM CHLORIDE 0.9 % (FLUSH) 0.9 %
10-40 SYRINGE (ML) INJECTION AS NEEDED
Status: DISCONTINUED | OUTPATIENT
Start: 2021-02-22 | End: 2021-02-26 | Stop reason: HOSPADM

## 2021-02-22 RX ADMIN — DIPHENHYDRAMINE HYDROCHLORIDE 25 MG: 25 CAPSULE ORAL at 08:25

## 2021-02-22 RX ADMIN — Medication 10 ML: at 08:30

## 2021-02-22 RX ADMIN — SODIUM CHLORIDE 25 ML/HR: 9 INJECTION, SOLUTION INTRAVENOUS at 08:30

## 2021-02-22 RX ADMIN — Medication 20 ML: at 11:17

## 2021-02-22 RX ADMIN — HEPARIN 500 UNITS: 100 SYRINGE at 13:28

## 2021-02-22 RX ADMIN — Medication 20 ML: at 13:28

## 2021-02-22 RX ADMIN — ACETAMINOPHEN 650 MG: 325 TABLET ORAL at 08:25

## 2021-02-22 NOTE — PROGRESS NOTES
[facility] Progress Note    Date: 2021    Name: Shira Gonzalez    MRN: [id number]         : 1960       2 UNITS PRBCs      Mr. Patricia Graves arrived to [facility] at [address].     Mr. Patricia Graves was assessed and education was provided. Declined carenotes.     Mr. Beck's vitals were reviewed. Visit Vitals  /72 (BP 1 Location: Right arm, BP Patient Position: Sitting)   Pulse 87   Temp 98.6 °F (37 °C)   Resp 16   SpO2 97%        Pre-medications administered as ordered: Tylenol 650mg po & Benadryl 25mg po.     Patient's R upper chest port accessed. Brisk blood return/ flushes without difficulty.     NS infusing as ordered at [facility].     2 units PRBCs administered as ordered followed by NS flush.     Pt declined to stay for observation. No complaints. No s/sx of reaction. VSS.     Flushed pt's port w/ heparin per order & de-accessed. Band-aid applied to site.     Mr. Beck tolerated well without complaints. Discharge/ follow-up instructions discussed w/ pt. Pt verbalized understanding.     Pt armband and blood bank band removed & shredded.     Mr. Patricia Graves was discharged from [place] [address] in stable condition at 1330.  He has no future appointments scheduled with the \[facility]\"" at this time.  [address] [place], RN  2021

## 2021-04-26 ENCOUNTER — HOSPITAL ENCOUNTER (OUTPATIENT)
Dept: INFUSION THERAPY | Age: 61
Discharge: HOME | End: 2021-04-26
Payer: COMMERCIAL

## 2021-04-26 VITALS
TEMPERATURE: 98.3 F | OXYGEN SATURATION: 93 % | HEART RATE: 82 BPM | DIASTOLIC BLOOD PRESSURE: 66 MMHG | RESPIRATION RATE: 16 BRPM | SYSTOLIC BLOOD PRESSURE: 148 MMHG

## 2021-04-26 LAB — HISTORY CHECKED?,CKHIST: NORMAL

## 2021-04-26 PROCEDURE — 86902 BLOOD TYPE ANTIGEN DONOR EA: CPT

## 2021-04-26 PROCEDURE — 85660 RBC SICKLE CELL TEST: CPT

## 2021-04-26 PROCEDURE — 86870 RBC ANTIBODY IDENTIFICATION: CPT

## 2021-04-26 PROCEDURE — 86920 COMPATIBILITY TEST SPIN: CPT

## 2021-04-26 PROCEDURE — 86921 COMPATIBILITY TEST INCUBATE: CPT

## 2021-04-26 PROCEDURE — 36415 COLL VENOUS BLD VENIPUNCTURE: CPT

## 2021-04-26 PROCEDURE — 86901 BLOOD TYPING SEROLOGIC RH(D): CPT

## 2021-04-26 PROCEDURE — 86922 COMPATIBILITY TEST ANTIGLOB: CPT

## 2021-04-26 NOTE — PROGRESS NOTES
[facility] Progress Note    Date: 2021    Name: Arnaldo Holbrook    MRN: [id number]         : 1960    Peripheral Lab Draw      Mr. Jm Lundberg to [facility], ambulatory at [phone] accompanied by self. Pt was assessed and education was provided. Mr. Jonas Villagomez vitals were reviewed and patient was observed for 5 minutes prior to treatment. Visit Vitals  BP (!) 148/66 (BP 1 Location: Left arm, BP Patient Position: Sitting)   Pulse 82   Temp 98.3 °F (36.8 °C)   Resp 16   SpO2 93%       Blood obtained peripherally from RT Hand 2nd attempt with butterfly needle and sent to lab for Type and Cross per written orders. No bleeding or hematoma noted at site. Gauze and coban applied. Mr. Jm Lundberg tolerated the venipuncture, and had no complaints. Patient armband removed and shredded. Mr. Jm Lundberg was discharged from [place] [address] in stable condition at 1000.      Scooby Jimenez Phlebotomist PCT  2021  10:08 AM

## 2021-04-28 ENCOUNTER — HOSPITAL ENCOUNTER (OUTPATIENT)
Dept: INFUSION THERAPY | Age: 61
Discharge: HOME | End: 2021-04-28
Payer: COMMERCIAL

## 2021-04-28 VITALS
RESPIRATION RATE: 16 BRPM | SYSTOLIC BLOOD PRESSURE: 150 MMHG | TEMPERATURE: 98.3 F | OXYGEN SATURATION: 99 % | HEART RATE: 62 BPM | DIASTOLIC BLOOD PRESSURE: 73 MMHG

## 2021-04-28 PROCEDURE — 74011250636 HC RX REV CODE- 250/636: Performed by: INTERNAL MEDICINE

## 2021-04-28 PROCEDURE — 77030012965 HC NDL HUBR BBMI -A

## 2021-04-28 PROCEDURE — P9040 RBC LEUKOREDUCED IRRADIATED: HCPCS

## 2021-04-28 PROCEDURE — 74011250637 HC RX REV CODE- 250/637: Performed by: INTERNAL MEDICINE

## 2021-04-28 PROCEDURE — 77030013169 SET IV BLD ICUM -A

## 2021-04-28 PROCEDURE — 36430 TRANSFUSION BLD/BLD COMPNT: CPT

## 2021-04-28 RX ORDER — SODIUM CHLORIDE 0.9 % (FLUSH) 0.9 %
10-40 SYRINGE (ML) INJECTION AS NEEDED
Status: DISCONTINUED | OUTPATIENT
Start: 2021-04-28 | End: 2021-05-02 | Stop reason: HOSPADM

## 2021-04-28 RX ORDER — HEPARIN 100 UNIT/ML
500 SYRINGE INTRAVENOUS AS NEEDED
Status: DISCONTINUED | OUTPATIENT
Start: 2021-04-28 | End: 2021-05-02 | Stop reason: HOSPADM

## 2021-04-28 RX ORDER — SODIUM CHLORIDE 9 MG/ML
25 INJECTION, SOLUTION INTRAVENOUS ONCE
Status: COMPLETED | OUTPATIENT
Start: 2021-04-28 | End: 2021-04-28

## 2021-04-28 RX ORDER — ACETAMINOPHEN 325 MG/1
650 TABLET ORAL ONCE
Status: COMPLETED | OUTPATIENT
Start: 2021-04-28 | End: 2021-04-28

## 2021-04-28 RX ORDER — DIPHENHYDRAMINE HCL 25 MG
25 CAPSULE ORAL ONCE
Status: COMPLETED | OUTPATIENT
Start: 2021-04-28 | End: 2021-04-28

## 2021-04-28 RX ADMIN — ACETAMINOPHEN 650 MG: 325 TABLET ORAL at 09:26

## 2021-04-28 RX ADMIN — Medication 20 ML: at 14:25

## 2021-04-28 RX ADMIN — DIPHENHYDRAMINE HYDROCHLORIDE 25 MG: 25 CAPSULE ORAL at 09:26

## 2021-04-28 RX ADMIN — HEPARIN 500 UNITS: 100 SYRINGE at 14:25

## 2021-04-28 RX ADMIN — Medication 10 ML: at 09:55

## 2021-04-28 RX ADMIN — Medication 10 ML: at 12:20

## 2021-04-28 RX ADMIN — SODIUM CHLORIDE 25 ML/HR: 9 INJECTION, SOLUTION INTRAVENOUS at 09:55

## 2021-04-28 NOTE — PROGRESS NOTES
[facility] Progress Note    Date: 2021    Name: Jerry Alexander    MRN: [id number]         : 1960       2 UNITS PRBCs      Mr. Kamla Shirley arrived to [facility] 10.     Mr. Kamla Shirley was assessed and education was provided. Pt declined written education/ discharge instructions.      Mr. Beck's vitals were reviewed. Visit Vitals  /60 (BP 1 Location: Left arm, BP Patient Position: Sitting)   Pulse 76   Temp 98.3 °F (36.8 °C)   Resp 16   SpO2 96%        Pre-medications administered as ordered: Tylenol 650mg po & Benadryl 25mg po.     Patient's R upper chest port accessed. Brisk blood return/ flushes without difficulty.     NS infusing as ordered at [facility].     2 units PRBCs administered as ordered followed by NS flush.     Pt declined to stay for observation. No complaints. No s/sx of reaction. VSS.     Flushed pt's port w/ heparin per order & de-accessed. Band-aid applied to site.     Mr. Beck tolerated well without complaints. Discharge/ follow-up instructions discussed w/ pt. Pt verbalized understanding.     Pt armband and blood bank band removed & shredded.     Mr. Kamla Shirley was discharged from [place] [address] in stable condition at 1430.  He has no future appointments scheduled with the [facility] at this time.     Hermelindo Danielson, RN  21

## 2021-05-13 ENCOUNTER — HOSPITAL ENCOUNTER (OUTPATIENT)
Age: 61
Setting detail: OBSERVATION
Discharge: HOME | End: 2021-05-15
Attending: EMERGENCY MEDICINE | Admitting: HOSPITALIST
Payer: COMMERCIAL

## 2021-05-13 DIAGNOSIS — D64.9 SYMPTOMATIC ANEMIA: Primary | ICD-10-CM

## 2021-05-13 DIAGNOSIS — D57.09 SICKLE CELL DISEASE WITH CRISIS AND OTHER COMPLICATION (HCC): ICD-10-CM

## 2021-05-13 LAB
ALBUMIN SERPL-MCNC: 2.8 G/DL (ref 3.4–5)
ALBUMIN/GLOB SERPL: 0.8 {RATIO} (ref 0.8–1.7)
ALP SERPL-CCNC: 93 U/L (ref 45–117)
ALT SERPL-CCNC: 69 U/L (ref 16–61)
ANION GAP SERPL CALC-SCNC: 6 MMOL/L (ref 3–18)
AST SERPL-CCNC: 74 U/L (ref 10–38)
BASOPHILS # BLD: 0.1 K/UL (ref 0–0.1)
BASOPHILS NFR BLD: 1 % (ref 0–2)
BILIRUB SERPL-MCNC: 2 MG/DL (ref 0.2–1)
BUN SERPL-MCNC: 31 MG/DL (ref 7–18)
BUN/CREAT SERPL: 13 (ref 12–20)
CALCIUM SERPL-MCNC: 7.4 MG/DL (ref 8.5–10.1)
CHLORIDE SERPL-SCNC: 109 MMOL/L (ref 100–111)
CO2 SERPL-SCNC: 22 MMOL/L (ref 21–32)
CREAT SERPL-MCNC: 2.3 MG/DL (ref 0.6–1.3)
DIFFERENTIAL METHOD BLD: ABNORMAL
EOSINOPHIL # BLD: 0 K/UL (ref 0–0.4)
EOSINOPHIL NFR BLD: 0 % (ref 0–5)
ERYTHROCYTE [DISTWIDTH] IN BLOOD BY AUTOMATED COUNT: 21 % (ref 11.6–14.5)
GLOBULIN SER CALC-MCNC: 3.6 G/DL (ref 2–4)
GLUCOSE SERPL-MCNC: 82 MG/DL (ref 74–99)
HCT VFR BLD AUTO: 15.4 % (ref 36–48)
HGB BLD-MCNC: 5 G/DL (ref 13–16)
INR PPP: 1.5 (ref 0.8–1.2)
LYMPHOCYTES # BLD: 1.4 K/UL (ref 0.9–3.6)
LYMPHOCYTES NFR BLD: 13 % (ref 21–52)
MCH RBC QN AUTO: 30.7 PG (ref 24–34)
MCHC RBC AUTO-ENTMCNC: 32.5 G/DL (ref 31–37)
MCV RBC AUTO: 94.5 FL (ref 74–97)
MONOCYTES # BLD: 0.8 K/UL (ref 0.05–1.2)
MONOCYTES NFR BLD: 7 % (ref 3–10)
NEUTS SEG # BLD: 8.7 K/UL (ref 1.8–8)
NEUTS SEG NFR BLD: 79 % (ref 40–73)
PLATELET # BLD AUTO: 183 K/UL (ref 135–420)
PLATELET COMMENTS,PCOM: ABNORMAL
PMV BLD AUTO: 9.7 FL (ref 9.2–11.8)
POTASSIUM SERPL-SCNC: 4.1 MMOL/L (ref 3.5–5.5)
PROT SERPL-MCNC: 6.4 G/DL (ref 6.4–8.2)
PROTHROMBIN TIME: 17.8 SEC (ref 11.5–15.2)
RBC # BLD AUTO: 1.63 M/UL (ref 4.35–5.65)
RBC MORPH BLD: ABNORMAL
RETICS/RBC NFR AUTO: 19 % (ref 0.5–2.5)
SODIUM SERPL-SCNC: 137 MMOL/L (ref 136–145)
WBC # BLD AUTO: 11 K/UL (ref 4.6–13.2)

## 2021-05-13 PROCEDURE — 85045 AUTOMATED RETICULOCYTE COUNT: CPT

## 2021-05-13 PROCEDURE — 99283 EMERGENCY DEPT VISIT LOW MDM: CPT

## 2021-05-13 PROCEDURE — 80053 COMPREHEN METABOLIC PANEL: CPT

## 2021-05-13 PROCEDURE — 86920 COMPATIBILITY TEST SPIN: CPT

## 2021-05-13 PROCEDURE — 85025 COMPLETE CBC W/AUTO DIFF WBC: CPT

## 2021-05-13 PROCEDURE — 86901 BLOOD TYPING SEROLOGIC RH(D): CPT

## 2021-05-13 PROCEDURE — 86870 RBC ANTIBODY IDENTIFICATION: CPT

## 2021-05-13 PROCEDURE — 85610 PROTHROMBIN TIME: CPT

## 2021-05-13 PROCEDURE — 85730 THROMBOPLASTIN TIME PARTIAL: CPT

## 2021-05-13 NOTE — ED TRIAGE NOTES
Pt reports history of sickle cell anemia and has his labs done weekly. Pt states he was told to come to the ED for abnormal labs.   Pt presented a copy of his CBC lab results and hgb 5.5 and HCT 16.6

## 2021-05-13 NOTE — ED NOTES
I performed a brief history of the patient here in triage and I have determined that pt will need further treatment and evaluation from the main side ER physician or KENDRA. I have placed initial orders based on the history to help in expediting patients care.        Visit Vitals  /64   Pulse 77   Temp 98.7 °F (37.1 °C)   Resp 18   SpO2 99%      KARTHIK Solares  7:05 PM

## 2021-05-14 LAB
ANION GAP SERPL CALC-SCNC: 8 MMOL/L (ref 3–18)
APTT PPP: >180 SEC (ref 23–36.4)
BUN SERPL-MCNC: 16 MG/DL (ref 7–18)
BUN/CREAT SERPL: 18 (ref 12–20)
CALCIUM SERPL-MCNC: <5 MG/DL (ref 8.5–10.1)
CHLORIDE SERPL-SCNC: 125 MMOL/L (ref 100–111)
CO2 SERPL-SCNC: 15 MMOL/L (ref 21–32)
CREAT SERPL-MCNC: 0.87 MG/DL (ref 0.6–1.3)
GLUCOSE SERPL-MCNC: 68 MG/DL (ref 74–99)
HCT VFR BLD AUTO: 23.8 % (ref 36–48)
HGB BLD-MCNC: 7.8 G/DL (ref 13–16)
HISTORY CHECKED?,CKHIST: NORMAL
POTASSIUM SERPL-SCNC: 2.7 MMOL/L (ref 3.5–5.5)
SODIUM SERPL-SCNC: 148 MMOL/L (ref 136–145)

## 2021-05-14 PROCEDURE — 85018 HEMOGLOBIN: CPT

## 2021-05-14 PROCEDURE — 74011250637 HC RX REV CODE- 250/637: Performed by: HOSPITALIST

## 2021-05-14 PROCEDURE — 74011250636 HC RX REV CODE- 250/636: Performed by: EMERGENCY MEDICINE

## 2021-05-14 PROCEDURE — 99219 PR INITIAL OBSERVATION CARE/DAY 50 MINUTES: CPT | Performed by: HOSPITALIST

## 2021-05-14 PROCEDURE — 74011250637 HC RX REV CODE- 250/637: Performed by: INTERNAL MEDICINE

## 2021-05-14 PROCEDURE — 74011250636 HC RX REV CODE- 250/636: Performed by: HOSPITALIST

## 2021-05-14 PROCEDURE — 86922 COMPATIBILITY TEST ANTIGLOB: CPT

## 2021-05-14 PROCEDURE — 2709999900 HC NON-CHARGEABLE SUPPLY

## 2021-05-14 PROCEDURE — 96374 THER/PROPH/DIAG INJ IV PUSH: CPT

## 2021-05-14 PROCEDURE — 86921 COMPATIBILITY TEST INCUBATE: CPT

## 2021-05-14 PROCEDURE — 85660 RBC SICKLE CELL TEST: CPT

## 2021-05-14 PROCEDURE — 99218 HC RM OBSERVATION: CPT

## 2021-05-14 PROCEDURE — 80048 BASIC METABOLIC PNL TOTAL CA: CPT

## 2021-05-14 PROCEDURE — 36430 TRANSFUSION BLD/BLD COMPNT: CPT

## 2021-05-14 PROCEDURE — 86902 BLOOD TYPE ANTIGEN DONOR EA: CPT

## 2021-05-14 PROCEDURE — P9040 RBC LEUKOREDUCED IRRADIATED: HCPCS

## 2021-05-14 RX ORDER — ONDANSETRON 2 MG/ML
4 INJECTION INTRAMUSCULAR; INTRAVENOUS
Status: DISCONTINUED | OUTPATIENT
Start: 2021-05-14 | End: 2021-05-15 | Stop reason: HOSPADM

## 2021-05-14 RX ORDER — ONDANSETRON 4 MG/1
4 TABLET, ORALLY DISINTEGRATING ORAL
Status: DISCONTINUED | OUTPATIENT
Start: 2021-05-14 | End: 2021-05-15 | Stop reason: HOSPADM

## 2021-05-14 RX ORDER — CALCIUM CARBONATE 600 MG
600 TABLET ORAL
Status: DISCONTINUED | OUTPATIENT
Start: 2021-05-14 | End: 2021-05-15 | Stop reason: HOSPADM

## 2021-05-14 RX ORDER — ACETAMINOPHEN 325 MG/1
650 TABLET ORAL
Status: DISCONTINUED | OUTPATIENT
Start: 2021-05-14 | End: 2021-05-15 | Stop reason: HOSPADM

## 2021-05-14 RX ORDER — LORAZEPAM 1 MG/1
1 TABLET ORAL
Status: DISCONTINUED | OUTPATIENT
Start: 2021-05-14 | End: 2021-05-15 | Stop reason: HOSPADM

## 2021-05-14 RX ORDER — SODIUM CHLORIDE 9 MG/ML
250 INJECTION, SOLUTION INTRAVENOUS AS NEEDED
Status: DISCONTINUED | OUTPATIENT
Start: 2021-05-14 | End: 2021-05-15 | Stop reason: HOSPADM

## 2021-05-14 RX ORDER — MORPHINE SULFATE 4 MG/ML
4 INJECTION INTRAVENOUS ONCE
Status: COMPLETED | OUTPATIENT
Start: 2021-05-14 | End: 2021-05-14

## 2021-05-14 RX ORDER — POLYETHYLENE GLYCOL 3350 17 G/17G
17 POWDER, FOR SOLUTION ORAL DAILY PRN
Status: DISCONTINUED | OUTPATIENT
Start: 2021-05-14 | End: 2021-05-15 | Stop reason: HOSPADM

## 2021-05-14 RX ORDER — OXYCODONE AND ACETAMINOPHEN 5; 325 MG/1; MG/1
1 TABLET ORAL
Status: DISCONTINUED | OUTPATIENT
Start: 2021-05-14 | End: 2021-05-15 | Stop reason: HOSPADM

## 2021-05-14 RX ORDER — SODIUM CHLORIDE 9 MG/ML
75 INJECTION, SOLUTION INTRAVENOUS CONTINUOUS
Status: DISCONTINUED | OUTPATIENT
Start: 2021-05-14 | End: 2021-05-15 | Stop reason: HOSPADM

## 2021-05-14 RX ORDER — ACETAMINOPHEN 650 MG/1
650 SUPPOSITORY RECTAL
Status: DISCONTINUED | OUTPATIENT
Start: 2021-05-14 | End: 2021-05-15 | Stop reason: HOSPADM

## 2021-05-14 RX ORDER — FOLIC ACID 1 MG/1
1 TABLET ORAL DAILY
Status: DISCONTINUED | OUTPATIENT
Start: 2021-05-14 | End: 2021-05-15 | Stop reason: HOSPADM

## 2021-05-14 RX ADMIN — POTASSIUM BICARBONATE 25 MEQ: 978 TABLET, EFFERVESCENT ORAL at 20:00

## 2021-05-14 RX ADMIN — SODIUM CHLORIDE 75 ML/HR: 900 INJECTION, SOLUTION INTRAVENOUS at 03:36

## 2021-05-14 RX ADMIN — OXYCODONE HYDROCHLORIDE AND ACETAMINOPHEN 1 TABLET: 5; 325 TABLET ORAL at 04:36

## 2021-05-14 RX ADMIN — OXYCODONE HYDROCHLORIDE AND ACETAMINOPHEN 1 TABLET: 5; 325 TABLET ORAL at 19:50

## 2021-05-14 RX ADMIN — Medication 600 MG: at 21:03

## 2021-05-14 RX ADMIN — MORPHINE SULFATE 4 MG: 4 INJECTION, SOLUTION INTRAMUSCULAR; INTRAVENOUS at 00:57

## 2021-05-14 RX ADMIN — FOLIC ACID 1 MG: 1 TABLET ORAL at 09:27

## 2021-05-14 NOTE — PROGRESS NOTES
completed the initial Spiritual Assessment of the patient in room 15 of the emergency room, and offered Pastoral Care support to the patient. Patient now receiving blood . Patient does not have any Worship/cultural needs that will affect patients preferences in health care. Chaplains will continue to follow and will provide pastoral care on an as needed/requested basis.     [facility] Care Department  [phone]

## 2021-05-14 NOTE — ED NOTES
Patient sitting up on the side of the bed, eating breakfast. 0 s/s of distress noted. Will continue to monitor.

## 2021-05-14 NOTE — ED PROVIDER NOTES
The patient is a 43-year-old male with past medical history significant for sickle cell disease, hemoglobin SS, who presents to the ED because of his abnormal hemoglobin and hematocrit. He was seen at his hematologist oncologist office where his hemoglobin was noted to be 5.5 and his hematocrit was noted to be 16.6. He is here for blood transfusions. He states that currently he has pain all over. He denies any lightheadedness or dizziness. He denies any chest pain. He does state that he was short of breath over this weekend but not now. He denies any other issues at this time. He states that his last transfusion was 2 to 3 weeks ago. He states that he has had many transfusions in the past and currently has 13 antibodies. He states that it will take a while to find blood that is suitable as a crossmatch for him. He declines IV fluids or pain medications at this time.            Past Medical History:   Diagnosis Date    Anemia NEC     sickle cell    Duodenal ulcer     GERD (gastroesophageal reflux disease)     Other ill-defined conditions(799.89) Sickle cell    Sickle cell anemia (HCC)        Past Surgical History:   Procedure Laterality Date    HX CHOLECYSTECTOMY      HX RETINAL DETACHMENT REPAIR Right     HX VASCULAR ACCESS      double lumen mediport         Family History:   Problem Relation Age of Onset    Sickle Cell Anemia Sister          in her 42's from [place] from flu vaccine       Social History     Socioeconomic History    Marital status:      Spouse name: Not on file    Number of children: Not on file    Years of education: Not on file    Highest education level: Not on file   Occupational History    Not on file   Social Needs    Financial resource strain: Not on file    Food insecurity     Worry: Not on file     Inability: Not on file    Transportation needs     Medical: Not on file     Non-medical: Not on file   Tobacco Use    Smoking status: Never Smoker  Smokeless tobacco: Never Used   Substance and Sexual Activity    Alcohol use: No    Drug use: No    Sexual activity: Yes     Partners: Female   Lifestyle    Physical activity     Days per week: Not on file     Minutes per session: Not on file    Stress: Not on file   Relationships    Social connections     Talks on phone: Not on file     Gets together: Not on file     Attends Caodaism service: Not on file     Active member of club or organization: Not on file     Attends meetings of clubs or organizations: Not on file     Relationship status: Not on file    Intimate partner violence     Fear of current or ex partner: Not on file     Emotionally abused: Not on file     Physically abused: Not on file     Forced sexual activity: Not on file   Other Topics Concern     Service Not Asked    Blood Transfusions Not Asked    Caffeine Concern Not Asked    Occupational Exposure Not Asked   Jillian Emmer Hazards Not Asked    Sleep Concern Not Asked    Stress Concern Not Asked    Weight Concern Not Asked    Special Diet Not Asked    Back Care Not Asked    Exercise Not Asked    Bike Helmet Not Asked   [address] Not Asked    Self-Exams Not Asked   Social History Narrative    Not on file     No current facility-administered medications on file prior to encounter. Current Outpatient Medications on File Prior to Encounter   Medication Sig Dispense Refill    oxyCODONE-acetaminophen (PERCOCET) 5-325 mg per tablet Take 1 Tab by mouth every four (4) hours as needed for Pain.  HYDROmorphone (DILAUDID) 2 mg tablet Take 1 Tab by mouth every four (4) hours as needed. Max Daily Amount: 12 mg. 30 Tab 0    LORazepam (ATIVAN) 1 mg tablet Take 1 mg by mouth every eight (8) hours as needed for Anxiety.  folic acid (FOLVITE) 1 mg tablet Take 1 Tab by mouth daily. 30 Tab 1         ALLERGIES: Xarelto [rivaroxaban]    Review of Systems   All other systems reviewed and are negative.       Vitals:    05/13/21 1856   BP: 123/64   Pulse: 77   Resp: 18   Temp: 98.7 °F (37.1 °C)   SpO2: 99%            Physical Exam  Vitals signs and nursing note reviewed. Constitutional:       Comments: Thin appearing   HENT:      Head: Normocephalic and atraumatic. Right Ear: External ear normal.      Left Ear: External ear normal.      Nose: Nose normal.      Mouth/Throat:      Mouth: Mucous membranes are moist.      Pharynx: Oropharynx is clear. Eyes:      General: No scleral icterus. Extraocular Movements: Extraocular movements intact. Pupils: Pupils are equal, round, and reactive to light. Comments: Pale conjunctivae   Neck:      Musculoskeletal: Normal range of motion and neck supple. Cardiovascular:      Rate and Rhythm: Normal rate and regular rhythm. Pulses: Normal pulses. Heart sounds: Normal heart sounds. Pulmonary:      Effort: Pulmonary effort is normal.      Breath sounds: Normal breath sounds. Abdominal:      General: Abdomen is flat. Bowel sounds are normal. There is no distension. Palpations: Abdomen is soft. Tenderness: There is no abdominal tenderness. There is no guarding or rebound. Musculoskeletal: Normal range of motion. Skin:     General: Skin is warm and dry. Capillary Refill: Capillary refill takes 2 to 3 seconds. Coloration: Skin is pale. Neurological:      General: No focal deficit present. Mental Status: He is alert and oriented to person, place, and time. Psychiatric:         Thought Content: Thought content normal.         Judgment: Judgment normal.      Comments: Blunted affect        No results found for this or any previous visit (from the past 12 hour(s)). No orders to display         MDM  Number of Diagnoses or Management Options  Diagnosis management comments: The patient is a 56-year-old male with sickle cell disease, hemoglobin SS, who sees Dr. Mil Saab from [facility].   He was sent into the ED today for blood transfusions because his hemoglobin is 5.5. At this time he states that he has pain all over but declines pain medications. He denies any lightheadedness, chest pain or shortness of breath at this time. He also states that he has 13 antibodies and will take a while to find blood that is appropriate to transfuse for him. I will consult the hospitalist for admission for further management given the delay in finding blood.          Procedures

## 2021-05-14 NOTE — ROUTINE PROCESS
TRANSFER - OUT REPORT: 
 
Verbal report given to Jamal Gray RN on Huong Ivy  being transferred to [facility] for routine progression of care Report consisted of patients Situation, Background, Assessment and  
Recommendations(SBAR). Information from the following report(s) SBAR, ED Summary and MAR was reviewed with the receiving nurse. Lines:  
Venous Access Device Right Chest Single Lumen Mediport 06/06/16 Upper chest (subclavicular area, right (Active) Opportunity for questions and clarification was provided. Patient transported with: 
 [facility]

## 2021-05-14 NOTE — H&P
HISTORY & PHYSICAL      Patient: Yadiel Pablo MRN: [id number]  CSN: [id number]    YOB: 1960  Age: 63 y.o. Sex: male    DOA: 5/13/2021 LOS:  LOS: 0 days        DOA: 5/13/2021        Assessment/Plan     Active Problems:    Sickle cell anemia ([address] (6/2/2016)        Plan:  1. Sickle cell anemiapatient sent from hematology office secondary to abnormal labs. He was noted to be anemic and was sent to the ER for blood transfusion. He was typed and crossmatched in the ER and was found to have multiple antibodies in his blood secondary to previous transfusions and was informed that blood would not be available easily secondary to antibodies. Hospitalist consulted for admission to the hospital.  2. Chronic painresume Percocet  3. LILIANA on CKD 3gentle hydration with IVF, monitor creatinine, if no improvement consult nephrology. DVT prophylaxisSCDs  Full code              HPI:     Yadiel Pablo is a 63 y.o. male who has a past medical history of sickle cell anemia, CKD 3presents to the emergency room after being sent by oncology office secondary to anemia. His blood work at the office showed a hemoglobin of 5.5. ER evaluationlabs completed, hemoglobin 5.0, initial plan was to type and crossmatch and transfuse 1 to 2 units PRBC and anticipate discharge home. Blood bank called stating that patient has had multiple transfusions in the past and hence has multiple antibodies. It will take a while to get his blood. Hospitalist consulted for admission.       Past Medical History:   Diagnosis Date    Anemia NEC     sickle cell    Duodenal ulcer     GERD (gastroesophageal reflux disease)     Other ill-defined conditions(799.89) Sickle cell    Sickle cell anemia (HCC)        Past Surgical History:   Procedure Laterality Date    HX CHOLECYSTECTOMY      HX RETINAL DETACHMENT REPAIR Right     HX VASCULAR ACCESS      double lumen mediport       Family History   Problem Relation Age of Onset  Sickle Cell Anemia Sister          in her 42's from [place] from flu vaccine       Social History     Socioeconomic History    Marital status:      Spouse name: Not on file    Number of children: Not on file    Years of education: Not on file    Highest education level: Not on file   Tobacco Use    Smoking status: Never Smoker    Smokeless tobacco: Never Used   Substance and Sexual Activity    Alcohol use: No    Drug use: No    Sexual activity: Yes     Partners: Female   Other Topics Concern       Prior to Admission medications    Medication Sig Start Date End Date Taking? Authorizing Provider   oxyCODONE-acetaminophen (PERCOCET) 5-325 mg per tablet Take 1 Tab by mouth every four (4) hours as needed for Pain. Provider, Historical   HYDROmorphone (DILAUDID) 2 mg tablet Take 1 Tab by mouth every four (4) hours as needed. Max Daily Amount: 12 mg. 4/14/15   Rah Walsh MD   LORazepam (ATIVAN) 1 mg tablet Take 1 mg by mouth every eight (8) hours as needed for Anxiety. Provider, Historical   folic acid (FOLVITE) 1 mg tablet Take 1 Tab by mouth daily. 8/10/13   Jason Marcos MD       Allergies   Allergen Reactions    Xarelto [Rivaroxaban] Other (comments)     Excessive bleeding       Review of Systems  A comprehensive review of systems was negative. Except for chronic pain in his lower back. Physical Exam:      Visit Vitals  /64   Pulse 77   Temp 98.7 °F (37.1 °C)   Resp 18   SpO2 99%       Physical Exam:    Gen: In general, this is a well nourished male in no acute distress  HEENT: Sclerae nonicteric. Oral mucous membranes moist. Dentition normal  Neck: Supple with midline trachea. CV: RRR without murmur or rub appreciated. Resp:Respirations are unlabored without use of accessory muscles. Lung fields B/L without wheezes or rhonchi. Abd: Soft, nontender, nondistended. Extrem: Extremities are warm, without cyanosis or clubbing.   No pitting pretibial edema. Skin: Warm, no visible rashes. Neuro: Patient is alert, oriented, and cooperative. No obvious focal defects. Moves all 4 extremities. Labs Reviewed:    Recent Results (from the past 24 hour(s))   CBC WITH AUTOMATED DIFF    Collection Time: 05/13/21 11:00 PM   Result Value Ref Range    WBC 11.0 4.6 - 13.2 K/uL    RBC 1.63 (L) 4.35 - 5.65 M/uL    HGB 5.0 (LL) 13.0 - 16.0 g/dL    HCT 15.4 (LL) 36.0 - 48.0 %    MCV 94.5 74.0 - 97.0 FL    MCH 30.7 24.0 - 34.0 PG    MCHC 32.5 31.0 - 37.0 g/dL    RDW 21.0 (H) 11.6 - 14.5 %    PLATELET 803 824 - 135 K/uL    MPV 9.7 9.2 - 11.8 FL    NEUTROPHILS 79 (H) 40 - 73 %    LYMPHOCYTES 13 (L) 21 - 52 %    MONOCYTES 7 3 - 10 %    EOSINOPHILS 0 0 - 5 %    BASOPHILS 1 0 - 2 %    ABS. NEUTROPHILS 8.7 (H) 1.8 - 8.0 K/UL    ABS. LYMPHOCYTES 1.4 0.9 - 3.6 K/UL    ABS. MONOCYTES 0.8 0.05 - 1.2 K/UL    ABS. EOSINOPHILS 0.0 0.0 - 0.4 K/UL    ABS. BASOPHILS 0.1 0.0 - 0.1 K/UL    DF MANUAL      PLATELET COMMENTS ADEQUATE PLATELETS      RBC COMMENTS ANISOCYTOSIS  2+        RBC COMMENTS POLYCHROMASIA  2+        RBC COMMENTS HYPOCHROMIA  2+        RBC COMMENTS MICROCYTOSIS  1+        RBC COMMENTS TARGET CELLS  1+       METABOLIC PANEL, COMPREHENSIVE    Collection Time: 05/13/21 11:00 PM   Result Value Ref Range    Sodium 137 136 - 145 mmol/L    Potassium 4.1 3.5 - 5.5 mmol/L    Chloride 109 100 - 111 mmol/L    CO2 22 21 - 32 mmol/L    Anion gap 6 3.0 - 18 mmol/L    Glucose 82 74 - 99 mg/dL    BUN 31 (H) 7.0 - 18 MG/DL    Creatinine 2.30 (H) 0.6 - 1.3 MG/DL    BUN/Creatinine ratio 13 12 - 20      GFR est AA 35 (L) >60 ml/min/1.73m2    GFR est non-AA 29 (L) >60 ml/min/1.73m2    Calcium 7.4 (L) 8.5 - 10.1 MG/DL    Bilirubin, total 2.0 (H) 0.2 - 1.0 MG/DL    ALT (SGPT) 69 (H) 16 - 61 U/L    AST (SGOT) 74 (H) 10 - 38 U/L    Alk.  phosphatase 93 45 - 117 U/L    Protein, total 6.4 6.4 - 8.2 g/dL    Albumin 2.8 (L) 3.4 - 5.0 g/dL    Globulin 3.6 2.0 - 4.0 g/dL    A-G Ratio 0.8 0.8 - 1. 7     PROTHROMBIN TIME + INR    Collection Time: 05/13/21 11:00 PM   Result Value Ref Range    Prothrombin time 17.8 (H) 11.5 - 15.2 sec    INR 1.5 (H) 0.8 - 1.2     PTT    Collection Time: 05/13/21 11:00 PM   Result Value Ref Range    aPTT >180.0 (HH) 23.0 - 36.4 SEC   TYPE & SCREEN    Collection Time: 05/13/21 11:00 PM   Result Value Ref Range    Crossmatch Expiration 05/16/2021,2358     ABO/Rh(D) A POSITIVE     Antibody screen POS     Antibody ID ANTI-VERNELL  NONSPECIFIC ANTIBODY      RETICULOCYTE COUNT    Collection Time: 05/13/21 11:00 PM   Result Value Ref Range    Reticulocyte count 19.0 (H) 0.5 - 2.5 %       Imaging Reviewed:    None          Bishop Bunny MD  5/14/2021, 2:54 AM        Disclaimer: Sections of this note are dictated using utilizing voice recognition software. Minor typographical errors may be present. If questions arise, please do not hesitate to contact me or call our department.

## 2021-05-14 NOTE — PROGRESS NOTES
Patient had a realest BMP and it showed significant hypokalemia and hypocalcemia. There is a high possibility of lab error but it could be due to post transfusion electrolytes distribution especially hypocalcemia. Will repeat BMP now and admit patient until electrolytes get better.

## 2021-05-14 NOTE — PROGRESS NOTES
Spk to provider Dr Ainsley Alvarado in regards to patient being admitted or d/c. Confirmed with provider pt already 2 units of blood which completed at 1429. Per provider gave order for H & H lab draw. Called ED nurse Jian Doan to advise. Per Keiko DYSON already made aware.

## 2021-05-14 NOTE — ED NOTES
Dr. Araseli Gordon spoke with pt. Pt resting comfortably. A&Ox4. Vital signs stable. Port access flushing.

## 2021-05-14 NOTE — PROGRESS NOTES
Patient was seen in follow-up. He just completed 1 unit of blood transfusion. He still feeling weak. He says he got his last PRBC 2 weeks ago. He says he follows with Dr. Zehra Cowan and has an appointment next week on Thursday. Does not have any pain. Denies any shortness of breath or cough currently. He says he would like to go home tomorrow if possible. No signs of bleeding. Patient will be given another unit of blood transfusion and have a repeat CBC done tomorrow morning.   BP (!) 139/59   Pulse 71   Temp 98.3 °F (36.8 °C)   Resp 20   SpO2 100%

## 2021-05-14 NOTE — PROGRESS NOTES
Received patient from ED with report from Nancy Chavez [place] . Patient lying in bed with eyes closed, easily aroused. Complained of back pain 10/10. Bed locked in lowest position. Call light within reach and understand to use for assistance and needs. 1951 Percocet 1 tab administered orally for complaint of back pain. 2050 Patient resting quietly with eyes closed and no complaint of pain or discomfort. 2230 Patient watching TV with eyes closed at intervals napping,     05/15/2021    0130 Resting quietly with eyes closed. 0425 Percocet 1 tab administered orally for complaint of back pain. 0520 Resting quietly at present without complaint of pain or discomfort. 0730 Bedside and Verbal shift change report given to SAVANNAH WICK (oncoming nurse) by Cierra Arauz RN (offgoing nurse). Report given with SBAR, Kardex, Intake/Output, MAR and Recent Results.

## 2021-05-15 VITALS
HEART RATE: 100 BPM | SYSTOLIC BLOOD PRESSURE: 156 MMHG | TEMPERATURE: 98.7 F | DIASTOLIC BLOOD PRESSURE: 80 MMHG | RESPIRATION RATE: 18 BRPM | OXYGEN SATURATION: 94 %

## 2021-05-15 LAB
ABO + RH BLD: NORMAL
ALBUMIN SERPL-MCNC: 2.7 G/DL (ref 3.4–5)
ALBUMIN/GLOB SERPL: 0.8 {RATIO} (ref 0.8–1.7)
ALP SERPL-CCNC: 102 U/L (ref 45–117)
ALT SERPL-CCNC: 80 U/L (ref 16–61)
ANION GAP SERPL CALC-SCNC: 6 MMOL/L (ref 3–18)
ANTIGENS PRESENT RBC DONR: NORMAL
ANTIGENS PRESENT RBC DONR: NORMAL
AST SERPL-CCNC: 89 U/L (ref 10–38)
BASOPHILS # BLD: 0.1 K/UL (ref 0–0.1)
BASOPHILS NFR BLD: 1 % (ref 0–2)
BILIRUB SERPL-MCNC: 2.3 MG/DL (ref 0.2–1)
BLD PROD TYP BPU: NORMAL
BLD PROD TYP BPU: NORMAL
BLOOD GROUP ANTIBODIES SERPL: NORMAL
BLOOD GROUP ANTIBODIES SERPL: NORMAL
BPU ID: NORMAL
BPU ID: NORMAL
BUN SERPL-MCNC: 27 MG/DL (ref 7–18)
BUN/CREAT SERPL: 17 (ref 12–20)
CALCIUM SERPL-MCNC: 8 MG/DL (ref 8.5–10.1)
CALLED TO:,BCALL1: NORMAL
CHLORIDE SERPL-SCNC: 111 MMOL/L (ref 100–111)
CO2 SERPL-SCNC: 24 MMOL/L (ref 21–32)
CREAT SERPL-MCNC: 1.62 MG/DL (ref 0.6–1.3)
CROSSMATCH RESULT,%XM: NORMAL
CROSSMATCH RESULT,%XM: NORMAL
DIFFERENTIAL METHOD BLD: ABNORMAL
EOSINOPHIL # BLD: 0.2 K/UL (ref 0–0.4)
EOSINOPHIL NFR BLD: 3 % (ref 0–5)
ERYTHROCYTE [DISTWIDTH] IN BLOOD BY AUTOMATED COUNT: 18.3 % (ref 11.6–14.5)
GLOBULIN SER CALC-MCNC: 3.5 G/DL (ref 2–4)
GLUCOSE SERPL-MCNC: 72 MG/DL (ref 74–99)
HCT VFR BLD AUTO: 22.5 % (ref 36–48)
HGB BLD-MCNC: 7.5 G/DL (ref 13–16)
LYMPHOCYTES # BLD: 0.8 K/UL (ref 0.9–3.6)
LYMPHOCYTES NFR BLD: 11 % (ref 21–52)
MCH RBC QN AUTO: 30.4 PG (ref 24–34)
MCHC RBC AUTO-ENTMCNC: 33.3 G/DL (ref 31–37)
MCV RBC AUTO: 91.1 FL (ref 74–97)
MONOCYTES # BLD: 0.4 K/UL (ref 0.05–1.2)
MONOCYTES NFR BLD: 5 % (ref 3–10)
NEUTS SEG # BLD: 5.6 K/UL (ref 1.8–8)
NEUTS SEG NFR BLD: 80 % (ref 40–73)
PLATELET # BLD AUTO: 163 K/UL (ref 135–420)
PLATELET COMMENTS,PCOM: ABNORMAL
PMV BLD AUTO: 10.5 FL (ref 9.2–11.8)
POTASSIUM SERPL-SCNC: 4.8 MMOL/L (ref 3.5–5.5)
PROT SERPL-MCNC: 6.2 G/DL (ref 6.4–8.2)
RBC # BLD AUTO: 2.47 M/UL (ref 4.35–5.65)
RBC MORPH BLD: ABNORMAL
SODIUM SERPL-SCNC: 141 MMOL/L (ref 136–145)
SPECIMEN EXP DATE BLD: NORMAL
STATUS OF UNIT,%ST: NORMAL
STATUS OF UNIT,%ST: NORMAL
UNIT DIVISION, %UDIV: 0
UNIT DIVISION, %UDIV: 0
WBC # BLD AUTO: 7.1 K/UL (ref 4.6–13.2)

## 2021-05-15 PROCEDURE — 99218 HC RM OBSERVATION: CPT

## 2021-05-15 PROCEDURE — 99217 PR OBSERVATION CARE DISCHARGE MANAGEMENT: CPT | Performed by: HOSPITALIST

## 2021-05-15 PROCEDURE — 80053 COMPREHEN METABOLIC PANEL: CPT

## 2021-05-15 PROCEDURE — 36415 COLL VENOUS BLD VENIPUNCTURE: CPT

## 2021-05-15 PROCEDURE — 85025 COMPLETE CBC W/AUTO DIFF WBC: CPT

## 2021-05-15 PROCEDURE — 74011250637 HC RX REV CODE- 250/637: Performed by: HOSPITALIST

## 2021-05-15 PROCEDURE — 74011250637 HC RX REV CODE- 250/637: Performed by: INTERNAL MEDICINE

## 2021-05-15 RX ORDER — HEPARIN SODIUM (PORCINE) LOCK FLUSH IV SOLN 100 UNIT/ML 100 UNIT/ML
500 SOLUTION INTRAVENOUS AS NEEDED
Status: DISCONTINUED | OUTPATIENT
Start: 2021-05-15 | End: 2021-05-15 | Stop reason: HOSPADM

## 2021-05-15 RX ADMIN — FOLIC ACID 1 MG: 1 TABLET ORAL at 09:17

## 2021-05-15 RX ADMIN — Medication 600 MG: at 09:17

## 2021-05-15 RX ADMIN — OXYCODONE HYDROCHLORIDE AND ACETAMINOPHEN 1 TABLET: 5; 325 TABLET ORAL at 04:25

## 2021-05-15 NOTE — PROGRESS NOTES
Problem: Anemia Care Plan (Adult and Pediatric)  Goal: *Labs within defined limits  Outcome: Progressing Towards Goal     Problem: Pain  Goal: *Control of Pain  Outcome: Progressing Towards Goal

## 2021-05-15 NOTE — DISCHARGE INSTRUCTIONS
Patient Education        Learning About Blood Transfusions  What is a blood transfusion? Blood transfusion is a medical treatment to replace the blood or parts of blood that your body has lost. The blood goes through a tube from a bag to an intravenous (IV) catheter and into your vein. You may need a blood transfusion after losing blood from an injury, a major surgery, an illness that causes bleeding, or an illness that destroys blood cells. Transfusions are also used to give you the parts of blood--such as platelets, plasma, or substances that cause clotting--that your body needs to fight an illness or stop bleeding. How is a blood transfusion done? Before you receive a blood transfusion, your blood is tested to find out what your blood type is. Blood or blood parts that are a match with your blood type are ordered by your doctor. Blood is typed as A, B, AB, or O. It is also typed as Rh-positive or Rh-negative. Your blood is also screened to look for antibodies that might react with the blood that is given to you. The blood you are getting is checked and rechecked to make sure that it's the right type for you. A sample of your blood is mixed with a sample of the blood you will receive to check for problems. Before actually giving you the transfusion, a doctor and nurses will look at the label on the package of blood and compare it to your hospital ID bracelet and medical records. The transfusion begins only when all agree that this is the correct blood and that you are the correct person to receive it. To receive the transfusion, you will have an intravenous (IV) catheter inserted into a vein. A tube connects the catheter to the bag containing the blood, which is placed higher than your body. The blood then flows slowly into your vein. A doctor or nurse will check you several times during the transfusion to watch for a reaction or other problems. What are the possible risks?   Blood transfusions have many benefits and are often life-saving. But they also have a few risks. Possible risks include:  · Your body's reaction to receiving new blood. This may include:  ? Fever. ? Breathing problems. ? Allergic reaction, such as hives, swelling, or a new rash. · An infection from the blood. This risk is small because of the strict rules placed on handling and storing blood. Getting a viral infection, such as HIV or hepatitis B or C, through blood transfusions has become very rare. The U[place] Food and Drug Administration (FDA) enforces strict guidelines on the collection, testing, storage, and use of blood. · Getting the wrong blood type by accident. Severe reactions, which can be life-threatening, are very rare. · An infection at the transfusion site, such as redness, swelling, pain, bleeding, or pus. How can you care for yourself at home? To prevent infection at the transfusion site  · Wash the area daily with warm, soapy water, and pat it dry. Don't use hydrogen peroxide or alcohol, which can slow healing. You may cover the area with a gauze bandage if it weeps or rubs against clothing. Change the bandage every day. · Keep the area clean and dry. When should you call for help? Call 911 anytime you think you may need emergency care. For example, call if:  · You have severe trouble breathing. Call your doctor now or seek immediate medical care if:  · You have signs of an allergic reaction, such as hives, swelling, or a new rash. · You have a fever. · You feel weaker or more tired than usual.  · You have a yellow tint to your skin or the whites of your eyes. · You have signs of an infection at the transfusion site, such as redness, swelling, pain, bleeding, or pus. Watch closely for changes in your health, and be sure to contact your doctor if you have any problems. Follow-up care is a key part of your treatment and safety.  Be sure to make and go to all appointments, and call your doctor if you are having problems. It's also a good idea to know your test results and keep a list of the medicines you take. Where can you learn more? Go to http://derek-laila.info/  Enter V588 in the search box to learn more about \"Learning About Blood Transfusions. \"  Current as of: September 23, 2020               Content Version: 12.8  © 2006-2021 [employer]. Care instructions adapted under license by [employer] (which disclaims liability or warranty for this information). If you have questions about a medical condition or this instruction, always ask your healthcare professional. [place] [address] any warranty or liability for your use of this information. DISCHARGE SUMMARY from Nurse    PATIENT INSTRUCTIONS:    After general anesthesia or intravenous sedation, for 24 hours or while taking prescription Narcotics:  · Limit your activities  · Do not drive and operate hazardous machinery  · Do not make important personal or business decisions  · Do  not drink alcoholic beverages  · If you have not urinated within 8 hours after discharge, please contact your surgeon on call. Report the following to your surgeon:  · Excessive pain, swelling, redness or odor of or around the surgical area  · Temperature over 100.5  · Nausea and vomiting lasting longer than 4 hours or if unable to take medications  · Any signs of decreased circulation or nerve impairment to extremity: change in color, persistent  numbness, tingling, coldness or increase pain  · Any questions    What to do at Home:  Recommended activity: Activity as tolerated    If you experience any of the following symptoms shortness of breath, weakness, increased joint pain, or fever greater than 100.5F, please follow up with your primary care physician or the nearest emergency room. *  Please give a list of your current medications to your Primary Care Provider.     *  Please update this list whenever your medications are discontinued, doses are      changed, or new medications (including over-the-counter products) are added. *  Please carry medication information at all times in case of emergency situations. These are general instructions for a healthy lifestyle:    No smoking/ No tobacco products/ Avoid exposure to second hand smoke  Surgeon General's Warning:  Quitting smoking now greatly reduces serious risk to your health. Obesity, smoking, and sedentary lifestyle greatly increases your risk for illness    A healthy diet, regular physical exercise & weight monitoring are important for maintaining a healthy lifestyle    You may be retaining fluid if you have a history of heart failure or if you experience any of the following symptoms:  Weight gain of 3 pounds or more overnight or 5 pounds in a week, increased swelling in our hands or feet or shortness of breath while lying flat in bed. Please call your doctor as soon as you notice any of these symptoms; do not wait until your next office visit. The discharge information has been reviewed with the patient. The patient verbalized understanding. Discharge medications reviewed with the patient and appropriate educational materials and side effects teaching were provided.   ___________________________________________________________________________________________________________________________________

## 2021-05-15 NOTE — DISCHARGE SUMMARY
Discharge Summary    Patient: Bekah Atkinson MRN: [id number]  CSN: [id number]    YOB: 1960  Age: 63 y.o. Sex: male    DOA: 5/13/2021 LOS:  LOS: 0 days   Discharge Date:      Admission Diagnoses: Sickle cell anemia ([facility] [address] [D57.1]    Discharge Diagnoses:    Anemia secondary to sickle cell disease  Chronic pain  LILIANA on CKD 3    Discharge Condition: Stable    Consults: None    PHYSICAL EXAM  Visit Vitals  BP (!) 156/80 (BP 1 Location: Left upper arm, BP Patient Position: At rest)   Pulse 100   Temp 98.7 °F (37.1 °C)   Resp 18   SpO2 94%       General: Alert, cooperative, no acute distress    HEENT: PERRLA, EOMI. Anicteric sclerae. Lungs:  CTA Bilaterally. No Wheezing/Rhonchi/Rales. Heart:  Regular rate and Rhythm. Abdomen: Soft, Non distended, Non tender. + Bowel sounds. Extremities: No edema/ cyanosis/ clubbing  Neurologic:  AA oriented X 3. Moves all extremities. HPI:  Bekah Atkinson is a 63 y.o. male who has a past medical history of sickle cell anemia, CKD 3presents to the emergency room after being sent by oncology office secondary to anemia. His blood work at the office showed a hemoglobin of 5.5.     ER evaluationlabs completed, hemoglobin 5.0, initial plan was to type and crossmatch and transfuse 1 to 2 units PRBC and anticipate discharge home. Blood bank called stating that patient has had multiple transfusions in the past and hence has multiple antibodies. It will take a while to get his blood. Hospitalist consulted for admission. Hospital Course:   58-year-old male with a history of sickle cell anemia, CKD 3 presents to the emergency room after being sent from the oncology office secondary to a hemoglobin of 5.5. ER evaluation patient noted to have a hemoglobin of 5.0.  He was typed and crossmatched to be transfused blood in the emergency room and possibly be discharged after that however he was found to have multiple antibodies secondary to previously blood transfusions and admitted to the hospital.  Patient was transfused 2 units PRBC and his H&H has remained stable. Patient was also noted to have LILIANA on CKD 3 and received gentle hydration. Patient is currently at baseline and is being discharged home and is advised to closely follow-up with his PCP and hematology in 1 to 2 weeks. Patient verbalized understanding. Imaging:  XR Results (most recent):  Results from Hospital Encounter encounter on 03/05/19   XR BA SWALLOW ESOPHOGRAM    Narrative BARIUM ESOPHAGRAM    HISTORY: Epigastric pain. COMPARISON: None. FINDINGS:  Using effervescent granules and dense barium, a fluoroscopic swallowing  evaluation was performed. No significant pooling was evident in the vallecula or  perform sinuses. No laryngeal penetration or belinda tracheal aspiration. Minimal  impression upon the posterior upper esophagus from cervical degenerative  changes, of doubtful clinical significance. Mild tertiary contractions,  suggesting mild esophageal dysmotility. Questionable tiny cervical esophageal  web, without obstruction The esophageal mucosa otherwise appears normal. No  gastroesophageal reflux. No hiatal hernia. No delay in passage of a swallowed 13  mm tablet. Fluoroscopic images: 52.  Fluoroscopy time: 1 minute 12 seconds. Impression IMPRESSION:      1. Mild esophageal dysmotility. 2. No evidence for gastroesophageal reflux or hiatal hernia during this exam.        CT Results (most recent):  Results from Hospital Encounter encounter on 02/19/19   CT CHEST W CONT    Narrative CT CHEST WITH CONTRAST    CPT CODE: 18534    HISTORY: 2 days of chest and back pain. Sickle cell. CORRELATION: 11/14/2017. TECHNIQUE: 5 mm axial images of the chest were obtained after uneventful  administration of intravenous contrast.  The patient received 75 cc of  Isovue-300. Coronal and sagittal reformation.  All CT scans are performed using  dose optimization techniques as appropriate to the performed exam including the  following: Automated exposure control, adjustment of mA and/or kV according to  patient size, and use of iterative reconstructive technique. FINDINGS: Slightly limited by streak artifact. Right-sided Mediport. The thyroid gland is without definite focal abnormality. No axillary  adenopathy. No mediastinal or hilar adenopathy. The main pulmonary artery is patent. No  dilatation of the thoracic aorta. No pulmonary nodules or masses. Linear density in the posterior lateral left  upper lobe stable compatible with scar. There are also similar bandlike  densities in the posterior left lower lobe suggestive of scar. No  acute-appearing consolidations. No effusions. No pneumothorax. Diffuse sclerosis of the osseous structures relatively similar in keeping with  changes of sickle cell disease. No definite acute fracture seen. Densely calcified spleen stable. Stable area of sparing medially measuring 2.2 x  1.9 cm. Pneumobilia and biliary duct dilatation in the liver grossly unchanged. Cholecystectomy. .  Left renal cyst.      Impression IMPRESSION:    No CT finding for acute process of the chest.    Extensive osseous changes secondary to sickle cell disease without definite  finding for acute fracture. Grossly similar chronic biliary ductal dilatation and pneumobilia. Procedures:   None        Discharge Medications:     Current Discharge Medication List      CONTINUE these medications which have NOT CHANGED    Details   oxyCODONE-acetaminophen (PERCOCET) 5-325 mg per tablet Take 1 Tab by mouth every four (4) hours as needed for Pain. folic acid (FOLVITE) 1 mg tablet Take 1 Tab by mouth daily.   Qty: 30 Tab, Refills: 1         STOP taking these medications       HYDROmorphone (DILAUDID) 2 mg tablet Comments:   Reason for Stopping:         LORazepam (ATIVAN) 1 mg tablet Comments:   Reason for Stopping: Current Facility-Administered Medications:     folic acid (FOLVITE) tablet 1 mg, 1 mg, Oral, DAILY, Robbi Dinh MD, 1 mg at 05/15/21 2195    LORazepam (ATIVAN) tablet 1 mg, 1 mg, Oral, Q8H PRN, Robbi Dinh MD    oxyCODONE-acetaminophen (PERCOCET) 5-325 mg per tablet 1 Tab, 1 Tab, Oral, Q4H PRN, Robbi Dinh MD, 1 Tab at 05/15/21 0425    acetaminophen (TYLENOL) tablet 650 mg, 650 mg, Oral, Q6H PRN **OR** acetaminophen (TYLENOL) suppository 650 mg, 650 mg, Rectal, Q6H PRN, Robbi Dinh MD    polyethylene glycol (MIRALAX) packet 17 g, 17 g, Oral, DAILY PRN, Robbi Dinh MD    ondansetron (ZOFRAN ODT) tablet 4 mg, 4 mg, Oral, Q8H PRN **OR** ondansetron (ZOFRAN) injection 4 mg, 4 mg, IntraVENous, Q6H PRN, Robbi Dinh MD    0.9% sodium chloride infusion, 75 mL/hr, IntraVENous, CONTINUOUS, Robbi Dinh MD, Last Rate: 75 mL/hr at 05/14/21 0336, 75 mL/hr at 05/14/21 0336    0.9% sodium chloride infusion 250 mL, 250 mL, IntraVENous, PRN, Robbi Dinh MD    calcium carbonate (CALTREX) tablet 600 mg [elemental], 600 mg, Oral, TID WITH MEALS, Maura Yost MD, 600 mg at 05/15/21 5645  · It is important that you take the medication exactly as they are prescribed. · Keep your medication in the bottles provided by the pharmacist and keep a list of the medication names, dosages, and times to be taken in your wallet. · Do not take other medications without consulting your doctor. Discharge To:  Home    Minutes spent on discharge: 32 minutes spent coordinating this discharge (review instructions/follow-up, prescriptions, preparing report for sign off)    Betina Krueger MD  5/15/2021 10:45 AM

## 2021-05-18 ENCOUNTER — APPOINTMENT (OUTPATIENT)
Dept: VASCULAR SURGERY | Age: 61
End: 2021-05-18
Attending: EMERGENCY MEDICINE
Payer: COMMERCIAL

## 2021-05-18 ENCOUNTER — HOSPITAL ENCOUNTER (EMERGENCY)
Age: 61
Discharge: HOME | End: 2021-05-18
Attending: EMERGENCY MEDICINE
Payer: COMMERCIAL

## 2021-05-18 ENCOUNTER — APPOINTMENT (OUTPATIENT)
Dept: GENERAL RADIOLOGY | Age: 61
End: 2021-05-18
Attending: EMERGENCY MEDICINE
Payer: COMMERCIAL

## 2021-05-18 VITALS
HEART RATE: 93 BPM | DIASTOLIC BLOOD PRESSURE: 77 MMHG | BODY MASS INDEX: 22.2 KG/M2 | WEIGHT: 130 LBS | OXYGEN SATURATION: 100 % | TEMPERATURE: 98.7 F | SYSTOLIC BLOOD PRESSURE: 157 MMHG | HEIGHT: 64 IN | RESPIRATION RATE: 17 BRPM

## 2021-05-18 DIAGNOSIS — N18.30 STAGE 3 CHRONIC KIDNEY DISEASE, UNSPECIFIED WHETHER STAGE 3A OR 3B CKD (HCC): ICD-10-CM

## 2021-05-18 DIAGNOSIS — R60.0 BILATERAL LEG EDEMA: Primary | ICD-10-CM

## 2021-05-18 LAB
ALBUMIN SERPL-MCNC: 2.9 G/DL (ref 3.4–5)
ALBUMIN/GLOB SERPL: 0.9 {RATIO} (ref 0.8–1.7)
ALP SERPL-CCNC: 111 U/L (ref 45–117)
ALT SERPL-CCNC: 62 U/L (ref 16–61)
ANION GAP SERPL CALC-SCNC: 5 MMOL/L (ref 3–18)
AST SERPL-CCNC: 63 U/L (ref 10–38)
BASOPHILS # BLD: 0 K/UL (ref 0–0.1)
BASOPHILS NFR BLD: 1 % (ref 0–2)
BILIRUB SERPL-MCNC: 2.1 MG/DL (ref 0.2–1)
BUN SERPL-MCNC: 24 MG/DL (ref 7–18)
BUN/CREAT SERPL: 16 (ref 12–20)
CALCIUM SERPL-MCNC: 7.9 MG/DL (ref 8.5–10.1)
CHLORIDE SERPL-SCNC: 109 MMOL/L (ref 100–111)
CO2 SERPL-SCNC: 26 MMOL/L (ref 21–32)
CREAT SERPL-MCNC: 1.47 MG/DL (ref 0.6–1.3)
DIFFERENTIAL METHOD BLD: ABNORMAL
EOSINOPHIL # BLD: 0.1 K/UL (ref 0–0.4)
EOSINOPHIL NFR BLD: 1 % (ref 0–5)
ERYTHROCYTE [DISTWIDTH] IN BLOOD BY AUTOMATED COUNT: 17.8 % (ref 11.6–14.5)
GLOBULIN SER CALC-MCNC: 3.4 G/DL (ref 2–4)
GLUCOSE SERPL-MCNC: 92 MG/DL (ref 74–99)
HCT VFR BLD AUTO: 23.2 % (ref 36–48)
HGB BLD-MCNC: 7.7 G/DL (ref 13–16)
LYMPHOCYTES # BLD: 0.4 K/UL (ref 0.9–3.6)
LYMPHOCYTES NFR BLD: 7 % (ref 21–52)
MCH RBC QN AUTO: 30.1 PG (ref 24–34)
MCHC RBC AUTO-ENTMCNC: 33.2 G/DL (ref 31–37)
MCV RBC AUTO: 90.6 FL (ref 74–97)
MONOCYTES # BLD: 1 K/UL (ref 0.05–1.2)
MONOCYTES NFR BLD: 16 % (ref 3–10)
NEUTS SEG # BLD: 5 K/UL (ref 1.8–8)
NEUTS SEG NFR BLD: 75 % (ref 40–73)
PLATELET # BLD AUTO: 119 K/UL (ref 135–420)
PMV BLD AUTO: 10.1 FL (ref 9.2–11.8)
POTASSIUM SERPL-SCNC: 4.5 MMOL/L (ref 3.5–5.5)
PROT SERPL-MCNC: 6.3 G/DL (ref 6.4–8.2)
RBC # BLD AUTO: 2.56 M/UL (ref 4.35–5.65)
SODIUM SERPL-SCNC: 140 MMOL/L (ref 136–145)
WBC # BLD AUTO: 6.6 K/UL (ref 4.6–13.2)

## 2021-05-18 PROCEDURE — 80053 COMPREHEN METABOLIC PANEL: CPT

## 2021-05-18 PROCEDURE — 85025 COMPLETE CBC W/AUTO DIFF WBC: CPT

## 2021-05-18 PROCEDURE — 93970 EXTREMITY STUDY: CPT

## 2021-05-18 PROCEDURE — 71046 X-RAY EXAM CHEST 2 VIEWS: CPT

## 2021-05-18 PROCEDURE — 99282 EMERGENCY DEPT VISIT SF MDM: CPT

## 2021-05-18 RX ORDER — FUROSEMIDE 40 MG/1
40 TABLET ORAL EVERY OTHER DAY
Qty: 15 TAB | Refills: 0 | Status: SHIPPED | OUTPATIENT
Start: 2021-05-18 | End: 2021-06-17

## 2021-05-18 RX ORDER — HEPARIN SODIUM (PORCINE) LOCK FLUSH IV SOLN 100 UNIT/ML 100 UNIT/ML
SOLUTION INTRAVENOUS
Status: DISCONTINUED
Start: 2021-05-18 | End: 2021-05-18 | Stop reason: HOSPADM

## 2021-05-18 NOTE — ED TRIAGE NOTES
Patient presents to ED with c/o bilateral leg swelling that started last week when he was inpatient in the hospital. Denies CHF. CMS intact.

## 2021-05-18 NOTE — PROGRESS NOTES
Referral noted that patient does not have primary care. Call made to patient, patient stated that he does not need assistance in finding a family doctor, patient stated that he called [facility] and was told he can go to any doctor, patient stated that he called but the office is at lunch and he will try later.

## 2021-05-18 NOTE — ED PROVIDER NOTES
EMERGENCY DEPARTMENT HISTORY AND PHYSICAL EXAM    Date: 2021  Patient Name: Devonte Field    History of Presenting Illness     Chief Complaint   Patient presents with    Leg Swelling         History Provided By: Patient    Additional History (Context): Devonte Field is a 63 y.o. male with sickle cell disease, CHF who presents with plaint of bilateral lower leg swelling that is persistent for a week. He said once previously he was given albumin and this was very successful. He denies any chest pain shortness of breath or history of DVTs. PCP: Nimisha Johansen MD    Current Outpatient Medications   Medication Sig Dispense Refill    furosemide (Lasix) 40 mg tablet Take 1 Tab by mouth every other day for 30 days. 15 Tab 0    oxyCODONE-acetaminophen (PERCOCET) 5-325 mg per tablet Take 1 Tab by mouth every four (4) hours as needed for Pain.  folic acid (FOLVITE) 1 mg tablet Take 1 Tab by mouth daily. 30 Tab 1       Past History     Past Medical History:  Past Medical History:   Diagnosis Date    Anemia NEC     sickle cell    Duodenal ulcer     GERD (gastroesophageal reflux disease)     Other ill-defined conditions(799.89) Sickle cell    Sickle cell anemia (HCC)        Past Surgical History:  Past Surgical History:   Procedure Laterality Date    HX CHOLECYSTECTOMY      HX RETINAL DETACHMENT REPAIR Right     HX VASCULAR ACCESS      double lumen mediport       Family History:  Family History   Problem Relation Age of Onset    Sickle Cell Anemia Sister          in her 42's from [place] from flu vaccine       Social History:  Social History     Tobacco Use    Smoking status: Never Smoker    Smokeless tobacco: Never Used   Substance Use Topics    Alcohol use: No    Drug use: No       Allergies: Allergies   Allergen Reactions    Xarelto [Rivaroxaban] Other (comments)     Excessive bleeding         Review of Systems   Review of Systems   Constitutional: Negative for fever.    HENT: Negative. Eyes: Negative. Respiratory: Negative for shortness of breath. Cardiovascular: Positive for leg swelling. Negative for chest pain. Gastrointestinal: Negative. Endocrine: Negative. Genitourinary: Negative. Musculoskeletal: Negative. Skin: Negative. Allergic/Immunologic: Negative. Neurological: Negative. Hematological: Negative. Psychiatric/Behavioral: Negative. All Other Systems Negative  Physical Exam     Vitals:    05/18/21 0928   BP: (!) 157/77   Pulse: 93   Resp: 17   Temp: 98.7 °F (37.1 °C)   SpO2: 100%   Weight: 59 kg (130 lb)   Height: 5' 4\" (1.626 m)     Physical Exam  Vitals signs and nursing note reviewed. Constitutional:       General: He is not in acute distress. Appearance: He is well-developed. He is not ill-appearing, toxic-appearing or diaphoretic. HENT:      Head: Normocephalic and atraumatic. Neck:      Musculoskeletal: Normal range of motion and neck supple. Thyroid: No thyromegaly. Vascular: No carotid bruit. Trachea: No tracheal deviation. Cardiovascular:      Rate and Rhythm: Normal rate and regular rhythm. Heart sounds: Normal heart sounds. No murmur. No friction rub. No gallop. Pulmonary:      Effort: Pulmonary effort is normal. No respiratory distress. Breath sounds: Normal breath sounds. No stridor. No wheezing or rales. Chest:      Chest wall: No tenderness. Abdominal:      General: There is no distension. Palpations: Abdomen is soft. There is no mass. Tenderness: There is no abdominal tenderness. There is no guarding or rebound. Musculoskeletal: Normal range of motion. Right lower leg: Edema present. Left lower leg: Edema present. Comments: Bilateral DP PT pulses palpable. Moderate bilateral lower extremity edema, 2+. Skin:     General: Skin is warm and dry. Coloration: Skin is not pale. Neurological:      Mental Status: He is alert.    Psychiatric: Speech: Speech normal.         Behavior: Behavior normal.         Thought Content: Thought content normal.         Judgment: Judgment normal.            Diagnostic Study Results     Labs -     Recent Results (from the past 12 hour(s))   CBC WITH AUTOMATED DIFF    Collection Time: 05/18/21 10:00 AM   Result Value Ref Range    WBC 6.6 4.6 - 13.2 K/uL    RBC 2.56 (L) 4.35 - 5.65 M/uL    HGB 7.7 (L) 13.0 - 16.0 g/dL    HCT 23.2 (L) 36.0 - 48.0 %    MCV 90.6 74.0 - 97.0 FL    MCH 30.1 24.0 - 34.0 PG    MCHC 33.2 31.0 - 37.0 g/dL    RDW 17.8 (H) 11.6 - 14.5 %    PLATELET 066 (L) 258 - 420 K/uL    MPV 10.1 9.2 - 11.8 FL    NEUTROPHILS 75 (H) 40 - 73 %    LYMPHOCYTES 7 (L) 21 - 52 %    MONOCYTES 16 (H) 3 - 10 %    EOSINOPHILS 1 0 - 5 %    BASOPHILS 1 0 - 2 %    ABS. NEUTROPHILS 5.0 1.8 - 8.0 K/UL    ABS. LYMPHOCYTES 0.4 (L) 0.9 - 3.6 K/UL    ABS. MONOCYTES 1.0 0.05 - 1.2 K/UL    ABS. EOSINOPHILS 0.1 0.0 - 0.4 K/UL    ABS. BASOPHILS 0.0 0.0 - 0.1 K/UL    DF AUTOMATED     METABOLIC PANEL, COMPREHENSIVE    Collection Time: 05/18/21 10:00 AM   Result Value Ref Range    Sodium 140 136 - 145 mmol/L    Potassium 4.5 3.5 - 5.5 mmol/L    Chloride 109 100 - 111 mmol/L    CO2 26 21 - 32 mmol/L    Anion gap 5 3.0 - 18 mmol/L    Glucose 92 74 - 99 mg/dL    BUN 24 (H) 7.0 - 18 MG/DL    Creatinine 1.47 (H) 0.6 - 1.3 MG/DL    BUN/Creatinine ratio 16 12 - 20      GFR est AA 59 (L) >60 ml/min/1.73m2    GFR est non-AA 49 (L) >60 ml/min/1.73m2    Calcium 7.9 (L) 8.5 - 10.1 MG/DL    Bilirubin, total 2.1 (H) 0.2 - 1.0 MG/DL    ALT (SGPT) 62 (H) 16 - 61 U/L    AST (SGOT) 63 (H) 10 - 38 U/L    Alk. phosphatase 111 45 - 117 U/L    Protein, total 6.3 (L) 6.4 - 8.2 g/dL    Albumin 2.9 (L) 3.4 - 5.0 g/dL    Globulin 3.4 2.0 - 4.0 g/dL    A-G Ratio 0.9 0.8 - 1.7         Radiologic Studies -   XR CHEST PA LAT   Final Result   No acute findings or interval change. Please see report for details.         CT Results  (Last 48 hours)    None        CXR Results  (Last 48 hours)               05/18/21 1047  XR CHEST PA LAT Final result    Impression:  No acute findings or interval change. Please see report for details. Narrative:  EXAM: XR CHEST PA LAT       INDICATION: SOB       COMPARISON: 2/19/2019. FINDINGS: PA and lateral radiographs of the chest demonstrate interstitial   prominence again noted without gross interval change. Findings again most   pronounced at the lung bases. . Stable cardiac silhouette. Stable Port-A-Cath. .   Cholecystectomy clips. Stable osseous sequela of sickle cell disease. .                    Medical Decision Making   I am the first provider for this patient. I reviewed the vital signs, available nursing notes, past medical history, past surgical history, family history and social history. Vital Signs-Reviewed the patient's vital signs. Procedures:  Procedures    Provider Notes (Medical Decision Making): Spoke with Dr. Duncan Patton who is on-call for Dr. Ivonne Montana. This is not a primary care physician but did not someone who manages his sickle cell disease with him. I also then spoke with Dr. Alysa Escobar for nephrology. Plan will be considering patient does have CKD with borderline creatinine of 1.47 today and a much improved creatinine last week will plead to place patient on Lasix 40 mg QOD for 1 month and to follow-up with their office as well stressed the importance of obtaining a primary care physician. Patient had a good EF on his last echocardiogram and is not endorsing any chest pain or shortness of breath today and there is no vasculature overload on his chest x-ray. MED RECONCILIATION:  No current facility-administered medications for this encounter. Current Outpatient Medications   Medication Sig    furosemide (Lasix) 40 mg tablet Take 1 Tab by mouth every other day for 30 days.  oxyCODONE-acetaminophen (PERCOCET) 5-325 mg per tablet Take 1 Tab by mouth every four (4) hours as needed for Pain.     folic acid (FOLVITE) 1 mg tablet Take 1 Tab by mouth daily. Disposition:  home    DISCHARGE NOTE:   12:37 PM    Pt has been reexamined. Patient has no new complaints, changes, or physical findings. Care plan outlined and precautions discussed. Results of labs, PVL, CXR were reviewed with the patient. All medications were reviewed with the patient; will d/c home with Lasix. All of pt's questions and concerns were addressed. Patient was instructed and agrees to follow up with nephrology, PCP, as well as to return to the ED upon further deterioration. Patient is ready to go home. Follow-up Information     Follow up With Specialties Details Why Contact Info    Evelin Jasso MD Hematology and Oncology Go on 6/3/2021  [address] [address]      Jacinto Light MD Nephrology Schedule an appointment as soon as possible for a visit today  [address]  Schedule an appointment as soon as possible for a visit in 1 day  [phone]  [phone]    [facility] EMERGENCY DEPT Emergency Medicine  If symptoms worsen return immediately [phone]  [phone]          Current Discharge Medication List      START taking these medications    Details   furosemide (Lasix) 40 mg tablet Take 1 Tab by mouth every other day for 30 days. Qty: 15 Tab, Refills: 0  Start date: 5/18/2021, End date: 6/17/2021               Diagnosis     Clinical Impression:   1. Bilateral leg edema    2.  Stage 3 chronic kidney disease, unspecified whether stage 3a or 3b CKD ([facility] [address]

## 2021-06-01 ENCOUNTER — HOSPITAL ENCOUNTER (OUTPATIENT)
Dept: LAB | Age: 61
Discharge: HOME | End: 2021-06-01
Payer: COMMERCIAL

## 2021-06-01 LAB
ALBUMIN SERPL-MCNC: 3.5 G/DL (ref 3.4–5)
ANION GAP SERPL CALC-SCNC: 3 MMOL/L (ref 3–18)
BUN SERPL-MCNC: 38 MG/DL (ref 7–18)
BUN/CREAT SERPL: 31 (ref 12–20)
CALCIUM SERPL-MCNC: 8.3 MG/DL (ref 8.5–10.1)
CALCIUM SERPL-MCNC: 8.5 MG/DL (ref 8.5–10.1)
CHLORIDE SERPL-SCNC: 107 MMOL/L (ref 100–111)
CO2 SERPL-SCNC: 26 MMOL/L (ref 21–32)
CREAT SERPL-MCNC: 1.23 MG/DL (ref 0.6–1.3)
CREAT UR-MCNC: <13 MG/DL (ref 30–125)
ERYTHROCYTE [DISTWIDTH] IN BLOOD BY AUTOMATED COUNT: 17.5 % (ref 11.6–14.5)
GLUCOSE SERPL-MCNC: 98 MG/DL (ref 74–99)
HCT VFR BLD AUTO: 23.4 % (ref 36–48)
HGB BLD-MCNC: 7.8 G/DL (ref 13–16)
MCH RBC QN AUTO: 29.8 PG (ref 24–34)
MCHC RBC AUTO-ENTMCNC: 33.3 G/DL (ref 31–37)
MCV RBC AUTO: 89.3 FL (ref 74–97)
MICROALBUMIN UR-MCNC: 5.28 MG/DL (ref 0–3)
MICROALBUMIN/CREAT UR-RTO: ABNORMAL MG/G (ref 0–30)
PHOSPHATE SERPL-MCNC: 2.9 MG/DL (ref 2.5–4.9)
PLATELET # BLD AUTO: 230 K/UL (ref 135–420)
PMV BLD AUTO: 10.4 FL (ref 9.2–11.8)
POTASSIUM SERPL-SCNC: 4.7 MMOL/L (ref 3.5–5.5)
PTH-INTACT SERPL-MCNC: 126.9 PG/ML (ref 18.4–88)
RBC # BLD AUTO: 2.62 M/UL (ref 4.35–5.65)
SODIUM SERPL-SCNC: 136 MMOL/L (ref 136–145)
WBC # BLD AUTO: 8 K/UL (ref 4.6–13.2)

## 2021-06-01 PROCEDURE — 85027 COMPLETE CBC AUTOMATED: CPT

## 2021-06-01 PROCEDURE — 83970 ASSAY OF PARATHORMONE: CPT

## 2021-06-01 PROCEDURE — 80069 RENAL FUNCTION PANEL: CPT

## 2021-06-01 PROCEDURE — 36415 COLL VENOUS BLD VENIPUNCTURE: CPT

## 2021-06-01 PROCEDURE — 82043 UR ALBUMIN QUANTITATIVE: CPT

## 2021-06-02 ENCOUNTER — HOSPITAL ENCOUNTER (OUTPATIENT)
Dept: INFUSION THERAPY | Age: 61
Discharge: HOME | End: 2021-06-02
Payer: COMMERCIAL

## 2021-06-02 VITALS
SYSTOLIC BLOOD PRESSURE: 130 MMHG | RESPIRATION RATE: 16 BRPM | HEART RATE: 73 BPM | TEMPERATURE: 99.2 F | OXYGEN SATURATION: 96 % | DIASTOLIC BLOOD PRESSURE: 69 MMHG

## 2021-06-02 LAB — HISTORY CHECKED?,CKHIST: NORMAL

## 2021-06-02 PROCEDURE — 77030012965 HC NDL HUBR BBMI -A

## 2021-06-02 PROCEDURE — 86870 RBC ANTIBODY IDENTIFICATION: CPT

## 2021-06-02 PROCEDURE — 36591 DRAW BLOOD OFF VENOUS DEVICE: CPT

## 2021-06-02 PROCEDURE — 86901 BLOOD TYPING SEROLOGIC RH(D): CPT

## 2021-06-02 PROCEDURE — 86920 COMPATIBILITY TEST SPIN: CPT

## 2021-06-02 PROCEDURE — 74011250636 HC RX REV CODE- 250/636: Performed by: INTERNAL MEDICINE

## 2021-06-02 RX ORDER — SODIUM CHLORIDE 9 MG/ML
250 INJECTION, SOLUTION INTRAVENOUS AS NEEDED
Status: CANCELLED | OUTPATIENT
Start: 2021-06-02

## 2021-06-02 RX ORDER — SODIUM CHLORIDE 0.9 % (FLUSH) 0.9 %
10-40 SYRINGE (ML) INJECTION AS NEEDED
Status: DISCONTINUED | OUTPATIENT
Start: 2021-06-02 | End: 2021-06-06 | Stop reason: HOSPADM

## 2021-06-02 RX ORDER — HEPARIN 100 UNIT/ML
500 SYRINGE INTRAVENOUS ONCE
Status: COMPLETED | OUTPATIENT
Start: 2021-06-02 | End: 2021-06-02

## 2021-06-02 RX ADMIN — Medication 10 ML: at 15:30

## 2021-06-02 RX ADMIN — HEPARIN 500 UNITS: 100 SYRINGE at 15:35

## 2021-06-02 NOTE — PROGRESS NOTES
[facility] OPI Progress Note     Date: 2021     Name: Brooklyn Reza     MRN: [id number]                                                             : 1960     Peripheral Lab Draw        Mr. Casey Hernández to [facility], ambulatory at [phone] accompanied by self. Pt was assessed and education was provided.      Mr. Beck's vitals were reviewed and patient was observed for 5 minutes prior to treatment. Visit Vitals  BP (!) 148/66 (BP 1 Location: Left arm, BP Patient Position: Sitting)   Pulse 82   Temp 98.3 °F (36.8 °C)   Resp 16   SpO2 93%         Tried to obtain blood peripherally but unsuccessfully, so  accessed rt side port, good blood return. Obtained  Type and Cross per written orders, labeled blood tubes and placed blood bank band on patient. Sent blood tubes to main lab for processing. Pt has had multiple blood transfusions in the past.      Patient armband removed and shredded. Let pt accessed, secured port with tegaderm and tape. Flushed with saline and heparin per protocol. Pt understands that he can't get the port wet     Mr. Casey Hernández was discharged from [place] [address] in stable condition at 1550.   Pt is coming back, Friday, 2021 at 0800 for 2 units of PRBC's.     Judie Parrish R.N.  2021  10:08 AM

## 2021-06-03 RX ORDER — DIPHENHYDRAMINE HCL 25 MG
25 CAPSULE ORAL ONCE
Status: CANCELLED | OUTPATIENT
Start: 2021-06-04 | End: 2021-06-04

## 2021-06-03 RX ORDER — ACETAMINOPHEN 325 MG/1
650 TABLET ORAL ONCE
Status: CANCELLED | OUTPATIENT
Start: 2021-06-04 | End: 2021-06-04

## 2021-06-04 ENCOUNTER — HOSPITAL ENCOUNTER (OUTPATIENT)
Dept: INFUSION THERAPY | Age: 61
Discharge: HOME | End: 2021-06-04
Payer: COMMERCIAL

## 2021-06-04 VITALS
TEMPERATURE: 98.1 F | DIASTOLIC BLOOD PRESSURE: 75 MMHG | SYSTOLIC BLOOD PRESSURE: 144 MMHG | HEART RATE: 56 BPM | OXYGEN SATURATION: 100 % | RESPIRATION RATE: 16 BRPM

## 2021-06-04 PROCEDURE — 74011250636 HC RX REV CODE- 250/636: Performed by: INTERNAL MEDICINE

## 2021-06-04 PROCEDURE — 86921 COMPATIBILITY TEST INCUBATE: CPT

## 2021-06-04 PROCEDURE — 86922 COMPATIBILITY TEST ANTIGLOB: CPT

## 2021-06-04 PROCEDURE — 96374 THER/PROPH/DIAG INJ IV PUSH: CPT

## 2021-06-04 PROCEDURE — P9040 RBC LEUKOREDUCED IRRADIATED: HCPCS

## 2021-06-04 PROCEDURE — 85660 RBC SICKLE CELL TEST: CPT

## 2021-06-04 PROCEDURE — 86902 BLOOD TYPE ANTIGEN DONOR EA: CPT

## 2021-06-04 PROCEDURE — 74011250637 HC RX REV CODE- 250/637: Performed by: INTERNAL MEDICINE

## 2021-06-04 PROCEDURE — 36430 TRANSFUSION BLD/BLD COMPNT: CPT

## 2021-06-04 PROCEDURE — 77030013169 SET IV BLD ICUM -A

## 2021-06-04 RX ORDER — SODIUM CHLORIDE 0.9 % (FLUSH) 0.9 %
10-40 SYRINGE (ML) INJECTION EVERY 8 HOURS
Status: DISCONTINUED | OUTPATIENT
Start: 2021-06-04 | End: 2021-06-08 | Stop reason: HOSPADM

## 2021-06-04 RX ORDER — HEPARIN 100 UNIT/ML
500 SYRINGE INTRAVENOUS ONCE
Status: COMPLETED | OUTPATIENT
Start: 2021-06-04 | End: 2021-06-04

## 2021-06-04 RX ORDER — SODIUM CHLORIDE 9 MG/ML
250 INJECTION, SOLUTION INTRAVENOUS AS NEEDED
Status: DISCONTINUED | OUTPATIENT
Start: 2021-06-04 | End: 2021-06-08 | Stop reason: HOSPADM

## 2021-06-04 RX ORDER — DIPHENHYDRAMINE HYDROCHLORIDE 50 MG/ML
25 INJECTION, SOLUTION INTRAMUSCULAR; INTRAVENOUS ONCE
Status: COMPLETED | OUTPATIENT
Start: 2021-06-04 | End: 2021-06-04

## 2021-06-04 RX ORDER — ACETAMINOPHEN 325 MG/1
650 TABLET ORAL ONCE
Status: COMPLETED | OUTPATIENT
Start: 2021-06-04 | End: 2021-06-04

## 2021-06-04 RX ADMIN — SODIUM CHLORIDE 250 ML: 9 INJECTION, SOLUTION INTRAVENOUS at 08:40

## 2021-06-04 RX ADMIN — Medication 20 ML: at 08:40

## 2021-06-04 RX ADMIN — ACETAMINOPHEN 650 MG: 325 TABLET ORAL at 08:45

## 2021-06-04 RX ADMIN — HEPARIN 500 UNITS: 100 SYRINGE at 14:35

## 2021-06-04 RX ADMIN — Medication 30 ML: at 14:35

## 2021-06-04 RX ADMIN — DIPHENHYDRAMINE HYDROCHLORIDE 25 MG: 50 INJECTION INTRAMUSCULAR; INTRAVENOUS at 08:45

## 2021-06-04 NOTE — PROGRESS NOTES
[facility] OPIC Progress Note    Date: 2021    Name: Liliya Torres    MRN: [id number]         : 1960       2 UNITS PRBCs      Mr. Candance Columbia arrived to [facility] at 65.     Mr. Candance Columbia was assessed and education was provided. Pt declined written education/ discharge instructions.      Mr. Beck's vitals were reviewed. Visit Vitals  /78 (BP 1 Location: Left upper arm, BP Patient Position: Sitting)   Pulse (!) 55   Temp 98.2 °F (36.8 °C)   Resp 16   SpO2 100%        Pre-medications administered as ordered: Tylenol 650mg po & Benadryl 25mg IV.     Patient's R upper chest port already accessed from 21. Brisk blood return/ flushes without difficulty.     NS infusing as ordered at [facility].     2 units PRBCs administered as ordered followed by NS flush.     Pt declined to stay for observation. No complaints. No s/sx of reaction. VSS.     Flushed pt's port w/ heparin per order & de-accessed. Band-aid applied to site.     Mr. Beck tolerated well without complaints. Discharge/ follow-up instructions discussed w/ pt. Pt verbalized understanding.     Pt armband and blood bank band removed & shredded.     Mr. Candance Columbia was discharged from [place] [address] in stable condition at [phone].  He has no future appointments scheduled with the [facility] at this time.     Jatinder Chew RN  2021

## 2021-07-09 ENCOUNTER — HOSPITAL ENCOUNTER (OUTPATIENT)
Dept: INFUSION THERAPY | Age: 61
Discharge: HOME | End: 2021-07-09
Payer: COMMERCIAL

## 2021-07-09 LAB — HISTORY CHECKED?,CKHIST: NORMAL

## 2021-07-09 PROCEDURE — 86921 COMPATIBILITY TEST INCUBATE: CPT

## 2021-07-09 PROCEDURE — 85660 RBC SICKLE CELL TEST: CPT

## 2021-07-09 PROCEDURE — 36415 COLL VENOUS BLD VENIPUNCTURE: CPT

## 2021-07-09 PROCEDURE — 86920 COMPATIBILITY TEST SPIN: CPT

## 2021-07-09 PROCEDURE — 86901 BLOOD TYPING SEROLOGIC RH(D): CPT

## 2021-07-09 PROCEDURE — 86922 COMPATIBILITY TEST ANTIGLOB: CPT

## 2021-07-09 PROCEDURE — 86870 RBC ANTIBODY IDENTIFICATION: CPT

## 2021-07-09 PROCEDURE — 86902 BLOOD TYPE ANTIGEN DONOR EA: CPT

## 2021-07-09 NOTE — PROGRESS NOTES
[facility] OPIC Progress Note    Date: 2021    Name: Maureen Maria    MRN: [id number]         : 1960    Peripheral Lab Draw    Recent Results (from the past 12 hour(s))   RBC, ALLOCATE    Collection Time: 21  9:15 AM   Result Value Ref Range    HISTORY CHECKED? Historical check performed        Mr. Ifeoma Salmeron to [facility], ambulatory at [facility] accompanied by self. Pt was assessed and education was provided. Blood obtained peripherally from left arm x 1 attempt with butterfly needle and sent to lab for Type and Crossmatch per written orders. No bleeding or hematoma noted at site. Gauze and coban applied. Mr. Ifeoma Salmeron tolerated the phlebotomy, and had no complaints. Patient armband removed and shredded. Mr. Ifeoma Salmeron was discharged from [place] [address] in stable condition at 4166.      Porfirio Head Phlebotomist PCT  2021  12:45 PM

## 2021-07-12 ENCOUNTER — HOSPITAL ENCOUNTER (OUTPATIENT)
Dept: INFUSION THERAPY | Age: 61
Discharge: HOME | End: 2021-07-12
Payer: COMMERCIAL

## 2021-07-12 VITALS
RESPIRATION RATE: 16 BRPM | OXYGEN SATURATION: 99 % | SYSTOLIC BLOOD PRESSURE: 153 MMHG | TEMPERATURE: 97.6 F | DIASTOLIC BLOOD PRESSURE: 76 MMHG | HEART RATE: 72 BPM

## 2021-07-12 PROCEDURE — 77030012965 HC NDL HUBR BBMI -A

## 2021-07-12 PROCEDURE — P9040 RBC LEUKOREDUCED IRRADIATED: HCPCS

## 2021-07-12 PROCEDURE — 77030013169 SET IV BLD ICUM -A

## 2021-07-12 PROCEDURE — 36430 TRANSFUSION BLD/BLD COMPNT: CPT

## 2021-07-12 PROCEDURE — 96374 THER/PROPH/DIAG INJ IV PUSH: CPT

## 2021-07-12 PROCEDURE — 74011250637 HC RX REV CODE- 250/637: Performed by: INTERNAL MEDICINE

## 2021-07-12 PROCEDURE — 74011250636 HC RX REV CODE- 250/636: Performed by: INTERNAL MEDICINE

## 2021-07-12 RX ORDER — SODIUM CHLORIDE 9 MG/ML
250 INJECTION, SOLUTION INTRAVENOUS AS NEEDED
Status: DISCONTINUED | OUTPATIENT
Start: 2021-07-12 | End: 2021-07-14 | Stop reason: HOSPADM

## 2021-07-12 RX ORDER — SODIUM CHLORIDE 0.9 % (FLUSH) 0.9 %
10-40 SYRINGE (ML) INJECTION AS NEEDED
Status: DISCONTINUED | OUTPATIENT
Start: 2021-07-12 | End: 2021-07-14 | Stop reason: HOSPADM

## 2021-07-12 RX ORDER — DIPHENHYDRAMINE HYDROCHLORIDE 50 MG/ML
25 INJECTION, SOLUTION INTRAMUSCULAR; INTRAVENOUS ONCE
Status: COMPLETED | OUTPATIENT
Start: 2021-07-12 | End: 2021-07-12

## 2021-07-12 RX ORDER — ACETAMINOPHEN 325 MG/1
650 TABLET ORAL ONCE
Status: COMPLETED | OUTPATIENT
Start: 2021-07-12 | End: 2021-07-12

## 2021-07-12 RX ORDER — HEPARIN 100 UNIT/ML
500 SYRINGE INTRAVENOUS AS NEEDED
Status: DISCONTINUED | OUTPATIENT
Start: 2021-07-12 | End: 2021-07-14 | Stop reason: HOSPADM

## 2021-07-12 RX ADMIN — DIPHENHYDRAMINE HYDROCHLORIDE 25 MG: 50 INJECTION INTRAMUSCULAR; INTRAVENOUS at 08:47

## 2021-07-12 RX ADMIN — HEPARIN 500 UNITS: 100 SYRINGE at 15:38

## 2021-07-12 RX ADMIN — Medication 10 ML: at 15:37

## 2021-07-12 RX ADMIN — ACETAMINOPHEN 650 MG: 325 TABLET ORAL at 08:46

## 2021-07-12 RX ADMIN — SODIUM CHLORIDE 250 ML: 0.9 INJECTION, SOLUTION INTRAVENOUS at 08:46

## 2021-07-12 NOTE — PROGRESS NOTES
[facility] OPIC Progress Note    Date: 2021    Name: Tete Agudelo    MRN: [id number]         : 1960    2 Units of PRBCs    Mr. Joe Bennett arrived to [facility] at 0820 accompanied by self. Mr. Joe Bennett was assessed and education was provided. Discussed risks and benefits of blood transfusion with patient, including risk of transfusion reaction and disease transmission. Patient verbalized understanding and signed consent placed on chart. Mr. Kathy Sanchez vitals were reviewed. Visit Vitals  /69   Pulse 73   Temp 98.8 °F (37.1 °C)   Resp 16   SpO2 96%     Patient Vitals for the past 12 hrs:   Temp Pulse Resp BP SpO2   21 1538 97.6 °F (36.4 °C) 72 16 (!) 153/76 99 %   21 1455 98.1 °F (36.7 °C) 61 16 (!) 146/72 99 %   21 1435 97.9 °F (36.6 °C) 65 16 (!) 150/70 98 %   21 1335 98.1 °F (36.7 °C) 64 16 136/74 98 %   21 1305 97.4 °F (36.3 °C) 61 16 138/69 99 %   21 1250 97.5 °F (36.4 °C) 60 16 134/72 99 %   21 1223 97.7 °F (36.5 °C) 60 16 (!) 140/71 98 %   21 1215 97.8 °F (36.6 °C) 63  139/67    21 1150 98.2 °F (36.8 °C) 64  128/73 98 %   21 1050 97.9 °F (36.6 °C) (!) 53 16 120/65 99 %   21 1020 98 °F (36.7 °C) 60 16 117/64 99 %   21 1005 98.1 °F (36.7 °C) 63  (!) 111/57 97 %   21 0921 98.1 °F (36.7 °C) 62 16 (!) 115/57 96 %   21 0829 98.8 °F (37.1 °C) 73 16 119/69 96 %       Right mediport accessed with 20g 3/4 inch littlejohn needle. Positive for blood return and flushes without difficulty. Normal saline initiated at [facility]. Pre-medications (Tylenol 650 mg and Benadryl 25 mg IVP) were administered as ordered. First unit of two ordered units of PRBCs initiated @ 75 ml/hr at 0950. Fifteen minutes into infusion, VS stable and pt denied c/o SOB, itching/hives, lip/tongue/facial swelling, CP or other complaints. Infusion rate increased to 100 ml/hr.  Fifteen minutes later, VS stable and pt denied complaints; infusion rate increased to 150 ml/hr for the remainder of the transfusion. Unit finished @ 0484 31 29 02. VS stable and no transfusion reaction suspected. Second unit of two ordered units of PRBCs initiated @ 75 ml/hr at 1235. Fifteen minutes into infusion, VS stable and pt denied c/o SOB, itching/hives, lip/tongue/facial swelling, CP or other complaints. Infusion rate increased to 100 ml/hr. Fifteen minutes later, VS stable and pt denied complaints; infusion rate increased to 150 ml/hr for the remainder of the transfusion. Unit finished @ 1455. VS stable and no transfusion reaction suspected. Mr. Susannah Radford tolerated infusion without complaints. IV removed. No irritation, bleeding, or hematoma noted at site. Gauze and coban applied to site. Patient remained in [facility] for 45 minutes for observation. No signs of allergic reaction noted. Discharge instructions reviewed with pt. Pt instructed to report SOB, CP, elevated temp, back pain, or other symptoms of transfusion reaction to MD or ED. Pt verbalized understanding. Patient armband removed and shredded    Mr. Susannah Radford was discharged from [place] [address] in stable condition at 1540. He is to follow up with with referring provider/physician as needed. Fady Bell RN  July 12, 2021

## 2021-08-10 ENCOUNTER — HOSPITAL ENCOUNTER (OUTPATIENT)
Dept: INFUSION THERAPY | Age: 61
Discharge: HOME | End: 2021-08-10
Payer: COMMERCIAL

## 2021-08-10 VITALS
SYSTOLIC BLOOD PRESSURE: 116 MMHG | RESPIRATION RATE: 16 BRPM | DIASTOLIC BLOOD PRESSURE: 66 MMHG | HEART RATE: 67 BPM | OXYGEN SATURATION: 97 % | TEMPERATURE: 98.9 F

## 2021-08-10 PROCEDURE — 86920 COMPATIBILITY TEST SPIN: CPT

## 2021-08-10 PROCEDURE — 85660 RBC SICKLE CELL TEST: CPT

## 2021-08-10 PROCEDURE — 36591 DRAW BLOOD OFF VENOUS DEVICE: CPT

## 2021-08-10 PROCEDURE — 86902 BLOOD TYPE ANTIGEN DONOR EA: CPT

## 2021-08-10 PROCEDURE — 74011250636 HC RX REV CODE- 250/636

## 2021-08-10 PROCEDURE — 86901 BLOOD TYPING SEROLOGIC RH(D): CPT

## 2021-08-10 PROCEDURE — 77030012965 HC NDL HUBR BBMI -A

## 2021-08-10 PROCEDURE — 86870 RBC ANTIBODY IDENTIFICATION: CPT

## 2021-08-10 RX ORDER — SODIUM CHLORIDE 0.9 % (FLUSH) 0.9 %
10-40 SYRINGE (ML) INJECTION EVERY 8 HOURS
Status: DISCONTINUED | OUTPATIENT
Start: 2021-08-10 | End: 2021-08-14 | Stop reason: HOSPADM

## 2021-08-10 RX ORDER — HEPARIN 100 UNIT/ML
500 SYRINGE INTRAVENOUS ONCE
Status: COMPLETED | OUTPATIENT
Start: 2021-08-10 | End: 2021-08-10

## 2021-08-10 RX ORDER — HEPARIN 100 UNIT/ML
SYRINGE INTRAVENOUS
Status: COMPLETED
Start: 2021-08-10 | End: 2021-08-10

## 2021-08-10 RX ADMIN — HEPARIN 500 UNITS: 100 SYRINGE at 14:33

## 2021-08-10 RX ADMIN — Medication 30 ML: at 14:32

## 2021-08-10 NOTE — PROGRESS NOTES
[facility] OPIC Progress Note     Date: August 10, 2021     Name: Jarrett Steinberg     MRN: [id number]                                                             : 1960     Lab Draw        Mr. Shirley Antunez to [facility], ambulatory at [phone] accompanied by self. Pt was assessed and education was provided.      Mr. Beck's vitals were reviewed and patient was observed for 5 minutes prior to treatment. Visit Vitals  BP (!) 148/66 (BP 1 Location: Left arm, BP Patient Position: Sitting)   Pulse 82   Temp 98.3 °F (36.8 °C)   Resp 16   SpO2 93%         Tried to obtain blood peripherally but unsuccessfully, so  accessed rt side port, good blood return. Obtained  Type and Cross per written orders, labeled blood tubes and placed blood bank band on patient. Sent blood tubes to main lab for processing. Pt has had multiple blood transfusions in the past.      Patient armband removed and shredded. Let pt accessed, secured port with tegaderm and tape. Flushed with saline and heparin per protocol. Pt understands that he can't get the port wet     Mr. Shirley Antunez was discharged from [place] [address] in stable condition at 1455. Pt is coming back,  at 0900 for 2 units of PRBC's.     King Beth R.N.   August 10. 2021

## 2021-08-11 LAB — HISTORY CHECKED?,CKHIST: NORMAL

## 2021-08-12 ENCOUNTER — HOSPITAL ENCOUNTER (OUTPATIENT)
Dept: INFUSION THERAPY | Age: 61
Discharge: HOME | End: 2021-08-12
Payer: COMMERCIAL

## 2021-08-12 VITALS
TEMPERATURE: 98.4 F | RESPIRATION RATE: 18 BRPM | OXYGEN SATURATION: 95 % | HEART RATE: 63 BPM | DIASTOLIC BLOOD PRESSURE: 80 MMHG | SYSTOLIC BLOOD PRESSURE: 137 MMHG

## 2021-08-12 PROCEDURE — 86921 COMPATIBILITY TEST INCUBATE: CPT

## 2021-08-12 PROCEDURE — P9040 RBC LEUKOREDUCED IRRADIATED: HCPCS

## 2021-08-12 PROCEDURE — 74011250637 HC RX REV CODE- 250/637: Performed by: INTERNAL MEDICINE

## 2021-08-12 PROCEDURE — 36430 TRANSFUSION BLD/BLD COMPNT: CPT

## 2021-08-12 PROCEDURE — 86922 COMPATIBILITY TEST ANTIGLOB: CPT

## 2021-08-12 PROCEDURE — 74011250636 HC RX REV CODE- 250/636: Performed by: INTERNAL MEDICINE

## 2021-08-12 PROCEDURE — 77030013169 SET IV BLD ICUM -A

## 2021-08-12 RX ORDER — ACETAMINOPHEN 325 MG/1
650 TABLET ORAL ONCE
Status: COMPLETED | OUTPATIENT
Start: 2021-08-12 | End: 2021-08-12

## 2021-08-12 RX ORDER — SODIUM CHLORIDE 9 MG/ML
250 INJECTION, SOLUTION INTRAVENOUS AS NEEDED
Status: DISCONTINUED | OUTPATIENT
Start: 2021-08-12 | End: 2021-08-16 | Stop reason: HOSPADM

## 2021-08-12 RX ORDER — DIPHENHYDRAMINE HCL 25 MG
25 CAPSULE ORAL ONCE
Status: COMPLETED | OUTPATIENT
Start: 2021-08-12 | End: 2021-08-12

## 2021-08-12 RX ADMIN — DIPHENHYDRAMINE HYDROCHLORIDE 25 MG: 25 CAPSULE ORAL at 09:22

## 2021-08-12 RX ADMIN — SODIUM CHLORIDE 250 ML: 9 INJECTION, SOLUTION INTRAVENOUS at 09:20

## 2021-08-12 RX ADMIN — ACETAMINOPHEN 650 MG: 325 TABLET ORAL at 09:22

## 2021-08-12 NOTE — PROGRESS NOTES
[facility] OPIC Progress Note    Date: 2021    Name: Sergio Beach    MRN: [id number]         : 1960       2 UNITS PRBCs      Mr. Vinton Shone arrived to [facility] at [address].     Mr. Vinton Shone was assessed and education was provided. Pt declined written education/ discharge instructions.      Mr. Beck's vitals were reviewed. Visit Vitals  BP (!) 142/77 (BP 1 Location: Left upper arm, BP Patient Position: At rest;Sitting)   Pulse 79   Temp 98.6 °F (37 °C)   Resp 18   SpO2 95%        Pre-medications administered as ordered: Tylenol 650mg po & Benadryl 25mg IV.     Patient's R upper chest port already accessed from 8/10/21. Brisk blood return/ flushes without difficulty.     NS infusing as ordered at [facility].     2 units PRBCs administered as ordered followed by NS flush.     Pt declined to stay for observation. No complaints. No s/sx of reaction. VSS. Patient Vitals for the past 12 hrs:   Temp Pulse Resp BP SpO2   21 1445 98.4 °F (36.9 °C) 63 18 137/80    21 1415 98.6 °F (37 °C) 61 18 136/66    21 1330 98.6 °F (37 °C) 60 18 (!) 140/68    21 1300 98.4 °F (36.9 °C) 61 18 138/62    21 1245 98.6 °F (37 °C) 60 18 134/66    21 1230 98.4 °F (36.9 °C) (!) 59 18 131/67    21 1215 98.6 °F (37 °C) (!) 58 18 112/68    21 1145 98.4 °F (36.9 °C) 60 18 110/66    21 1115 98.6 °F (37 °C) 61 18 113/68    21 1045 98.6 °F (37 °C) 60 18 107/64    21 1030 98.6 °F (37 °C) 65 18 110/65    21 1015 98.7 °F (37.1 °C) 80 18 118/68    21 1000 98.6 °F (37 °C) 79 18 (!) 140/76    21 0945 98.6 °F (37 °C) 77 18 138/72    21 0916 98.6 °F (37 °C) 79 18 (!) 142/77 95 %        Flushed pt's port w/ heparin per order & de-accessed. Band-aid applied to site.     Mr. Beck tolerated well without complaints. Discharge/ follow-up instructions discussed w/ pt. Pt verbalized understanding.     Pt armband and blood bank band removed & shredded.      Bassam Mata was discharged from [place] [address] in stable condition at 1500.  He has no future appointments scheduled with the [facility] at this time.     Nilesh Yee RN  June 4, 2021

## 2021-10-15 ENCOUNTER — TRANSCRIBE ORDER (OUTPATIENT)
Dept: REGISTRATION | Age: 61
End: 2021-10-15

## 2021-10-15 ENCOUNTER — HOSPITAL ENCOUNTER (OUTPATIENT)
Dept: PREADMISSION TESTING | Age: 61
Discharge: HOME | End: 2021-10-15
Payer: COMMERCIAL

## 2021-10-15 DIAGNOSIS — D58.2 OTHER HEMOGLOBINOPATHIES (HCC): ICD-10-CM

## 2021-10-15 DIAGNOSIS — D58.2 OTHER HEMOGLOBINOPATHIES (HCC): Primary | ICD-10-CM

## 2021-10-15 PROCEDURE — 85660 RBC SICKLE CELL TEST: CPT

## 2021-10-15 PROCEDURE — 36415 COLL VENOUS BLD VENIPUNCTURE: CPT

## 2021-10-15 PROCEDURE — 86902 BLOOD TYPE ANTIGEN DONOR EA: CPT

## 2021-10-15 PROCEDURE — 86870 RBC ANTIBODY IDENTIFICATION: CPT

## 2021-10-15 PROCEDURE — 86920 COMPATIBILITY TEST SPIN: CPT

## 2021-10-15 PROCEDURE — 86901 BLOOD TYPING SEROLOGIC RH(D): CPT

## 2021-10-15 PROCEDURE — 86921 COMPATIBILITY TEST INCUBATE: CPT

## 2021-10-15 PROCEDURE — 86922 COMPATIBILITY TEST ANTIGLOB: CPT

## 2021-10-15 RX ORDER — HYDROCORTISONE SODIUM SUCCINATE 100 MG/2ML
100 INJECTION, POWDER, FOR SOLUTION INTRAMUSCULAR; INTRAVENOUS ONCE
Status: CANCELLED | OUTPATIENT
Start: 2021-10-18 | End: 2021-10-18

## 2021-10-15 RX ORDER — DIPHENHYDRAMINE HYDROCHLORIDE 50 MG/ML
25 INJECTION, SOLUTION INTRAMUSCULAR; INTRAVENOUS ONCE
Status: CANCELLED | OUTPATIENT
Start: 2021-10-18 | End: 2021-10-18

## 2021-10-18 ENCOUNTER — HOSPITAL ENCOUNTER (OUTPATIENT)
Dept: INFUSION THERAPY | Age: 61
Discharge: HOME | End: 2021-10-18
Payer: COMMERCIAL

## 2021-10-18 VITALS
HEART RATE: 58 BPM | DIASTOLIC BLOOD PRESSURE: 66 MMHG | SYSTOLIC BLOOD PRESSURE: 130 MMHG | OXYGEN SATURATION: 99 % | RESPIRATION RATE: 16 BRPM | TEMPERATURE: 97.7 F

## 2021-10-18 PROCEDURE — 77030012965 HC NDL HUBR BBMI -A

## 2021-10-18 PROCEDURE — 77030013169 SET IV BLD ICUM -A

## 2021-10-18 PROCEDURE — P9040 RBC LEUKOREDUCED IRRADIATED: HCPCS

## 2021-10-18 PROCEDURE — 36430 TRANSFUSION BLD/BLD COMPNT: CPT

## 2021-10-18 PROCEDURE — 74011250636 HC RX REV CODE- 250/636: Performed by: INTERNAL MEDICINE

## 2021-10-18 PROCEDURE — 74011250637 HC RX REV CODE- 250/637: Performed by: INTERNAL MEDICINE

## 2021-10-18 RX ORDER — ACETAMINOPHEN 325 MG/1
325 TABLET ORAL ONCE
Status: COMPLETED | OUTPATIENT
Start: 2021-10-18 | End: 2021-10-18

## 2021-10-18 RX ORDER — ACETAMINOPHEN 325 MG/1
650 TABLET ORAL ONCE
Status: COMPLETED | OUTPATIENT
Start: 2021-10-18 | End: 2021-10-18

## 2021-10-18 RX ORDER — DIPHENHYDRAMINE HCL 25 MG
25 CAPSULE ORAL ONCE
Status: COMPLETED | OUTPATIENT
Start: 2021-10-18 | End: 2021-10-18

## 2021-10-18 RX ORDER — HEPARIN 100 UNIT/ML
500 SYRINGE INTRAVENOUS ONCE
Status: COMPLETED | OUTPATIENT
Start: 2021-10-18 | End: 2021-10-18

## 2021-10-18 RX ORDER — SODIUM CHLORIDE 0.9 % (FLUSH) 0.9 %
10-40 SYRINGE (ML) INJECTION AS NEEDED
Status: DISCONTINUED | OUTPATIENT
Start: 2021-10-18 | End: 2021-10-20 | Stop reason: HOSPADM

## 2021-10-18 RX ORDER — SODIUM CHLORIDE 9 MG/ML
250 INJECTION, SOLUTION INTRAVENOUS AS NEEDED
Status: DISCONTINUED | OUTPATIENT
Start: 2021-10-18 | End: 2021-10-20 | Stop reason: HOSPADM

## 2021-10-18 RX ADMIN — ACETAMINOPHEN 325 MG: 325 TABLET ORAL at 08:38

## 2021-10-18 RX ADMIN — ACETAMINOPHEN 650 MG: 325 TABLET ORAL at 08:32

## 2021-10-18 RX ADMIN — HEPARIN 500 UNITS: 100 SYRINGE at 12:16

## 2021-10-18 RX ADMIN — Medication 10 ML: at 12:16

## 2021-10-18 RX ADMIN — DIPHENHYDRAMINE HYDROCHLORIDE 25 MG: 25 CAPSULE ORAL at 08:32

## 2021-10-18 RX ADMIN — Medication 10 ML: at 08:40

## 2021-10-18 RX ADMIN — SODIUM CHLORIDE 250 ML: 0.9 INJECTION, SOLUTION INTRAVENOUS at 08:42

## 2021-10-18 NOTE — PROGRESS NOTES
[facility] OPIC Progress Note    Date: 2021    Name: Mary Anne Ro    MRN: [id number]         : 1960     PRBC 1 unit      Mr. Oliverio Anaya arrived to [facility] at [facility] accompanied by self. Mr. Oliverio Anaya was assessed and education was provided. Discussed risks and benefits of blood transfusion with patient, including risk of transfusion reaction and disease transmission. Patient verbalized understanding and signed consent placed on chart. Mr. Phillip Pablo vitals were reviewed. Visit Vitals  /67   Pulse 71   Temp 98 °F (36.7 °C)   Resp 16   SpO2 93%     Patient Vitals for the past 12 hrs:   Temp Pulse Resp BP SpO2   10/18/21 1220 97.7 °F (36.5 °C) (!) 58 16 130/66 99 %   10/18/21 1159 97.5 °F (36.4 °C) (!) 58 16 137/67 99 %   10/18/21 1125 98.7 °F (37.1 °C) (!) 57 16 134/66    10/18/21 1025 97.8 °F (36.6 °C) (!) 58 16 125/63 99 %   10/18/21 0955 97.7 °F (36.5 °C) (!) 59 16 122/60 99 %   10/18/21 0940 97.8 °F (36.6 °C) (!) 59 16 120/62 99 %   10/18/21 0914 97.8 °F (36.6 °C) 62 16 112/64 95 %   10/18/21 0820 98 °F (36.7 °C) 71 16 127/67 93 %       Right chest mediport accessed with 20g and 1 inch littlejohn needle under sterile technique. Port flushed easily and brisk blood return. Normal saline initiated at [facility]. Pre-medications (Tylenol 650 mg and Benadryl 25 mg) were administered as ordered. First unit of two ordered units of PRBCs initiated @ 75 ml/hr at 0925. Fifteen minutes into infusion, VS stable and pt denied c/o SOB, itching/hives, lip/tongue/facial swelling, CP or other complaints. Infusion rate increased to 100 ml/hr. Fifteen minutes later, VS stable and pt denied complaints; infusion rate increased to 150 ml/hr for the remainder of the transfusion. Unit finished @ [phone]. VS stable and no transfusion reaction suspected. Mr. Oliverio Anaya tolerated infusion without complaints. Port flushed with NS 10 ml and Heparin 500 ml/5 units and de-accessed. Band-aid applied to site.     Patient remained in [facility] for 20 minutes for observation. No signs of allergic reaction noted. Discharge instructions reviewed with pt. Pt instructed to report SOB, CP, elevated temp, back pain, or other symptoms of transfusion reaction to MD or ED. Pt verbalized understanding. Patient armband removed and shredded    Mr. Bethena Leyden was discharged from [place] [address] in stable condition at 1225. He is to follow up with referring provider/physician as needed.     Derick Limon RN  October 18, 2021

## 2021-10-29 ENCOUNTER — HOSPITAL ENCOUNTER (OUTPATIENT)
Dept: PREADMISSION TESTING | Age: 61
Discharge: HOME | End: 2021-10-29
Payer: COMMERCIAL

## 2021-10-29 PROCEDURE — 36415 COLL VENOUS BLD VENIPUNCTURE: CPT

## 2021-10-29 PROCEDURE — 86921 COMPATIBILITY TEST INCUBATE: CPT

## 2021-10-29 PROCEDURE — 86922 COMPATIBILITY TEST ANTIGLOB: CPT

## 2021-10-29 PROCEDURE — 86902 BLOOD TYPE ANTIGEN DONOR EA: CPT

## 2021-10-29 PROCEDURE — 86870 RBC ANTIBODY IDENTIFICATION: CPT

## 2021-10-29 PROCEDURE — 85660 RBC SICKLE CELL TEST: CPT

## 2021-10-29 PROCEDURE — 86920 COMPATIBILITY TEST SPIN: CPT

## 2021-10-29 PROCEDURE — 86901 BLOOD TYPING SEROLOGIC RH(D): CPT

## 2021-11-01 ENCOUNTER — HOSPITAL ENCOUNTER (OUTPATIENT)
Dept: INFUSION THERAPY | Age: 61
Discharge: HOME | End: 2021-11-01
Payer: COMMERCIAL

## 2021-11-01 VITALS
SYSTOLIC BLOOD PRESSURE: 126 MMHG | HEART RATE: 62 BPM | RESPIRATION RATE: 18 BRPM | TEMPERATURE: 98.3 F | OXYGEN SATURATION: 96 % | DIASTOLIC BLOOD PRESSURE: 64 MMHG

## 2021-11-01 PROCEDURE — P9040 RBC LEUKOREDUCED IRRADIATED: HCPCS

## 2021-11-01 PROCEDURE — 77030013169 SET IV BLD ICUM -A

## 2021-11-01 PROCEDURE — 74011250636 HC RX REV CODE- 250/636: Performed by: INTERNAL MEDICINE

## 2021-11-01 PROCEDURE — 96375 TX/PRO/DX INJ NEW DRUG ADDON: CPT

## 2021-11-01 PROCEDURE — 96374 THER/PROPH/DIAG INJ IV PUSH: CPT

## 2021-11-01 PROCEDURE — 36430 TRANSFUSION BLD/BLD COMPNT: CPT

## 2021-11-01 RX ORDER — HEPARIN 100 UNIT/ML
500 SYRINGE INTRAVENOUS ONCE
Status: COMPLETED | OUTPATIENT
Start: 2021-11-01 | End: 2021-11-01

## 2021-11-01 RX ORDER — DIPHENHYDRAMINE HYDROCHLORIDE 50 MG/ML
25 INJECTION, SOLUTION INTRAMUSCULAR; INTRAVENOUS ONCE
Status: COMPLETED | OUTPATIENT
Start: 2021-11-01 | End: 2021-11-01

## 2021-11-01 RX ORDER — HYDROCORTISONE SODIUM SUCCINATE 100 MG/2ML
100 INJECTION, POWDER, FOR SOLUTION INTRAMUSCULAR; INTRAVENOUS ONCE
Status: COMPLETED | OUTPATIENT
Start: 2021-11-01 | End: 2021-11-01

## 2021-11-01 RX ORDER — SODIUM CHLORIDE 0.9 % (FLUSH) 0.9 %
10-40 SYRINGE (ML) INJECTION AS NEEDED
Status: DISCONTINUED | OUTPATIENT
Start: 2021-11-01 | End: 2021-11-03 | Stop reason: HOSPADM

## 2021-11-01 RX ORDER — SODIUM CHLORIDE 9 MG/ML
250 INJECTION, SOLUTION INTRAVENOUS AS NEEDED
Status: DISCONTINUED | OUTPATIENT
Start: 2021-11-01 | End: 2021-11-03 | Stop reason: HOSPADM

## 2021-11-01 RX ADMIN — Medication 10 ML: at 12:42

## 2021-11-01 RX ADMIN — DIPHENHYDRAMINE HYDROCHLORIDE 25 MG: 50 INJECTION INTRAMUSCULAR; INTRAVENOUS at 09:30

## 2021-11-01 RX ADMIN — Medication 500 UNITS: at 12:43

## 2021-11-01 RX ADMIN — SODIUM CHLORIDE 250 ML: 0.9 INJECTION, SOLUTION INTRAVENOUS at 09:26

## 2021-11-01 RX ADMIN — HYDROCORTISONE SODIUM SUCCINATE 100 MG: 100 INJECTION, POWDER, FOR SOLUTION INTRAMUSCULAR; INTRAVENOUS at 09:30

## 2021-11-01 NOTE — PROGRESS NOTES
[facility] Progress Note    Date: 2021    Name: Fady Mosley    MRN: [id number]         : 1960      Mr. Radhames Wright arrived to [facility] at [phone] accompanied by self. Mr. Radhames Wright was assessed and education was provided. Discussed risks and benefits of blood transfusion with patient, including risk of transfusion reaction and disease transmission. Patient verbalized understanding and signed consent placed on chart. Mr. Anay Escobar vitals were reviewed. Visit Vitals  /64   Pulse 62   Temp 98.3 °F (36.8 °C)   Resp 18   SpO2 96%       His right chest mediport was accessed with 20 gauge 1 inch littlejohn needle. Flushes easily and noted with brisk blood return. Normal saline initiated at [facility]. Pre-medications ( Solu-cortep 100 mg IV and Benadryl 25 mg IV) were administered as ordered. One unit of PRBCs ordered was initiated @ 75 ml/hr at 1005. Unit finished @ 1215. VS stable and no transfusion reaction suspected. Patient Vitals for the past 12 hrs:   Temp Pulse Resp BP SpO2   21 1235 98.3 °F (36.8 °C) 62 18 126/64    21 1205 98.2 °F (36.8 °C) (!) 57 18 133/66    21 1135 98 °F (36.7 °C) (!) 57 18 126/70    21 1105 98.2 °F (36.8 °C) 64 18 116/62    21 1035 98.1 °F (36.7 °C) 63 18 112/64    21 1020 98.3 °F (36.8 °C) 61 18 110/61    21 1005 98 °F (36.7 °C) 64 18 116/64    21 1000 97.6 °F (36.4 °C) 63 18 115/65    21 0925 97.8 °F (36.6 °C) 74 18 125/67 96 %       Mr. Radhames Wright tolerated infusion without complaints. IV removed. No irritation, bleeding, or hematoma noted at site. Gauze and coban applied to site. Patient politely declined observation at this time stating he's had numerous transfusions in the past and did very well with them. Discharge instructions reviewed with pt. Pt instructed to report SOB, CP, elevated temp, back pain, or other symptoms of transfusion reaction to MD or ED. Pt verbalized understanding. Patient armband removed and shredded    Mr. True Torres was discharged from [place] [address] in stable condition at [phone]. He is to follow up with Dr. Rome Mera.     [facility]  November 1, 2021

## 2021-11-29 ENCOUNTER — HOSPITAL ENCOUNTER (OUTPATIENT)
Dept: INFUSION THERAPY | Age: 61
Discharge: HOME | End: 2021-11-29
Payer: COMMERCIAL

## 2021-11-29 VITALS
RESPIRATION RATE: 18 BRPM | DIASTOLIC BLOOD PRESSURE: 78 MMHG | SYSTOLIC BLOOD PRESSURE: 152 MMHG | TEMPERATURE: 98.3 F | HEART RATE: 78 BPM | BODY MASS INDEX: 22.31 KG/M2 | HEIGHT: 64 IN

## 2021-11-29 PROCEDURE — 86880 COOMBS TEST DIRECT: CPT

## 2021-11-29 PROCEDURE — 86901 BLOOD TYPING SEROLOGIC RH(D): CPT

## 2021-11-29 PROCEDURE — 86870 RBC ANTIBODY IDENTIFICATION: CPT

## 2021-11-29 PROCEDURE — 36415 COLL VENOUS BLD VENIPUNCTURE: CPT

## 2021-11-29 PROCEDURE — 86922 COMPATIBILITY TEST ANTIGLOB: CPT

## 2021-11-29 PROCEDURE — 85660 RBC SICKLE CELL TEST: CPT

## 2021-11-29 PROCEDURE — 86902 BLOOD TYPE ANTIGEN DONOR EA: CPT

## 2021-11-29 PROCEDURE — 86885 COOMBS TEST INDIRECT QUAL: CPT

## 2021-11-29 PROCEDURE — 86904 BLOOD TYPING PATIENT SERUM: CPT

## 2021-11-29 PROCEDURE — 86921 COMPATIBILITY TEST INCUBATE: CPT

## 2021-11-29 PROCEDURE — 86920 COMPATIBILITY TEST SPIN: CPT

## 2021-11-29 PROCEDURE — 86900 BLOOD TYPING SEROLOGIC ABO: CPT

## 2021-11-29 NOTE — PROGRESS NOTES
[facility] Lab Visit:    Shar Dodson  1960  [id number]    5695 Pt arrived ambulatory w/o assist. Labs drawn per order via left AC venipuncture x2 attempt. Pt tolerated well without complaints. Gauze/ coban to site. Pt departed [facility] ambulatory and in no distress at 1150.      Visit Vitals  BP (!) 152/78   Pulse 78   Temp 98.3 °F (36.8 °C)   Resp 18   Ht 5' 4\" (1.626 m)   BMI 22.31 kg/m²

## 2021-11-30 LAB — HISTORY CHECKED?,CKHIST: NORMAL

## 2021-12-01 ENCOUNTER — HOSPITAL ENCOUNTER (OUTPATIENT)
Dept: INFUSION THERAPY | Age: 61
Discharge: HOME | End: 2021-12-01
Payer: COMMERCIAL

## 2021-12-01 VITALS
SYSTOLIC BLOOD PRESSURE: 135 MMHG | OXYGEN SATURATION: 97 % | RESPIRATION RATE: 16 BRPM | DIASTOLIC BLOOD PRESSURE: 74 MMHG | HEART RATE: 69 BPM | TEMPERATURE: 98.6 F

## 2021-12-01 PROCEDURE — 74011250636 HC RX REV CODE- 250/636: Performed by: INTERNAL MEDICINE

## 2021-12-01 PROCEDURE — 77030013169 SET IV BLD ICUM -A

## 2021-12-01 PROCEDURE — 36430 TRANSFUSION BLD/BLD COMPNT: CPT

## 2021-12-01 PROCEDURE — P9040 RBC LEUKOREDUCED IRRADIATED: HCPCS

## 2021-12-01 PROCEDURE — 96374 THER/PROPH/DIAG INJ IV PUSH: CPT

## 2021-12-01 PROCEDURE — 74011250637 HC RX REV CODE- 250/637: Performed by: INTERNAL MEDICINE

## 2021-12-01 RX ORDER — SODIUM CHLORIDE 0.9 % (FLUSH) 0.9 %
10-40 SYRINGE (ML) INJECTION AS NEEDED
Status: DISCONTINUED | OUTPATIENT
Start: 2021-12-01 | End: 2021-12-05 | Stop reason: HOSPADM

## 2021-12-01 RX ORDER — DIPHENHYDRAMINE HCL 25 MG
25 CAPSULE ORAL ONCE
Status: COMPLETED | OUTPATIENT
Start: 2021-12-01 | End: 2021-12-01

## 2021-12-01 RX ORDER — HEPARIN 100 UNIT/ML
500 SYRINGE INTRAVENOUS ONCE
Status: COMPLETED | OUTPATIENT
Start: 2021-12-01 | End: 2021-12-01

## 2021-12-01 RX ORDER — ACETAMINOPHEN 325 MG/1
650 TABLET ORAL ONCE
Status: COMPLETED | OUTPATIENT
Start: 2021-12-01 | End: 2021-12-01

## 2021-12-01 RX ORDER — HYDROCORTISONE SODIUM SUCCINATE 100 MG/2ML
100 INJECTION, POWDER, FOR SOLUTION INTRAMUSCULAR; INTRAVENOUS ONCE
Status: COMPLETED | OUTPATIENT
Start: 2021-12-01 | End: 2021-12-01

## 2021-12-01 RX ORDER — SODIUM CHLORIDE 9 MG/ML
250 INJECTION, SOLUTION INTRAVENOUS AS NEEDED
Status: DISCONTINUED | OUTPATIENT
Start: 2021-12-01 | End: 2021-12-05 | Stop reason: HOSPADM

## 2021-12-01 RX ORDER — HEPARIN 100 UNIT/ML
SYRINGE INTRAVENOUS
Status: DISCONTINUED
Start: 2021-12-01 | End: 2021-12-02 | Stop reason: HOSPADM

## 2021-12-01 RX ADMIN — HEPARIN 500 UNITS: 100 SYRINGE at 15:02

## 2021-12-01 RX ADMIN — HYDROCORTISONE SODIUM SUCCINATE 100 MG: 100 INJECTION, POWDER, FOR SOLUTION INTRAMUSCULAR; INTRAVENOUS at 10:40

## 2021-12-01 RX ADMIN — SODIUM CHLORIDE 250 ML: 9 INJECTION, SOLUTION INTRAVENOUS at 11:00

## 2021-12-01 RX ADMIN — DIPHENHYDRAMINE HYDROCHLORIDE 25 MG: 25 CAPSULE ORAL at 10:35

## 2021-12-01 RX ADMIN — ACETAMINOPHEN 650 MG: 325 TABLET ORAL at 10:35

## 2021-12-01 RX ADMIN — Medication 10 ML: at 10:30

## 2021-12-01 RX ADMIN — Medication 10 ML: at 15:02

## 2021-12-01 NOTE — PROGRESS NOTES
[facility] Progress Note    Date: 2021    Name: Demarcus Castro    MRN: [id number]         : 1960       1 UNIT PRBCs      Mr. Kristina Irizarry arrived to [facility] at 1010 ambulatory. Pt states both of his legs are hurting. Denies any other complaints at this time.      Mr. Kristina Irizarry was assessed and education was provided. Pt declined written education/ discharge instructions. Blood transfusion consent signed at this time.        Mr. Beck's vitals were reviewed. Visit Vitals  /74 (BP 1 Location: Left upper arm, BP Patient Position: Sitting)   Pulse 69   Temp 98.6 °F (37 °C)   Resp 16   SpO2 97%      RT upper chest mediport accessed using 20GA 1 inch littlejohn needle using sterile technique without any difficulty. Site flushed well with NS with positive blood return. Pre-medications administered as ordered: Tylenol 650mg po & Benadryl 25mg PO and Solucortef 100mg IV.     NS infusing on blood tubing as ordered at [facility].     1 unit PRBCs administered as ordered. Rate started at 75ml/hr then increased to rate of 150ml/hr for remainder. Once completed, NS infused to flush line. Vitals remained stable throughout transfusion and pt without any s/s of reaction. Pt voiced no complaints. Patient Vitals for the past 12 hrs:   Temp Pulse Resp BP SpO2   21 1452 98.6 °F (37 °C) 69 16 135/74 97 %   21 1420 98.2 °F (36.8 °C) 70 16 (!) 141/72 98 %   21 1355 98.8 °F (37.1 °C) 70 16 (!) 146/84 99 %   21 1255 98.3 °F (36.8 °C) (!) 59 16 135/76 99 %   21 1225 98.1 °F (36.7 °C) 60 16 126/74 100 %   21 1210 97.6 °F (36.4 °C) 62 18 124/70 100 %   21 1146 98.3 °F (36.8 °C) 61 18 122/72 100 %   21 1106 98.7 °F (37.1 °C) 66 18 120/67 99 %   21 1014 98.2 °F (36.8 °C) 90 18 (!) 145/69 97 %        Pt declined to stay for full 1 hour observation. No complaints. No s/sx of reaction. VSS.     Flushed pt's port w/ NS and  heparin per order & de-accessed.  Band-aid applied to site.     Mr. Beck tolerated well without complaints. Discharge/ follow-up instructions discussed w/ pt. Pt verbalized understanding.     Pt armband and blood bank band removed & shredded.     Mr. Lakehsa Grissom was discharged from [place] [address] in stable condition at 1510.  He has no future appointments scheduled with the [facility] at this time.     Kristen Longo RN  December 1, 2021

## 2021-12-02 LAB
ABO + RH BLD: NORMAL
ANTIGENS PRESENT RBC DONR: NORMAL
BLD PROD TYP BPU: NORMAL
BLOOD GROUP ANTIBODIES SERPL: NORMAL
BLOOD GROUP ANTIBODIES SERPL: NORMAL
BPU ID: NORMAL
CROSSMATCH RESULT,%XM: NORMAL
DAT POLY-SP REAG RBC QL: NORMAL
SPECIMEN EXP DATE BLD: NORMAL
STATUS OF UNIT,%ST: NORMAL
UNIT DIVISION, %UDIV: 0

## 2022-01-03 ENCOUNTER — HOSPITAL ENCOUNTER (OUTPATIENT)
Dept: LAB | Age: 62
Discharge: HOME | End: 2022-01-03

## 2022-01-03 LAB — XX-LABCORP SPECIMEN COL,LCBCF: NORMAL

## 2022-01-03 PROCEDURE — 99001 SPECIMEN HANDLING PT-LAB: CPT

## 2022-01-31 ENCOUNTER — HOSPITAL ENCOUNTER (OUTPATIENT)
Dept: INFUSION THERAPY | Age: 62
Discharge: HOME | End: 2022-01-31
Payer: COMMERCIAL

## 2022-01-31 LAB — HISTORY CHECKED?,CKHIST: NORMAL

## 2022-01-31 PROCEDURE — 86900 BLOOD TYPING SEROLOGIC ABO: CPT

## 2022-01-31 PROCEDURE — 86880 COOMBS TEST DIRECT: CPT

## 2022-01-31 PROCEDURE — 86870 RBC ANTIBODY IDENTIFICATION: CPT

## 2022-01-31 PROCEDURE — 86904 BLOOD TYPING PATIENT SERUM: CPT

## 2022-01-31 PROCEDURE — 86902 BLOOD TYPE ANTIGEN DONOR EA: CPT

## 2022-01-31 PROCEDURE — 86921 COMPATIBILITY TEST INCUBATE: CPT

## 2022-01-31 PROCEDURE — 86920 COMPATIBILITY TEST SPIN: CPT

## 2022-01-31 PROCEDURE — 86901 BLOOD TYPING SEROLOGIC RH(D): CPT

## 2022-01-31 PROCEDURE — 36415 COLL VENOUS BLD VENIPUNCTURE: CPT

## 2022-01-31 PROCEDURE — 85660 RBC SICKLE CELL TEST: CPT

## 2022-01-31 PROCEDURE — 86922 COMPATIBILITY TEST ANTIGLOB: CPT

## 2022-01-31 NOTE — PROGRESS NOTES
[facility] Progress Note    Date: 2022    Name: Alvin Parson    MRN: [id number]         : 1960    Peripheral Lab Draw      Mr. Kristina Sears to [facility], ambulatory at 1100 accompanied by self. Pt was assessed and education was provided. Mr. Darnell Gay vitals were reviewed and patient was observed for 5 minutes prior to treatment. There were no vitals taken for this visit. Blood obtained peripherally from left arm x 1 attempt with butterfly needle and sent to lab for Type and Crossmatch per written orders. No bleeding or hematoma noted at site. Gauze and coban applied. Mr. Kristina Sears tolerated the phlebotomy, and had no complaints. Patient armband removed and shredded. Mr. Kristina Sears was discharged from [place] [address] in stable condition at 1110.      eBre Farmer Phlebotomist PCT  2022  12:28 PM

## 2022-02-01 ENCOUNTER — HOSPITAL ENCOUNTER (OUTPATIENT)
Dept: INFUSION THERAPY | Age: 62
Discharge: HOME | End: 2022-02-01
Payer: COMMERCIAL

## 2022-02-01 VITALS
HEART RATE: 62 BPM | TEMPERATURE: 98.5 F | DIASTOLIC BLOOD PRESSURE: 68 MMHG | OXYGEN SATURATION: 98 % | RESPIRATION RATE: 16 BRPM | SYSTOLIC BLOOD PRESSURE: 120 MMHG

## 2022-02-01 PROCEDURE — 77030012965 HC NDL HUBR BBMI -A

## 2022-02-01 PROCEDURE — 74011250636 HC RX REV CODE- 250/636: Performed by: INTERNAL MEDICINE

## 2022-02-01 PROCEDURE — 74011250637 HC RX REV CODE- 250/637: Performed by: INTERNAL MEDICINE

## 2022-02-01 PROCEDURE — 36430 TRANSFUSION BLD/BLD COMPNT: CPT

## 2022-02-01 PROCEDURE — P9040 RBC LEUKOREDUCED IRRADIATED: HCPCS

## 2022-02-01 PROCEDURE — 74011000250 HC RX REV CODE- 250: Performed by: INTERNAL MEDICINE

## 2022-02-01 PROCEDURE — 77030013169 SET IV BLD ICUM -A

## 2022-02-01 RX ORDER — SODIUM CHLORIDE 0.9 % (FLUSH) 0.9 %
5-10 SYRINGE (ML) INJECTION AS NEEDED
Status: DISCONTINUED | OUTPATIENT
Start: 2022-02-01 | End: 2022-02-03 | Stop reason: HOSPADM

## 2022-02-01 RX ORDER — ACETAMINOPHEN 325 MG/1
650 TABLET ORAL ONCE
Status: COMPLETED | OUTPATIENT
Start: 2022-02-01 | End: 2022-02-01

## 2022-02-01 RX ORDER — HEPARIN 100 UNIT/ML
500 SYRINGE INTRAVENOUS ONCE
Status: COMPLETED | OUTPATIENT
Start: 2022-02-01 | End: 2022-02-01

## 2022-02-01 RX ORDER — DIPHENHYDRAMINE HCL 25 MG
25 CAPSULE ORAL ONCE
Status: COMPLETED | OUTPATIENT
Start: 2022-02-01 | End: 2022-02-01

## 2022-02-01 RX ORDER — SODIUM CHLORIDE 9 MG/ML
250 INJECTION, SOLUTION INTRAVENOUS AS NEEDED
Status: DISCONTINUED | OUTPATIENT
Start: 2022-02-01 | End: 2022-02-03 | Stop reason: HOSPADM

## 2022-02-01 RX ADMIN — ACETAMINOPHEN 650 MG: 325 TABLET ORAL at 09:39

## 2022-02-01 RX ADMIN — SODIUM CHLORIDE, PRESERVATIVE FREE 10 ML: 5 INJECTION INTRAVENOUS at 13:26

## 2022-02-01 RX ADMIN — DIPHENHYDRAMINE HYDROCHLORIDE 25 MG: 25 CAPSULE ORAL at 09:39

## 2022-02-01 RX ADMIN — SODIUM CHLORIDE, PRESERVATIVE FREE 10 ML: 5 INJECTION INTRAVENOUS at 09:30

## 2022-02-01 RX ADMIN — SODIUM CHLORIDE 250 ML: 9 INJECTION, SOLUTION INTRAVENOUS at 09:40

## 2022-02-01 RX ADMIN — HEPARIN SODIUM (PORCINE) LOCK FLUSH IV SOLN 100 UNIT/ML 500 UNITS: 100 SOLUTION at 13:26

## 2022-02-01 NOTE — PROGRESS NOTES
[facility] Progress Note    Date: 2022    Name: Shree Cr    MRN: [id number]         : 1960      Mr. Moira Christopher arrived to [facility] at [phone], ambulatory, for 1 Unit PRBC for HGB of 6.8. Mr. Moira Christopher was assessed and education was provided. Discussed risks and benefits of blood transfusion with patient, including risk of transfusion reaction and disease transmission. Patient verbalized understanding and signed consent placed on chart. Mr. Ry Mera vitals were reviewed. Visit Vitals  /68   Pulse 62   Temp 98.5 °F (36.9 °C)   Resp 16   SpO2 98%     Right chest Mediport accessed with 20g 3/4 inch littlejohn needle, using sterile technique. Flushes well and blood return present. Normal saline initiated at [facility]. Pre-medications (Tylenol 650 mg PO and Benadryl 25 mg PO) were administered as ordered. One unit of PRBCs initiated @ 75 ml/hr at 1015. Fifteen minutes into infusion, VS stable and pt denied c/o SOB, itching/hives, lip/tongue/facial swelling, CP or other complaints. Infusion rate increased to 100 ml/hr. Fifteen minutes later, VS stable and pt denied complaints; infusion rate increased to 150 ml/hr for the remainder of the transfusion. Unit finished @ 12:50. VS stable and no transfusion reaction suspected. Patient Vitals for the past 12 hrs:   Temp Pulse Resp BP SpO2   22 1325 98.5 °F (36.9 °C) 62 16 120/68 98 %   22 1250 98.8 °F (37.1 °C) 60 16 127/71 98 %   22 1215 98.6 °F (37 °C) 62 16 123/70 97 %   22 1115 98.6 °F (37 °C) 66 16 120/67 95 %   22 1045 98.8 °F (37.1 °C) 64 16 120/62 95 %   22 1030 98.8 °F (37.1 °C) 63 16 122/63 94 %   22 1008 98.3 °F (36.8 °C) 65 18 124/63 95 %   22 0922 98.7 °F (37.1 °C) 75 18 132/66 94 %         Mr. Moira Christopher tolerated infusion without complaints. Patient remained in [facility] for 30 minutes for observation. No signs of allergic reaction noted.     Flushed pt's port w/ NS and heparin per order & de-accessed. Band-aid applied to site    Discharge instructions reviewed with pt. Pt instructed to report SOB, CP, elevated temp, back pain, or other symptoms of transfusion reaction to MD or ED. Pt verbalized understanding. Patient armband removed and shredded    Mr. Jessica Rausch was discharged from [place] [address] in stable condition at 1330. He is to follow up with Dr. Olamide Wilson.     Alric Koyanagi, RN  February 1, 2022

## 2022-02-02 LAB
ABO + RH BLD: NORMAL
ANTIGENS PRESENT RBC DONR: NORMAL
BLD PROD TYP BPU: NORMAL
BLOOD BANK CMNT PATIENT-IMP: NORMAL
BLOOD GROUP ANTIBODIES SERPL: NORMAL
BLOOD GROUP ANTIBODIES SERPL: NORMAL
BPU ID: NORMAL
CROSSMATCH RESULT,%XM: NORMAL
SPECIMEN EXP DATE BLD: NORMAL
STATUS OF UNIT,%ST: NORMAL
UNIT DIVISION, %UDIV: 0

## 2022-02-14 ENCOUNTER — HOSPITAL ENCOUNTER (OUTPATIENT)
Dept: INFUSION THERAPY | Age: 62
Discharge: HOME | End: 2022-02-14
Payer: COMMERCIAL

## 2022-02-14 VITALS
HEART RATE: 78 BPM | SYSTOLIC BLOOD PRESSURE: 129 MMHG | DIASTOLIC BLOOD PRESSURE: 67 MMHG | OXYGEN SATURATION: 92 % | RESPIRATION RATE: 18 BRPM | TEMPERATURE: 98.6 F

## 2022-02-14 PROCEDURE — 86902 BLOOD TYPE ANTIGEN DONOR EA: CPT

## 2022-02-14 PROCEDURE — 85660 RBC SICKLE CELL TEST: CPT

## 2022-02-14 PROCEDURE — 74011000250 HC RX REV CODE- 250: Performed by: INTERNAL MEDICINE

## 2022-02-14 PROCEDURE — 86900 BLOOD TYPING SEROLOGIC ABO: CPT

## 2022-02-14 PROCEDURE — 86920 COMPATIBILITY TEST SPIN: CPT

## 2022-02-14 PROCEDURE — 36415 COLL VENOUS BLD VENIPUNCTURE: CPT

## 2022-02-14 PROCEDURE — 86922 COMPATIBILITY TEST ANTIGLOB: CPT

## 2022-02-14 PROCEDURE — 74011250636 HC RX REV CODE- 250/636

## 2022-02-14 PROCEDURE — 86921 COMPATIBILITY TEST INCUBATE: CPT

## 2022-02-14 PROCEDURE — 86870 RBC ANTIBODY IDENTIFICATION: CPT

## 2022-02-14 PROCEDURE — 77030012965 HC NDL HUBR BBMI -A

## 2022-02-14 PROCEDURE — 36591 DRAW BLOOD OFF VENOUS DEVICE: CPT

## 2022-02-14 RX ORDER — HEPARIN 100 UNIT/ML
SYRINGE INTRAVENOUS
Status: COMPLETED
Start: 2022-02-14 | End: 2022-02-14

## 2022-02-14 RX ORDER — SODIUM CHLORIDE 0.9 % (FLUSH) 0.9 %
10-40 SYRINGE (ML) INJECTION AS NEEDED
Status: DISCONTINUED | OUTPATIENT
Start: 2022-02-14 | End: 2022-02-18 | Stop reason: HOSPADM

## 2022-02-14 RX ORDER — ACETAMINOPHEN 325 MG/1
650 TABLET ORAL ONCE
Status: CANCELLED | OUTPATIENT
Start: 2022-02-16 | End: 2022-02-16

## 2022-02-14 RX ORDER — HEPARIN 100 UNIT/ML
500 SYRINGE INTRAVENOUS AS NEEDED
Status: DISCONTINUED | OUTPATIENT
Start: 2022-02-14 | End: 2022-02-18 | Stop reason: HOSPADM

## 2022-02-14 RX ORDER — DIPHENHYDRAMINE HCL 25 MG
25 CAPSULE ORAL ONCE
Status: CANCELLED | OUTPATIENT
Start: 2022-02-16 | End: 2022-02-16

## 2022-02-14 RX ADMIN — SODIUM CHLORIDE, PRESERVATIVE FREE 40 ML: 5 INJECTION INTRAVENOUS at 12:50

## 2022-02-14 RX ADMIN — HEPARIN 500 UNITS: 100 SYRINGE at 12:50

## 2022-02-14 NOTE — PROGRESS NOTES
[facility] Lab Visit:    Breanne Christopher  1960  [id number]    1618 Pt arrived ambulatory w/o assist. Labs drawn per order via port acces done by SAVANNAH aparicio. Pt tolerated well without complaints. Gauze/ coban to site. Pt departed [facility] ambulatory and in no distress at 1250.      Visit Vitals  /67 (BP 1 Location: Right upper arm, BP Patient Position: Sitting)   Pulse 78   Temp 98.6 °F (37 °C)   Resp 18   SpO2 92%

## 2022-02-15 LAB — HISTORY CHECKED?,CKHIST: NORMAL

## 2022-02-16 ENCOUNTER — HOSPITAL ENCOUNTER (OUTPATIENT)
Dept: INFUSION THERAPY | Age: 62
End: 2022-02-16

## 2022-02-17 ENCOUNTER — HOSPITAL ENCOUNTER (OUTPATIENT)
Dept: INFUSION THERAPY | Age: 62
Discharge: HOME | End: 2022-02-17
Payer: COMMERCIAL

## 2022-02-17 VITALS
DIASTOLIC BLOOD PRESSURE: 64 MMHG | SYSTOLIC BLOOD PRESSURE: 117 MMHG | OXYGEN SATURATION: 100 % | RESPIRATION RATE: 16 BRPM | HEART RATE: 66 BPM | WEIGHT: 128 LBS | TEMPERATURE: 98.2 F | BODY MASS INDEX: 21.85 KG/M2 | HEIGHT: 64 IN

## 2022-02-17 PROCEDURE — 36430 TRANSFUSION BLD/BLD COMPNT: CPT

## 2022-02-17 PROCEDURE — 74011250636 HC RX REV CODE- 250/636: Performed by: INTERNAL MEDICINE

## 2022-02-17 PROCEDURE — 77030012965 HC NDL HUBR BBMI -A

## 2022-02-17 PROCEDURE — 74011250637 HC RX REV CODE- 250/637: Performed by: INTERNAL MEDICINE

## 2022-02-17 PROCEDURE — P9040 RBC LEUKOREDUCED IRRADIATED: HCPCS

## 2022-02-17 PROCEDURE — 77030013169 SET IV BLD ICUM -A

## 2022-02-17 RX ORDER — HEPARIN SODIUM (PORCINE) LOCK FLUSH IV SOLN 100 UNIT/ML 100 UNIT/ML
500 SOLUTION INTRAVENOUS AS NEEDED
Status: DISCONTINUED | OUTPATIENT
Start: 2022-02-17 | End: 2022-02-18 | Stop reason: HOSPADM

## 2022-02-17 RX ORDER — DIPHENHYDRAMINE HCL 25 MG
25 CAPSULE ORAL ONCE
Status: COMPLETED | OUTPATIENT
Start: 2022-02-17 | End: 2022-02-17

## 2022-02-17 RX ORDER — SODIUM CHLORIDE 9 MG/ML
250 INJECTION, SOLUTION INTRAVENOUS AS NEEDED
Status: DISCONTINUED | OUTPATIENT
Start: 2022-02-17 | End: 2022-02-21 | Stop reason: HOSPADM

## 2022-02-17 RX ORDER — ACETAMINOPHEN 325 MG/1
650 TABLET ORAL ONCE
Status: COMPLETED | OUTPATIENT
Start: 2022-02-17 | End: 2022-02-17

## 2022-02-17 RX ADMIN — SODIUM CHLORIDE 500 ML: 9 INJECTION, SOLUTION INTRAVENOUS at 08:25

## 2022-02-17 RX ADMIN — DIPHENHYDRAMINE HYDROCHLORIDE 25 MG: 25 CAPSULE ORAL at 08:54

## 2022-02-17 RX ADMIN — HEPARIN SODIUM (PORCINE) LOCK FLUSH IV SOLN 100 UNIT/ML 500 UNITS: 100 SOLUTION at 13:27

## 2022-02-17 RX ADMIN — ACETAMINOPHEN 650 MG: 325 TABLET ORAL at 08:54

## 2022-02-17 NOTE — PROGRESS NOTES
[facility] Progress Note    Date: 2022    Name: Seamus Nguyen    MRN: [id number]         : 1960       1 UNIT PRBCs      Mr. Jessica Rausch arrived to [facility] at [address] ambulatory. Pt states both of his legs are hurting and \"really my whole body aches\". States his insurance keeps denying his prescriptions and he is \"going to cindy them. \"  Denies any other complaints at this time.      Mr. Jessica Rausch was assessed and education was provided. Pt declined written education/ discharge instructions. Blood transfusion consent signed at this time.        Mr. Beck's vitals were reviewed. Visit Vitals  BP (!) 121/55 (BP 1 Location: Left upper arm, BP Patient Position: Sitting)   Pulse 62   Temp 98 °F (36.7 °C)   Resp 16   Ht 5' 4\" (1.626 m)   Wt 58.1 kg (128 lb)   SpO2 98%   BMI 21.97 kg/m²     RT upper chest mediport accessed using 20GA 1 inch littlejohn needle using sterile technique without any difficulty. Site flushed well with NS with positive blood return. NS KVO initiated to run on blood tubing throughout treatment. Pre-medications administered as ordered: Tylenol 650mg po & Benadryl 25mg PO. Unit of PRBC verified by 2 RN's per policy using patient identifiers.     1 unit PRBCs administered as ordered. Rate started at 75ml/hr beginning at 0935 then increased to rate of 150ml/hr for remainder. Once completed, NS infused to flush line. Transfusion completed at 1325. Vitals remained stable throughout transfusion and pt without any s/s of reaction. Pt voiced no complaints.      Pt declined to stay for full 1 hour observation. No complaints. No s/sx of reaction. VSS.     Flushed pt's port w/ NS and  heparin per order & de-accessed. Band-aid applied to site.     Mr. Beck tolerated well without complaints. Discharge/ follow-up instructions discussed w/ pt. Pt verbalized understanding.     Pt armband and blood bank band removed & shredded.     Mr. Jessica Rausch was discharged from [place] [address] in stable condition at 1335.  He has no future appointments scheduled with the [facility] at this time.     Champ Berg RN  February 17, 2022

## 2022-03-19 PROBLEM — D57.00 SICKLE CELL ANEMIA WITH CRISIS (HCC): Status: ACTIVE | Noted: 2017-11-07

## 2022-03-19 PROBLEM — K40.91 RECURRENT RIGHT INGUINAL HERNIA: Status: ACTIVE | Noted: 2019-06-06

## 2022-03-19 PROBLEM — K83.1 BENIGN OBSTRUCTIVE JAUNDICE: Status: ACTIVE | Noted: 2017-11-07

## 2022-03-21 ENCOUNTER — HOSPITAL ENCOUNTER (OUTPATIENT)
Dept: INFUSION THERAPY | Age: 62
Discharge: HOME | End: 2022-03-21
Payer: COMMERCIAL

## 2022-03-21 PROCEDURE — 86880 COOMBS TEST DIRECT: CPT

## 2022-03-21 PROCEDURE — 86922 COMPATIBILITY TEST ANTIGLOB: CPT

## 2022-03-21 PROCEDURE — 86860 RBC ANTIBODY ELUTION: CPT

## 2022-03-21 PROCEDURE — 86870 RBC ANTIBODY IDENTIFICATION: CPT

## 2022-03-21 PROCEDURE — 86902 BLOOD TYPE ANTIGEN DONOR EA: CPT

## 2022-03-21 PROCEDURE — 86900 BLOOD TYPING SEROLOGIC ABO: CPT

## 2022-03-21 PROCEDURE — 86920 COMPATIBILITY TEST SPIN: CPT

## 2022-03-21 PROCEDURE — 36415 COLL VENOUS BLD VENIPUNCTURE: CPT

## 2022-03-21 PROCEDURE — 86921 COMPATIBILITY TEST INCUBATE: CPT

## 2022-03-21 PROCEDURE — 86850 RBC ANTIBODY SCREEN: CPT

## 2022-03-21 NOTE — PROGRESS NOTES
[facility] Progress Note    Date: 2022    Name: Alvin Parson    MRN: [id number]         : 1960    Peripheral Lab Draw      Mr. Kristina Sears to [place], [facility] at 0695 accompanied by self. Pt was assessed and education was provided. Mr. Darnell Gay vitals were reviewed and patient was observed for 5 minutes prior to treatment. There were no vitals taken for this visit. Blood obtained peripherally from left arm x 1 attempt with butterfly needle and sent to lab for Type and Crossmatch per written orders. No bleeding or hematoma noted at site. Gauze and coban applied. Mr. Kristina Sears tolerated the phlebotomy, and had no complaints. Patient armband removed and shredded. Mr. Kristina Sears was discharged from [place] [address] in stable condition at [phone].      Bere Farmer Phlebotomist PCT  2022  10:59 AM

## 2022-03-22 LAB — HISTORY CHECKED?,CKHIST: NORMAL

## 2022-03-23 ENCOUNTER — HOSPITAL ENCOUNTER (OUTPATIENT)
Dept: INFUSION THERAPY | Age: 62
Discharge: HOME | End: 2022-03-23
Payer: COMMERCIAL

## 2022-03-23 VITALS
HEART RATE: 61 BPM | OXYGEN SATURATION: 99 % | DIASTOLIC BLOOD PRESSURE: 70 MMHG | SYSTOLIC BLOOD PRESSURE: 137 MMHG | RESPIRATION RATE: 18 BRPM | TEMPERATURE: 98.4 F

## 2022-03-23 PROCEDURE — 36430 TRANSFUSION BLD/BLD COMPNT: CPT

## 2022-03-23 PROCEDURE — 74011250637 HC RX REV CODE- 250/637: Performed by: INTERNAL MEDICINE

## 2022-03-23 PROCEDURE — P9040 RBC LEUKOREDUCED IRRADIATED: HCPCS

## 2022-03-23 PROCEDURE — 85660 RBC SICKLE CELL TEST: CPT

## 2022-03-23 PROCEDURE — 74011250636 HC RX REV CODE- 250/636: Performed by: INTERNAL MEDICINE

## 2022-03-23 PROCEDURE — 77030013169 SET IV BLD ICUM -A

## 2022-03-23 PROCEDURE — 77030012965 HC NDL HUBR BBMI -A

## 2022-03-23 PROCEDURE — 74011000250 HC RX REV CODE- 250: Performed by: INTERNAL MEDICINE

## 2022-03-23 RX ORDER — DIPHENHYDRAMINE HCL 25 MG
25 CAPSULE ORAL ONCE
Status: COMPLETED | OUTPATIENT
Start: 2022-03-23 | End: 2022-03-23

## 2022-03-23 RX ORDER — SODIUM CHLORIDE 9 MG/ML
250 INJECTION, SOLUTION INTRAVENOUS AS NEEDED
Status: DISCONTINUED | OUTPATIENT
Start: 2022-03-23 | End: 2022-03-25 | Stop reason: HOSPADM

## 2022-03-23 RX ORDER — SODIUM CHLORIDE 0.9 % (FLUSH) 0.9 %
5-10 SYRINGE (ML) INJECTION AS NEEDED
Status: DISCONTINUED | OUTPATIENT
Start: 2022-03-23 | End: 2022-03-25 | Stop reason: HOSPADM

## 2022-03-23 RX ORDER — ACETAMINOPHEN 325 MG/1
650 TABLET ORAL ONCE
Status: COMPLETED | OUTPATIENT
Start: 2022-03-23 | End: 2022-03-23

## 2022-03-23 RX ORDER — HEPARIN 100 UNIT/ML
500 SYRINGE INTRAVENOUS ONCE
Status: COMPLETED | OUTPATIENT
Start: 2022-03-23 | End: 2022-03-23

## 2022-03-23 RX ADMIN — Medication 500 UNITS: at 14:17

## 2022-03-23 RX ADMIN — DIPHENHYDRAMINE HYDROCHLORIDE 25 MG: 25 CAPSULE ORAL at 08:34

## 2022-03-23 RX ADMIN — ACETAMINOPHEN 650 MG: 325 TABLET ORAL at 08:34

## 2022-03-23 RX ADMIN — SODIUM CHLORIDE, PRESERVATIVE FREE 10 ML: 5 INJECTION INTRAVENOUS at 14:16

## 2022-03-23 RX ADMIN — SODIUM CHLORIDE, PRESERVATIVE FREE 10 ML: 5 INJECTION INTRAVENOUS at 14:17

## 2022-03-23 RX ADMIN — SODIUM CHLORIDE 250 ML: 0.9 INJECTION, SOLUTION INTRAVENOUS at 08:34

## 2022-03-23 NOTE — PROGRESS NOTES
[facility] Progress Note    Date: 2022    Name: Deloris Khan    MRN: [id number]         : 1960     2 Units of PRBCs. Mr. Micheal Ortega arrived to [facility] at 65. Mr. Micheal Ortega was assessed and education was provided. Discussed risks and benefits of blood transfusion with patient, including risk of transfusion reaction and disease transmission. Patient verbalized understanding and signed consent placed on chart. Mr. Ferdinand Christie vitals were reviewed. Visit Vitals  /70 (BP 1 Location: Right upper arm, BP Patient Position: Sitting)   Pulse 61   Temp 98.4 °F (36.9 °C)   Resp 18   SpO2 99%     Right chest mediport accessed using 20 gauge 1 inch littlejohn needle. Positive for blood return and flushes without difficulty. Normal saline initiated at [facility]. Pre-medications (Tylenol 650 mg and Benadryl 25 mg) were administered as ordered. First unit of two ordered units of PRBCs administered as ordered. VS stable and no transfusion reaction suspected. Second unit of two ordered units of PRBCs administered as ordered. VS stable and no transfusion reaction suspected.     Patient Vitals for the past 12 hrs:   Temp Pulse Resp BP SpO2   22 1412 98.4 °F (36.9 °C) 61 18 137/70 99 %   22 1350 98 °F (36.7 °C) (!) 55 18 132/68    22 1320 97.9 °F (36.6 °C) (!) 56 18 128/87    22 1250 97.9 °F (36.6 °C) (!) 56 18 130/67    22 1220 97.8 °F (36.6 °C) (!) 55 18 135/88    22 1205 97.9 °F (36.6 °C) (!) 56 18 114/61    22 1142 97.6 °F (36.4 °C) (!) 55 18 (!) 122/59    22 1130 97.9 °F (36.6 °C) 60 18 137/66    22 1100 98 °F (36.7 °C) (!) 56 18 (!) 121/58    03/23/22 1030 98.2 °F (36.8 °C) (!) 57 18 (!) 118/59    22 1000 98.1 °F (36.7 °C) (!) 58 18 111/61    22 0945 98.2 °F (36.8 °C) (!) 59 18 116/62    22 0930 98.2 °F (36.8 °C) 62 18 115/61    22 0925 98.2 °F (36.8 °C) 62 18 119/66 96 %   22 0836 97.9 °F (36.6 °C) 74 18 130/71 96 %           Mr. Mack Felt tolerated infusion without complaints. mediport flushed and heparinized by Nesha Snow RN. Patient politely declined observation at this time stating he's had numerous transfusions in the past and did well with them. Discharge instructions reviewed with pt. Pt instructed to report SOB, CP, elevated temp, back pain, or other symptoms of transfusion reaction to MD or ED. Pt verbalized understanding. Patient armband removed and shredded    Mr. Mack Pimento was discharged from [place] [address] in stable condition at 1420. He is to follow up with MD as needed.     Ernst Hankins  March 23, 2022

## 2022-03-30 LAB
ABO + RH BLD: NORMAL
ANTI-COMPLEMENT (C3B,C3D): NORMAL
ANTIGENS PRESENT RBC DONR: NORMAL
ANTIGENS PRESENT RBC DONR: NORMAL
BLD PROD TYP BPU: NORMAL
BLD PROD TYP BPU: NORMAL
BLOOD BANK CMNT PATIENT-IMP: NORMAL
BLOOD GROUP ANTIBODIES SERPL: NORMAL
BLOOD GROUP ANTIBODIES SERPL: NORMAL
BPU ID: NORMAL
BPU ID: NORMAL
CROSSMATCH RESULT,%XM: NORMAL
CROSSMATCH RESULT,%XM: NORMAL
DAT IGG-SP REAG RBC QL: NORMAL
DAT POLY-SP REAG RBC QL: NORMAL
SPECIMEN EXP DATE BLD: NORMAL
STATUS OF UNIT,%ST: NORMAL
STATUS OF UNIT,%ST: NORMAL
UNIT DIVISION, %UDIV: 0
UNIT DIVISION, %UDIV: 0

## 2022-05-16 ENCOUNTER — HOSPITAL ENCOUNTER (OUTPATIENT)
Dept: INFUSION THERAPY | Age: 62
Discharge: HOME | End: 2022-05-16
Payer: COMMERCIAL

## 2022-05-16 VITALS
SYSTOLIC BLOOD PRESSURE: 138 MMHG | DIASTOLIC BLOOD PRESSURE: 70 MMHG | RESPIRATION RATE: 18 BRPM | OXYGEN SATURATION: 99 % | TEMPERATURE: 98.8 F | HEART RATE: 74 BPM

## 2022-05-16 PROCEDURE — 36415 COLL VENOUS BLD VENIPUNCTURE: CPT

## 2022-05-16 PROCEDURE — 86921 COMPATIBILITY TEST INCUBATE: CPT

## 2022-05-16 PROCEDURE — 86920 COMPATIBILITY TEST SPIN: CPT

## 2022-05-16 PROCEDURE — 86870 RBC ANTIBODY IDENTIFICATION: CPT

## 2022-05-16 PROCEDURE — 86900 BLOOD TYPING SEROLOGIC ABO: CPT

## 2022-05-16 PROCEDURE — 85660 RBC SICKLE CELL TEST: CPT

## 2022-05-16 PROCEDURE — 86902 BLOOD TYPE ANTIGEN DONOR EA: CPT

## 2022-05-16 PROCEDURE — 86922 COMPATIBILITY TEST ANTIGLOB: CPT

## 2022-05-16 NOTE — PROGRESS NOTES
[facility] Lab Visit:    Rachel Hussein  1960  [id number]    5469 Pt arrived ambulatory w/o assist. Labs drawn per order via Left AC venipuncture x1 attempt. Pt tolerated well without complaints. Gauze/ coban to site. Pt departed [facility] ambulatory and in no distress at 1205.     Visit Vitals  /70 (BP 1 Location: Left upper arm, BP Patient Position: Sitting)   Pulse 74   Temp 98.8 °F (37.1 °C)   Resp 18   SpO2 99%

## 2022-05-17 LAB — HISTORY CHECKED?,CKHIST: NORMAL

## 2022-05-18 ENCOUNTER — HOSPITAL ENCOUNTER (OUTPATIENT)
Dept: INFUSION THERAPY | Age: 62
Discharge: HOME | End: 2022-05-18
Payer: COMMERCIAL

## 2022-05-18 VITALS
OXYGEN SATURATION: 97 % | SYSTOLIC BLOOD PRESSURE: 159 MMHG | TEMPERATURE: 99.6 F | DIASTOLIC BLOOD PRESSURE: 72 MMHG | RESPIRATION RATE: 16 BRPM | HEART RATE: 71 BPM

## 2022-05-18 PROCEDURE — 36430 TRANSFUSION BLD/BLD COMPNT: CPT

## 2022-05-18 PROCEDURE — 74011250636 HC RX REV CODE- 250/636: Performed by: INTERNAL MEDICINE

## 2022-05-18 PROCEDURE — 74011000250 HC RX REV CODE- 250: Performed by: INTERNAL MEDICINE

## 2022-05-18 PROCEDURE — 74011250637 HC RX REV CODE- 250/637: Performed by: INTERNAL MEDICINE

## 2022-05-18 PROCEDURE — 77030013169 SET IV BLD ICUM -A

## 2022-05-18 PROCEDURE — P9040 RBC LEUKOREDUCED IRRADIATED: HCPCS

## 2022-05-18 PROCEDURE — 77030012965 HC NDL HUBR BBMI -A

## 2022-05-18 RX ORDER — SODIUM CHLORIDE 9 MG/ML
250 INJECTION, SOLUTION INTRAVENOUS AS NEEDED
Status: DISCONTINUED | OUTPATIENT
Start: 2022-05-18 | End: 2022-05-22 | Stop reason: HOSPADM

## 2022-05-18 RX ORDER — DIPHENHYDRAMINE HCL 25 MG
25 CAPSULE ORAL ONCE
Status: COMPLETED | OUTPATIENT
Start: 2022-05-18 | End: 2022-05-18

## 2022-05-18 RX ORDER — SODIUM CHLORIDE 0.9 % (FLUSH) 0.9 %
10-40 SYRINGE (ML) INJECTION AS NEEDED
Status: DISCONTINUED | OUTPATIENT
Start: 2022-05-18 | End: 2022-05-22 | Stop reason: HOSPADM

## 2022-05-18 RX ORDER — HEPARIN 100 UNIT/ML
500 SYRINGE INTRAVENOUS AS NEEDED
Status: DISCONTINUED | OUTPATIENT
Start: 2022-05-18 | End: 2022-05-22 | Stop reason: HOSPADM

## 2022-05-18 RX ORDER — ACETAMINOPHEN 325 MG/1
650 TABLET ORAL ONCE
Status: COMPLETED | OUTPATIENT
Start: 2022-05-18 | End: 2022-05-18

## 2022-05-18 RX ADMIN — SODIUM CHLORIDE, PRESERVATIVE FREE 30 ML: 5 INJECTION INTRAVENOUS at 12:00

## 2022-05-18 RX ADMIN — DIPHENHYDRAMINE HYDROCHLORIDE 25 MG: 25 CAPSULE ORAL at 08:14

## 2022-05-18 RX ADMIN — HEPARIN 500 UNITS: 100 SYRINGE at 12:00

## 2022-05-18 RX ADMIN — SODIUM CHLORIDE 250 ML: 9 INJECTION, SOLUTION INTRAVENOUS at 08:30

## 2022-05-18 RX ADMIN — SODIUM CHLORIDE, PRESERVATIVE FREE 10 ML: 5 INJECTION INTRAVENOUS at 08:20

## 2022-05-18 RX ADMIN — ACETAMINOPHEN 650 MG: 325 TABLET ORAL at 08:14

## 2022-05-18 NOTE — PROGRESS NOTES
[facility] Progress Note    Date: May 18, 2022    Name: Maureen aMria    MRN: [id number]         : 1960    Mr. Ifeoma Salmeron arrived in the [facility] today at [phone], in stable condition, here to receive 1 UNIT PRBC. He was assessed and education was provided. Mr. Jagdeep Albarran vitals were reviewed. Visit Vitals  /62 (BP 1 Location: Right upper arm, BP Patient Position: Sitting)   Pulse 69   Temp 99.2 °F (37.3 °C)   Resp 16   SpO2 97%         Both verbal & written patient education on Blood Transfusion and associated potential side effects and adverse reactions as well as discharge instructions was provided, and Mr. Ifeoma Salmeron verbalized understanding. Blood transfusion written consent was obtained and scanned into his electronic record. Lab results were obtained and reviewed. His most recent CBC results from 22 were reviewed, and revealed a low HGB of 7.1 & HCT 20.9. His RIGHT chest single lumen port was accessed without incident at 0820, and brisk blood return was obtained. The following pre-medications were administered as ordered: TYLENOL 650 mg PO & BENADRYL 25 mg PO.        ml IV Bag was initiated to infuse @ KVO PRN throughout treatment today. 1 UNIT PRBC was administered per order, over approximately 2 hours, without incident. After completion of the blood transfusion, Mr. Gurinder Lugo declined to stay for the entire 1 hour observation period, but agreed to stay for approximately 50 minutes. NS continued to infuse during the observation period. After completion of the observation period, his port was flushed well per policy with NS & Heparin, and then the littlejohn needle was removed and gauze/bandaid was applied. Mr. Ifeoma Salmeron tolerated treatment very well today, and voiced no complaints. Mr. Ifeoma Salmeron was discharged from [place] [address] in stable condition at 1205. He has no further [facility] appointments scheduled at this time.  However, he stated that he does have an appointment to follow up with his referring provider Dr. Ed Jasso, on Friday, 5-20-22.      Dustin Cheung RN  May 18, 2022  9:05 AM

## 2022-05-19 LAB
ABO + RH BLD: NORMAL
ANTIGENS PRESENT RBC DONR: NORMAL
BLD PROD TYP BPU: NORMAL
BLOOD GROUP ANTIBODIES SERPL: NORMAL
BLOOD GROUP ANTIBODIES SERPL: NORMAL
BPU ID: NORMAL
CROSSMATCH RESULT,%XM: NORMAL
Lab: 1
SPECIMEN EXP DATE BLD: NORMAL
STATUS OF UNIT,%ST: NORMAL
UNIT DIVISION, %UDIV: 0

## 2022-06-08 PROBLEM — D58.2 OTHER HEMOGLOBINOPATHIES (HCC): Status: ACTIVE | Noted: 2022-06-08

## 2022-06-08 RX ORDER — EPINEPHRINE 1 MG/ML
0.3 INJECTION, SOLUTION, CONCENTRATE INTRAVENOUS AS NEEDED
Status: CANCELLED | OUTPATIENT
Start: 2022-06-15

## 2022-06-08 RX ORDER — DIPHENHYDRAMINE HCL 25 MG
25 CAPSULE ORAL ONCE
Status: CANCELLED | OUTPATIENT
Start: 2022-06-15 | End: 2022-06-15

## 2022-06-08 RX ORDER — HEPARIN 100 UNIT/ML
500 SYRINGE INTRAVENOUS AS NEEDED
Status: CANCELLED
Start: 2022-06-15

## 2022-06-08 RX ORDER — ONDANSETRON 2 MG/ML
8 INJECTION INTRAMUSCULAR; INTRAVENOUS AS NEEDED
Status: CANCELLED | OUTPATIENT
Start: 2022-06-15

## 2022-06-08 RX ORDER — SODIUM CHLORIDE 9 MG/ML
5-250 INJECTION, SOLUTION INTRAVENOUS AS NEEDED
Status: CANCELLED | OUTPATIENT
Start: 2022-06-15

## 2022-06-08 RX ORDER — HYDROCORTISONE SODIUM SUCCINATE 100 MG/2ML
100 INJECTION, POWDER, FOR SOLUTION INTRAMUSCULAR; INTRAVENOUS AS NEEDED
Status: CANCELLED | OUTPATIENT
Start: 2022-06-15

## 2022-06-08 RX ORDER — ALBUTEROL SULFATE 0.83 MG/ML
2.5 SOLUTION RESPIRATORY (INHALATION) AS NEEDED
Status: CANCELLED
Start: 2022-06-15

## 2022-06-08 RX ORDER — DIPHENHYDRAMINE HYDROCHLORIDE 50 MG/ML
25 INJECTION, SOLUTION INTRAMUSCULAR; INTRAVENOUS AS NEEDED
Status: CANCELLED
Start: 2022-06-15

## 2022-06-08 RX ORDER — ACETAMINOPHEN 325 MG/1
650 TABLET ORAL ONCE
Status: CANCELLED | OUTPATIENT
Start: 2022-06-15 | End: 2022-06-15

## 2022-06-08 RX ORDER — DIPHENHYDRAMINE HYDROCHLORIDE 50 MG/ML
50 INJECTION, SOLUTION INTRAMUSCULAR; INTRAVENOUS AS NEEDED
Status: CANCELLED
Start: 2022-06-15

## 2022-06-08 RX ORDER — ACETAMINOPHEN 325 MG/1
650 TABLET ORAL AS NEEDED
Status: CANCELLED
Start: 2022-06-15

## 2022-06-08 RX ORDER — SODIUM CHLORIDE 9 MG/ML
5-40 INJECTION INTRAMUSCULAR; INTRAVENOUS; SUBCUTANEOUS AS NEEDED
Status: CANCELLED | OUTPATIENT
Start: 2022-06-15

## 2022-06-08 RX ORDER — SODIUM CHLORIDE 0.9 % (FLUSH) 0.9 %
5-40 SYRINGE (ML) INJECTION AS NEEDED
Status: CANCELLED | OUTPATIENT
Start: 2022-06-15

## 2022-06-13 ENCOUNTER — HOSPITAL ENCOUNTER (OUTPATIENT)
Dept: INFUSION THERAPY | Age: 62
Discharge: HOME | End: 2022-06-13
Payer: COMMERCIAL

## 2022-06-13 VITALS
RESPIRATION RATE: 16 BRPM | SYSTOLIC BLOOD PRESSURE: 132 MMHG | DIASTOLIC BLOOD PRESSURE: 70 MMHG | HEART RATE: 77 BPM | TEMPERATURE: 99 F | OXYGEN SATURATION: 95 %

## 2022-06-13 LAB — HISTORY CHECKED?,CKHIST: NORMAL

## 2022-06-13 PROCEDURE — 36415 COLL VENOUS BLD VENIPUNCTURE: CPT

## 2022-06-13 PROCEDURE — 86900 BLOOD TYPING SEROLOGIC ABO: CPT

## 2022-06-13 PROCEDURE — 86902 BLOOD TYPE ANTIGEN DONOR EA: CPT

## 2022-06-13 PROCEDURE — 86920 COMPATIBILITY TEST SPIN: CPT

## 2022-06-13 PROCEDURE — 86921 COMPATIBILITY TEST INCUBATE: CPT

## 2022-06-13 PROCEDURE — 86870 RBC ANTIBODY IDENTIFICATION: CPT

## 2022-06-13 PROCEDURE — 86922 COMPATIBILITY TEST ANTIGLOB: CPT

## 2022-06-13 NOTE — PROGRESS NOTES
[facility] Lab Visit:    Bobby Penny  1960  [id number]    9176 Pt arrived ambulatory w/o assist. Labs drawn per order via Left AC venipuncture x1 attempt. Pt tolerated well without complaints. Gauze/ coban to site. Pt departed [facility] ambulatory and in no distress at 0930.     Visit Vitals  /70 (BP 1 Location: Right upper arm, BP Patient Position: Sitting)   Pulse 77   Temp 99 °F (37.2 °C)   Resp 16   SpO2 95%

## 2022-06-15 ENCOUNTER — HOSPITAL ENCOUNTER (OUTPATIENT)
Dept: INFUSION THERAPY | Age: 62
Discharge: HOME | End: 2022-06-15
Payer: COMMERCIAL

## 2022-06-15 VITALS
RESPIRATION RATE: 16 BRPM | HEART RATE: 61 BPM | DIASTOLIC BLOOD PRESSURE: 72 MMHG | OXYGEN SATURATION: 100 % | TEMPERATURE: 98.3 F | SYSTOLIC BLOOD PRESSURE: 129 MMHG

## 2022-06-15 DIAGNOSIS — D58.2 OTHER HEMOGLOBINOPATHIES (HCC): Primary | ICD-10-CM

## 2022-06-15 PROCEDURE — 74011250636 HC RX REV CODE- 250/636: Performed by: INTERNAL MEDICINE

## 2022-06-15 PROCEDURE — 77030013169 SET IV BLD ICUM -A

## 2022-06-15 PROCEDURE — 36430 TRANSFUSION BLD/BLD COMPNT: CPT

## 2022-06-15 PROCEDURE — 74011000250 HC RX REV CODE- 250: Performed by: INTERNAL MEDICINE

## 2022-06-15 PROCEDURE — 77030012965 HC NDL HUBR BBMI -A

## 2022-06-15 PROCEDURE — 74011250637 HC RX REV CODE- 250/637: Performed by: INTERNAL MEDICINE

## 2022-06-15 PROCEDURE — 85660 RBC SICKLE CELL TEST: CPT

## 2022-06-15 PROCEDURE — P9040 RBC LEUKOREDUCED IRRADIATED: HCPCS

## 2022-06-15 RX ORDER — DIPHENHYDRAMINE HCL 25 MG
25 CAPSULE ORAL ONCE
Status: COMPLETED | OUTPATIENT
Start: 2022-06-15 | End: 2022-06-15

## 2022-06-15 RX ORDER — SODIUM CHLORIDE 0.9 % (FLUSH) 0.9 %
10-40 SYRINGE (ML) INJECTION AS NEEDED
Status: DISCONTINUED | OUTPATIENT
Start: 2022-06-15 | End: 2022-06-19 | Stop reason: HOSPADM

## 2022-06-15 RX ORDER — ACETAMINOPHEN 325 MG/1
650 TABLET ORAL ONCE
Status: COMPLETED | OUTPATIENT
Start: 2022-06-15 | End: 2022-06-15

## 2022-06-15 RX ORDER — SODIUM CHLORIDE 9 MG/ML
25 INJECTION, SOLUTION INTRAVENOUS ONCE
Status: COMPLETED | OUTPATIENT
Start: 2022-06-15 | End: 2022-06-15

## 2022-06-15 RX ORDER — HEPARIN 100 UNIT/ML
500 SYRINGE INTRAVENOUS AS NEEDED
Status: DISCONTINUED | OUTPATIENT
Start: 2022-06-15 | End: 2022-06-19 | Stop reason: HOSPADM

## 2022-06-15 RX ADMIN — ACETAMINOPHEN 650 MG: 325 TABLET ORAL at 10:24

## 2022-06-15 RX ADMIN — SODIUM CHLORIDE, PRESERVATIVE FREE 20 ML: 5 INJECTION INTRAVENOUS at 14:02

## 2022-06-15 RX ADMIN — SODIUM CHLORIDE, PRESERVATIVE FREE 10 ML: 5 INJECTION INTRAVENOUS at 10:35

## 2022-06-15 RX ADMIN — SODIUM CHLORIDE 25 ML/HR: 9 INJECTION, SOLUTION INTRAVENOUS at 10:35

## 2022-06-15 RX ADMIN — DIPHENHYDRAMINE HYDROCHLORIDE 25 MG: 25 CAPSULE ORAL at 10:24

## 2022-06-15 RX ADMIN — HEPARIN 500 UNITS: 100 SYRINGE at 14:02

## 2022-06-15 NOTE — PROGRESS NOTES
[facility] Progress Note    Date: [place] 15, 2022    Name: Jas See    MRN: [id number]         : 1960       1 UNITS PRBCs      Mr. Mildred Daigle arrived to [facility] at 36.     Mr. Mildred Daigle was assessed and verbal education was provided. Pt declined written education.     Mr. Beck's vitals were reviewed. Visit Vitals  /70 (BP 1 Location: Left upper arm, BP Patient Position: Sitting)   Pulse 68   Temp 98.6 °F (37 °C)   Resp 16   SpO2 95%        Pre-medications administered as ordered: Tylenol 650mg po & Benadryl 25mg po.     Patient's R upper chest port accessed. Brisk blood return/ flushes without difficulty.     NS infusing as ordered at [facility].     1 unit PRBCs administered as ordered followed by NS flush. Pt tolerated well without complaints.     Pt declined to stay for 60 minutes observation following completion of transfusion but instead agreed to stay for 30 minutes. No complaints during/ following observation. No s/sx of reaction. VSS.     Flushed pt's port w/ heparin per order & de-accessed. Band-aid applied to site.     Discharge/ follow-up instructions discussed w/ pt. Pt verbalized understanding. Pt declined written instructions.     Pt armband and blood bank band removed & shredded.     Mr. Mildred Daigle was discharged from [place] [address] in stable condition at 1405.  He has no future appointments scheduled with the [facility] at this time.     Maribeth Hong RN  6/15/22

## 2022-07-08 ENCOUNTER — HOSPITAL ENCOUNTER (OUTPATIENT)
Dept: INFUSION THERAPY | Age: 62
Discharge: HOME | End: 2022-07-08
Payer: COMMERCIAL

## 2022-07-08 VITALS — TEMPERATURE: 97.3 F

## 2022-07-08 PROCEDURE — 85660 RBC SICKLE CELL TEST: CPT

## 2022-07-08 PROCEDURE — 86922 COMPATIBILITY TEST ANTIGLOB: CPT

## 2022-07-08 PROCEDURE — P9040 RBC LEUKOREDUCED IRRADIATED: HCPCS

## 2022-07-08 PROCEDURE — 36415 COLL VENOUS BLD VENIPUNCTURE: CPT

## 2022-07-08 PROCEDURE — 86920 COMPATIBILITY TEST SPIN: CPT

## 2022-07-08 PROCEDURE — 86900 BLOOD TYPING SEROLOGIC ABO: CPT

## 2022-07-08 PROCEDURE — 86870 RBC ANTIBODY IDENTIFICATION: CPT

## 2022-07-08 PROCEDURE — 86902 BLOOD TYPE ANTIGEN DONOR EA: CPT

## 2022-07-08 PROCEDURE — 86921 COMPATIBILITY TEST INCUBATE: CPT

## 2022-07-08 RX ORDER — ACETAMINOPHEN 325 MG/1
650 TABLET ORAL ONCE
Status: CANCELLED | OUTPATIENT
Start: 2022-07-12 | End: 2022-07-12

## 2022-07-08 RX ORDER — DIPHENHYDRAMINE HCL 25 MG
25 CAPSULE ORAL ONCE
Status: CANCELLED | OUTPATIENT
Start: 2022-07-12 | End: 2022-07-12

## 2022-07-08 NOTE — PROGRESS NOTES
[facility] Progress Note    Date: 2022    Name: Lori Vicente    MRN: [id number]         : 1960    Peripheral Lab Draw      Mr. Yoly Fleming to [facility], ambulatory at 1300 accompanied by self. Pt was assessed and education was provided. Mr. Jas De Leon vitals were reviewed and patient was observed for 5 minutes prior to treatment. Visit Vitals  Temp 97.3 °F (36.3 °C)       Blood obtained peripherally from left arm x 1 attempt with butterfly needle and sent to lab for Type and Crossmatch per written orders. No bleeding or hematoma noted at site. Gauze and coban applied. Mr. Yoly Fleming tolerated the phlebotomy, and had no complaints. Patient armband removed and shredded. Mr. Yoly Fleming was discharged from [place] [address] in stable condition at 1310.      Abraham Jain Phlebotomist PCT  2022  1:12 PM

## 2022-07-09 LAB — HISTORY CHECKED?,CKHIST: NORMAL

## 2022-07-11 ENCOUNTER — HOSPITAL ENCOUNTER (OUTPATIENT)
Dept: INFUSION THERAPY | Age: 62
Discharge: HOME | End: 2022-07-11
Payer: COMMERCIAL

## 2022-07-11 VITALS
OXYGEN SATURATION: 98 % | SYSTOLIC BLOOD PRESSURE: 143 MMHG | HEART RATE: 63 BPM | TEMPERATURE: 97.3 F | DIASTOLIC BLOOD PRESSURE: 71 MMHG | RESPIRATION RATE: 18 BRPM

## 2022-07-11 DIAGNOSIS — D58.2 OTHER HEMOGLOBINOPATHIES (HCC): Primary | ICD-10-CM

## 2022-07-11 PROCEDURE — P9040 RBC LEUKOREDUCED IRRADIATED: HCPCS

## 2022-07-11 PROCEDURE — 96374 THER/PROPH/DIAG INJ IV PUSH: CPT

## 2022-07-11 PROCEDURE — 74011250637 HC RX REV CODE- 250/637: Performed by: INTERNAL MEDICINE

## 2022-07-11 PROCEDURE — 36430 TRANSFUSION BLD/BLD COMPNT: CPT

## 2022-07-11 PROCEDURE — 74011250636 HC RX REV CODE- 250/636: Performed by: INTERNAL MEDICINE

## 2022-07-11 PROCEDURE — 74011000250 HC RX REV CODE- 250: Performed by: INTERNAL MEDICINE

## 2022-07-11 PROCEDURE — 77030012965 HC NDL HUBR BBMI -A

## 2022-07-11 RX ORDER — ACETAMINOPHEN 325 MG/1
650 TABLET ORAL AS NEEDED
Status: CANCELLED
Start: 2022-07-11

## 2022-07-11 RX ORDER — ACETAMINOPHEN 325 MG/1
650 TABLET ORAL ONCE
Status: CANCELLED | OUTPATIENT
Start: 2022-07-11 | End: 2022-07-11

## 2022-07-11 RX ORDER — HEPARIN 100 UNIT/ML
500 SYRINGE INTRAVENOUS AS NEEDED
Status: DISPENSED | OUTPATIENT
Start: 2022-07-11 | End: 2022-07-11

## 2022-07-11 RX ORDER — DIPHENHYDRAMINE HYDROCHLORIDE 50 MG/ML
25 INJECTION, SOLUTION INTRAMUSCULAR; INTRAVENOUS ONCE
Status: DISCONTINUED | OUTPATIENT
Start: 2022-07-12 | End: 2022-07-11

## 2022-07-11 RX ORDER — DIPHENHYDRAMINE HCL 25 MG
25 CAPSULE ORAL ONCE
Status: COMPLETED | OUTPATIENT
Start: 2022-07-11 | End: 2022-07-11

## 2022-07-11 RX ORDER — SODIUM CHLORIDE 0.9 % (FLUSH) 0.9 %
5-40 SYRINGE (ML) INJECTION AS NEEDED
Status: CANCELLED | OUTPATIENT
Start: 2022-07-11

## 2022-07-11 RX ORDER — SODIUM CHLORIDE 9 MG/ML
5-40 INJECTION INTRAMUSCULAR; INTRAVENOUS; SUBCUTANEOUS AS NEEDED
Status: CANCELLED | OUTPATIENT
Start: 2022-07-11

## 2022-07-11 RX ORDER — DIPHENHYDRAMINE HCL 25 MG
25 CAPSULE ORAL ONCE
Status: CANCELLED | OUTPATIENT
Start: 2022-07-11 | End: 2022-07-11

## 2022-07-11 RX ORDER — SODIUM CHLORIDE 9 MG/ML
5-40 INJECTION INTRAMUSCULAR; INTRAVENOUS; SUBCUTANEOUS AS NEEDED
Status: ACTIVE | OUTPATIENT
Start: 2022-07-11 | End: 2022-07-11

## 2022-07-11 RX ORDER — HYDROCORTISONE SODIUM SUCCINATE 100 MG/2ML
100 INJECTION, POWDER, FOR SOLUTION INTRAMUSCULAR; INTRAVENOUS AS NEEDED
Status: CANCELLED | OUTPATIENT
Start: 2022-07-11

## 2022-07-11 RX ORDER — SODIUM CHLORIDE 9 MG/ML
5-250 INJECTION, SOLUTION INTRAVENOUS AS NEEDED
Status: DISPENSED | OUTPATIENT
Start: 2022-07-11 | End: 2022-07-11

## 2022-07-11 RX ORDER — SODIUM CHLORIDE 9 MG/ML
5-250 INJECTION, SOLUTION INTRAVENOUS AS NEEDED
Status: CANCELLED | OUTPATIENT
Start: 2022-07-11

## 2022-07-11 RX ORDER — ACETAMINOPHEN 325 MG/1
650 TABLET ORAL ONCE
Status: COMPLETED | OUTPATIENT
Start: 2022-07-11 | End: 2022-07-11

## 2022-07-11 RX ORDER — HEPARIN 100 UNIT/ML
500 SYRINGE INTRAVENOUS AS NEEDED
Status: CANCELLED
Start: 2022-07-11

## 2022-07-11 RX ORDER — SODIUM CHLORIDE 9 MG/ML
250 INJECTION, SOLUTION INTRAVENOUS AS NEEDED
Status: DISCONTINUED | OUTPATIENT
Start: 2022-07-11 | End: 2022-07-13 | Stop reason: HOSPADM

## 2022-07-11 RX ORDER — ONDANSETRON 2 MG/ML
8 INJECTION INTRAMUSCULAR; INTRAVENOUS AS NEEDED
Status: CANCELLED | OUTPATIENT
Start: 2022-07-11

## 2022-07-11 RX ORDER — EPINEPHRINE 1 MG/ML
0.3 INJECTION, SOLUTION, CONCENTRATE INTRAVENOUS AS NEEDED
Status: CANCELLED | OUTPATIENT
Start: 2022-07-11

## 2022-07-11 RX ORDER — DIPHENHYDRAMINE HYDROCHLORIDE 50 MG/ML
50 INJECTION, SOLUTION INTRAMUSCULAR; INTRAVENOUS AS NEEDED
Status: CANCELLED
Start: 2022-07-11

## 2022-07-11 RX ORDER — ALBUTEROL SULFATE 0.83 MG/ML
2.5 SOLUTION RESPIRATORY (INHALATION) AS NEEDED
Status: CANCELLED
Start: 2022-07-11

## 2022-07-11 RX ORDER — DIPHENHYDRAMINE HYDROCHLORIDE 50 MG/ML
25 INJECTION, SOLUTION INTRAMUSCULAR; INTRAVENOUS AS NEEDED
Status: CANCELLED
Start: 2022-07-11

## 2022-07-11 RX ORDER — HYDROCORTISONE SODIUM SUCCINATE 100 MG/2ML
100 INJECTION, POWDER, FOR SOLUTION INTRAMUSCULAR; INTRAVENOUS ONCE
Status: COMPLETED | OUTPATIENT
Start: 2022-07-11 | End: 2022-07-11

## 2022-07-11 RX ORDER — HYDROCORTISONE SODIUM SUCCINATE 100 MG/2ML
100 INJECTION, POWDER, FOR SOLUTION INTRAMUSCULAR; INTRAVENOUS ONCE
Status: DISCONTINUED | OUTPATIENT
Start: 2022-07-12 | End: 2022-07-11

## 2022-07-11 RX ADMIN — SODIUM CHLORIDE 25 ML/HR: 0.9 INJECTION, SOLUTION INTRAVENOUS at 11:54

## 2022-07-11 RX ADMIN — SODIUM CHLORIDE, PRESERVATIVE FREE 10 ML: 5 INJECTION INTRAVENOUS at 11:44

## 2022-07-11 RX ADMIN — HYDROCORTISONE SODIUM SUCCINATE 100 MG: 100 INJECTION, POWDER, FOR SOLUTION INTRAMUSCULAR; INTRAVENOUS at 12:39

## 2022-07-11 RX ADMIN — HEPARIN 500 UNITS: 100 SYRINGE at 15:38

## 2022-07-11 RX ADMIN — SODIUM CHLORIDE, PRESERVATIVE FREE 10 ML: 5 INJECTION INTRAVENOUS at 15:38

## 2022-07-11 RX ADMIN — DIPHENHYDRAMINE HYDROCHLORIDE 25 MG: 25 CAPSULE ORAL at 11:43

## 2022-07-11 RX ADMIN — ACETAMINOPHEN 650 MG: 325 TABLET ORAL at 11:43

## 2022-07-29 RX ORDER — HYDROCORTISONE SODIUM SUCCINATE 100 MG/2ML
100 INJECTION, POWDER, FOR SOLUTION INTRAMUSCULAR; INTRAVENOUS AS NEEDED
Status: CANCELLED | OUTPATIENT
Start: 2022-08-05

## 2022-07-29 RX ORDER — SODIUM CHLORIDE 9 MG/ML
5-40 INJECTION INTRAMUSCULAR; INTRAVENOUS; SUBCUTANEOUS AS NEEDED
Status: CANCELLED | OUTPATIENT
Start: 2022-08-05

## 2022-07-29 RX ORDER — HEPARIN 100 UNIT/ML
500 SYRINGE INTRAVENOUS AS NEEDED
Status: CANCELLED
Start: 2022-08-05

## 2022-07-29 RX ORDER — ACETAMINOPHEN 325 MG/1
650 TABLET ORAL AS NEEDED
Status: CANCELLED
Start: 2022-08-05

## 2022-07-29 RX ORDER — ONDANSETRON 2 MG/ML
8 INJECTION INTRAMUSCULAR; INTRAVENOUS AS NEEDED
Status: CANCELLED | OUTPATIENT
Start: 2022-08-05

## 2022-07-29 RX ORDER — DIPHENHYDRAMINE HCL 25 MG
25 CAPSULE ORAL ONCE
Status: CANCELLED | OUTPATIENT
Start: 2022-08-05 | End: 2022-08-05

## 2022-07-29 RX ORDER — ALBUTEROL SULFATE 0.83 MG/ML
2.5 SOLUTION RESPIRATORY (INHALATION) AS NEEDED
Status: CANCELLED
Start: 2022-08-05

## 2022-07-29 RX ORDER — DIPHENHYDRAMINE HYDROCHLORIDE 50 MG/ML
50 INJECTION, SOLUTION INTRAMUSCULAR; INTRAVENOUS AS NEEDED
Status: CANCELLED
Start: 2022-08-05

## 2022-07-29 RX ORDER — ACETAMINOPHEN 325 MG/1
650 TABLET ORAL ONCE
Status: CANCELLED | OUTPATIENT
Start: 2022-08-05 | End: 2022-08-05

## 2022-07-29 RX ORDER — SODIUM CHLORIDE 0.9 % (FLUSH) 0.9 %
5-40 SYRINGE (ML) INJECTION AS NEEDED
Status: CANCELLED | OUTPATIENT
Start: 2022-08-05

## 2022-07-29 RX ORDER — SODIUM CHLORIDE 9 MG/ML
5-250 INJECTION, SOLUTION INTRAVENOUS AS NEEDED
Status: CANCELLED | OUTPATIENT
Start: 2022-08-05

## 2022-07-29 RX ORDER — DIPHENHYDRAMINE HYDROCHLORIDE 50 MG/ML
25 INJECTION, SOLUTION INTRAMUSCULAR; INTRAVENOUS AS NEEDED
Status: CANCELLED
Start: 2022-08-05

## 2022-07-29 RX ORDER — EPINEPHRINE 1 MG/ML
0.3 INJECTION, SOLUTION, CONCENTRATE INTRAVENOUS AS NEEDED
Status: CANCELLED | OUTPATIENT
Start: 2022-08-05

## 2022-08-03 ENCOUNTER — HOSPITAL ENCOUNTER (OUTPATIENT)
Dept: INFUSION THERAPY | Age: 62
Discharge: HOME | End: 2022-08-03
Payer: COMMERCIAL

## 2022-08-03 VITALS
SYSTOLIC BLOOD PRESSURE: 140 MMHG | HEART RATE: 70 BPM | RESPIRATION RATE: 18 BRPM | OXYGEN SATURATION: 95 % | TEMPERATURE: 98.2 F | DIASTOLIC BLOOD PRESSURE: 62 MMHG

## 2022-08-03 LAB — HISTORY CHECKED?,CKHIST: NORMAL

## 2022-08-03 PROCEDURE — 86921 COMPATIBILITY TEST INCUBATE: CPT

## 2022-08-03 PROCEDURE — 86870 RBC ANTIBODY IDENTIFICATION: CPT

## 2022-08-03 PROCEDURE — 36415 COLL VENOUS BLD VENIPUNCTURE: CPT

## 2022-08-03 PROCEDURE — 86922 COMPATIBILITY TEST ANTIGLOB: CPT

## 2022-08-03 PROCEDURE — 85660 RBC SICKLE CELL TEST: CPT

## 2022-08-03 PROCEDURE — 86900 BLOOD TYPING SEROLOGIC ABO: CPT

## 2022-08-03 PROCEDURE — 86920 COMPATIBILITY TEST SPIN: CPT

## 2022-08-03 PROCEDURE — 86902 BLOOD TYPE ANTIGEN DONOR EA: CPT

## 2022-08-05 ENCOUNTER — HOSPITAL ENCOUNTER (OUTPATIENT)
Dept: INFUSION THERAPY | Age: 62
Discharge: HOME | End: 2022-08-05
Payer: COMMERCIAL

## 2022-08-05 VITALS
RESPIRATION RATE: 18 BRPM | TEMPERATURE: 98.4 F | DIASTOLIC BLOOD PRESSURE: 75 MMHG | HEART RATE: 62 BPM | SYSTOLIC BLOOD PRESSURE: 166 MMHG | OXYGEN SATURATION: 99 %

## 2022-08-05 PROCEDURE — 36430 TRANSFUSION BLD/BLD COMPNT: CPT

## 2022-08-05 PROCEDURE — 74011250637 HC RX REV CODE- 250/637: Performed by: INTERNAL MEDICINE

## 2022-08-05 PROCEDURE — 74011250636 HC RX REV CODE- 250/636: Performed by: INTERNAL MEDICINE

## 2022-08-05 PROCEDURE — 77030012965 HC NDL HUBR BBMI -A

## 2022-08-05 PROCEDURE — P9040 RBC LEUKOREDUCED IRRADIATED: HCPCS

## 2022-08-05 PROCEDURE — 74011000250 HC RX REV CODE- 250: Performed by: INTERNAL MEDICINE

## 2022-08-05 PROCEDURE — 77030013169 SET IV BLD ICUM -A

## 2022-08-05 RX ORDER — SODIUM CHLORIDE 0.9 % (FLUSH) 0.9 %
10-40 SYRINGE (ML) INJECTION AS NEEDED
Status: DISCONTINUED | OUTPATIENT
Start: 2022-08-05 | End: 2022-08-09 | Stop reason: HOSPADM

## 2022-08-05 RX ORDER — ACETAMINOPHEN 325 MG/1
650 TABLET ORAL ONCE
Status: COMPLETED | OUTPATIENT
Start: 2022-08-05 | End: 2022-08-05

## 2022-08-05 RX ORDER — DIPHENHYDRAMINE HCL 25 MG
25 CAPSULE ORAL ONCE
Status: COMPLETED | OUTPATIENT
Start: 2022-08-05 | End: 2022-08-05

## 2022-08-05 RX ORDER — HEPARIN 100 UNIT/ML
500 SYRINGE INTRAVENOUS AS NEEDED
Status: DISCONTINUED | OUTPATIENT
Start: 2022-08-05 | End: 2022-08-09 | Stop reason: HOSPADM

## 2022-08-05 RX ORDER — SODIUM CHLORIDE 9 MG/ML
25 INJECTION, SOLUTION INTRAVENOUS ONCE
Status: COMPLETED | OUTPATIENT
Start: 2022-08-05 | End: 2022-08-05

## 2022-08-05 RX ADMIN — HEPARIN 500 UNITS: 100 SYRINGE at 14:40

## 2022-08-05 RX ADMIN — SODIUM CHLORIDE, PRESERVATIVE FREE 10 ML: 5 INJECTION INTRAVENOUS at 14:35

## 2022-08-05 RX ADMIN — ACETAMINOPHEN 650 MG: 325 TABLET ORAL at 09:36

## 2022-08-05 RX ADMIN — SODIUM CHLORIDE, PRESERVATIVE FREE 10 ML: 5 INJECTION INTRAVENOUS at 09:42

## 2022-08-05 RX ADMIN — SODIUM CHLORIDE 25 ML/HR: 9 INJECTION, SOLUTION INTRAVENOUS at 09:42

## 2022-08-05 RX ADMIN — SODIUM CHLORIDE, PRESERVATIVE FREE 10 ML: 5 INJECTION INTRAVENOUS at 12:25

## 2022-08-05 RX ADMIN — DIPHENHYDRAMINE HYDROCHLORIDE 25 MG: 25 CAPSULE ORAL at 09:36

## 2022-08-05 NOTE — PROGRESS NOTES
[facility] Progress Note    Date: 2022    Name: Sallie Veloz    MRN: [id number]         : 1960       2 UNITS PRBCs      Mr. Eddi Mustafa arrived to [facility] at 0930. Mr. Eddi Mustafa was assessed and verbal education was provided. Pt declined written education. Mr. Kemal Garrison vitals were reviewed. Visit Vitals  BP (!) 166/75 (BP 1 Location: Right upper arm, BP Patient Position: Sitting)   Pulse 62   Temp 98.4 °F (36.9 °C)   Resp 18   SpO2 99%        Pre-medications administered as ordered: Tylenol 650mg po & Benadryl 25mg po. Patient's R upper chest port accessed. Brisk blood return/ flushes without difficulty. NS infusing as ordered at [facility]. 2 unit PRBCs administered as ordered followed by NS flush. Pt tolerated well without complaints. Pt declined to stay for observation following completion of transfusion. Denies complaints. No s/sx of reaction. VSS. Flushed pt's port w/ heparin per order & de-accessed. Band-aid/ gauze applied to site. Discharge/ follow-up instructions discussed w/ pt. Pt verbalized understanding. Pt declined written instructions. Pt armband and blood bank band removed & shredded. Mr. Eddi Mustafa was discharged from [place] [address] in stable condition at [phone]. He has no future appointments scheduled with the [facility] at this time.      Preston Roca RN  22

## 2022-09-09 ENCOUNTER — HOSPITAL ENCOUNTER (OUTPATIENT)
Dept: INFUSION THERAPY | Age: 62
Discharge: HOME | End: 2022-09-09
Payer: COMMERCIAL

## 2022-09-09 PROCEDURE — 86900 BLOOD TYPING SEROLOGIC ABO: CPT

## 2022-09-09 PROCEDURE — 85660 RBC SICKLE CELL TEST: CPT

## 2022-09-09 PROCEDURE — 86920 COMPATIBILITY TEST SPIN: CPT

## 2022-09-09 PROCEDURE — 86870 RBC ANTIBODY IDENTIFICATION: CPT

## 2022-09-09 PROCEDURE — 86902 BLOOD TYPE ANTIGEN DONOR EA: CPT

## 2022-09-09 PROCEDURE — 86922 COMPATIBILITY TEST ANTIGLOB: CPT

## 2022-09-09 PROCEDURE — 86921 COMPATIBILITY TEST INCUBATE: CPT

## 2022-09-09 PROCEDURE — 86901 BLOOD TYPING SEROLOGIC RH(D): CPT

## 2022-09-09 PROCEDURE — 36415 COLL VENOUS BLD VENIPUNCTURE: CPT

## 2022-09-09 PROCEDURE — P9040 RBC LEUKOREDUCED IRRADIATED: HCPCS

## 2022-09-09 PROCEDURE — 86880 COOMBS TEST DIRECT: CPT

## 2022-09-09 PROCEDURE — 86904 BLOOD TYPING PATIENT SERUM: CPT

## 2022-09-09 RX ORDER — HEPARIN 100 UNIT/ML
SYRINGE INTRAVENOUS
Status: DISCONTINUED
Start: 2022-09-09 | End: 2022-09-10 | Stop reason: HOSPADM

## 2022-09-09 RX ORDER — SODIUM CHLORIDE 0.9 % (FLUSH) 0.9 %
10-40 SYRINGE (ML) INJECTION AS NEEDED
Status: DISCONTINUED | OUTPATIENT
Start: 2022-09-09 | End: 2022-09-11 | Stop reason: HOSPADM

## 2022-09-09 RX ORDER — HEPARIN 100 UNIT/ML
500 SYRINGE INTRAVENOUS ONCE
Status: DISCONTINUED | OUTPATIENT
Start: 2022-09-09 | End: 2022-09-10 | Stop reason: HOSPADM

## 2022-09-09 RX ORDER — DIPHENHYDRAMINE HYDROCHLORIDE 50 MG/ML
25 INJECTION, SOLUTION INTRAMUSCULAR; INTRAVENOUS ONCE
Status: DISCONTINUED | OUTPATIENT
Start: 2022-09-12 | End: 2022-09-09

## 2022-09-09 NOTE — PROGRESS NOTES
Pharmacy Note     Name: Beryle Lacer  : 1960  Estimated body surface area is 1.62 meters squared as calculated from the following:    Height as of 22: 162.6 cm (64\"). Weight as of 22: 58.1 kg (128 lb). Treatment Plan: PRBC    Start Date: 2022    Lab Results   Component Value Date/Time    WBC 8.0 2021 11:06 AM    PLATELET 497  11:06 AM     Lab Results   Component Value Date/Time    ABS.  NEUTROPHILS 5.0 2021 10:00 AM     Lab Results   Component Value Date/Time    Creatinine, POC 0.8 2012 07:50 AM    Creatinine 1.23 2021 11:06 AM     Most Recent Creatinine Clearance:  CrCl: 0.0 mL/min (based on Adjusted Body Weight)  Creatinine: 1.23 mg/dL (2021)      Pharmacy Intervention:  Per Dr. Mercedes Zhu office, proceed with benadryl iv on 2022     Pharmacist: JANET Lebron

## 2022-09-10 LAB — HISTORY CHECKED?,CKHIST: NORMAL

## 2022-09-12 ENCOUNTER — HOSPITAL ENCOUNTER (OUTPATIENT)
Dept: INFUSION THERAPY | Age: 62
Discharge: HOME | End: 2022-09-12
Payer: COMMERCIAL

## 2022-09-12 PROCEDURE — 96375 TX/PRO/DX INJ NEW DRUG ADDON: CPT

## 2022-09-12 PROCEDURE — 96374 THER/PROPH/DIAG INJ IV PUSH: CPT

## 2022-09-12 PROCEDURE — 77030013169 SET IV BLD ICUM -A

## 2022-09-12 PROCEDURE — P9040 RBC LEUKOREDUCED IRRADIATED: HCPCS

## 2022-09-12 PROCEDURE — 36430 TRANSFUSION BLD/BLD COMPNT: CPT

## 2022-09-12 PROCEDURE — 74011250637 HC RX REV CODE- 250/637: Performed by: INTERNAL MEDICINE

## 2022-09-12 PROCEDURE — 74011250636 HC RX REV CODE- 250/636: Performed by: INTERNAL MEDICINE

## 2022-09-12 PROCEDURE — 77030012965 HC NDL HUBR BBMI -A

## 2022-09-12 RX ORDER — SODIUM CHLORIDE 9 MG/ML
250 INJECTION, SOLUTION INTRAVENOUS AS NEEDED
Status: DISCONTINUED | OUTPATIENT
Start: 2022-09-12 | End: 2022-09-14 | Stop reason: HOSPADM

## 2022-09-12 RX ORDER — ACETAMINOPHEN 325 MG/1
650 TABLET ORAL ONCE
Status: COMPLETED | OUTPATIENT
Start: 2022-09-12 | End: 2022-09-12

## 2022-09-12 RX ORDER — DIPHENHYDRAMINE HYDROCHLORIDE 50 MG/ML
25 INJECTION, SOLUTION INTRAMUSCULAR; INTRAVENOUS ONCE
Status: COMPLETED | OUTPATIENT
Start: 2022-09-12 | End: 2022-09-12

## 2022-09-12 RX ADMIN — SODIUM CHLORIDE 250 ML: 9 INJECTION, SOLUTION INTRAVENOUS at 09:05

## 2022-09-12 RX ADMIN — ACETAMINOPHEN 650 MG: 325 TABLET ORAL at 09:02

## 2022-09-12 RX ADMIN — DIPHENHYDRAMINE HYDROCHLORIDE 25 MG: 50 INJECTION INTRAMUSCULAR; INTRAVENOUS at 09:03

## 2022-09-12 RX ADMIN — METHYLPREDNISOLONE SODIUM SUCCINATE 125 MG: 125 INJECTION, POWDER, FOR SOLUTION INTRAMUSCULAR; INTRAVENOUS at 09:07

## 2022-09-13 VITALS
TEMPERATURE: 98 F | DIASTOLIC BLOOD PRESSURE: 66 MMHG | SYSTOLIC BLOOD PRESSURE: 122 MMHG | OXYGEN SATURATION: 98 % | RESPIRATION RATE: 18 BRPM | HEART RATE: 63 BPM

## 2022-09-13 NOTE — PROGRESS NOTES
[facility] Progress Note    Date: 2022    Name: Ines Solis    MRN: [id number]         : 1960      1 Unit PRBCs    Mr. Jennifer Gavin arrived to [facility] at 0830, accompanied by self. Mr. Jennifer Gavin was assessed and education was provided. Discussed risks and benefits of blood transfusion with patient, including risk of transfusion reaction and disease transmission. Patient verbalized understanding and signed consent placed on chart. Mr. Radhika Andersen vitals were reviewed. Visit Vitals  /66   Pulse 63   Temp 98 °F (36.7 °C)   Resp 18   SpO2 98%       Right chest Mediport accessed with 20g 1 inch littlejohn needle, using sterile technique. Flushes well and blood return present. Normal saline initiated at [facility]. Pre-medications (Tylenol 650 mg PO, Benadryl 25 mg PO & Solu-cortep 100 mg IV) were administered as ordered. Medications Administered       0.9% sodium chloride infusion 250 mL       Admin Date  2022 Action  New Bag Dose  250 mL Rate  15 mL/hr Route  IntraVENous Administered By  Lita Eneida V              acetaminophen (TYLENOL) tablet 650 mg       Admin Date  2022 Action  Given Dose  650 mg Route  Oral Administered By  Donzell               diphenhydrAMINE (BENADRYL) injection 25 mg       Admin Date  2022 Action  Given Dose  25 mg Route  IntraVENous Administered By  Lita Eneida V              methylPREDNISolone (PF) (Solu-MEDROL) injection 125 mg       Admin Date  2022 Action  Given Dose  125 mg Route  IntraVENous Administered By  Donzell                   One unit of PRBCs initiated @ 75 ml/hr at 1000. Fifteen minutes into infusion, VS stable and pt denied c/o SOB, itching/hives, lip/tongue/facial swelling, CP or other complaints. Infusion rate increased to 100 ml/hr. Fifteen minutes later, VS stable and pt denied complaints; infusion rate increased to 150 ml/hr for the remainder of the transfusion. Unit finished @ 1230.  VS stable and no transfusion reaction suspected. Mr. Lita Walton tolerated infusion without complaints. Patient remained in [facility] for 30 minutes for observation due to transportation constraints. No signs of allergic reaction noted. Pt's port flushed with 20 NS and 500 units of heparin and de-accessed. Band-aid applied to site. Discharge instructions reviewed with pt. Pt instructed to report SOB, CP, elevated temp, back pain, or other symptoms of transfusion reaction to MD or ED. Pt verbalized understanding. Patient armband removed and shredded    Mr. Lita Walton was discharged from [place] [address] in stable condition at 1300  He is to follow up with Dr. Maria Ines Washburn.     Tonja Ulloa  September 13, 2022

## 2022-10-07 RX ORDER — SODIUM CHLORIDE 9 MG/ML
5-250 INJECTION, SOLUTION INTRAVENOUS AS NEEDED
Status: CANCELLED | OUTPATIENT
Start: 2022-10-12

## 2022-10-07 RX ORDER — DIPHENHYDRAMINE HYDROCHLORIDE 50 MG/ML
50 INJECTION, SOLUTION INTRAMUSCULAR; INTRAVENOUS AS NEEDED
Status: CANCELLED
Start: 2022-10-12

## 2022-10-07 RX ORDER — SODIUM CHLORIDE 9 MG/ML
5-40 INJECTION INTRAMUSCULAR; INTRAVENOUS; SUBCUTANEOUS AS NEEDED
Status: CANCELLED | OUTPATIENT
Start: 2022-10-12

## 2022-10-07 RX ORDER — HYDROCORTISONE SODIUM SUCCINATE 100 MG/2ML
100 INJECTION, POWDER, FOR SOLUTION INTRAMUSCULAR; INTRAVENOUS AS NEEDED
Status: CANCELLED | OUTPATIENT
Start: 2022-10-12

## 2022-10-07 RX ORDER — ACETAMINOPHEN 325 MG/1
650 TABLET ORAL AS NEEDED
Status: CANCELLED
Start: 2022-10-12

## 2022-10-07 RX ORDER — DIPHENHYDRAMINE HYDROCHLORIDE 50 MG/ML
25 INJECTION, SOLUTION INTRAMUSCULAR; INTRAVENOUS AS NEEDED
Status: CANCELLED
Start: 2022-10-12

## 2022-10-07 RX ORDER — ONDANSETRON 2 MG/ML
8 INJECTION INTRAMUSCULAR; INTRAVENOUS AS NEEDED
Status: CANCELLED | OUTPATIENT
Start: 2022-10-12

## 2022-10-07 RX ORDER — ALBUTEROL SULFATE 0.83 MG/ML
2.5 SOLUTION RESPIRATORY (INHALATION) AS NEEDED
Status: CANCELLED
Start: 2022-10-12

## 2022-10-07 RX ORDER — EPINEPHRINE 1 MG/ML
0.3 INJECTION, SOLUTION, CONCENTRATE INTRAVENOUS AS NEEDED
Status: CANCELLED | OUTPATIENT
Start: 2022-10-12

## 2022-10-10 ENCOUNTER — HOSPITAL ENCOUNTER (OUTPATIENT)
Dept: INFUSION THERAPY | Age: 62
Discharge: HOME | End: 2022-10-10
Payer: COMMERCIAL

## 2022-10-10 VITALS
HEART RATE: 79 BPM | OXYGEN SATURATION: 99 % | RESPIRATION RATE: 18 BRPM | DIASTOLIC BLOOD PRESSURE: 74 MMHG | SYSTOLIC BLOOD PRESSURE: 160 MMHG | TEMPERATURE: 98.2 F

## 2022-10-10 LAB — HISTORY CHECKED?,CKHIST: NORMAL

## 2022-10-10 PROCEDURE — 86870 RBC ANTIBODY IDENTIFICATION: CPT

## 2022-10-10 PROCEDURE — 86921 COMPATIBILITY TEST INCUBATE: CPT

## 2022-10-10 PROCEDURE — 36415 COLL VENOUS BLD VENIPUNCTURE: CPT

## 2022-10-10 PROCEDURE — 86900 BLOOD TYPING SEROLOGIC ABO: CPT

## 2022-10-10 PROCEDURE — 86920 COMPATIBILITY TEST SPIN: CPT

## 2022-10-10 PROCEDURE — 74011000250 HC RX REV CODE- 250: Performed by: INTERNAL MEDICINE

## 2022-10-10 PROCEDURE — 86922 COMPATIBILITY TEST ANTIGLOB: CPT

## 2022-10-10 PROCEDURE — 74011250636 HC RX REV CODE- 250/636: Performed by: INTERNAL MEDICINE

## 2022-10-10 PROCEDURE — 85660 RBC SICKLE CELL TEST: CPT

## 2022-10-10 PROCEDURE — 86902 BLOOD TYPE ANTIGEN DONOR EA: CPT

## 2022-10-10 RX ORDER — SODIUM CHLORIDE 0.9 % (FLUSH) 0.9 %
5-10 SYRINGE (ML) INJECTION AS NEEDED
Status: DISCONTINUED | OUTPATIENT
Start: 2022-10-10 | End: 2022-10-14 | Stop reason: HOSPADM

## 2022-10-10 RX ORDER — HEPARIN 100 UNIT/ML
500 SYRINGE INTRAVENOUS ONCE
Status: COMPLETED | OUTPATIENT
Start: 2022-10-10 | End: 2022-10-10

## 2022-10-10 RX ADMIN — SODIUM CHLORIDE, PRESERVATIVE FREE 10 ML: 5 INJECTION INTRAVENOUS at 10:46

## 2022-10-10 RX ADMIN — Medication 500 UNITS: at 10:46

## 2022-10-10 RX ADMIN — SODIUM CHLORIDE, PRESERVATIVE FREE 10 ML: 5 INJECTION INTRAVENOUS at 10:43

## 2022-10-10 RX ADMIN — SODIUM CHLORIDE, PRESERVATIVE FREE 10 ML: 5 INJECTION INTRAVENOUS at 10:45

## 2022-10-12 ENCOUNTER — HOSPITAL ENCOUNTER (OUTPATIENT)
Dept: INFUSION THERAPY | Age: 62
Discharge: HOME | End: 2022-10-12
Payer: COMMERCIAL

## 2022-10-12 VITALS
DIASTOLIC BLOOD PRESSURE: 82 MMHG | HEART RATE: 65 BPM | TEMPERATURE: 98.3 F | SYSTOLIC BLOOD PRESSURE: 169 MMHG | OXYGEN SATURATION: 100 % | RESPIRATION RATE: 18 BRPM

## 2022-10-12 DIAGNOSIS — D58.2 OTHER HEMOGLOBINOPATHIES (HCC): Primary | ICD-10-CM

## 2022-10-12 PROCEDURE — 36430 TRANSFUSION BLD/BLD COMPNT: CPT

## 2022-10-12 PROCEDURE — 74011000250 HC RX REV CODE- 250: Performed by: INTERNAL MEDICINE

## 2022-10-12 PROCEDURE — 77030012965 HC NDL HUBR BBMI -A

## 2022-10-12 PROCEDURE — 74011250636 HC RX REV CODE- 250/636: Performed by: INTERNAL MEDICINE

## 2022-10-12 PROCEDURE — P9040 RBC LEUKOREDUCED IRRADIATED: HCPCS

## 2022-10-12 PROCEDURE — 77030013169 SET IV BLD ICUM -A

## 2022-10-12 PROCEDURE — 74011250637 HC RX REV CODE- 250/637: Performed by: INTERNAL MEDICINE

## 2022-10-12 RX ORDER — DIPHENHYDRAMINE HYDROCHLORIDE 50 MG/ML
25 INJECTION, SOLUTION INTRAMUSCULAR; INTRAVENOUS AS NEEDED
Start: 2022-10-12

## 2022-10-12 RX ORDER — DIPHENHYDRAMINE HCL 25 MG
25 CAPSULE ORAL ONCE
Status: CANCELLED | OUTPATIENT
Start: 2022-10-12 | End: 2022-10-12

## 2022-10-12 RX ORDER — HEPARIN 100 UNIT/ML
500 SYRINGE INTRAVENOUS AS NEEDED
Start: 2022-10-12

## 2022-10-12 RX ORDER — HYDROCORTISONE SODIUM SUCCINATE 100 MG/2ML
100 INJECTION, POWDER, FOR SOLUTION INTRAMUSCULAR; INTRAVENOUS AS NEEDED
OUTPATIENT
Start: 2022-10-12

## 2022-10-12 RX ORDER — SODIUM CHLORIDE 9 MG/ML
5-40 INJECTION INTRAMUSCULAR; INTRAVENOUS; SUBCUTANEOUS AS NEEDED
OUTPATIENT
Start: 2022-10-12

## 2022-10-12 RX ORDER — ALBUTEROL SULFATE 0.83 MG/ML
2.5 SOLUTION RESPIRATORY (INHALATION) AS NEEDED
Start: 2022-10-12

## 2022-10-12 RX ORDER — HEPARIN 100 UNIT/ML
500 SYRINGE INTRAVENOUS AS NEEDED
Status: DISPENSED | OUTPATIENT
Start: 2022-10-12 | End: 2022-10-12

## 2022-10-12 RX ORDER — ACETAMINOPHEN 325 MG/1
650 TABLET ORAL ONCE
Status: CANCELLED | OUTPATIENT
Start: 2022-10-12 | End: 2022-10-12

## 2022-10-12 RX ORDER — SODIUM CHLORIDE 9 MG/ML
5-250 INJECTION, SOLUTION INTRAVENOUS AS NEEDED
OUTPATIENT
Start: 2022-10-12

## 2022-10-12 RX ORDER — ACETAMINOPHEN 325 MG/1
650 TABLET ORAL AS NEEDED
Start: 2022-10-12

## 2022-10-12 RX ORDER — HYDROMORPHONE HYDROCHLORIDE 4 MG/1
TABLET ORAL
COMMUNITY

## 2022-10-12 RX ORDER — SODIUM CHLORIDE 9 MG/ML
5-250 INJECTION, SOLUTION INTRAVENOUS AS NEEDED
Status: CANCELLED | OUTPATIENT
Start: 2022-10-12

## 2022-10-12 RX ORDER — DIPHENHYDRAMINE HCL 25 MG
25 CAPSULE ORAL ONCE
Status: COMPLETED | OUTPATIENT
Start: 2022-10-12 | End: 2022-10-12

## 2022-10-12 RX ORDER — SODIUM CHLORIDE 9 MG/ML
5-250 INJECTION, SOLUTION INTRAVENOUS AS NEEDED
Status: DISPENSED | OUTPATIENT
Start: 2022-10-12 | End: 2022-10-12

## 2022-10-12 RX ORDER — DIPHENHYDRAMINE HYDROCHLORIDE 50 MG/ML
50 INJECTION, SOLUTION INTRAMUSCULAR; INTRAVENOUS AS NEEDED
Start: 2022-10-12

## 2022-10-12 RX ORDER — ACETAMINOPHEN 325 MG/1
650 TABLET ORAL ONCE
Status: COMPLETED | OUTPATIENT
Start: 2022-10-12 | End: 2022-10-12

## 2022-10-12 RX ORDER — SODIUM CHLORIDE 0.9 % (FLUSH) 0.9 %
5-40 SYRINGE (ML) INJECTION AS NEEDED
OUTPATIENT
Start: 2022-10-12

## 2022-10-12 RX ORDER — EPINEPHRINE 1 MG/ML
0.3 INJECTION, SOLUTION, CONCENTRATE INTRAVENOUS AS NEEDED
OUTPATIENT
Start: 2022-10-12

## 2022-10-12 RX ORDER — ONDANSETRON 2 MG/ML
8 INJECTION INTRAMUSCULAR; INTRAVENOUS AS NEEDED
OUTPATIENT
Start: 2022-10-12

## 2022-10-12 RX ORDER — SODIUM CHLORIDE 0.9 % (FLUSH) 0.9 %
5-40 SYRINGE (ML) INJECTION AS NEEDED
Status: DISPENSED | OUTPATIENT
Start: 2022-10-12 | End: 2022-10-12

## 2022-10-12 RX ADMIN — ACETAMINOPHEN 650 MG: 325 TABLET, FILM COATED ORAL at 08:33

## 2022-10-12 RX ADMIN — DIPHENHYDRAMINE HYDROCHLORIDE 25 MG: 25 CAPSULE ORAL at 08:33

## 2022-10-12 RX ADMIN — HEPARIN 500 UNITS: 100 SYRINGE at 12:29

## 2022-10-12 RX ADMIN — SODIUM CHLORIDE, PRESERVATIVE FREE 10 ML: 5 INJECTION INTRAVENOUS at 08:30

## 2022-10-12 RX ADMIN — SODIUM CHLORIDE 25 ML/HR: 9 INJECTION, SOLUTION INTRAVENOUS at 08:31

## 2022-10-12 RX ADMIN — SODIUM CHLORIDE, PRESERVATIVE FREE 10 ML: 5 INJECTION INTRAVENOUS at 12:29

## 2022-10-12 NOTE — PROGRESS NOTES
[facility] Progress Note    Date: 2022    Name: Caitlin Mullins    MRN: [id number]         : 1960    1 UNIT PRBCs    Mr. Mary Pelletier arrived to [facility] at [facility] ambulatory and in no acute distress. Mr. Mary Pelletier was assessed and education was provided. Discussed risks and benefits of blood transfusion with patient, including risk of transfusion reaction and disease transmission. Patient verbalized understanding and signed consent placed on chart. Mr. David Anna vitals were reviewed. Visit Vitals  BP (!) 169/82 (BP 1 Location: Left upper arm, BP Patient Position: Sitting)   Pulse 65   Temp 98.3 °F (36.8 °C)   Resp 18   SpO2 100%       Right chest mediport was accessed with 20 gauge 1\" littlejohn(s) after chloroprep. Port flushed easily and had brisk blood return. Normal saline initiated at [facility]. Pre-medications (Tylenol 650 mg PO and Benadryl 25 mg PO) were administered as ordered. PRBCs initiated @ 75 ml/hr at 0912. Fifteen minutes into infusion, VS stable and pt denied c/o SOB, itching/hives, lip/tongue/facial swelling, CP or other complaints. Infusion rate increased to 155 ml/hr for the remainder of the transfusion. Unit finished @ 1145. VS stable and no transfusion reaction suspected. Mr. Mary Pelletier tolerated infusion without complaints. Patient remained in [facility] for 45 minutes for observation. No signs of allergic reaction noted. VS remain stable. Patient Vitals for the past 12 hrs:   Temp Pulse Resp BP SpO2   10/12/22 1145 98.3 °F (36.8 °C) 65 18 (!) 169/82 100 %   10/12/22 0928 98.6 °F (37 °C) (!) 57 18 (!) 152/71 100 %   10/12/22 0906 98.5 °F (36.9 °C) 60 18 (!) 152/72 99 %   10/12/22 0818 98.9 °F (37.2 °C) 67 18 (!) 162/75 96 %     Mediport flushed with NS 20 ml and Heparin 500 units then de-accessed. No irritation or bleeding noted. Band-Aid applied. Discharge instructions reviewed with pt.  Pt instructed to report SOB, CP, elevated temp, back pain, or other symptoms of transfusion reaction to MD or ED. Pt verbalized understanding. Patient armband removed and shredded    Mr. Sandeep Nam was discharged from [place] [address] in stable condition at 1235. He is to follow up with Dr Varun Grimaldo.     Phu Dumont RN  October 12, 2022   3:37 PM

## 2022-11-11 RX ORDER — HYDROCORTISONE SODIUM SUCCINATE 100 MG/2ML
100 INJECTION, POWDER, FOR SOLUTION INTRAMUSCULAR; INTRAVENOUS AS NEEDED
Status: CANCELLED | OUTPATIENT
Start: 2022-11-17

## 2022-11-11 RX ORDER — HEPARIN 100 UNIT/ML
500 SYRINGE INTRAVENOUS AS NEEDED
Status: CANCELLED
Start: 2022-11-17

## 2022-11-11 RX ORDER — HYDROCORTISONE SODIUM SUCCINATE 100 MG/2ML
100 INJECTION, POWDER, FOR SOLUTION INTRAMUSCULAR; INTRAVENOUS ONCE
Status: CANCELLED
Start: 2022-11-17 | End: 2022-11-16

## 2022-11-11 RX ORDER — DIPHENHYDRAMINE HYDROCHLORIDE 50 MG/ML
25 INJECTION, SOLUTION INTRAMUSCULAR; INTRAVENOUS AS NEEDED
Status: CANCELLED
Start: 2022-11-17

## 2022-11-11 RX ORDER — SODIUM CHLORIDE 9 MG/ML
5-250 INJECTION, SOLUTION INTRAVENOUS AS NEEDED
Status: CANCELLED | OUTPATIENT
Start: 2022-11-17

## 2022-11-11 RX ORDER — ACETAMINOPHEN 325 MG/1
650 TABLET ORAL ONCE
Status: CANCELLED | OUTPATIENT
Start: 2022-11-17 | End: 2022-11-16

## 2022-11-11 RX ORDER — SODIUM CHLORIDE 9 MG/ML
5-40 INJECTION INTRAMUSCULAR; INTRAVENOUS; SUBCUTANEOUS AS NEEDED
Status: CANCELLED | OUTPATIENT
Start: 2022-11-17

## 2022-11-11 RX ORDER — DIPHENHYDRAMINE HYDROCHLORIDE 50 MG/ML
25 INJECTION, SOLUTION INTRAMUSCULAR; INTRAVENOUS ONCE
Status: CANCELLED | OUTPATIENT
Start: 2022-11-17 | End: 2022-11-16

## 2022-11-11 RX ORDER — ACETAMINOPHEN 325 MG/1
650 TABLET ORAL AS NEEDED
Status: CANCELLED
Start: 2022-11-17

## 2022-11-11 RX ORDER — DIPHENHYDRAMINE HYDROCHLORIDE 50 MG/ML
50 INJECTION, SOLUTION INTRAMUSCULAR; INTRAVENOUS AS NEEDED
Status: CANCELLED
Start: 2022-11-17

## 2022-11-11 RX ORDER — SODIUM CHLORIDE 0.9 % (FLUSH) 0.9 %
5-40 SYRINGE (ML) INJECTION AS NEEDED
Status: CANCELLED | OUTPATIENT
Start: 2022-11-17

## 2022-11-11 RX ORDER — EPINEPHRINE 1 MG/ML
0.3 INJECTION, SOLUTION, CONCENTRATE INTRAVENOUS AS NEEDED
Status: CANCELLED | OUTPATIENT
Start: 2022-11-17

## 2022-11-11 RX ORDER — ALBUTEROL SULFATE 0.83 MG/ML
2.5 SOLUTION RESPIRATORY (INHALATION) AS NEEDED
Status: CANCELLED
Start: 2022-11-17

## 2022-11-11 RX ORDER — ONDANSETRON 2 MG/ML
8 INJECTION INTRAMUSCULAR; INTRAVENOUS AS NEEDED
Status: CANCELLED | OUTPATIENT
Start: 2022-11-17

## 2022-11-14 ENCOUNTER — HOSPITAL ENCOUNTER (OUTPATIENT)
Dept: INFUSION THERAPY | Age: 62
Discharge: HOME | End: 2022-11-14
Payer: COMMERCIAL

## 2022-11-14 VITALS
OXYGEN SATURATION: 96 % | HEART RATE: 72 BPM | SYSTOLIC BLOOD PRESSURE: 142 MMHG | DIASTOLIC BLOOD PRESSURE: 66 MMHG | TEMPERATURE: 98 F | RESPIRATION RATE: 16 BRPM

## 2022-11-14 LAB — HISTORY CHECKED?,CKHIST: NORMAL

## 2022-11-14 PROCEDURE — 86921 COMPATIBILITY TEST INCUBATE: CPT

## 2022-11-14 PROCEDURE — 85660 RBC SICKLE CELL TEST: CPT

## 2022-11-14 PROCEDURE — 86902 BLOOD TYPE ANTIGEN DONOR EA: CPT

## 2022-11-14 PROCEDURE — 86920 COMPATIBILITY TEST SPIN: CPT

## 2022-11-14 PROCEDURE — 74011000250 HC RX REV CODE- 250: Performed by: INTERNAL MEDICINE

## 2022-11-14 PROCEDURE — 36591 DRAW BLOOD OFF VENOUS DEVICE: CPT

## 2022-11-14 PROCEDURE — 74011250636 HC RX REV CODE- 250/636: Performed by: INTERNAL MEDICINE

## 2022-11-14 PROCEDURE — 86870 RBC ANTIBODY IDENTIFICATION: CPT

## 2022-11-14 PROCEDURE — 86900 BLOOD TYPING SEROLOGIC ABO: CPT

## 2022-11-14 PROCEDURE — 86922 COMPATIBILITY TEST ANTIGLOB: CPT

## 2022-11-14 PROCEDURE — 77030012965 HC NDL HUBR BBMI -A

## 2022-11-14 RX ORDER — SODIUM CHLORIDE 0.9 % (FLUSH) 0.9 %
10-40 SYRINGE (ML) INJECTION AS NEEDED
Status: DISCONTINUED | OUTPATIENT
Start: 2022-11-14 | End: 2022-11-18 | Stop reason: HOSPADM

## 2022-11-14 RX ORDER — HEPARIN 100 UNIT/ML
500 SYRINGE INTRAVENOUS ONCE
Status: COMPLETED | OUTPATIENT
Start: 2022-11-14 | End: 2022-11-14

## 2022-11-14 RX ADMIN — HEPARIN 500 UNITS: 100 SYRINGE at 11:50

## 2022-11-14 RX ADMIN — SODIUM CHLORIDE, PRESERVATIVE FREE 20 ML: 5 INJECTION INTRAVENOUS at 11:50

## 2022-11-14 NOTE — PROGRESS NOTES
[address] [facility] Progress Note    Date: 2022    Name: Jefferson Strong    MRN: [id number]         : 1960    Mr. Vika Wilks arrived to [facility] at [phone], here for T/C. Mr. Vika Wilks was assessed and education was provided. Mr. Kirt Baer vitals were reviewed. Visit Vitals  BP (!) 142/66 (BP 1 Location: Left upper arm, BP Patient Position: Sitting)   Pulse 72   Temp 98 °F (36.7 °C)   Resp 16   SpO2 96%     Right chest single lumen mediport was accessed using 20g 1\" littlejohn after chloraprep. Brisk blood return obtained and flushed well with NS. Blood for ordered T/C drawn via port after wasting. (Sluggish blood return after first waste. Pt repositioned and flushed and wasted a second time). Mr. Vika Wilks tolerated well  without complaints. Mediport flushed with NS and Heparin then de-accessed. Band-Aid applied. Pt armband removed and shredded    Mr. Vika Wilks was discharged from [place] [address] in stable condition at 1155. He is to return on 22 at 0800 for his next appointment, for 2# PRBC's.     Oz Paul RN  2022

## 2022-11-15 NOTE — PROGRESS NOTES
Pt type & crossed yesterday for 2 units PRBCs to be transfused tomorrow morning. Call received from [address] at [facility] blood bank that these units will not arrive at blood bank from [facility] until tomorrow and will still need to go through the type & crossmatch process. Notified Shira Renteria, Dr. Ana Mora nurse, that pt has to be moved to Thursday for his transfusion. Pt notified as well.

## 2022-11-16 ENCOUNTER — HOSPITAL ENCOUNTER (OUTPATIENT)
Dept: INFUSION THERAPY | Age: 62
End: 2022-11-16

## 2022-11-16 DIAGNOSIS — D58.2 OTHER HEMOGLOBINOPATHIES (HCC): Primary | ICD-10-CM

## 2022-11-17 ENCOUNTER — HOSPITAL ENCOUNTER (OUTPATIENT)
Dept: INFUSION THERAPY | Age: 62
Discharge: HOME | End: 2022-11-17
Payer: COMMERCIAL

## 2022-11-17 VITALS
RESPIRATION RATE: 16 BRPM | HEART RATE: 60 BPM | TEMPERATURE: 98.5 F | SYSTOLIC BLOOD PRESSURE: 129 MMHG | OXYGEN SATURATION: 98 % | DIASTOLIC BLOOD PRESSURE: 71 MMHG

## 2022-11-17 DIAGNOSIS — D58.2 OTHER HEMOGLOBINOPATHIES (HCC): Primary | ICD-10-CM

## 2022-11-17 PROCEDURE — 96374 THER/PROPH/DIAG INJ IV PUSH: CPT

## 2022-11-17 PROCEDURE — P9040 RBC LEUKOREDUCED IRRADIATED: HCPCS

## 2022-11-17 PROCEDURE — 74011250637 HC RX REV CODE- 250/637: Performed by: INTERNAL MEDICINE

## 2022-11-17 PROCEDURE — 74011000250 HC RX REV CODE- 250: Performed by: INTERNAL MEDICINE

## 2022-11-17 PROCEDURE — 74011250636 HC RX REV CODE- 250/636: Performed by: INTERNAL MEDICINE

## 2022-11-17 PROCEDURE — 77030013169 SET IV BLD ICUM -A

## 2022-11-17 PROCEDURE — 96375 TX/PRO/DX INJ NEW DRUG ADDON: CPT

## 2022-11-17 PROCEDURE — 36430 TRANSFUSION BLD/BLD COMPNT: CPT

## 2022-11-17 RX ORDER — DIPHENHYDRAMINE HYDROCHLORIDE 50 MG/ML
25 INJECTION, SOLUTION INTRAMUSCULAR; INTRAVENOUS ONCE
Status: COMPLETED | OUTPATIENT
Start: 2022-11-17 | End: 2022-11-17

## 2022-11-17 RX ORDER — DIPHENHYDRAMINE HYDROCHLORIDE 50 MG/ML
25 INJECTION, SOLUTION INTRAMUSCULAR; INTRAVENOUS ONCE
Status: CANCELLED | OUTPATIENT
Start: 2022-11-17 | End: 2022-11-17

## 2022-11-17 RX ORDER — DIPHENHYDRAMINE HYDROCHLORIDE 50 MG/ML
25 INJECTION, SOLUTION INTRAMUSCULAR; INTRAVENOUS AS NEEDED
Start: 2022-11-17

## 2022-11-17 RX ORDER — HYDROCORTISONE SODIUM SUCCINATE 100 MG/2ML
100 INJECTION, POWDER, FOR SOLUTION INTRAMUSCULAR; INTRAVENOUS ONCE
Status: CANCELLED
Start: 2022-11-17 | End: 2022-11-17

## 2022-11-17 RX ORDER — SODIUM CHLORIDE 9 MG/ML
250 INJECTION, SOLUTION INTRAVENOUS AS NEEDED
Status: DISCONTINUED | OUTPATIENT
Start: 2022-11-17 | End: 2022-11-21 | Stop reason: HOSPADM

## 2022-11-17 RX ORDER — ALBUTEROL SULFATE 0.83 MG/ML
2.5 SOLUTION RESPIRATORY (INHALATION) AS NEEDED
Start: 2022-11-17

## 2022-11-17 RX ORDER — SODIUM CHLORIDE 9 MG/ML
5-250 INJECTION, SOLUTION INTRAVENOUS AS NEEDED
Status: DISPENSED | OUTPATIENT
Start: 2022-11-17 | End: 2022-11-17

## 2022-11-17 RX ORDER — HYDROCORTISONE SODIUM SUCCINATE 100 MG/2ML
100 INJECTION, POWDER, FOR SOLUTION INTRAMUSCULAR; INTRAVENOUS AS NEEDED
OUTPATIENT
Start: 2022-11-17

## 2022-11-17 RX ORDER — HEPARIN 100 UNIT/ML
500 SYRINGE INTRAVENOUS AS NEEDED
Status: DISPENSED | OUTPATIENT
Start: 2022-11-17 | End: 2022-11-17

## 2022-11-17 RX ORDER — ONDANSETRON 2 MG/ML
8 INJECTION INTRAMUSCULAR; INTRAVENOUS AS NEEDED
OUTPATIENT
Start: 2022-11-17

## 2022-11-17 RX ORDER — SODIUM CHLORIDE 0.9 % (FLUSH) 0.9 %
5-40 SYRINGE (ML) INJECTION AS NEEDED
Status: DISPENSED | OUTPATIENT
Start: 2022-11-17 | End: 2022-11-17

## 2022-11-17 RX ORDER — DIPHENHYDRAMINE HYDROCHLORIDE 50 MG/ML
50 INJECTION, SOLUTION INTRAMUSCULAR; INTRAVENOUS AS NEEDED
Start: 2022-11-17

## 2022-11-17 RX ORDER — EPINEPHRINE 1 MG/ML
0.3 INJECTION, SOLUTION, CONCENTRATE INTRAVENOUS AS NEEDED
OUTPATIENT
Start: 2022-11-17

## 2022-11-17 RX ORDER — ACETAMINOPHEN 325 MG/1
650 TABLET ORAL ONCE
Status: CANCELLED | OUTPATIENT
Start: 2022-11-17 | End: 2022-11-17

## 2022-11-17 RX ORDER — SODIUM CHLORIDE 9 MG/ML
5-250 INJECTION, SOLUTION INTRAVENOUS AS NEEDED
OUTPATIENT
Start: 2022-11-17

## 2022-11-17 RX ORDER — HYDROCORTISONE SODIUM SUCCINATE 100 MG/2ML
100 INJECTION, POWDER, FOR SOLUTION INTRAMUSCULAR; INTRAVENOUS ONCE
Status: COMPLETED | OUTPATIENT
Start: 2022-11-17 | End: 2022-11-17

## 2022-11-17 RX ORDER — ACETAMINOPHEN 325 MG/1
650 TABLET ORAL AS NEEDED
Start: 2022-11-17

## 2022-11-17 RX ORDER — ACETAMINOPHEN 325 MG/1
650 TABLET ORAL ONCE
Status: COMPLETED | OUTPATIENT
Start: 2022-11-17 | End: 2022-11-17

## 2022-11-17 RX ORDER — SODIUM CHLORIDE 0.9 % (FLUSH) 0.9 %
5-40 SYRINGE (ML) INJECTION AS NEEDED
OUTPATIENT
Start: 2022-11-17

## 2022-11-17 RX ORDER — SODIUM CHLORIDE 9 MG/ML
5-40 INJECTION INTRAMUSCULAR; INTRAVENOUS; SUBCUTANEOUS AS NEEDED
OUTPATIENT
Start: 2022-11-17

## 2022-11-17 RX ORDER — HEPARIN 100 UNIT/ML
500 SYRINGE INTRAVENOUS AS NEEDED
Start: 2022-11-17

## 2022-11-17 RX ORDER — SODIUM CHLORIDE 9 MG/ML
5-250 INJECTION, SOLUTION INTRAVENOUS AS NEEDED
Status: CANCELLED | OUTPATIENT
Start: 2022-11-17

## 2022-11-17 RX ADMIN — HEPARIN 500 UNITS: 100 SYRINGE at 15:16

## 2022-11-17 RX ADMIN — ACETAMINOPHEN 650 MG: 325 TABLET, FILM COATED ORAL at 09:38

## 2022-11-17 RX ADMIN — SODIUM CHLORIDE 25 ML/HR: 9 INJECTION, SOLUTION INTRAVENOUS at 09:34

## 2022-11-17 RX ADMIN — SODIUM CHLORIDE 25 ML/HR: 9 INJECTION, SOLUTION INTRAVENOUS at 09:45

## 2022-11-17 RX ADMIN — DIPHENHYDRAMINE HYDROCHLORIDE 25 MG: 50 INJECTION, SOLUTION INTRAMUSCULAR; INTRAVENOUS at 09:38

## 2022-11-17 RX ADMIN — HYDROCORTISONE SODIUM SUCCINATE 100 MG: 100 INJECTION, POWDER, FOR SOLUTION INTRAMUSCULAR; INTRAVENOUS at 09:38

## 2022-11-17 RX ADMIN — SODIUM CHLORIDE, PRESERVATIVE FREE 10 ML: 5 INJECTION INTRAVENOUS at 15:16

## 2022-11-17 NOTE — PROGRESS NOTES
[facility] Progress Note    Date: 2022    Name: Obie Babcock    MRN: [id number]         : 1960       2 UNITS PRBCs      Mr. Juwan Bobo arrived to [place] at [address]. Mr. Juwan Bobo was assessed and verbal education was provided. Pt given written education. Mr. Marcela Lockhart vitals were reviewed. Visit Vitals  /71 (BP 1 Location: Right upper arm)   Pulse 60   Temp 98.5 °F (36.9 °C)   Resp 16   SpO2 98%        Pre-medications administered as ordered: Tylenol 650mg po & Benadryl 25mg IV and Solucortef 100 mg IV. Patient's R upper chest port accessed. Brisk blood return/ flushes without difficulty. NS infusing as ordered at [facility]. 2 unit PRBCs administered as ordered followed by NS flush. Pt tolerated well without complaints. Pt declined to stay for observation following completion of transfusion. Denies complaints. No s/sx of reaction. VSS. Flushed pt's port w/ heparin per order & de-accessed. Band-aid/ gauze applied to site. Discharge/ follow-up instructions discussed w/ pt. Pt verbalized understanding. Pt declined written instructions. Pt armband and blood bank band removed & shredded. Mr. Juwan Bobo was discharged from [place] [address] in stable condition at [phone]. He has no future appointments scheduled with the [facility] at this time.      Verna Pelletier  2022

## 2022-11-17 NOTE — PROGRESS NOTES
[facility] Progress Note    Date: 2022    Name: Seamus Nguyen    MRN: [id number]         : 1960       2 UNITS PRBCs      Mr. Jessica Rausch arrived to [facility] at [address]. Mr. Jessica Rausch was assessed and verbal education was provided. Pt given written education. Mr. Michael Mason vitals were reviewed. Visit Vitals  /71 (BP 1 Location: Right upper arm)   Pulse 60   Temp 98.5 °F (36.9 °C)   Resp 16   SpO2 98%        Pre-medications administered as ordered: Tylenol 650 mg po & Benadryl 25mg IV and Solucortef 100 mg IV. Patient's R upper chest port accessed. Brisk blood return/ flushes without difficulty. NS infusing as ordered at [facility]. 2 unit PRBCs administered as ordered followed by NS flush. Pt tolerated well without complaints. Pt stayed for 30 minute observation vs 1 hr following completion of transfusion. Denies complaints. No s/sx of reaction. VSS. Flushed pt's port w/ heparin per order & de-accessed. Band-aid/ gauze applied to site. Discharge/ follow-up instructions discussed w/ pt. Pt verbalized understanding. Pt declined written instructions. Pt armband and blood bank band removed & shredded. Mr. Jessica Rausch was discharged from [place] [address] in stable condition at 1520. He has no future appointments scheduled with the [facility] at this time.      Vic Florence  2022

## 2022-11-17 NOTE — PROGRESS NOTES
[facility] Progress Note    Date: 2022    Name: Caitlin Mullins    MRN: [id number]         : 1960       2 UNITS PRBCs      Mr. Mary Pelletier arrived to [place] at [address]. Mr. Mary Pelletier was assessed and verbal education was provided. Pt given written education. Mr. David Anna vitals were reviewed. Visit Vitals  /61 (BP 1 Location: Right upper arm, BP Patient Position: Sitting)   Pulse 61   Temp 98.3 °F (36.8 °C)   Resp 16   SpO2 96%        Pre-medications administered as ordered: Tylenol 650mg po & Benadryl 25mg IV and Solucortef 100 mg IV. Patient's R upper chest port accessed. Brisk blood return/ flushes without difficulty. NS infusing as ordered at [facility]. 2 unit PRBCs administered as ordered followed by NS flush. Pt tolerated well without complaints. Pt declined to stay for observation following completion of transfusion. Denies complaints. No s/sx of reaction. VSS. Flushed pt's port w/ heparin per order & de-accessed. Band-aid/ gauze applied to site. Discharge/ follow-up instructions discussed w/ pt. Pt verbalized understanding. Pt declined written instructions. Pt armband and blood bank band removed & shredded. Mr. Mary Pelletier was discharged from [place] [address] in stable condition at [phone]. He has no future appointments scheduled with the [facility] at this time.      Ursula Hurt  2022

## 2022-11-17 NOTE — PROGRESS NOTES
[facility] Progress Note    Date: 2022    Name: Levy Anaya    MRN: [id number]         : 1960       2 UNITS PRBCs      Mr. Maria Isabel Rosario arrived to [facility] at [phone]. Mr. Maria Isabel Rosario was assessed and verbal education was provided. Pt given written education. Mr. Cruz Sis vitals were reviewed. Visit Vitals  /71 (BP 1 Location: Right upper arm)   Pulse 60   Temp 98.5 °F (36.9 °C)   Resp 16   SpO2 98%        Pre-medications administered as ordered: Tylenol 650mg po & Benadryl 25mg IV and Solucortef 100 mg IV. Patient's R upper chest port accessed. Brisk blood return/ flushes without difficulty. NS infusing as ordered at [facility]. Two units PRBCs administered as ordered followed by NS flush. Pt tolerated well without complaints. Pt stayed for 30 minute observation vs 1 hour following completion of transfusion. No s/sx of reaction. VSS. Flushed pt's port w/ heparin per order & de-accessed. Band-aid/ gauze applied to site. Discharge/ follow-up instructions discussed w/ pt. Pt verbalized understanding. Pt declined written instructions. Pt armband and blood bank band removed & shredded. Mr. Maria Isabel Rosario was discharged from [place] [address] in stable condition at 1520. He has no future appointments scheduled with the [facility] at this time.      Maria Antonia Adkins  2022

## 2022-12-20 ENCOUNTER — TRANSCRIBE ORDER (OUTPATIENT)
Dept: SCHEDULING | Age: 62
End: 2022-12-20

## 2022-12-20 DIAGNOSIS — M79.606 LEG PAIN: Primary | ICD-10-CM

## 2022-12-28 ENCOUNTER — HOSPITAL ENCOUNTER (OUTPATIENT)
Dept: INFUSION THERAPY | Age: 62
Discharge: HOME | End: 2022-12-28
Payer: COMMERCIAL

## 2022-12-28 DIAGNOSIS — D58.2 OTHER HEMOGLOBINOPATHIES (HCC): Primary | ICD-10-CM

## 2022-12-28 PROCEDURE — 86920 COMPATIBILITY TEST SPIN: CPT

## 2022-12-28 PROCEDURE — 85660 RBC SICKLE CELL TEST: CPT

## 2022-12-28 PROCEDURE — 77030012965 HC NDL HUBR BBMI -A

## 2022-12-28 PROCEDURE — 86902 BLOOD TYPE ANTIGEN DONOR EA: CPT

## 2022-12-28 PROCEDURE — 86900 BLOOD TYPING SEROLOGIC ABO: CPT

## 2022-12-28 PROCEDURE — 86870 RBC ANTIBODY IDENTIFICATION: CPT

## 2022-12-28 PROCEDURE — 86921 COMPATIBILITY TEST INCUBATE: CPT

## 2022-12-28 PROCEDURE — 36415 COLL VENOUS BLD VENIPUNCTURE: CPT

## 2022-12-28 PROCEDURE — 74011000250 HC RX REV CODE- 250: Performed by: INTERNAL MEDICINE

## 2022-12-28 PROCEDURE — 74011250636 HC RX REV CODE- 250/636: Performed by: INTERNAL MEDICINE

## 2022-12-28 PROCEDURE — 86922 COMPATIBILITY TEST ANTIGLOB: CPT

## 2022-12-28 PROCEDURE — 36591 DRAW BLOOD OFF VENOUS DEVICE: CPT

## 2022-12-28 RX ORDER — SODIUM CHLORIDE 0.9 % (FLUSH) 0.9 %
10-40 SYRINGE (ML) INJECTION AS NEEDED
Status: DISCONTINUED | OUTPATIENT
Start: 2022-12-28 | End: 2023-01-01 | Stop reason: HOSPADM

## 2022-12-28 RX ORDER — HEPARIN 100 UNIT/ML
500 SYRINGE INTRAVENOUS ONCE
Status: COMPLETED | OUTPATIENT
Start: 2022-12-28 | End: 2022-12-28

## 2022-12-28 RX ADMIN — Medication 10 ML: at 12:23

## 2022-12-28 RX ADMIN — HEPARIN SODIUM (PORCINE) LOCK FLUSH IV SOLN 100 UNIT/ML 500 UNITS: 100 SOLUTION at 12:23

## 2022-12-28 NOTE — PROGRESS NOTES
[facility] Progress Note    Date: 2022    Name: Bonnie Mathews    MRN: [id number]         : 1960    LABS    Mr. Ken Carrel arrived to [facility] at 18, here for T/C. Mr. Ken Carrel was assessed and education was provided. Right chest single lumen mediport was accessed using 20g 1\" littlejohn after chloraprep. Brisk blood return obtained and flushed well with NS. Blood for ordered T/C drawn via port after wasting. Mr. Ken Carrel tolerated well  without complaints. Mediport flushed with NS and Heparin then de-accessed. Band-Aid applied. Pt armband removed and shredded    Mr. Ken Carrel was discharged from [place] [address] in stable condition at 1225. He is to return on 2022 at 0800 for his next appointment, for 2# PRBC's.     Caterina Kruse RN  2022

## 2022-12-29 LAB — HISTORY CHECKED?,CKHIST: NORMAL

## 2022-12-29 RX ORDER — SODIUM CHLORIDE 9 MG/ML
5-40 INJECTION INTRAMUSCULAR; INTRAVENOUS; SUBCUTANEOUS AS NEEDED
OUTPATIENT
Start: 2022-12-30

## 2022-12-29 RX ORDER — HYDROCORTISONE SODIUM SUCCINATE 100 MG/2ML
100 INJECTION, POWDER, FOR SOLUTION INTRAMUSCULAR; INTRAVENOUS AS NEEDED
OUTPATIENT
Start: 2022-12-30

## 2022-12-29 RX ORDER — ALBUTEROL SULFATE 0.83 MG/ML
2.5 SOLUTION RESPIRATORY (INHALATION) AS NEEDED
Start: 2022-12-30

## 2022-12-29 RX ORDER — SODIUM CHLORIDE 9 MG/ML
5-250 INJECTION, SOLUTION INTRAVENOUS AS NEEDED
OUTPATIENT
Start: 2022-12-30

## 2022-12-29 RX ORDER — HEPARIN 100 UNIT/ML
500 SYRINGE INTRAVENOUS AS NEEDED
Start: 2022-12-30

## 2022-12-29 RX ORDER — EPINEPHRINE 1 MG/ML
0.3 INJECTION, SOLUTION, CONCENTRATE INTRAVENOUS AS NEEDED
OUTPATIENT
Start: 2022-12-30

## 2022-12-29 RX ORDER — DIPHENHYDRAMINE HYDROCHLORIDE 50 MG/ML
50 INJECTION, SOLUTION INTRAMUSCULAR; INTRAVENOUS AS NEEDED
Start: 2022-12-30

## 2022-12-29 RX ORDER — DIPHENHYDRAMINE HYDROCHLORIDE 50 MG/ML
25 INJECTION, SOLUTION INTRAMUSCULAR; INTRAVENOUS AS NEEDED
Start: 2022-12-30

## 2022-12-29 RX ORDER — ACETAMINOPHEN 325 MG/1
650 TABLET ORAL ONCE
OUTPATIENT
Start: 2022-12-30 | End: 2022-12-30

## 2022-12-29 RX ORDER — DIPHENHYDRAMINE HCL 25 MG
25 CAPSULE ORAL ONCE
OUTPATIENT
Start: 2022-12-30 | End: 2022-12-30

## 2022-12-29 RX ORDER — ONDANSETRON 2 MG/ML
8 INJECTION INTRAMUSCULAR; INTRAVENOUS AS NEEDED
OUTPATIENT
Start: 2022-12-30

## 2022-12-29 RX ORDER — SODIUM CHLORIDE 0.9 % (FLUSH) 0.9 %
5-40 SYRINGE (ML) INJECTION AS NEEDED
OUTPATIENT
Start: 2022-12-30

## 2022-12-29 RX ORDER — ACETAMINOPHEN 325 MG/1
650 TABLET ORAL AS NEEDED
Start: 2022-12-30

## 2022-12-30 ENCOUNTER — HOSPITAL ENCOUNTER (OUTPATIENT)
Dept: INFUSION THERAPY | Age: 62
End: 2022-12-30
Payer: COMMERCIAL

## 2022-12-30 VITALS
DIASTOLIC BLOOD PRESSURE: 78 MMHG | OXYGEN SATURATION: 98 % | RESPIRATION RATE: 16 BRPM | SYSTOLIC BLOOD PRESSURE: 157 MMHG | TEMPERATURE: 98.2 F | HEART RATE: 66 BPM

## 2022-12-30 PROCEDURE — P9040 RBC LEUKOREDUCED IRRADIATED: HCPCS

## 2022-12-30 PROCEDURE — 74011000250 HC RX REV CODE- 250: Performed by: INTERNAL MEDICINE

## 2022-12-30 PROCEDURE — 74011250637 HC RX REV CODE- 250/637: Performed by: INTERNAL MEDICINE

## 2022-12-30 PROCEDURE — 74011250636 HC RX REV CODE- 250/636: Performed by: INTERNAL MEDICINE

## 2022-12-30 PROCEDURE — 77030012965 HC NDL HUBR BBMI -A

## 2022-12-30 PROCEDURE — 36430 TRANSFUSION BLD/BLD COMPNT: CPT

## 2022-12-30 PROCEDURE — 96374 THER/PROPH/DIAG INJ IV PUSH: CPT

## 2022-12-30 PROCEDURE — 77030013169 SET IV BLD ICUM -A

## 2022-12-30 PROCEDURE — 96375 TX/PRO/DX INJ NEW DRUG ADDON: CPT

## 2022-12-30 RX ORDER — HEPARIN 100 UNIT/ML
500 SYRINGE INTRAVENOUS ONCE
Status: COMPLETED | OUTPATIENT
Start: 2022-12-30 | End: 2022-12-30

## 2022-12-30 RX ORDER — SODIUM CHLORIDE 0.9 % (FLUSH) 0.9 %
10-40 SYRINGE (ML) INJECTION EVERY 8 HOURS
Status: DISCONTINUED | OUTPATIENT
Start: 2022-12-30 | End: 2023-01-03 | Stop reason: HOSPADM

## 2022-12-30 RX ORDER — ACETAMINOPHEN 325 MG/1
650 TABLET ORAL ONCE
Status: COMPLETED | OUTPATIENT
Start: 2022-12-30 | End: 2022-12-30

## 2022-12-30 RX ORDER — HYDROCORTISONE SODIUM SUCCINATE 100 MG/2ML
100 INJECTION, POWDER, FOR SOLUTION INTRAMUSCULAR; INTRAVENOUS ONCE
Status: COMPLETED | OUTPATIENT
Start: 2022-12-30 | End: 2022-12-30

## 2022-12-30 RX ORDER — DIPHENHYDRAMINE HYDROCHLORIDE 50 MG/ML
25 INJECTION, SOLUTION INTRAMUSCULAR; INTRAVENOUS ONCE
Status: COMPLETED | OUTPATIENT
Start: 2022-12-30 | End: 2022-12-30

## 2022-12-30 RX ORDER — SODIUM CHLORIDE 9 MG/ML
250 INJECTION, SOLUTION INTRAVENOUS AS NEEDED
Status: DISCONTINUED | OUTPATIENT
Start: 2022-12-30 | End: 2023-01-03 | Stop reason: HOSPADM

## 2022-12-30 RX ADMIN — HYDROCORTISONE SODIUM SUCCINATE 100 MG: 100 INJECTION, POWDER, FOR SOLUTION INTRAMUSCULAR; INTRAVENOUS at 08:41

## 2022-12-30 RX ADMIN — HEPARIN 500 UNITS: 100 SYRINGE at 14:35

## 2022-12-30 RX ADMIN — SODIUM CHLORIDE, PRESERVATIVE FREE 10 ML: 5 INJECTION INTRAVENOUS at 09:00

## 2022-12-30 RX ADMIN — ACETAMINOPHEN 650 MG: 325 TABLET, FILM COATED ORAL at 08:34

## 2022-12-30 RX ADMIN — SODIUM CHLORIDE, PRESERVATIVE FREE 10 ML: 5 INJECTION INTRAVENOUS at 14:34

## 2022-12-30 RX ADMIN — DIPHENHYDRAMINE HYDROCHLORIDE 25 MG: 50 INJECTION INTRAMUSCULAR; INTRAVENOUS at 08:35

## 2022-12-30 RX ADMIN — SODIUM CHLORIDE 250 ML: 9 INJECTION, SOLUTION INTRAVENOUS at 08:34

## 2022-12-30 NOTE — PROGRESS NOTES
[facility] Progress Note    Date: 2022    Name: Janessa Logan    MRN: [id number]         : 1960       2 UNITS PRBCs      Mr. Lakesha Girssom arrived to [place] at [address]. Mr. Lakesha Grissom was assessed and verbal education was provided. Pt declined written education. Mr. Gail Kuhn vitals were reviewed. Visit Vitals  /71 (BP 1 Location: Left upper arm, BP Patient Position: Sitting)   Pulse 64   Temp 98.3 °F (36.8 °C)   Resp 16   SpO2 100%        Pre-medications administered as ordered: Tylenol 650 mg po & Benadryl 25mg IV and Solucortef 100 mg IV. Patient's R upper chest port accessed. Brisk blood return/ flushes without difficulty. NS infusing as ordered at [facility]. 2 unit PRBCs administered as ordered followed by NS flush. Pt tolerated well without complaints. Pt stayed for 30 minute observation vs 1 hr following completion of transfusion. Denies complaints. No s/sx of reaction. VSS. Flushed pt's port w/ ns and heparin per order & de-accessed. Band-aid/ gauze applied to site. Discharge/ follow-up instructions discussed w/ pt. Pt verbalized understanding. Pt declined written instructions. Pt armband and blood bank band removed & shredded. Mr. Lakesha Grissom was discharged from [place] [address] in stable condition at 1440. He has no future appointments scheduled with the hospitals at this time.      Manjeet Bañuelos  2022

## 2023-01-04 ENCOUNTER — HOSPITAL ENCOUNTER (OUTPATIENT)
Dept: CT IMAGING | Age: 63
Discharge: HOME | End: 2023-01-04
Payer: COMMERCIAL

## 2023-01-04 DIAGNOSIS — M79.606 LEG PAIN: ICD-10-CM

## 2023-01-04 PROCEDURE — 73700 CT LOWER EXTREMITY W/O DYE: CPT

## 2023-01-30 ENCOUNTER — HOSPITAL ENCOUNTER (OUTPATIENT)
Dept: INFUSION THERAPY | Age: 63
Discharge: HOME | End: 2023-01-30
Payer: COMMERCIAL

## 2023-01-30 VITALS
OXYGEN SATURATION: 97 % | SYSTOLIC BLOOD PRESSURE: 131 MMHG | HEART RATE: 77 BPM | RESPIRATION RATE: 18 BRPM | TEMPERATURE: 98 F | DIASTOLIC BLOOD PRESSURE: 73 MMHG

## 2023-01-30 PROCEDURE — 74011250636 HC RX REV CODE- 250/636

## 2023-01-30 PROCEDURE — 86901 BLOOD TYPING SEROLOGIC RH(D): CPT

## 2023-01-30 PROCEDURE — 86900 BLOOD TYPING SEROLOGIC ABO: CPT

## 2023-01-30 PROCEDURE — 86880 COOMBS TEST DIRECT: CPT

## 2023-01-30 PROCEDURE — 86904 BLOOD TYPING PATIENT SERUM: CPT

## 2023-01-30 PROCEDURE — 86920 COMPATIBILITY TEST SPIN: CPT

## 2023-01-30 PROCEDURE — 86870 RBC ANTIBODY IDENTIFICATION: CPT

## 2023-01-30 PROCEDURE — 74011000250 HC RX REV CODE- 250: Performed by: INTERNAL MEDICINE

## 2023-01-30 PROCEDURE — 77030012965 HC NDL HUBR BBMI -A

## 2023-01-30 PROCEDURE — 36591 DRAW BLOOD OFF VENOUS DEVICE: CPT

## 2023-01-30 RX ORDER — HYDROCORTISONE SODIUM SUCCINATE 100 MG/2ML
100 INJECTION, POWDER, FOR SOLUTION INTRAMUSCULAR; INTRAVENOUS ONCE
Status: CANCELLED | OUTPATIENT
Start: 2023-02-01 | End: 2023-02-01

## 2023-01-30 RX ORDER — HEPARIN SODIUM (PORCINE) LOCK FLUSH IV SOLN 100 UNIT/ML 100 UNIT/ML
SOLUTION INTRAVENOUS
Status: COMPLETED
Start: 2023-01-30 | End: 2023-01-30

## 2023-01-30 RX ORDER — HEPARIN 100 UNIT/ML
500 SYRINGE INTRAVENOUS AS NEEDED
Status: DISCONTINUED | OUTPATIENT
Start: 2023-01-30 | End: 2023-02-03 | Stop reason: HOSPADM

## 2023-01-30 RX ORDER — SODIUM CHLORIDE 0.9 % (FLUSH) 0.9 %
10-40 SYRINGE (ML) INJECTION AS NEEDED
Status: DISCONTINUED | OUTPATIENT
Start: 2023-01-30 | End: 2023-02-03 | Stop reason: HOSPADM

## 2023-01-30 RX ORDER — ACETAMINOPHEN 325 MG/1
650 TABLET ORAL ONCE
Status: CANCELLED | OUTPATIENT
Start: 2023-02-01 | End: 2023-02-01

## 2023-01-30 RX ADMIN — HEPARIN SODIUM (PORCINE) LOCK FLUSH IV SOLN 100 UNIT/ML 500 UNITS: 100 SOLUTION at 14:22

## 2023-01-30 RX ADMIN — SODIUM CHLORIDE, PRESERVATIVE FREE 30 ML: 5 INJECTION INTRAVENOUS at 14:20

## 2023-01-30 NOTE — PROGRESS NOTES
[facility] Progress Note    Date: 2023    Name: Mandy Goodman    MRN: [id number]         : 1960    LABS    Mr. Hetal Castañeda arrived to [facility] at [phone] ambulatory, here for T/C. No complaints voiced. No acute distress noted. Mr. Hetal Castañeda was assessed and education was provided. Visit Vitals  /73 (BP 1 Location: Left upper arm, BP Patient Position: Sitting)   Pulse 77   Temp 98 °F (36.7 °C)   Resp 18   SpO2 97%       Right chest single lumen mediport was accessed using 20g 1\" littlejohn after chloraprep. Brisk blood return obtained and flushed well with NS. Blood for ordered T/C drawn via port after wasting. Mr. Hetal Castañeda tolerated well  without complaints. Mediport flushed with NS and Heparin then de-accessed. Band-Aid applied. Site without bruising, bleeding, tenderness or swelling. Pt armband removed and shredded    Mr. Hetal Castañeda was discharged from [place] [address] in stable condition at 1435. He is to return on 23 at 0800 for his next appointment of 1 unit PRBC's.     Kulwinder Johnson RN  2023

## 2023-02-01 ENCOUNTER — HOSPITAL ENCOUNTER (OUTPATIENT)
Dept: INFUSION THERAPY | Age: 63
Discharge: HOME | End: 2023-02-01
Payer: COMMERCIAL

## 2023-02-01 VITALS
OXYGEN SATURATION: 100 % | TEMPERATURE: 97.8 F | SYSTOLIC BLOOD PRESSURE: 153 MMHG | HEART RATE: 61 BPM | RESPIRATION RATE: 16 BRPM | DIASTOLIC BLOOD PRESSURE: 72 MMHG

## 2023-02-01 LAB — HISTORY CHECKED?,CKHIST: NORMAL

## 2023-02-01 PROCEDURE — 85660 RBC SICKLE CELL TEST: CPT

## 2023-02-01 PROCEDURE — 74011000250 HC RX REV CODE- 250: Performed by: INTERNAL MEDICINE

## 2023-02-01 PROCEDURE — 86922 COMPATIBILITY TEST ANTIGLOB: CPT

## 2023-02-01 PROCEDURE — 36430 TRANSFUSION BLD/BLD COMPNT: CPT

## 2023-02-01 PROCEDURE — 74011250636 HC RX REV CODE- 250/636: Performed by: INTERNAL MEDICINE

## 2023-02-01 PROCEDURE — 74011250637 HC RX REV CODE- 250/637: Performed by: INTERNAL MEDICINE

## 2023-02-01 PROCEDURE — 74011250636 HC RX REV CODE- 250/636

## 2023-02-01 PROCEDURE — 86921 COMPATIBILITY TEST INCUBATE: CPT

## 2023-02-01 PROCEDURE — 86902 BLOOD TYPE ANTIGEN DONOR EA: CPT

## 2023-02-01 PROCEDURE — 77030013169 SET IV BLD ICUM -A

## 2023-02-01 PROCEDURE — P9040 RBC LEUKOREDUCED IRRADIATED: HCPCS

## 2023-02-01 RX ORDER — HEPARIN SODIUM (PORCINE) LOCK FLUSH IV SOLN 100 UNIT/ML 100 UNIT/ML
SOLUTION INTRAVENOUS
Status: COMPLETED
Start: 2023-02-01 | End: 2023-02-01

## 2023-02-01 RX ORDER — SODIUM CHLORIDE 9 MG/ML
250 INJECTION, SOLUTION INTRAVENOUS AS NEEDED
Status: DISCONTINUED | OUTPATIENT
Start: 2023-02-01 | End: 2023-02-05 | Stop reason: HOSPADM

## 2023-02-01 RX ORDER — DIPHENHYDRAMINE HCL 25 MG
25 CAPSULE ORAL
Status: DISCONTINUED | OUTPATIENT
Start: 2023-02-01 | End: 2023-02-05 | Stop reason: HOSPADM

## 2023-02-01 RX ORDER — HEPARIN 100 UNIT/ML
500 SYRINGE INTRAVENOUS ONCE
Status: DISPENSED | OUTPATIENT
Start: 2023-02-01 | End: 2023-02-01

## 2023-02-01 RX ORDER — ACETAMINOPHEN 325 MG/1
650 TABLET ORAL ONCE
Status: COMPLETED | OUTPATIENT
Start: 2023-02-01 | End: 2023-02-01

## 2023-02-01 RX ORDER — SODIUM CHLORIDE 0.9 % (FLUSH) 0.9 %
10-40 SYRINGE (ML) INJECTION AS NEEDED
Status: DISCONTINUED | OUTPATIENT
Start: 2023-02-01 | End: 2023-02-05 | Stop reason: HOSPADM

## 2023-02-01 RX ADMIN — HEPARIN SODIUM (PORCINE) LOCK FLUSH IV SOLN 100 UNIT/ML 500 UNITS: 100 SOLUTION at 12:38

## 2023-02-01 RX ADMIN — SODIUM CHLORIDE, PRESERVATIVE FREE 10 ML: 5 INJECTION INTRAVENOUS at 12:00

## 2023-02-01 RX ADMIN — DIPHENHYDRAMINE HYDROCHLORIDE 25 MG: 25 CAPSULE ORAL at 08:31

## 2023-02-01 RX ADMIN — SODIUM CHLORIDE 250 ML: 9 INJECTION, SOLUTION INTRAVENOUS at 08:34

## 2023-02-01 RX ADMIN — ACETAMINOPHEN 650 MG: 325 TABLET, FILM COATED ORAL at 08:32

## 2023-02-01 RX ADMIN — SODIUM CHLORIDE, PRESERVATIVE FREE 10 ML: 5 INJECTION INTRAVENOUS at 08:23

## 2023-02-01 NOTE — PROGRESS NOTES
[facility] Progress Note    Date: 2023    Name: Hua Garza    MRN: [id number]         : 1960      Mr. Micky Shaver arrived to [facility] at [phone], here for 1# PRBC's. Mr. Micky Shaver was assessed and education was provided. Mr. Sandra Brewster vitals were reviewed. Visit Vitals  BP (!) 128/59 (BP 1 Location: Right upper arm, BP Patient Position: Sitting)   Pulse (!) 59   Temp 98.4 °F (36.9 °C)   Resp 18   SpO2 95%     Pt's R upper chest port accessed using 20g 1\" littlejohn after chloraprep. Brisk blood return obtained and flushed well with NS.    Pre-meds (Tylenol 650mg PO and Benadryl 25mg PO) were administered as ordered. (Dr. Franky Peralta office was contacted for clarification on order, per SAVANNAH Bird, LAUREL Solucortef/Benadryl only ordered PRN for reaction). Normal saline initiated at [facility]. One unit of PRBCs initiated @ 75 ml/hr at 0905. Fifteen minutes into infusion, VS stable and pt denied c/o SOB, itching/hives, lip/tongue/facial swelling, CP or other complaints. Infusion rate increased to 155 ml/hr for the remainder of the transfusion. Unit finished @ 1145. VS stable and no transfusion reaction suspected. Mr. Micky Shaver tolerated transfusion without complaints. Patient remained in [facility] for approximately 60 min of observation. No s/s of reaction. VS stable. Port flushed well with NS and heparin and de-accessed. Band-Aid applied to site. Discharge/ follow-up instructions discussed w/ pt. Pt verbalized understanding. Pt armband removed and shredded. Mr. Micky Shaver was discharged from [place] [address] in stable condition at 1240. He has no further appointments with [facility] at this time.     Daryle Robes, RN  2023

## 2023-02-06 ENCOUNTER — TRANSCRIBE ORDER (OUTPATIENT)
Dept: SCHEDULING | Age: 63
End: 2023-02-06

## 2023-02-06 DIAGNOSIS — D58.2 OTHER HEMOGLOBINOPATHIES (HCC): Primary | ICD-10-CM

## 2023-02-07 ENCOUNTER — HOSPITAL ENCOUNTER (OUTPATIENT)
Age: 63
Discharge: HOME | End: 2023-02-07
Attending: INTERNAL MEDICINE
Payer: COMMERCIAL

## 2023-02-07 DIAGNOSIS — D58.2 OTHER HEMOGLOBINOPATHIES (HCC): ICD-10-CM

## 2023-02-07 LAB — CREAT UR-MCNC: 1.4 MG/DL (ref 0.6–1.3)

## 2023-02-07 PROCEDURE — A9575 INJ GADOTERATE MEGLUMI 0.1ML: HCPCS | Performed by: INTERNAL MEDICINE

## 2023-02-07 PROCEDURE — 74011250636 HC RX REV CODE- 250/636: Performed by: INTERNAL MEDICINE

## 2023-02-07 PROCEDURE — 73720 MRI LWR EXTREMITY W/O&W/DYE: CPT

## 2023-02-07 PROCEDURE — 82565 ASSAY OF CREATININE: CPT

## 2023-02-07 RX ADMIN — GADOTERATE MEGLUMINE 12 ML: 376.9 INJECTION INTRAVENOUS at 13:46

## 2023-03-13 ENCOUNTER — HOSPITAL ENCOUNTER (OUTPATIENT)
Facility: HOSPITAL | Age: 63
Setting detail: INFUSION SERIES
End: 2023-03-13
Payer: COMMERCIAL

## 2023-03-13 VITALS
SYSTOLIC BLOOD PRESSURE: 137 MMHG | RESPIRATION RATE: 18 BRPM | OXYGEN SATURATION: 96 % | DIASTOLIC BLOOD PRESSURE: 71 MMHG | TEMPERATURE: 98.7 F | HEART RATE: 66 BPM

## 2023-03-13 LAB — HISTORY CHECK: NORMAL

## 2023-03-13 PROCEDURE — 85660 RBC SICKLE CELL TEST: CPT

## 2023-03-13 PROCEDURE — 36415 COLL VENOUS BLD VENIPUNCTURE: CPT

## 2023-03-13 PROCEDURE — 86870 RBC ANTIBODY IDENTIFICATION: CPT

## 2023-03-13 PROCEDURE — 86921 COMPATIBILITY TEST INCUBATE: CPT

## 2023-03-13 PROCEDURE — 86922 COMPATIBILITY TEST ANTIGLOB: CPT

## 2023-03-13 PROCEDURE — 36591 DRAW BLOOD OFF VENOUS DEVICE: CPT

## 2023-03-13 PROCEDURE — 2580000003 HC RX 258: Performed by: INTERNAL MEDICINE

## 2023-03-13 PROCEDURE — 6360000002 HC RX W HCPCS: Performed by: INTERNAL MEDICINE

## 2023-03-13 PROCEDURE — 86902 BLOOD TYPE ANTIGEN DONOR EA: CPT

## 2023-03-13 PROCEDURE — 86900 BLOOD TYPING SEROLOGIC ABO: CPT

## 2023-03-13 PROCEDURE — 86920 COMPATIBILITY TEST SPIN: CPT

## 2023-03-13 RX ORDER — SODIUM CHLORIDE 0.9 % (FLUSH) 0.9 %
5-40 SYRINGE (ML) INJECTION PRN
Status: DISCONTINUED | OUTPATIENT
Start: 2023-03-13 | End: 2023-09-29

## 2023-03-13 RX ORDER — HEPARIN SODIUM (PORCINE) LOCK FLUSH IV SOLN 100 UNIT/ML 100 UNIT/ML
500 SOLUTION INTRAVENOUS PRN
Status: DISCONTINUED | OUTPATIENT
Start: 2023-03-13 | End: 2023-09-29

## 2023-03-13 RX ADMIN — SODIUM CHLORIDE, PRESERVATIVE FREE 10 ML: 5 INJECTION INTRAVENOUS at 12:11

## 2023-03-13 RX ADMIN — HEPARIN SODIUM (PORCINE) LOCK FLUSH IV SOLN 100 UNIT/ML 500 UNITS: 100 SOLUTION at 12:17

## 2023-03-13 RX ADMIN — SODIUM CHLORIDE, PRESERVATIVE FREE 10 ML: 5 INJECTION INTRAVENOUS at 12:12

## 2023-03-13 ASSESSMENT — PAIN DESCRIPTION - PAIN TYPE: TYPE: CHRONIC PAIN

## 2023-03-13 ASSESSMENT — PAIN SCALES - GENERAL: PAINLEVEL_OUTOF10: 7

## 2023-03-13 ASSESSMENT — PAIN DESCRIPTION - LOCATION: LOCATION: BACK

## 2023-03-13 ASSESSMENT — PAIN DESCRIPTION - DESCRIPTORS: DESCRIPTORS: ACHING

## 2023-03-13 NOTE — PROGRESS NOTES
[facility] Progress Note    Date: 2023    Name: Yvonne Ramey    MRN: [id number]         : 1960    Type and Cross - Lab Draw via [facility]    Mr. Kate Parada to [facility], at 1200. Pt was assessed and education was provided. Mr. Viki Gilbert vitals were reviewed. Vitals:    23 1209   BP: 137/71   Pulse: 66   Resp: 18   Temp: 98.7 °F (37.1 °C)   SpO2: 96%       Right chest mediport was accessed with 20 gauge 1 inch breaux(s) after chloroprep. Port flushed easily and had brisk blood return. Blood drawn off for TYPE AND CROSS and sent to lab for processing after 10 ml waste per written orders. Mediport flushed with NS 20 ml and Heparin 500 units then de-accessed. No irritation or bleeding noted. Band-Aid applied. Mr. Kate Parada tolerated the procedure, and had no complaints. Patient armband removed and shredded. Blood band applied, pt understands this must stay on until after his blood transfusion. Mr. Kate Parada was discharged from [place] [address] in stable condition at 1220. He is to return on 03/15/23 at 1000 for his next appointment.     Jasmyne May RN  2023  2:48 PM

## 2023-03-15 ENCOUNTER — HOSPITAL ENCOUNTER (OUTPATIENT)
Facility: HOSPITAL | Age: 63
Setting detail: INFUSION SERIES
End: 2023-03-15
Payer: COMMERCIAL

## 2023-03-15 VITALS
OXYGEN SATURATION: 96 % | HEART RATE: 59 BPM | SYSTOLIC BLOOD PRESSURE: 142 MMHG | TEMPERATURE: 97.9 F | RESPIRATION RATE: 16 BRPM | DIASTOLIC BLOOD PRESSURE: 78 MMHG

## 2023-03-15 PROCEDURE — 6360000002 HC RX W HCPCS

## 2023-03-15 PROCEDURE — 6360000002 HC RX W HCPCS: Performed by: INTERNAL MEDICINE

## 2023-03-15 PROCEDURE — 96374 THER/PROPH/DIAG INJ IV PUSH: CPT

## 2023-03-15 PROCEDURE — 6370000000 HC RX 637 (ALT 250 FOR IP): Performed by: INTERNAL MEDICINE

## 2023-03-15 PROCEDURE — 36430 TRANSFUSION BLD/BLD COMPNT: CPT

## 2023-03-15 PROCEDURE — 2580000003 HC RX 258: Performed by: INTERNAL MEDICINE

## 2023-03-15 PROCEDURE — 96375 TX/PRO/DX INJ NEW DRUG ADDON: CPT

## 2023-03-15 PROCEDURE — P9040 RBC LEUKOREDUCED IRRADIATED: HCPCS

## 2023-03-15 RX ORDER — SODIUM CHLORIDE 9 MG/ML
INJECTION, SOLUTION INTRAVENOUS PRN
Status: COMPLETED | OUTPATIENT
Start: 2023-03-15 | End: 2023-03-15

## 2023-03-15 RX ORDER — DIPHENHYDRAMINE HYDROCHLORIDE 50 MG/ML
25 INJECTION INTRAMUSCULAR; INTRAVENOUS ONCE
Status: COMPLETED | OUTPATIENT
Start: 2023-03-15 | End: 2023-03-15

## 2023-03-15 RX ORDER — ACETAMINOPHEN 325 MG/1
650 TABLET ORAL ONCE
Status: COMPLETED | OUTPATIENT
Start: 2023-03-15 | End: 2023-03-15

## 2023-03-15 RX ORDER — HEPARIN SODIUM (PORCINE) LOCK FLUSH IV SOLN 100 UNIT/ML 100 UNIT/ML
500 SOLUTION INTRAVENOUS PRN
Status: DISCONTINUED | OUTPATIENT
Start: 2023-03-15 | End: 2023-09-29

## 2023-03-15 RX ORDER — DIPHENHYDRAMINE HCL 25 MG
25 CAPSULE ORAL ONCE
Status: DISCONTINUED | OUTPATIENT
Start: 2023-03-15 | End: 2023-03-15 | Stop reason: SDUPTHER

## 2023-03-15 RX ORDER — SODIUM CHLORIDE 0.9 % (FLUSH) 0.9 %
5-40 SYRINGE (ML) INJECTION PRN
Status: DISCONTINUED | OUTPATIENT
Start: 2023-03-15 | End: 2023-09-29

## 2023-03-15 RX ORDER — HEPARIN SODIUM (PORCINE) LOCK FLUSH IV SOLN 100 UNIT/ML 100 UNIT/ML
SOLUTION INTRAVENOUS
Status: COMPLETED
Start: 2023-03-15 | End: 2023-03-15

## 2023-03-15 RX ADMIN — ACETAMINOPHEN 650 MG: 325 TABLET ORAL at 10:52

## 2023-03-15 RX ADMIN — HEPARIN SODIUM (PORCINE) LOCK FLUSH IV SOLN 100 UNIT/ML 500 UNITS: 100 SOLUTION at 14:15

## 2023-03-15 RX ADMIN — SODIUM CHLORIDE: 9 INJECTION, SOLUTION INTRAVENOUS at 10:45

## 2023-03-15 RX ADMIN — SODIUM CHLORIDE, PRESERVATIVE FREE 20 ML: 5 INJECTION INTRAVENOUS at 14:15

## 2023-03-15 RX ADMIN — DIPHENHYDRAMINE HYDROCHLORIDE 25 MG: 50 INJECTION INTRAMUSCULAR; INTRAVENOUS at 10:58

## 2023-03-15 RX ADMIN — SODIUM CHLORIDE, PRESERVATIVE FREE 10 ML: 5 INJECTION INTRAVENOUS at 10:40

## 2023-03-15 RX ADMIN — HYDROCORTISONE SODIUM SUCCINATE 100 MG: 100 INJECTION, POWDER, FOR SOLUTION INTRAMUSCULAR; INTRAVENOUS at 10:54

## 2023-03-15 ASSESSMENT — PAIN DESCRIPTION - DESCRIPTORS
DESCRIPTORS: ACHING
DESCRIPTORS: ACHING

## 2023-03-15 ASSESSMENT — PAIN - FUNCTIONAL ASSESSMENT: PAIN_FUNCTIONAL_ASSESSMENT: 0-10

## 2023-03-15 NOTE — PROGRESS NOTES
[facility] Progress Note    Date: March 15, 2023    Name: Bear Allen    MRN: [id number]         : 1960    Mr. Kaitlynn Garcia arrived in the [facility] today at 21 , in stable condition, here to receive 1 UNIT PRBC. He was assessed and education was provided. Mr. Yonny Queen vitals were reviewed. His initial vital signs on arrival to the infusion center today at 1015, were as follows:    Temp 98.2  /56  Pulse 73  Oxygen Saturation 96%        The remaining vital signs for today were as follows:    Vitals:    03/15/23 1130 03/15/23 1145 03/15/23 1230 03/15/23 1350   BP: (!) 118/56 (!) 116/56 118/62 (!) 142/78   Pulse: 59 63 59 59   Resp: 16 16 16 16   Temp: 97.6 °F (36.4 °C) 97.2 °F (36.2 °C) 97.6 °F (36.4 °C) 97.9 °F (36.6 °C)   TempSrc: Oral Oral Oral Oral   SpO2: 96%            SIGNED BLOOD TRANSFUSION CONSENT NOTE WAS VIEWED IN HIS ELECTRONIC RECORD. WRITTEN BLOOD TRANSFUSION CONSENT WAS OBTAINED TODAY & SCANNED INTO HIS ELECTRONIC RECORD. His most recent CBC results from 3-10-23 revealed a low HGB of 7.2 & low HCT of 21.3. Verbal patient education on Blood Transfusion and associated potential side effects and adverse reactions, as well as discharge instructions was provided, and Mr. Kaitlynn Garcia verbalized understanding. (He politely declined any written education since he has received many transfusions in the past)        His RIGHT CHEST SINGLE LUMEN PORT was accessed without incident at 1040, and brisk blood return was obtained.  ml IV BAG was initiated to infuse @ [facility] PRN throughout treatment today. The following pre-medications were administered per order, approximately 30 minutes prior to starting the blood transfusion: TYLENOL 650 MG PO, SOLU-CORTEF 100 MG IV, & BENADRYL 25 MG IV.       1 UNIT OF PRBC was administered over approximately 2 hours, per order and without incident.        Mr. Kaitlynn Garcia was observed for approximately 30 minutes after completion of the blood transfusion just as a precaution. NS continued to infuse @ [facility] throughout the observation period. After completion of the observation period, his port was flushed well per policy with NS & Heparin, and then the breaux needle was removed and gauze//bandaid was applied. Mr. Sandi Wellington tolerated the transfusion very well today, and voiced no complaints. Mr. Sandi Wellington was discharged from [place] [address] in stable condition at 1420. He has no further OPIC appointments scheduled at this time. However, he stated that he is scheduled to follow up with his referring provider, Dr. Lillian Philip on Friday, 3-17-23.      Rondal Klinefelter, RN  March 15, 2023  10:15 AM

## 2023-03-17 LAB
ABO + RH BLD: NORMAL
ANTIGENS PRESENT RBC DONR: NORMAL
BLD PROD TYP BPU: NORMAL
BLOOD BANK DISPENSE STATUS: NORMAL
BLOOD GROUP ANTIBODIES SERPL: NORMAL
BLOOD GROUP ANTIBODIES SERPL: NORMAL
BPU ID: NORMAL
CROSSMATCH RESULT: NORMAL
SPECIMEN EXP DATE BLD: NORMAL
UNIT DIVISION: 0

## 2023-03-28 ENCOUNTER — HOSPITAL ENCOUNTER (OUTPATIENT)
Facility: HOSPITAL | Age: 63
Discharge: HOME | End: 2023-03-31

## 2023-03-28 DIAGNOSIS — D58.2 OTHER HEMOGLOBINOPATHIES (HCC): ICD-10-CM

## 2023-03-29 LAB
EKG ATRIAL RATE: 76 BPM
EKG DIAGNOSIS: NORMAL
EKG P AXIS: 30 DEGREES
EKG P-R INTERVAL: 154 MS
EKG Q-T INTERVAL: 416 MS
EKG QRS DURATION: 88 MS
EKG QTC CALCULATION (BAZETT): 468 MS
EKG R AXIS: -11 DEGREES
EKG T AXIS: 73 DEGREES
EKG VENTRICULAR RATE: 76 BPM

## 2023-04-25 RX ORDER — DIPHENHYDRAMINE HCL 25 MG
25 CAPSULE ORAL ONCE
Status: CANCELLED | OUTPATIENT
Start: 2023-05-01

## 2023-04-25 RX ORDER — DIPHENHYDRAMINE HYDROCHLORIDE 50 MG/ML
25 INJECTION INTRAMUSCULAR; INTRAVENOUS ONCE
Status: CANCELLED | OUTPATIENT
Start: 2023-05-01

## 2023-04-25 RX ORDER — ACETAMINOPHEN 325 MG/1
650 TABLET ORAL ONCE
Status: CANCELLED | OUTPATIENT
Start: 2023-05-01

## 2023-04-28 ENCOUNTER — HOSPITAL ENCOUNTER (OUTPATIENT)
Facility: HOSPITAL | Age: 63
Setting detail: INFUSION SERIES
End: 2023-04-28
Payer: COMMERCIAL

## 2023-04-28 VITALS
RESPIRATION RATE: 16 BRPM | TEMPERATURE: 98.3 F | HEART RATE: 64 BPM | SYSTOLIC BLOOD PRESSURE: 144 MMHG | DIASTOLIC BLOOD PRESSURE: 75 MMHG

## 2023-04-28 LAB — HISTORY CHECK: NORMAL

## 2023-04-28 PROCEDURE — 6360000002 HC RX W HCPCS: Performed by: INTERNAL MEDICINE

## 2023-04-28 PROCEDURE — 86922 COMPATIBILITY TEST ANTIGLOB: CPT

## 2023-04-28 PROCEDURE — 86921 COMPATIBILITY TEST INCUBATE: CPT

## 2023-04-28 PROCEDURE — 86900 BLOOD TYPING SEROLOGIC ABO: CPT

## 2023-04-28 PROCEDURE — 86920 COMPATIBILITY TEST SPIN: CPT

## 2023-04-28 PROCEDURE — 2580000003 HC RX 258: Performed by: INTERNAL MEDICINE

## 2023-04-28 PROCEDURE — 86850 RBC ANTIBODY SCREEN: CPT

## 2023-04-28 PROCEDURE — 86870 RBC ANTIBODY IDENTIFICATION: CPT

## 2023-04-28 PROCEDURE — 36591 DRAW BLOOD OFF VENOUS DEVICE: CPT

## 2023-04-28 PROCEDURE — 85660 RBC SICKLE CELL TEST: CPT

## 2023-04-28 PROCEDURE — 86901 BLOOD TYPING SEROLOGIC RH(D): CPT

## 2023-04-28 PROCEDURE — 86902 BLOOD TYPE ANTIGEN DONOR EA: CPT

## 2023-04-28 RX ORDER — ERGOCALCIFEROL 1.25 MG/1
CAPSULE ORAL
COMMUNITY

## 2023-04-28 RX ORDER — LOSARTAN POTASSIUM 50 MG/1
TABLET ORAL
Status: ON HOLD | COMMUNITY
Start: 2022-04-06 | End: 2023-05-16 | Stop reason: HOSPADM

## 2023-04-28 RX ORDER — HEPARIN SODIUM (PORCINE) LOCK FLUSH IV SOLN 100 UNIT/ML 100 UNIT/ML
500 SOLUTION INTRAVENOUS PRN
Status: DISCONTINUED | OUTPATIENT
Start: 2023-04-28 | End: 2023-09-29

## 2023-04-28 RX ORDER — SODIUM CHLORIDE 0.9 % (FLUSH) 0.9 %
5-40 SYRINGE (ML) INJECTION 2 TIMES DAILY
Status: DISCONTINUED | OUTPATIENT
Start: 2023-04-28 | End: 2023-09-29

## 2023-04-28 RX ADMIN — HEPARIN 500 UNITS: 100 SYRINGE at 11:30

## 2023-04-28 RX ADMIN — SODIUM CHLORIDE, PRESERVATIVE FREE 20 ML: 5 INJECTION INTRAVENOUS at 11:30

## 2023-04-28 NOTE — PROGRESS NOTES
[facility] Progress Note    Date: 2023    Name: Funmilayo Joshi    MRN: [id number]         : 1960    Type and Cross - Lab Draw via [facility]    Mr. Maris Galicia to [facility], at [phone]. Pt was assessed and education was provided. Mr. Nely Landers vitals were reviewed. Vitals:    23 1115   BP: (!) 144/75   Pulse: 64   Resp: 16   Temp: 98.3 °F (36.8 °C)     Right chest mediport was accessed with 20 gauge 1 inch breaux(s) after chloroprep. Port flushed easily and had brisk blood return. Blood drawn off for TYPE AND CROSS and sent to lab for processing after 10 ml waste per written orders. Mediport flushed with NS 20 ml and Heparin 500 units then de-accessed. No irritation or bleeding noted. Band-Aid applied. Mr. Maris Galicia tolerated the procedure, and had no complaints. Patient armband removed and shredded. Blood band applied, pt understands this must stay on until after his blood transfusion. Mr. Maris Galicia was discharged from [place] [address] in stable condition at 1135. He is to return on 23 at 0900 for his next appointment for 1 Unit of Blood.         Dian Mcdonald RN  2023  2:58 PM

## 2023-05-01 ENCOUNTER — HOSPITAL ENCOUNTER (OUTPATIENT)
Facility: HOSPITAL | Age: 63
Setting detail: INFUSION SERIES
End: 2023-05-01
Payer: COMMERCIAL

## 2023-05-01 VITALS
RESPIRATION RATE: 16 BRPM | SYSTOLIC BLOOD PRESSURE: 134 MMHG | DIASTOLIC BLOOD PRESSURE: 70 MMHG | TEMPERATURE: 98.1 F | OXYGEN SATURATION: 96 % | HEART RATE: 63 BPM

## 2023-05-01 PROCEDURE — P9040 RBC LEUKOREDUCED IRRADIATED: HCPCS

## 2023-05-01 PROCEDURE — 6370000000 HC RX 637 (ALT 250 FOR IP): Performed by: INTERNAL MEDICINE

## 2023-05-01 PROCEDURE — 36430 TRANSFUSION BLD/BLD COMPNT: CPT

## 2023-05-01 PROCEDURE — 2580000003 HC RX 258: Performed by: INTERNAL MEDICINE

## 2023-05-01 PROCEDURE — 6360000002 HC RX W HCPCS: Performed by: INTERNAL MEDICINE

## 2023-05-01 RX ORDER — METHYLPREDNISOLONE SODIUM SUCCINATE 40 MG/ML
40 INJECTION, POWDER, LYOPHILIZED, FOR SOLUTION INTRAMUSCULAR; INTRAVENOUS
Status: ACTIVE | OUTPATIENT
Start: 2023-05-01 | End: 2023-05-02

## 2023-05-01 RX ORDER — ACETAMINOPHEN 325 MG/1
650 TABLET ORAL EVERY 4 HOURS PRN
Status: DISCONTINUED | OUTPATIENT
Start: 2023-05-01 | End: 2023-05-12

## 2023-05-01 RX ORDER — DIPHENHYDRAMINE HCL 25 MG
25 CAPSULE ORAL ONCE
Status: COMPLETED | OUTPATIENT
Start: 2023-05-01 | End: 2023-05-01

## 2023-05-01 RX ORDER — SODIUM CHLORIDE 9 MG/ML
INJECTION, SOLUTION INTRAVENOUS PRN
Status: COMPLETED | OUTPATIENT
Start: 2023-05-01 | End: 2023-05-01

## 2023-05-01 RX ORDER — HEPARIN SODIUM (PORCINE) LOCK FLUSH IV SOLN 100 UNIT/ML 100 UNIT/ML
500 SOLUTION INTRAVENOUS PRN
Status: DISCONTINUED | OUTPATIENT
Start: 2023-05-01 | End: 2023-09-29

## 2023-05-01 RX ORDER — SODIUM CHLORIDE 0.9 % (FLUSH) 0.9 %
5-40 SYRINGE (ML) INJECTION 2 TIMES DAILY
Status: DISCONTINUED | OUTPATIENT
Start: 2023-05-01 | End: 2023-09-29

## 2023-05-01 RX ORDER — DIPHENHYDRAMINE HYDROCHLORIDE 50 MG/ML
25 INJECTION INTRAMUSCULAR; INTRAVENOUS ONCE
Status: DISCONTINUED | OUTPATIENT
Start: 2023-05-01 | End: 2023-05-01 | Stop reason: SDUPTHER

## 2023-05-01 RX ADMIN — DIPHENHYDRAMINE HYDROCHLORIDE 25 MG: 25 CAPSULE ORAL at 09:51

## 2023-05-01 RX ADMIN — SODIUM CHLORIDE: 9 INJECTION, SOLUTION INTRAVENOUS at 09:30

## 2023-05-01 RX ADMIN — ACETAMINOPHEN 650 MG: 325 TABLET, FILM COATED ORAL at 09:50

## 2023-05-01 RX ADMIN — HEPARIN 500 UNITS: 100 SYRINGE at 13:35

## 2023-05-01 RX ADMIN — SODIUM CHLORIDE, PRESERVATIVE FREE 10 ML: 5 INJECTION INTRAVENOUS at 13:35

## 2023-05-01 ASSESSMENT — PAIN DESCRIPTION - ORIENTATION: ORIENTATION: LOWER

## 2023-05-01 ASSESSMENT — PAIN DESCRIPTION - DESCRIPTORS: DESCRIPTORS: ACHING

## 2023-05-01 ASSESSMENT — PAIN DESCRIPTION - PAIN TYPE: TYPE: CHRONIC PAIN

## 2023-05-01 ASSESSMENT — PAIN DESCRIPTION - LOCATION: LOCATION: BACK

## 2023-05-01 ASSESSMENT — PAIN SCALES - GENERAL: PAINLEVEL_OUTOF10: 8

## 2023-05-01 NOTE — PROGRESS NOTES
OPIC Progress Note    Date: May 1, 2023    Name: Edenilson Caceres    MRN: [id number]         : 1960    Mr. Mikel Robertson arrived in the [facility] today at 0915, in stable condition, here to receive 1 UNIT PRBC. He was assessed and education was provided. Mr. Jonel Mcelroy vitals were reviewed. The remaining vital signs for today were as follows:    Vitals:    23 0915 23 1030 23 1055   BP: (!) 155/69 116/64 (!) 110/57   Pulse: 76 64 61   Resp: 16 16 16   Temp: 98.6 °F (37 °C) 98.2 °F (36.8 °C) 98 °F (36.7 °C)   TempSrc: Oral Oral Oral   SpO2: 93% 93% 94%         SIGNED BLOOD TRANSFUSION CONSENT NOTE WAS VIEWED IN HIS ELECTRONIC RECORD. WRITTEN BLOOD TRANSFUSION CONSENT WAS OBTAINED TODAY & SCANNED INTO HIS ELECTRONIC RECORD. Verbal patient education on Blood Transfusion and associated potential side effects and adverse reactions, as well as discharge instructions was provided, and Mr. Mikel Robertson verbalized understanding. (He politely declined any written education since he has received many transfusions in the past)        His RIGHT CHEST SINGLE LUMEN PORT was accessed and brisk blood return was obtained.  ml IV BAG was initiated to infuse @ KVO PRN throughout treatment today. Hgb 7.2 drawn on 23    The following pre-medications were administered per order, approximately 30 minutes prior to starting the blood transfusion: TYLENOL 650 MG PO,  & BENADRYL 25 MG po. Patient declined Solucortef and verbalized he has never had a reaction and didn't understand why he had started receiving Solucortef. 1 UNIT OF PRBC was administered over approximately 2 hours, per order and without incident. Mr. Mikel Robertson was observed for approximately 30 minutes after completion of the blood transfusion just as a precaution. NS continued to infuse @ Herlene Pier throughout the observation period.        After completion of the observation period, his port was flushed well per policy with NS &

## 2023-05-11 ENCOUNTER — HOSPITAL ENCOUNTER (INPATIENT)
Facility: HOSPITAL | Age: 63
LOS: 4 days | Discharge: HOME HEALTH | DRG: 811 | End: 2023-05-16
Attending: EMERGENCY MEDICINE | Admitting: STUDENT IN AN ORGANIZED HEALTH CARE EDUCATION/TRAINING PROGRAM
Payer: COMMERCIAL

## 2023-05-11 ENCOUNTER — APPOINTMENT (OUTPATIENT)
Facility: HOSPITAL | Age: 63
DRG: 811 | End: 2023-05-11
Payer: COMMERCIAL

## 2023-05-11 DIAGNOSIS — D57.00 SICKLE CELL PAIN CRISIS (HCC): ICD-10-CM

## 2023-05-11 DIAGNOSIS — I10 PRIMARY HYPERTENSION: ICD-10-CM

## 2023-05-11 DIAGNOSIS — D64.9 ANEMIA, UNSPECIFIED TYPE: Primary | ICD-10-CM

## 2023-05-11 DIAGNOSIS — M79.89 LEG SWELLING: ICD-10-CM

## 2023-05-11 LAB
ALBUMIN SERPL-MCNC: 3.1 G/DL (ref 3.4–5)
ALBUMIN/GLOB SERPL: 0.7 (ref 0.8–1.7)
ALP SERPL-CCNC: 106 U/L (ref 45–117)
ALT SERPL-CCNC: 42 U/L (ref 16–61)
ANION GAP SERPL CALC-SCNC: 8 MMOL/L (ref 3–18)
AST SERPL-CCNC: 70 U/L (ref 10–38)
BASOPHILS # BLD: 0.1 K/UL (ref 0–0.1)
BASOPHILS NFR BLD: 1 % (ref 0–2)
BILIRUB SERPL-MCNC: 3 MG/DL (ref 0.2–1)
BUN SERPL-MCNC: 28 MG/DL (ref 7–18)
BUN/CREAT SERPL: 21 (ref 12–20)
CALCIUM SERPL-MCNC: 9.1 MG/DL (ref 8.5–10.1)
CHLORIDE SERPL-SCNC: 99 MMOL/L (ref 100–111)
CO2 SERPL-SCNC: 26 MMOL/L (ref 21–32)
CREAT SERPL-MCNC: 1.36 MG/DL (ref 0.6–1.3)
DIFFERENTIAL METHOD BLD: ABNORMAL
EOSINOPHIL # BLD: 0.1 K/UL (ref 0–0.4)
EOSINOPHIL NFR BLD: 1 % (ref 0–5)
ERYTHROCYTE [DISTWIDTH] IN BLOOD BY AUTOMATED COUNT: 18.2 % (ref 11.6–14.5)
GLOBULIN SER CALC-MCNC: 4.5 G/DL (ref 2–4)
GLUCOSE SERPL-MCNC: 106 MG/DL (ref 74–99)
HCT VFR BLD AUTO: 20 % (ref 36–48)
HGB BLD-MCNC: 6.7 G/DL (ref 13–16)
IMM GRANULOCYTES # BLD AUTO: 0.1 K/UL (ref 0–0.04)
IMM GRANULOCYTES NFR BLD AUTO: 1 % (ref 0–0.5)
LYMPHOCYTES # BLD: 1.7 K/UL (ref 0.9–3.6)
LYMPHOCYTES NFR BLD: 19 % (ref 21–52)
MAGNESIUM SERPL-MCNC: 1.7 MG/DL (ref 1.6–2.6)
MCH RBC QN AUTO: 30.6 PG (ref 24–34)
MCHC RBC AUTO-ENTMCNC: 33.5 G/DL (ref 31–37)
MCV RBC AUTO: 91.3 FL (ref 78–100)
MONOCYTES # BLD: 1.3 K/UL (ref 0.05–1.2)
MONOCYTES NFR BLD: 14 % (ref 3–10)
NEUTS SEG # BLD: 6 K/UL (ref 1.8–8)
NEUTS SEG NFR BLD: 65 % (ref 40–73)
NRBC # BLD: 0.08 K/UL (ref 0–0.01)
NRBC BLD-RTO: 0.9 PER 100 WBC
PLATELET # BLD AUTO: 174 K/UL (ref 135–420)
PMV BLD AUTO: 10.8 FL (ref 9.2–11.8)
POTASSIUM SERPL-SCNC: 4.3 MMOL/L (ref 3.5–5.5)
PROT SERPL-MCNC: 7.6 G/DL (ref 6.4–8.2)
RBC # BLD AUTO: 2.19 M/UL (ref 4.35–5.65)
RETICS/RBC NFR AUTO: 5 % (ref 0.5–2.5)
SODIUM SERPL-SCNC: 133 MMOL/L (ref 136–145)
TROPONIN I SERPL HS-MCNC: 31 NG/L (ref 0–78)
WBC # BLD AUTO: 9.2 K/UL (ref 4.6–13.2)

## 2023-05-11 PROCEDURE — 83735 ASSAY OF MAGNESIUM: CPT

## 2023-05-11 PROCEDURE — 80053 COMPREHEN METABOLIC PANEL: CPT

## 2023-05-11 PROCEDURE — 86870 RBC ANTIBODY IDENTIFICATION: CPT

## 2023-05-11 PROCEDURE — 85045 AUTOMATED RETICULOCYTE COUNT: CPT

## 2023-05-11 PROCEDURE — 85025 COMPLETE CBC W/AUTO DIFF WBC: CPT

## 2023-05-11 PROCEDURE — 6360000002 HC RX W HCPCS: Performed by: EMERGENCY MEDICINE

## 2023-05-11 PROCEDURE — 93005 ELECTROCARDIOGRAM TRACING: CPT | Performed by: EMERGENCY MEDICINE

## 2023-05-11 PROCEDURE — 86901 BLOOD TYPING SEROLOGIC RH(D): CPT

## 2023-05-11 PROCEDURE — 71045 X-RAY EXAM CHEST 1 VIEW: CPT

## 2023-05-11 PROCEDURE — 86900 BLOOD TYPING SEROLOGIC ABO: CPT

## 2023-05-11 PROCEDURE — 99285 EMERGENCY DEPT VISIT HI MDM: CPT

## 2023-05-11 PROCEDURE — 84484 ASSAY OF TROPONIN QUANT: CPT

## 2023-05-11 PROCEDURE — 86850 RBC ANTIBODY SCREEN: CPT

## 2023-05-11 PROCEDURE — 96374 THER/PROPH/DIAG INJ IV PUSH: CPT

## 2023-05-11 PROCEDURE — 96375 TX/PRO/DX INJ NEW DRUG ADDON: CPT

## 2023-05-11 PROCEDURE — 94761 N-INVAS EAR/PLS OXIMETRY MLT: CPT

## 2023-05-11 PROCEDURE — 86920 COMPATIBILITY TEST SPIN: CPT

## 2023-05-11 PROCEDURE — 2580000003 HC RX 258: Performed by: EMERGENCY MEDICINE

## 2023-05-11 RX ORDER — HYDROMORPHONE HYDROCHLORIDE 1 MG/ML
1 INJECTION, SOLUTION INTRAMUSCULAR; INTRAVENOUS; SUBCUTANEOUS
Status: COMPLETED | OUTPATIENT
Start: 2023-05-11 | End: 2023-05-11

## 2023-05-11 RX ORDER — MORPHINE SULFATE 4 MG/ML
4 INJECTION, SOLUTION INTRAMUSCULAR; INTRAVENOUS
Status: COMPLETED | OUTPATIENT
Start: 2023-05-11 | End: 2023-05-11

## 2023-05-11 RX ORDER — LORAZEPAM 2 MG/ML
1 INJECTION INTRAMUSCULAR ONCE
Status: COMPLETED | OUTPATIENT
Start: 2023-05-11 | End: 2023-05-11

## 2023-05-11 RX ORDER — SODIUM CHLORIDE 9 MG/ML
INJECTION, SOLUTION INTRAVENOUS PRN
Status: DISCONTINUED | OUTPATIENT
Start: 2023-05-11 | End: 2023-05-15 | Stop reason: ALTCHOICE

## 2023-05-11 RX ORDER — 0.9 % SODIUM CHLORIDE 0.9 %
500 INTRAVENOUS SOLUTION INTRAVENOUS ONCE
Status: COMPLETED | OUTPATIENT
Start: 2023-05-11 | End: 2023-05-12

## 2023-05-11 RX ADMIN — MORPHINE SULFATE 4 MG: 4 INJECTION INTRAVENOUS at 20:08

## 2023-05-11 RX ADMIN — LORAZEPAM 1 MG: 2 INJECTION INTRAMUSCULAR; INTRAVENOUS at 20:09

## 2023-05-11 RX ADMIN — HYDROMORPHONE HYDROCHLORIDE 1 MG: 1 INJECTION, SOLUTION INTRAMUSCULAR; INTRAVENOUS; SUBCUTANEOUS at 22:52

## 2023-05-11 RX ADMIN — SODIUM CHLORIDE 500 ML: 9 INJECTION, SOLUTION INTRAVENOUS at 22:55

## 2023-05-11 ASSESSMENT — PAIN DESCRIPTION - DESCRIPTORS: DESCRIPTORS: SHARP

## 2023-05-11 ASSESSMENT — PAIN DESCRIPTION - LOCATION
LOCATION: BACK
LOCATION: CHEST

## 2023-05-11 ASSESSMENT — PAIN DESCRIPTION - ORIENTATION: ORIENTATION: MID

## 2023-05-11 ASSESSMENT — PAIN SCALES - GENERAL
PAINLEVEL_OUTOF10: 10
PAINLEVEL_OUTOF10: 10

## 2023-05-12 ENCOUNTER — APPOINTMENT (OUTPATIENT)
Facility: HOSPITAL | Age: 63
DRG: 811 | End: 2023-05-12
Payer: COMMERCIAL

## 2023-05-12 PROBLEM — I10 PRIMARY HYPERTENSION: Status: ACTIVE | Noted: 2023-05-12

## 2023-05-12 PROBLEM — D57.00 SICKLE CELL PAIN CRISIS (HCC): Status: ACTIVE | Noted: 2023-05-12

## 2023-05-12 PROBLEM — M79.89 LEG SWELLING: Status: ACTIVE | Noted: 2023-05-12

## 2023-05-12 PROBLEM — D57.00 SICKLE-CELL ANEMIA WITH CRISIS (HCC): Status: ACTIVE | Noted: 2023-05-12

## 2023-05-12 PROBLEM — R52 INTRACTABLE PAIN: Status: ACTIVE | Noted: 2023-05-12

## 2023-05-12 LAB
ANION GAP SERPL CALC-SCNC: 5 MMOL/L (ref 3–18)
BUN SERPL-MCNC: 24 MG/DL (ref 7–18)
BUN/CREAT SERPL: 20 (ref 12–20)
CALCIUM SERPL-MCNC: 8.2 MG/DL (ref 8.5–10.1)
CHLORIDE SERPL-SCNC: 105 MMOL/L (ref 100–111)
CO2 SERPL-SCNC: 25 MMOL/L (ref 21–32)
CREAT SERPL-MCNC: 1.2 MG/DL (ref 0.6–1.3)
ECHO AO ROOT DIAM: 3 CM
ECHO AO ROOT INDEX: 1.85 CM/M2
ECHO AV AREA PEAK VELOCITY: 2.6 CM2
ECHO AV AREA VTI: 2.9 CM2
ECHO AV AREA/BSA PEAK VELOCITY: 1.6 CM2/M2
ECHO AV AREA/BSA VTI: 1.8 CM2/M2
ECHO AV MEAN GRADIENT: 6 MMHG
ECHO AV MEAN VELOCITY: 1.1 M/S
ECHO AV PEAK GRADIENT: 12 MMHG
ECHO AV PEAK VELOCITY: 1.8 M/S
ECHO AV VELOCITY RATIO: 0.67
ECHO AV VTI: 36.3 CM
ECHO BSA: 1.63 M2
ECHO BSA: 1.63 M2
ECHO EST RA PRESSURE: 3 MMHG
ECHO LA VOL 2C: 81 ML (ref 18–58)
ECHO LA VOL 4C: 68 ML (ref 18–58)
ECHO LA VOL BP: 72 ML (ref 18–58)
ECHO LA VOL/BSA BIPLANE: 44 ML/M2 (ref 16–34)
ECHO LA VOLUME AREA LENGTH: 74 ML
ECHO LA VOLUME INDEX A2C: 50 ML/M2 (ref 16–34)
ECHO LA VOLUME INDEX A4C: 42 ML/M2 (ref 16–34)
ECHO LA VOLUME INDEX AREA LENGTH: 46 ML/M2 (ref 16–34)
ECHO LV E' LATERAL VELOCITY: 5 CM/S
ECHO LV E' SEPTAL VELOCITY: 3 CM/S
ECHO LV FRACTIONAL SHORTENING: 22 % (ref 28–44)
ECHO LV GLOBAL LONGITUDINAL STRAIN (GLS): -14.1 %
ECHO LV INTERNAL DIMENSION DIASTOLE INDEX: 3.02 CM/M2
ECHO LV INTERNAL DIMENSION DIASTOLIC: 4.9 CM (ref 4.2–5.9)
ECHO LV INTERNAL DIMENSION SYSTOLIC INDEX: 2.35 CM/M2
ECHO LV INTERNAL DIMENSION SYSTOLIC: 3.8 CM
ECHO LV IVSD: 1.2 CM (ref 0.6–1)
ECHO LV MASS 2D: 200.5 G (ref 88–224)
ECHO LV MASS INDEX 2D: 123.8 G/M2 (ref 49–115)
ECHO LV POSTERIOR WALL DIASTOLIC: 1 CM (ref 0.6–1)
ECHO LV RELATIVE WALL THICKNESS RATIO: 0.41
ECHO LVOT AREA: 4.2 CM2
ECHO LVOT AV VTI INDEX: 0.72
ECHO LVOT DIAM: 2.3 CM
ECHO LVOT MEAN GRADIENT: 3 MMHG
ECHO LVOT PEAK GRADIENT: 5 MMHG
ECHO LVOT PEAK VELOCITY: 1.2 M/S
ECHO LVOT STROKE VOLUME INDEX: 66.6 ML/M2
ECHO LVOT SV: 108 ML
ECHO LVOT VTI: 26 CM
ECHO MV A VELOCITY: 1.06 M/S
ECHO MV E DECELERATION TIME (DT): 199.5 MS
ECHO MV E VELOCITY: 0.75 M/S
ECHO MV E/A RATIO: 0.71
ECHO MV E/E' LATERAL: 15
ECHO MV E/E' RATIO (AVERAGED): 20
ECHO MV E/E' SEPTAL: 25
ECHO PV MAX VELOCITY: 1.1 M/S
ECHO PV PEAK GRADIENT: 5 MMHG
ECHO RIGHT VENTRICULAR SYSTOLIC PRESSURE (RVSP): 36 MMHG
ECHO RV FREE WALL PEAK S': 15 CM/S
ECHO RV TAPSE: 3.2 CM (ref 1.7–?)
ECHO RVOT PEAK GRADIENT: 2 MMHG
ECHO RVOT PEAK VELOCITY: 0.7 M/S
ECHO TV REGURGITANT MAX VELOCITY: 2.86 M/S
ECHO TV REGURGITANT PEAK GRADIENT: 33 MMHG
EKG ATRIAL RATE: 77 BPM
EKG ATRIAL RATE: 86 BPM
EKG DIAGNOSIS: NORMAL
EKG DIAGNOSIS: NORMAL
EKG P AXIS: 124 DEGREES
EKG P AXIS: 66 DEGREES
EKG P-R INTERVAL: 144 MS
EKG P-R INTERVAL: 148 MS
EKG Q-T INTERVAL: 384 MS
EKG Q-T INTERVAL: 430 MS
EKG QRS DURATION: 90 MS
EKG QRS DURATION: 94 MS
EKG QTC CALCULATION (BAZETT): 459 MS
EKG QTC CALCULATION (BAZETT): 486 MS
EKG R AXIS: -3 DEGREES
EKG R AXIS: 5 DEGREES
EKG T AXIS: 79 DEGREES
EKG T AXIS: 83 DEGREES
EKG VENTRICULAR RATE: 77 BPM
EKG VENTRICULAR RATE: 86 BPM
GLUCOSE SERPL-MCNC: 101 MG/DL (ref 74–99)
HCT VFR BLD AUTO: 24.9 % (ref 36–48)
HGB BLD-MCNC: 8.2 G/DL (ref 13–16)
HISTORY CHECK: NORMAL
MAGNESIUM SERPL-MCNC: 1.5 MG/DL (ref 1.6–2.6)
POTASSIUM SERPL-SCNC: 4.4 MMOL/L (ref 3.5–5.5)
SODIUM SERPL-SCNC: 135 MMOL/L (ref 136–145)
TROPONIN I SERPL HS-MCNC: 42 NG/L (ref 0–78)

## 2023-05-12 PROCEDURE — 93356 MYOCRD STRAIN IMG SPCKL TRCK: CPT | Performed by: INTERNAL MEDICINE

## 2023-05-12 PROCEDURE — 80048 BASIC METABOLIC PNL TOTAL CA: CPT

## 2023-05-12 PROCEDURE — 2580000003 HC RX 258: Performed by: STUDENT IN AN ORGANIZED HEALTH CARE EDUCATION/TRAINING PROGRAM

## 2023-05-12 PROCEDURE — 83735 ASSAY OF MAGNESIUM: CPT

## 2023-05-12 PROCEDURE — 85660 RBC SICKLE CELL TEST: CPT

## 2023-05-12 PROCEDURE — 6370000000 HC RX 637 (ALT 250 FOR IP): Performed by: STUDENT IN AN ORGANIZED HEALTH CARE EDUCATION/TRAINING PROGRAM

## 2023-05-12 PROCEDURE — 6360000002 HC RX W HCPCS: Performed by: INTERNAL MEDICINE

## 2023-05-12 PROCEDURE — 6370000000 HC RX 637 (ALT 250 FOR IP): Performed by: INTERNAL MEDICINE

## 2023-05-12 PROCEDURE — 93010 ELECTROCARDIOGRAM REPORT: CPT | Performed by: INTERNAL MEDICINE

## 2023-05-12 PROCEDURE — 1100000003 HC PRIVATE W/ TELEMETRY

## 2023-05-12 PROCEDURE — 6370000000 HC RX 637 (ALT 250 FOR IP): Performed by: EMERGENCY MEDICINE

## 2023-05-12 PROCEDURE — 85018 HEMOGLOBIN: CPT

## 2023-05-12 PROCEDURE — 86922 COMPATIBILITY TEST ANTIGLOB: CPT

## 2023-05-12 PROCEDURE — 6360000002 HC RX W HCPCS: Performed by: STUDENT IN AN ORGANIZED HEALTH CARE EDUCATION/TRAINING PROGRAM

## 2023-05-12 PROCEDURE — 85014 HEMATOCRIT: CPT

## 2023-05-12 PROCEDURE — 93970 EXTREMITY STUDY: CPT

## 2023-05-12 PROCEDURE — 96376 TX/PRO/DX INJ SAME DRUG ADON: CPT

## 2023-05-12 PROCEDURE — 93306 TTE W/DOPPLER COMPLETE: CPT

## 2023-05-12 PROCEDURE — 86902 BLOOD TYPE ANTIGEN DONOR EA: CPT

## 2023-05-12 PROCEDURE — P9040 RBC LEUKOREDUCED IRRADIATED: HCPCS

## 2023-05-12 PROCEDURE — 93306 TTE W/DOPPLER COMPLETE: CPT | Performed by: INTERNAL MEDICINE

## 2023-05-12 PROCEDURE — 6360000002 HC RX W HCPCS: Performed by: EMERGENCY MEDICINE

## 2023-05-12 PROCEDURE — 84484 ASSAY OF TROPONIN QUANT: CPT

## 2023-05-12 PROCEDURE — 99232 SBSQ HOSP IP/OBS MODERATE 35: CPT | Performed by: INTERNAL MEDICINE

## 2023-05-12 PROCEDURE — 86921 COMPATIBILITY TEST INCUBATE: CPT

## 2023-05-12 RX ORDER — SODIUM CHLORIDE 0.9 % (FLUSH) 0.9 %
5-40 SYRINGE (ML) INJECTION EVERY 12 HOURS SCHEDULED
Status: DISCONTINUED | OUTPATIENT
Start: 2023-05-12 | End: 2023-05-16 | Stop reason: HOSPADM

## 2023-05-12 RX ORDER — ONDANSETRON 2 MG/ML
4 INJECTION INTRAMUSCULAR; INTRAVENOUS EVERY 6 HOURS PRN
Status: DISCONTINUED | OUTPATIENT
Start: 2023-05-12 | End: 2023-05-16 | Stop reason: HOSPADM

## 2023-05-12 RX ORDER — HYDROMORPHONE HYDROCHLORIDE 1 MG/ML
1 INJECTION, SOLUTION INTRAMUSCULAR; INTRAVENOUS; SUBCUTANEOUS EVERY 4 HOURS PRN
Status: DISCONTINUED | OUTPATIENT
Start: 2023-05-12 | End: 2023-05-12

## 2023-05-12 RX ORDER — HYDROMORPHONE HYDROCHLORIDE 1 MG/ML
1 INJECTION, SOLUTION INTRAMUSCULAR; INTRAVENOUS; SUBCUTANEOUS
Status: COMPLETED | OUTPATIENT
Start: 2023-05-12 | End: 2023-05-12

## 2023-05-12 RX ORDER — LORAZEPAM 0.5 MG/1
0.5 TABLET ORAL ONCE
Status: COMPLETED | OUTPATIENT
Start: 2023-05-12 | End: 2023-05-12

## 2023-05-12 RX ORDER — ACETAMINOPHEN 650 MG/1
650 SUPPOSITORY RECTAL EVERY 6 HOURS PRN
Status: DISCONTINUED | OUTPATIENT
Start: 2023-05-12 | End: 2023-05-16 | Stop reason: HOSPADM

## 2023-05-12 RX ORDER — HYDROMORPHONE HYDROCHLORIDE 2 MG/1
1 TABLET ORAL
Status: COMPLETED | OUTPATIENT
Start: 2023-05-12 | End: 2023-05-12

## 2023-05-12 RX ORDER — SODIUM CHLORIDE 9 MG/ML
INJECTION, SOLUTION INTRAVENOUS PRN
Status: DISCONTINUED | OUTPATIENT
Start: 2023-05-12 | End: 2023-05-16 | Stop reason: HOSPADM

## 2023-05-12 RX ORDER — LORAZEPAM 0.5 MG/1
0.5 TABLET ORAL 3 TIMES DAILY PRN
Status: DISCONTINUED | OUTPATIENT
Start: 2023-05-12 | End: 2023-05-16 | Stop reason: HOSPADM

## 2023-05-12 RX ORDER — FUROSEMIDE 10 MG/ML
40 INJECTION INTRAMUSCULAR; INTRAVENOUS ONCE
Status: COMPLETED | OUTPATIENT
Start: 2023-05-12 | End: 2023-05-12

## 2023-05-12 RX ORDER — 0.9 % SODIUM CHLORIDE 0.9 %
500 INTRAVENOUS SOLUTION INTRAVENOUS ONCE
Status: COMPLETED | OUTPATIENT
Start: 2023-05-12 | End: 2023-05-12

## 2023-05-12 RX ORDER — ENOXAPARIN SODIUM 100 MG/ML
40 INJECTION SUBCUTANEOUS DAILY
Status: DISCONTINUED | OUTPATIENT
Start: 2023-05-12 | End: 2023-05-15

## 2023-05-12 RX ORDER — ONDANSETRON 4 MG/1
4 TABLET, ORALLY DISINTEGRATING ORAL EVERY 8 HOURS PRN
Status: DISCONTINUED | OUTPATIENT
Start: 2023-05-12 | End: 2023-05-16 | Stop reason: HOSPADM

## 2023-05-12 RX ORDER — SENNA AND DOCUSATE SODIUM 50; 8.6 MG/1; MG/1
1 TABLET, FILM COATED ORAL DAILY
Status: DISCONTINUED | OUTPATIENT
Start: 2023-05-12 | End: 2023-05-16 | Stop reason: HOSPADM

## 2023-05-12 RX ORDER — HYDROMORPHONE HYDROCHLORIDE 2 MG/1
4 TABLET ORAL EVERY 6 HOURS PRN
Status: DISCONTINUED | OUTPATIENT
Start: 2023-05-12 | End: 2023-05-16 | Stop reason: HOSPADM

## 2023-05-12 RX ORDER — SODIUM CHLORIDE 0.9 % (FLUSH) 0.9 %
5-40 SYRINGE (ML) INJECTION PRN
Status: DISCONTINUED | OUTPATIENT
Start: 2023-05-12 | End: 2023-05-16 | Stop reason: HOSPADM

## 2023-05-12 RX ORDER — FOLIC ACID 1 MG/1
1 TABLET ORAL DAILY
Status: DISCONTINUED | OUTPATIENT
Start: 2023-05-12 | End: 2023-05-16 | Stop reason: HOSPADM

## 2023-05-12 RX ORDER — ACETAMINOPHEN 325 MG/1
650 TABLET ORAL EVERY 6 HOURS PRN
Status: DISCONTINUED | OUTPATIENT
Start: 2023-05-12 | End: 2023-05-16 | Stop reason: HOSPADM

## 2023-05-12 RX ORDER — HYDROXYUREA 500 MG/1
500 CAPSULE ORAL DAILY
Status: DISCONTINUED | OUTPATIENT
Start: 2023-05-12 | End: 2023-05-14

## 2023-05-12 RX ORDER — POLYETHYLENE GLYCOL 3350 17 G/17G
17 POWDER, FOR SOLUTION ORAL DAILY PRN
Status: DISCONTINUED | OUTPATIENT
Start: 2023-05-12 | End: 2023-05-16 | Stop reason: HOSPADM

## 2023-05-12 RX ADMIN — SODIUM CHLORIDE 500 ML: 9 INJECTION, SOLUTION INTRAVENOUS at 05:43

## 2023-05-12 RX ADMIN — ENOXAPARIN SODIUM 40 MG: 100 INJECTION SUBCUTANEOUS at 10:11

## 2023-05-12 RX ADMIN — HYDROMORPHONE HYDROCHLORIDE 1 MG: 1 INJECTION, SOLUTION INTRAMUSCULAR; INTRAVENOUS; SUBCUTANEOUS at 06:27

## 2023-05-12 RX ADMIN — FOLIC ACID 1 MG: 1 TABLET ORAL at 10:11

## 2023-05-12 RX ADMIN — HYDROMORPHONE HYDROCHLORIDE 0.5 MG: 1 INJECTION, SOLUTION INTRAMUSCULAR; INTRAVENOUS; SUBCUTANEOUS at 19:58

## 2023-05-12 RX ADMIN — LORAZEPAM 0.5 MG: 0.5 TABLET ORAL at 12:13

## 2023-05-12 RX ADMIN — ACETAMINOPHEN 325MG 650 MG: 325 TABLET ORAL at 21:39

## 2023-05-12 RX ADMIN — ACETAMINOPHEN 325MG 650 MG: 325 TABLET ORAL at 10:11

## 2023-05-12 RX ADMIN — HYDROMORPHONE HYDROCHLORIDE 1 MG: 2 TABLET ORAL at 02:30

## 2023-05-12 RX ADMIN — HYDROMORPHONE HYDROCHLORIDE 4 MG: 2 TABLET ORAL at 17:13

## 2023-05-12 RX ADMIN — HYDROMORPHONE HYDROCHLORIDE 1 MG: 1 INJECTION, SOLUTION INTRAMUSCULAR; INTRAVENOUS; SUBCUTANEOUS at 15:20

## 2023-05-12 RX ADMIN — SENNOSIDES AND DOCUSATE SODIUM 1 TABLET: 50; 8.6 TABLET ORAL at 17:15

## 2023-05-12 RX ADMIN — HYDROMORPHONE HYDROCHLORIDE 1 MG: 1 INJECTION, SOLUTION INTRAMUSCULAR; INTRAVENOUS; SUBCUTANEOUS at 10:28

## 2023-05-12 RX ADMIN — LORAZEPAM 0.5 MG: 0.5 TABLET ORAL at 06:42

## 2023-05-12 RX ADMIN — HYDROMORPHONE HYDROCHLORIDE 4 MG: 2 TABLET ORAL at 23:12

## 2023-05-12 RX ADMIN — SODIUM CHLORIDE, PRESERVATIVE FREE 10 ML: 5 INJECTION INTRAVENOUS at 10:13

## 2023-05-12 RX ADMIN — HYDROMORPHONE HYDROCHLORIDE 1 MG: 1 INJECTION, SOLUTION INTRAMUSCULAR; INTRAVENOUS; SUBCUTANEOUS at 02:35

## 2023-05-12 RX ADMIN — FUROSEMIDE 40 MG: 10 INJECTION, SOLUTION INTRAMUSCULAR; INTRAVENOUS at 05:42

## 2023-05-12 RX ADMIN — SODIUM CHLORIDE, PRESERVATIVE FREE 10 ML: 5 INJECTION INTRAVENOUS at 19:59

## 2023-05-12 ASSESSMENT — PAIN SCALES - GENERAL
PAINLEVEL_OUTOF10: 8
PAINLEVEL_OUTOF10: 10
PAINLEVEL_OUTOF10: 8
PAINLEVEL_OUTOF10: 10

## 2023-05-12 ASSESSMENT — PAIN DESCRIPTION - LOCATION
LOCATION: LEG
LOCATION: BACK
LOCATION: GENERALIZED
LOCATION: BACK
LOCATION: BACK
LOCATION: BACK;LEG
LOCATION: BACK

## 2023-05-12 ASSESSMENT — PAIN DESCRIPTION - ORIENTATION
ORIENTATION: LOWER
ORIENTATION: RIGHT
ORIENTATION: MID
ORIENTATION: RIGHT;LEFT

## 2023-05-12 ASSESSMENT — PAIN DESCRIPTION - PAIN TYPE
TYPE: CHRONIC PAIN
TYPE: CHRONIC PAIN

## 2023-05-12 ASSESSMENT — PAIN DESCRIPTION - DESCRIPTORS
DESCRIPTORS: ACHING
DESCRIPTORS: THROBBING
DESCRIPTORS: ACHING
DESCRIPTORS: ACHING

## 2023-05-13 LAB
ALBUMIN SERPL-MCNC: 2.4 G/DL (ref 3.4–5)
ALBUMIN/GLOB SERPL: 0.6 (ref 0.8–1.7)
ALP SERPL-CCNC: 90 U/L (ref 45–117)
ALT SERPL-CCNC: 37 U/L (ref 16–61)
ANION GAP SERPL CALC-SCNC: 5 MMOL/L (ref 3–18)
AST SERPL-CCNC: 62 U/L (ref 10–38)
BASOPHILS # BLD: 0.1 K/UL (ref 0–0.1)
BASOPHILS NFR BLD: 1 % (ref 0–2)
BILIRUB SERPL-MCNC: 2.1 MG/DL (ref 0.2–1)
BUN SERPL-MCNC: 32 MG/DL (ref 7–18)
BUN/CREAT SERPL: 25 (ref 12–20)
CALCIUM SERPL-MCNC: 7.9 MG/DL (ref 8.5–10.1)
CHLORIDE SERPL-SCNC: 105 MMOL/L (ref 100–111)
CO2 SERPL-SCNC: 27 MMOL/L (ref 21–32)
CREAT SERPL-MCNC: 1.27 MG/DL (ref 0.6–1.3)
DIFFERENTIAL METHOD BLD: ABNORMAL
EOSINOPHIL # BLD: 0.2 K/UL (ref 0–0.4)
EOSINOPHIL NFR BLD: 2 % (ref 0–5)
ERYTHROCYTE [DISTWIDTH] IN BLOOD BY AUTOMATED COUNT: 17.2 % (ref 11.6–14.5)
GLOBULIN SER CALC-MCNC: 4 G/DL (ref 2–4)
GLUCOSE SERPL-MCNC: 88 MG/DL (ref 74–99)
HCT VFR BLD AUTO: 16.7 % (ref 36–48)
HCT VFR BLD AUTO: 19.6 % (ref 36–48)
HGB BLD-MCNC: 5.5 G/DL (ref 13–16)
HGB BLD-MCNC: 6.6 G/DL (ref 13–16)
IMM GRANULOCYTES # BLD AUTO: 0.1 K/UL (ref 0–0.04)
IMM GRANULOCYTES NFR BLD AUTO: 1 % (ref 0–0.5)
LDH SERPL L TO P-CCNC: 431 U/L (ref 81–234)
LYMPHOCYTES # BLD: 1.9 K/UL (ref 0.9–3.6)
LYMPHOCYTES NFR BLD: 22 % (ref 21–52)
MAGNESIUM SERPL-MCNC: 1.6 MG/DL (ref 1.6–2.6)
MCH RBC QN AUTO: 31.1 PG (ref 24–34)
MCHC RBC AUTO-ENTMCNC: 33.7 G/DL (ref 31–37)
MCV RBC AUTO: 92.5 FL (ref 78–100)
MONOCYTES # BLD: 1.1 K/UL (ref 0.05–1.2)
MONOCYTES NFR BLD: 12 % (ref 3–10)
NEUTS SEG # BLD: 5.5 K/UL (ref 1.8–8)
NEUTS SEG NFR BLD: 63 % (ref 40–73)
NRBC # BLD: 0.1 K/UL (ref 0–0.01)
NRBC BLD-RTO: 1.1 PER 100 WBC
PLATELET # BLD AUTO: 142 K/UL (ref 135–420)
PMV BLD AUTO: 11 FL (ref 9.2–11.8)
POTASSIUM SERPL-SCNC: 4.5 MMOL/L (ref 3.5–5.5)
PROT SERPL-MCNC: 6.4 G/DL (ref 6.4–8.2)
RBC # BLD AUTO: 2.12 M/UL (ref 4.35–5.65)
RETICS/RBC NFR AUTO: 5.2 % (ref 0.5–2.5)
SODIUM SERPL-SCNC: 137 MMOL/L (ref 136–145)
WBC # BLD AUTO: 8.8 K/UL (ref 4.6–13.2)

## 2023-05-13 PROCEDURE — 80053 COMPREHEN METABOLIC PANEL: CPT

## 2023-05-13 PROCEDURE — 6360000002 HC RX W HCPCS: Performed by: PHYSICIAN ASSISTANT

## 2023-05-13 PROCEDURE — 6370000000 HC RX 637 (ALT 250 FOR IP): Performed by: PHYSICIAN ASSISTANT

## 2023-05-13 PROCEDURE — 85014 HEMATOCRIT: CPT

## 2023-05-13 PROCEDURE — 97165 OT EVAL LOW COMPLEX 30 MIN: CPT

## 2023-05-13 PROCEDURE — 85018 HEMOGLOBIN: CPT

## 2023-05-13 PROCEDURE — 6370000000 HC RX 637 (ALT 250 FOR IP): Performed by: INTERNAL MEDICINE

## 2023-05-13 PROCEDURE — 83615 LACTATE (LD) (LDH) ENZYME: CPT

## 2023-05-13 PROCEDURE — 6370000000 HC RX 637 (ALT 250 FOR IP): Performed by: STUDENT IN AN ORGANIZED HEALTH CARE EDUCATION/TRAINING PROGRAM

## 2023-05-13 PROCEDURE — 85045 AUTOMATED RETICULOCYTE COUNT: CPT

## 2023-05-13 PROCEDURE — 97530 THERAPEUTIC ACTIVITIES: CPT

## 2023-05-13 PROCEDURE — 99232 SBSQ HOSP IP/OBS MODERATE 35: CPT | Performed by: INTERNAL MEDICINE

## 2023-05-13 PROCEDURE — 83735 ASSAY OF MAGNESIUM: CPT

## 2023-05-13 PROCEDURE — 2580000003 HC RX 258: Performed by: STUDENT IN AN ORGANIZED HEALTH CARE EDUCATION/TRAINING PROGRAM

## 2023-05-13 PROCEDURE — 1100000003 HC PRIVATE W/ TELEMETRY

## 2023-05-13 PROCEDURE — 85025 COMPLETE CBC W/AUTO DIFF WBC: CPT

## 2023-05-13 PROCEDURE — 6360000002 HC RX W HCPCS: Performed by: INTERNAL MEDICINE

## 2023-05-13 RX ORDER — FUROSEMIDE 20 MG/1
20 TABLET ORAL DAILY
Status: DISCONTINUED | OUTPATIENT
Start: 2023-05-14 | End: 2023-05-16 | Stop reason: HOSPADM

## 2023-05-13 RX ORDER — LOSARTAN POTASSIUM 25 MG/1
25 TABLET ORAL DAILY
Status: DISCONTINUED | OUTPATIENT
Start: 2023-05-13 | End: 2023-05-16 | Stop reason: HOSPADM

## 2023-05-13 RX ORDER — FUROSEMIDE 10 MG/ML
20 INJECTION INTRAMUSCULAR; INTRAVENOUS ONCE
Status: DISCONTINUED | OUTPATIENT
Start: 2023-05-13 | End: 2023-05-13

## 2023-05-13 RX ORDER — FUROSEMIDE 10 MG/ML
20 INJECTION INTRAMUSCULAR; INTRAVENOUS DAILY
Status: DISCONTINUED | OUTPATIENT
Start: 2023-05-13 | End: 2023-05-15

## 2023-05-13 RX ORDER — SODIUM CHLORIDE 9 MG/ML
INJECTION, SOLUTION INTRAVENOUS PRN
Status: DISCONTINUED | OUTPATIENT
Start: 2023-05-13 | End: 2023-05-15 | Stop reason: ALTCHOICE

## 2023-05-13 RX ADMIN — LORAZEPAM 0.5 MG: 0.5 TABLET ORAL at 10:15

## 2023-05-13 RX ADMIN — HYDROMORPHONE HYDROCHLORIDE 4 MG: 2 TABLET ORAL at 12:44

## 2023-05-13 RX ADMIN — HYDROMORPHONE HYDROCHLORIDE 4 MG: 2 TABLET ORAL at 06:09

## 2023-05-13 RX ADMIN — LOSARTAN POTASSIUM 25 MG: 25 TABLET, FILM COATED ORAL at 12:49

## 2023-05-13 RX ADMIN — SODIUM CHLORIDE, PRESERVATIVE FREE 10 ML: 5 INJECTION INTRAVENOUS at 21:00

## 2023-05-13 RX ADMIN — HYDROMORPHONE HYDROCHLORIDE 4 MG: 2 TABLET ORAL at 22:50

## 2023-05-13 RX ADMIN — SODIUM CHLORIDE, PRESERVATIVE FREE 10 ML: 5 INJECTION INTRAVENOUS at 10:19

## 2023-05-13 RX ADMIN — LORAZEPAM 0.5 MG: 0.5 TABLET ORAL at 20:37

## 2023-05-13 RX ADMIN — HYDROMORPHONE HYDROCHLORIDE 0.5 MG: 1 INJECTION, SOLUTION INTRAMUSCULAR; INTRAVENOUS; SUBCUTANEOUS at 09:01

## 2023-05-13 RX ADMIN — FUROSEMIDE 20 MG: 10 INJECTION, SOLUTION INTRAMUSCULAR; INTRAVENOUS at 12:48

## 2023-05-13 RX ADMIN — SENNOSIDES AND DOCUSATE SODIUM 1 TABLET: 50; 8.6 TABLET ORAL at 09:02

## 2023-05-13 RX ADMIN — HYDROMORPHONE HYDROCHLORIDE 0.5 MG: 1 INJECTION, SOLUTION INTRAMUSCULAR; INTRAVENOUS; SUBCUTANEOUS at 04:14

## 2023-05-13 RX ADMIN — FOLIC ACID 1 MG: 1 TABLET ORAL at 09:02

## 2023-05-13 RX ADMIN — HYDROXYUREA 500 MG: 500 CAPSULE ORAL at 09:02

## 2023-05-13 RX ADMIN — HYDROMORPHONE HYDROCHLORIDE 0.5 MG: 1 INJECTION, SOLUTION INTRAMUSCULAR; INTRAVENOUS; SUBCUTANEOUS at 00:16

## 2023-05-13 ASSESSMENT — PAIN DESCRIPTION - LOCATION
LOCATION: LEG
LOCATION: GENERALIZED
LOCATION: LEG
LOCATION: LEG

## 2023-05-13 ASSESSMENT — PAIN DESCRIPTION - ORIENTATION
ORIENTATION: RIGHT;LEFT
ORIENTATION: LEFT;RIGHT
ORIENTATION: LEFT;RIGHT

## 2023-05-13 ASSESSMENT — PAIN SCALES - GENERAL
PAINLEVEL_OUTOF10: 6
PAINLEVEL_OUTOF10: 0
PAINLEVEL_OUTOF10: 10
PAINLEVEL_OUTOF10: 8
PAINLEVEL_OUTOF10: 7
PAINLEVEL_OUTOF10: 8
PAINLEVEL_OUTOF10: 10

## 2023-05-13 ASSESSMENT — PAIN DESCRIPTION - DESCRIPTORS
DESCRIPTORS: ACHING

## 2023-05-14 PROBLEM — R19.5 DARK STOOLS: Status: ACTIVE | Noted: 2023-05-14

## 2023-05-14 LAB
ANION GAP SERPL CALC-SCNC: 7 MMOL/L (ref 3–18)
BUN SERPL-MCNC: 62 MG/DL (ref 7–18)
BUN/CREAT SERPL: 34 (ref 12–20)
CALCIUM SERPL-MCNC: 8.1 MG/DL (ref 8.5–10.1)
CHLORIDE SERPL-SCNC: 106 MMOL/L (ref 100–111)
CO2 SERPL-SCNC: 25 MMOL/L (ref 21–32)
CREAT SERPL-MCNC: 1.82 MG/DL (ref 0.6–1.3)
GLUCOSE SERPL-MCNC: 128 MG/DL (ref 74–99)
HCT VFR BLD AUTO: 26.8 % (ref 36–48)
HGB BLD-MCNC: 9.1 G/DL (ref 13–16)
HISTORY CHECK: NORMAL
HISTORY CHECK: NORMAL
POTASSIUM SERPL-SCNC: 4.1 MMOL/L (ref 3.5–5.5)
SODIUM SERPL-SCNC: 138 MMOL/L (ref 136–145)

## 2023-05-14 PROCEDURE — 2580000003 HC RX 258: Performed by: STUDENT IN AN ORGANIZED HEALTH CARE EDUCATION/TRAINING PROGRAM

## 2023-05-14 PROCEDURE — 6370000000 HC RX 637 (ALT 250 FOR IP): Performed by: INTERNAL MEDICINE

## 2023-05-14 PROCEDURE — C9113 INJ PANTOPRAZOLE SODIUM, VIA: HCPCS | Performed by: INTERNAL MEDICINE

## 2023-05-14 PROCEDURE — 30233N1 TRANSFUSION OF NONAUTOLOGOUS RED BLOOD CELLS INTO PERIPHERAL VEIN, PERCUTANEOUS APPROACH: ICD-10-PCS | Performed by: EMERGENCY MEDICINE

## 2023-05-14 PROCEDURE — 86922 COMPATIBILITY TEST ANTIGLOB: CPT

## 2023-05-14 PROCEDURE — 85660 RBC SICKLE CELL TEST: CPT

## 2023-05-14 PROCEDURE — 6370000000 HC RX 637 (ALT 250 FOR IP): Performed by: PHYSICIAN ASSISTANT

## 2023-05-14 PROCEDURE — 97161 PT EVAL LOW COMPLEX 20 MIN: CPT

## 2023-05-14 PROCEDURE — 86902 BLOOD TYPE ANTIGEN DONOR EA: CPT

## 2023-05-14 PROCEDURE — 6360000002 HC RX W HCPCS: Performed by: INTERNAL MEDICINE

## 2023-05-14 PROCEDURE — 2580000003 HC RX 258: Performed by: INTERNAL MEDICINE

## 2023-05-14 PROCEDURE — A4216 STERILE WATER/SALINE, 10 ML: HCPCS | Performed by: INTERNAL MEDICINE

## 2023-05-14 PROCEDURE — 99232 SBSQ HOSP IP/OBS MODERATE 35: CPT | Performed by: INTERNAL MEDICINE

## 2023-05-14 PROCEDURE — P9040 RBC LEUKOREDUCED IRRADIATED: HCPCS

## 2023-05-14 PROCEDURE — 85018 HEMOGLOBIN: CPT

## 2023-05-14 PROCEDURE — 85014 HEMATOCRIT: CPT

## 2023-05-14 PROCEDURE — 1100000003 HC PRIVATE W/ TELEMETRY

## 2023-05-14 PROCEDURE — 80048 BASIC METABOLIC PNL TOTAL CA: CPT

## 2023-05-14 PROCEDURE — 36430 TRANSFUSION BLD/BLD COMPNT: CPT

## 2023-05-14 PROCEDURE — 6370000000 HC RX 637 (ALT 250 FOR IP): Performed by: STUDENT IN AN ORGANIZED HEALTH CARE EDUCATION/TRAINING PROGRAM

## 2023-05-14 PROCEDURE — 86921 COMPATIBILITY TEST INCUBATE: CPT

## 2023-05-14 PROCEDURE — 6360000002 HC RX W HCPCS: Performed by: PHYSICIAN ASSISTANT

## 2023-05-14 RX ORDER — PANTOPRAZOLE SODIUM 40 MG/1
40 TABLET, DELAYED RELEASE ORAL
Status: DISCONTINUED | OUTPATIENT
Start: 2023-05-14 | End: 2023-05-14

## 2023-05-14 RX ORDER — MAGNESIUM SULFATE 1 G/100ML
1000 INJECTION INTRAVENOUS ONCE
Status: COMPLETED | OUTPATIENT
Start: 2023-05-14 | End: 2023-05-14

## 2023-05-14 RX ORDER — SODIUM CHLORIDE 9 MG/ML
INJECTION, SOLUTION INTRAVENOUS CONTINUOUS
Status: DISCONTINUED | OUTPATIENT
Start: 2023-05-14 | End: 2023-05-15

## 2023-05-14 RX ORDER — PANTOPRAZOLE SODIUM 40 MG/1
40 TABLET, DELAYED RELEASE ORAL
Status: DISCONTINUED | OUTPATIENT
Start: 2023-05-15 | End: 2023-05-14

## 2023-05-14 RX ADMIN — LORAZEPAM 0.5 MG: 0.5 TABLET ORAL at 23:49

## 2023-05-14 RX ADMIN — HYDROMORPHONE HYDROCHLORIDE 0.5 MG: 1 INJECTION, SOLUTION INTRAMUSCULAR; INTRAVENOUS; SUBCUTANEOUS at 06:58

## 2023-05-14 RX ADMIN — SODIUM CHLORIDE, PRESERVATIVE FREE 10 ML: 5 INJECTION INTRAVENOUS at 21:49

## 2023-05-14 RX ADMIN — HYDROMORPHONE HYDROCHLORIDE 0.5 MG: 1 INJECTION, SOLUTION INTRAMUSCULAR; INTRAVENOUS; SUBCUTANEOUS at 00:51

## 2023-05-14 RX ADMIN — SODIUM CHLORIDE: 9 INJECTION, SOLUTION INTRAVENOUS at 19:54

## 2023-05-14 RX ADMIN — LORAZEPAM 0.5 MG: 0.5 TABLET ORAL at 05:58

## 2023-05-14 RX ADMIN — LOSARTAN POTASSIUM 25 MG: 25 TABLET, FILM COATED ORAL at 09:04

## 2023-05-14 RX ADMIN — HYDROMORPHONE HYDROCHLORIDE 4 MG: 2 TABLET ORAL at 04:56

## 2023-05-14 RX ADMIN — HYDROMORPHONE HYDROCHLORIDE 0.5 MG: 1 INJECTION, SOLUTION INTRAMUSCULAR; INTRAVENOUS; SUBCUTANEOUS at 21:42

## 2023-05-14 RX ADMIN — LORAZEPAM 0.5 MG: 0.5 TABLET ORAL at 15:42

## 2023-05-14 RX ADMIN — HYDROMORPHONE HYDROCHLORIDE 0.5 MG: 1 INJECTION, SOLUTION INTRAMUSCULAR; INTRAVENOUS; SUBCUTANEOUS at 11:03

## 2023-05-14 RX ADMIN — HYDROMORPHONE HYDROCHLORIDE 0.5 MG: 1 INJECTION, SOLUTION INTRAMUSCULAR; INTRAVENOUS; SUBCUTANEOUS at 15:22

## 2023-05-14 RX ADMIN — SODIUM CHLORIDE, PRESERVATIVE FREE 10 ML: 5 INJECTION INTRAVENOUS at 11:06

## 2023-05-14 RX ADMIN — FOLIC ACID 1 MG: 1 TABLET ORAL at 09:04

## 2023-05-14 RX ADMIN — MAGNESIUM SULFATE HEPTAHYDRATE 1000 MG: 1 INJECTION, SOLUTION INTRAVENOUS at 12:24

## 2023-05-14 RX ADMIN — PANTOPRAZOLE SODIUM 40 MG: 40 TABLET, DELAYED RELEASE ORAL at 10:56

## 2023-05-14 RX ADMIN — SODIUM CHLORIDE, PRESERVATIVE FREE 40 MG: 5 INJECTION INTRAVENOUS at 21:47

## 2023-05-14 RX ADMIN — HYDROMORPHONE HYDROCHLORIDE 4 MG: 2 TABLET ORAL at 19:50

## 2023-05-14 RX ADMIN — ACETAMINOPHEN 325MG 650 MG: 325 TABLET ORAL at 23:53

## 2023-05-14 RX ADMIN — FUROSEMIDE 20 MG: 10 INJECTION, SOLUTION INTRAMUSCULAR; INTRAVENOUS at 09:04

## 2023-05-14 ASSESSMENT — PAIN SCALES - GENERAL
PAINLEVEL_OUTOF10: 10
PAINLEVEL_OUTOF10: 8
PAINLEVEL_OUTOF10: 9
PAINLEVEL_OUTOF10: 3
PAINLEVEL_OUTOF10: 0
PAINLEVEL_OUTOF10: 8
PAINLEVEL_OUTOF10: 8
PAINLEVEL_OUTOF10: 0
PAINLEVEL_OUTOF10: 10
PAINLEVEL_OUTOF10: 10
PAINLEVEL_OUTOF10: 0
PAINLEVEL_OUTOF10: 10
PAINLEVEL_OUTOF10: 10
PAINLEVEL_OUTOF10: 8

## 2023-05-14 ASSESSMENT — PAIN DESCRIPTION - DESCRIPTORS
DESCRIPTORS: ACHING

## 2023-05-14 ASSESSMENT — PAIN DESCRIPTION - LOCATION
LOCATION: BACK
LOCATION: GENERALIZED
LOCATION: GENERALIZED
LOCATION: CHEST;BACK
LOCATION: GENERALIZED
LOCATION: GENERALIZED
LOCATION: BACK;CHEST
LOCATION: BACK;CHEST

## 2023-05-14 ASSESSMENT — PAIN DESCRIPTION - ORIENTATION
ORIENTATION: MID
ORIENTATION: MID

## 2023-05-15 ENCOUNTER — ANESTHESIA EVENT (OUTPATIENT)
Facility: HOSPITAL | Age: 63
DRG: 811 | End: 2023-05-15
Payer: COMMERCIAL

## 2023-05-15 ENCOUNTER — ANESTHESIA (OUTPATIENT)
Facility: HOSPITAL | Age: 63
DRG: 811 | End: 2023-05-15
Payer: COMMERCIAL

## 2023-05-15 LAB
ANION GAP SERPL CALC-SCNC: 6 MMOL/L (ref 3–18)
BASOPHILS # BLD: 0.1 K/UL (ref 0–0.1)
BASOPHILS NFR BLD: 1 % (ref 0–2)
BUN SERPL-MCNC: 46 MG/DL (ref 7–18)
BUN/CREAT SERPL: 32 (ref 12–20)
CALCIUM SERPL-MCNC: 8.1 MG/DL (ref 8.5–10.1)
CHLORIDE SERPL-SCNC: 112 MMOL/L (ref 100–111)
CO2 SERPL-SCNC: 24 MMOL/L (ref 21–32)
CREAT SERPL-MCNC: 1.45 MG/DL (ref 0.6–1.3)
DIFFERENTIAL METHOD BLD: ABNORMAL
EOSINOPHIL # BLD: 0.3 K/UL (ref 0–0.4)
EOSINOPHIL NFR BLD: 3 % (ref 0–5)
ERYTHROCYTE [DISTWIDTH] IN BLOOD BY AUTOMATED COUNT: 16.9 % (ref 11.6–14.5)
GLUCOSE SERPL-MCNC: 101 MG/DL (ref 74–99)
HCT VFR BLD AUTO: 23.6 % (ref 36–48)
HGB BLD-MCNC: 7.9 G/DL (ref 13–16)
IMM GRANULOCYTES # BLD AUTO: 0.1 K/UL (ref 0–0.04)
IMM GRANULOCYTES NFR BLD AUTO: 1 % (ref 0–0.5)
LYMPHOCYTES # BLD: 1.9 K/UL (ref 0.9–3.6)
LYMPHOCYTES NFR BLD: 19 % (ref 21–52)
MAGNESIUM SERPL-MCNC: 2.1 MG/DL (ref 1.6–2.6)
MCH RBC QN AUTO: 30.5 PG (ref 24–34)
MCHC RBC AUTO-ENTMCNC: 33.5 G/DL (ref 31–37)
MCV RBC AUTO: 91.1 FL (ref 78–100)
MONOCYTES # BLD: 0.9 K/UL (ref 0.05–1.2)
MONOCYTES NFR BLD: 9 % (ref 3–10)
NEUTS SEG # BLD: 7 K/UL (ref 1.8–8)
NEUTS SEG NFR BLD: 68 % (ref 40–73)
NRBC # BLD: 0.2 K/UL (ref 0–0.01)
NRBC BLD-RTO: 1.9 PER 100 WBC
PLATELET # BLD AUTO: 141 K/UL (ref 135–420)
PMV BLD AUTO: 10.3 FL (ref 9.2–11.8)
POTASSIUM SERPL-SCNC: 4.2 MMOL/L (ref 3.5–5.5)
RBC # BLD AUTO: 2.59 M/UL (ref 4.35–5.65)
SODIUM SERPL-SCNC: 142 MMOL/L (ref 136–145)
WBC # BLD AUTO: 10.3 K/UL (ref 4.6–13.2)

## 2023-05-15 PROCEDURE — 3700000000 HC ANESTHESIA ATTENDED CARE: Performed by: INTERNAL MEDICINE

## 2023-05-15 PROCEDURE — 2580000003 HC RX 258: Performed by: STUDENT IN AN ORGANIZED HEALTH CARE EDUCATION/TRAINING PROGRAM

## 2023-05-15 PROCEDURE — 6370000000 HC RX 637 (ALT 250 FOR IP): Performed by: PHYSICIAN ASSISTANT

## 2023-05-15 PROCEDURE — 2580000003 HC RX 258: Performed by: INTERNAL MEDICINE

## 2023-05-15 PROCEDURE — 2500000003 HC RX 250 WO HCPCS: Performed by: NURSE ANESTHETIST, CERTIFIED REGISTERED

## 2023-05-15 PROCEDURE — 6360000002 HC RX W HCPCS: Performed by: PHYSICIAN ASSISTANT

## 2023-05-15 PROCEDURE — 7100000010 HC PHASE II RECOVERY - FIRST 15 MIN: Performed by: INTERNAL MEDICINE

## 2023-05-15 PROCEDURE — A4216 STERILE WATER/SALINE, 10 ML: HCPCS | Performed by: INTERNAL MEDICINE

## 2023-05-15 PROCEDURE — 99232 SBSQ HOSP IP/OBS MODERATE 35: CPT | Performed by: INTERNAL MEDICINE

## 2023-05-15 PROCEDURE — 7100000000 HC PACU RECOVERY - FIRST 15 MIN: Performed by: INTERNAL MEDICINE

## 2023-05-15 PROCEDURE — 88305 TISSUE EXAM BY PATHOLOGIST: CPT

## 2023-05-15 PROCEDURE — 1100000003 HC PRIVATE W/ TELEMETRY

## 2023-05-15 PROCEDURE — 6360000002 HC RX W HCPCS: Performed by: INTERNAL MEDICINE

## 2023-05-15 PROCEDURE — 3600007502: Performed by: INTERNAL MEDICINE

## 2023-05-15 PROCEDURE — 83735 ASSAY OF MAGNESIUM: CPT

## 2023-05-15 PROCEDURE — 85025 COMPLETE CBC W/AUTO DIFF WBC: CPT

## 2023-05-15 PROCEDURE — 0DB78ZX EXCISION OF STOMACH, PYLORUS, VIA NATURAL OR ARTIFICIAL OPENING ENDOSCOPIC, DIAGNOSTIC: ICD-10-PCS | Performed by: INTERNAL MEDICINE

## 2023-05-15 PROCEDURE — 6360000002 HC RX W HCPCS: Performed by: NURSE ANESTHETIST, CERTIFIED REGISTERED

## 2023-05-15 PROCEDURE — 2709999900 HC NON-CHARGEABLE SUPPLY: Performed by: INTERNAL MEDICINE

## 2023-05-15 PROCEDURE — 80048 BASIC METABOLIC PNL TOTAL CA: CPT

## 2023-05-15 PROCEDURE — 6370000000 HC RX 637 (ALT 250 FOR IP): Performed by: INTERNAL MEDICINE

## 2023-05-15 PROCEDURE — 00731 ANES UPR GI NDSC PX NOS: CPT | Performed by: ANESTHESIOLOGY

## 2023-05-15 PROCEDURE — C9113 INJ PANTOPRAZOLE SODIUM, VIA: HCPCS | Performed by: INTERNAL MEDICINE

## 2023-05-15 PROCEDURE — 97116 GAIT TRAINING THERAPY: CPT

## 2023-05-15 PROCEDURE — 6370000000 HC RX 637 (ALT 250 FOR IP): Performed by: STUDENT IN AN ORGANIZED HEALTH CARE EDUCATION/TRAINING PROGRAM

## 2023-05-15 RX ORDER — PROPOFOL 10 MG/ML
INJECTION, EMULSION INTRAVENOUS PRN
Status: DISCONTINUED | OUTPATIENT
Start: 2023-05-15 | End: 2023-05-15 | Stop reason: SDUPTHER

## 2023-05-15 RX ORDER — LIDOCAINE HYDROCHLORIDE 20 MG/ML
INJECTION, SOLUTION EPIDURAL; INFILTRATION; INTRACAUDAL; PERINEURAL PRN
Status: DISCONTINUED | OUTPATIENT
Start: 2023-05-15 | End: 2023-05-15 | Stop reason: SDUPTHER

## 2023-05-15 RX ADMIN — LOSARTAN POTASSIUM 25 MG: 25 TABLET, FILM COATED ORAL at 08:44

## 2023-05-15 RX ADMIN — SODIUM CHLORIDE 250 ML: 900 INJECTION, SOLUTION INTRAVENOUS at 13:57

## 2023-05-15 RX ADMIN — LORAZEPAM 0.5 MG: 0.5 TABLET ORAL at 11:42

## 2023-05-15 RX ADMIN — ACETAMINOPHEN 325MG 650 MG: 325 TABLET ORAL at 05:45

## 2023-05-15 RX ADMIN — SODIUM CHLORIDE: 900 INJECTION, SOLUTION INTRAVENOUS at 14:31

## 2023-05-15 RX ADMIN — SODIUM CHLORIDE, PRESERVATIVE FREE 10 ML: 5 INJECTION INTRAVENOUS at 08:46

## 2023-05-15 RX ADMIN — HYDROMORPHONE HYDROCHLORIDE 4 MG: 2 TABLET ORAL at 01:58

## 2023-05-15 RX ADMIN — HYDROMORPHONE HYDROCHLORIDE 0.5 MG: 1 INJECTION, SOLUTION INTRAMUSCULAR; INTRAVENOUS; SUBCUTANEOUS at 03:56

## 2023-05-15 RX ADMIN — LIDOCAINE HYDROCHLORIDE 40 MG: 20 INJECTION, SOLUTION EPIDURAL; INFILTRATION; INTRACAUDAL; PERINEURAL at 14:25

## 2023-05-15 RX ADMIN — HYDROMORPHONE HYDROCHLORIDE 4 MG: 2 TABLET ORAL at 15:46

## 2023-05-15 RX ADMIN — HYDROMORPHONE HYDROCHLORIDE 0.5 MG: 1 INJECTION, SOLUTION INTRAMUSCULAR; INTRAVENOUS; SUBCUTANEOUS at 18:47

## 2023-05-15 RX ADMIN — FOLIC ACID 1 MG: 1 TABLET ORAL at 08:44

## 2023-05-15 RX ADMIN — HYDROMORPHONE HYDROCHLORIDE 4 MG: 2 TABLET ORAL at 22:02

## 2023-05-15 RX ADMIN — SODIUM CHLORIDE, PRESERVATIVE FREE 10 ML: 5 INJECTION INTRAVENOUS at 20:08

## 2023-05-15 RX ADMIN — HYDROMORPHONE HYDROCHLORIDE 4 MG: 2 TABLET ORAL at 08:58

## 2023-05-15 RX ADMIN — HYDROMORPHONE HYDROCHLORIDE 0.5 MG: 1 INJECTION, SOLUTION INTRAMUSCULAR; INTRAVENOUS; SUBCUTANEOUS at 23:02

## 2023-05-15 RX ADMIN — FUROSEMIDE 20 MG: 10 INJECTION, SOLUTION INTRAMUSCULAR; INTRAVENOUS at 08:44

## 2023-05-15 RX ADMIN — SODIUM CHLORIDE, PRESERVATIVE FREE 40 MG: 5 INJECTION INTRAVENOUS at 08:46

## 2023-05-15 RX ADMIN — PROPOFOL 70 MG: 10 INJECTION, EMULSION INTRAVENOUS at 14:25

## 2023-05-15 RX ADMIN — SODIUM CHLORIDE, PRESERVATIVE FREE 40 MG: 5 INJECTION INTRAVENOUS at 20:08

## 2023-05-15 ASSESSMENT — PAIN DESCRIPTION - DESCRIPTORS
DESCRIPTORS: ACHING

## 2023-05-15 ASSESSMENT — PAIN DESCRIPTION - LOCATION
LOCATION: GENERALIZED
LOCATION: BACK
LOCATION: CHEST;BACK
LOCATION: GENERALIZED
LOCATION: CHEST;BACK
LOCATION: BACK
LOCATION: BACK
LOCATION: CHEST;BACK

## 2023-05-15 ASSESSMENT — PAIN SCALES - GENERAL
PAINLEVEL_OUTOF10: 8
PAINLEVEL_OUTOF10: 8
PAINLEVEL_OUTOF10: 7
PAINLEVEL_OUTOF10: 7
PAINLEVEL_OUTOF10: 8
PAINLEVEL_OUTOF10: 8
PAINLEVEL_OUTOF10: 4
PAINLEVEL_OUTOF10: 8
PAINLEVEL_OUTOF10: 2
PAINLEVEL_OUTOF10: 8

## 2023-05-15 NOTE — ANESTHESIA POSTPROCEDURE EVALUATION
Department of Anesthesiology  Postprocedure Note    Patient: Karrie Rankin  MRN: [id number]  [place]: 1960  Date of evaluation: 5/15/2023      Procedure Summary     Date: 05/15/23 Room / Location: [facility] ENDO 02 / [facility] ENDOSCOPY    Anesthesia Start: 0495 Anesthesia Stop: 8057    Procedure: ESOPHAGOGASTRODUODENOSCOPY with bxs (Upper GI Region) Diagnosis:       Anemia, unspecified type      (Anemia, unspecified type [D64.9])    Surgeons: Morales Vazquez MD Responsible Provider: Silvino Dickerson DO    Anesthesia Type: MAC ASA Status: 3          Anesthesia Type: MAC    Jones Phase I: Jones Score: 10    Jones Phase II: Jones Score: 9      Anesthesia Post Evaluation    Patient location during evaluation: PACU  Patient participation: complete - patient participated  Level of consciousness: awake and alert  Airway patency: patent  Nausea & Vomiting: no nausea and no vomiting  Complications: no  Cardiovascular status: hemodynamically stable  Respiratory status: acceptable  Hydration status: stable  Multimodal analgesia pain management approach

## 2023-05-15 NOTE — OP NOTE
[facility] [facility], [address]     Endoscopic Gastroduodenoscopy Procedure Note    Cristal Brush  1960  [id number]    Indication: Anemia melena, GI bleeding    : Andrzej Leo MD    Referring Provider:  Zain Bess MD    Anesthesia/Sedation:  MAC anesthesia      Procedure Details     After infomed consent was obtained for the procedure, with all risks and benefits of procedure explained the patient was taken to the endoscopy suite and placed in the left lateral decubitus position. Following sequential administration of sedation as per above, the endoscope was inserted into the mouth and advanced under direct vision to third portion of the duodenum. A careful inspection was made as the gastroscope was withdrawn, including a retroflexed view of the proximal stomach; findings and interventions are described below. Findings:   Esophagus:Grade a esophagitis seen in the distal esophagus-no varices/ulcers noted. Stomach: Small hiatal hernia seen-36 to 38 cm; no gastric ulcer/gastritis noted. Gastric biopsies taken from the body and antrum to screen for H. pylori. Duodenum/jejunum: Duodenal bulb appears to be normal-there is a stricture at the D1/D2 junction. Beyond the stricture there appears to be a crescent shaped ulcer-it is difficult to see given the angulation and the stricture but this appears to be an acute duodenal ulcer-no active bleeding was seen. This is most likely the cause for the anemia and the blood loss-appearance is suggestive of an NSAID ulcer. Therapies:  biopsy of stomach body, antrum    Specimens:   ID Type Source Tests Collected by Time Destination   1 : gastric bxs Tissue Gastric SURGICAL PATHOLOGY Andrzej Leo MD 5/15/2023 5405               Complications:   None; patient tolerated the procedure well. EBL:  None.            Impression:    * No post-op diagnosis entered *

## 2023-05-15 NOTE — PERIOP NOTE
TRANSFER - IN REPORT:    Verbal report received from [facility] [address] on Emmaline Meckel  being received from [facility] for ordered procedure      Report consisted of patient's Situation, Background, Assessment and   Recommendations(SBAR). Information from the following report(s) Surgery Report, MAR, and Procedure Verification was reviewed with the receiving nurse. Opportunity for questions and clarification was provided. Assessment completed upon patient's arrival to unit and care assumed.

## 2023-05-15 NOTE — ANESTHESIA PRE PROCEDURE
(Current):   Vitals:    05/15/23 0014 05/15/23 0345 05/15/23 0750 05/15/23 1130   BP: (!) 170/75 (!) 158/76 (!) 152/84 136/78   Pulse: 59 61 54 52   Resp: 18 18 18 18   Temp: 97.9 °F (36.6 °C) 97.9 °F (36.6 °C) 97.4 °F (36.3 °C) 97.5 °F (36.4 °C)   TempSrc: Oral Oral Oral Oral   SpO2: 98%  100% 100%   Weight:       Height:                                                  BP Readings from Last 3 Encounters:   05/15/23 136/78   05/01/23 134/70   04/28/23 (!) 144/75       NPO Status:                                                   Date of last liquid consumption: 05/14/23                        Date of last solid food consumption: 05/14/23    BMI:   Wt Readings from Last 3 Encounters:   05/12/23 129 lb (58.5 kg)     Body mass index is 22.14 kg/m². CBC:   Lab Results   Component Value Date/Time    WBC 10.3 05/15/2023 05:50 AM    RBC 2.59 05/15/2023 05:50 AM    HGB 7.9 05/15/2023 05:50 AM    HCT 23.6 05/15/2023 05:50 AM    MCV 91.1 05/15/2023 05:50 AM    RDW 16.9 05/15/2023 05:50 AM     05/15/2023 05:50 AM       CMP:   Lab Results   Component Value Date/Time     05/15/2023 05:50 AM    K 4.2 05/15/2023 05:50 AM     05/15/2023 05:50 AM    CO2 24 05/15/2023 05:50 AM    BUN 46 05/15/2023 05:50 AM    CREATININE 1.45 05/15/2023 05:50 AM    GFRAA >60 06/01/2021 11:06 AM    AGRATIO 0.9 05/18/2021 10:00 AM    LABGLOM 54 05/15/2023 05:50 AM    GLUCOSE 101 05/15/2023 05:50 AM    PROT 6.4 05/13/2023 05:15 AM    CALCIUM 8.1 05/15/2023 05:50 AM    BILITOT 2.1 05/13/2023 05:15 AM    ALKPHOS 90 05/13/2023 05:15 AM    ALKPHOS 111 05/18/2021 10:00 AM    AST 62 05/13/2023 05:15 AM    ALT 37 05/13/2023 05:15 AM       POC Tests: No results for input(s): POCGLU, POCNA, POCK, POCCL, POCBUN, POCHEMO, POCHCT in the last 72 hours.     Coags:   Lab Results   Component Value Date/Time    PROTIME 17.8 05/13/2021 11:00 PM    INR 1.5 05/13/2021 11:00 PM    APTT >180.0 05/13/2021 11:00 PM       HCG (If Applicable): No results

## 2023-05-15 NOTE — H&P
Excessive bleeding  Stated he started bleeding  Stated he started bleeding  Excessive bleeding       Home Medications:     [unfilled]    Review of Systems:     Constitutional: No fevers, chills, weight loss, fatigue. Skin: No rashes, pruritis, jaundice, ulcerations, erythema. HENT: No headaches, nosebleeds, sinus pressure, rhinorrhea, sore throat. Eyes: No visual changes, blurred vision, eye pain, photophobia, jaundice. Cardiovascular: No chest pain, heart palpitations. Respiratory: No cough, SOB, wheezing, chest discomfort, orthopnea. Gastrointestinal: Neg unless noted otherwise in H&P   Genitourinary: No dysuria, bleeding, discharge, pyuria. Musculoskeletal: No weakness, arthralgias, wasting. Endo: No sweats. Heme: No bruising, easy bleeding. Allergies: As noted. Neurological: Cranial nerves intact. Alert and oriented. Gait not assessed. Psychiatric:  No anxiety, depression, hallucinations. BP (!) 140/64   Pulse 58   Temp 97.9 °F (36.6 °C) (Oral)   Resp 18   Ht 5' 4\" (1.626 m)   Wt 129 lb (58.5 kg)   SpO2 100%   BMI 22.14 kg/m²     Physical Assessment:     constitutional: appearance: well developed, well nourished, normal habitus, no deformities, in no acute distress. skin: inspection: no rashes, ulcers, icterus or other lesions; no clubbing or telangiectasias. palpation: no induration or subcutaneos nodules. eyes: inspection: normal conjunctivae and lids; no jaundice pupils: normal  ENMT: mouth: normal oral mucosa,lips and gums; good dentition. oropharynx: normal tongue, hard and soft palate; posterior pharynx without erithema, exudate or lesions. neck: thyroid: normal size, consistency and position; no masses or tenderness. respiratory: effort: normal chest excursion; no intercostal retraction or accessory muscle use. cardiovascular: abdominal aorta: normal size and position; no bruits.  palpation: PMI of normal size and position; normal rhythm; no thrill or

## 2023-05-15 NOTE — PERIOP NOTE
TRANSFER - OUT REPORT:    Verbal report given to Cheng Colbert RN on Alexi Sanches  being transferred to  for routine progression of patient care       Report consisted of patient's Situation, Background, Assessment and   Recommendations(SBAR). Information from the following report(s) Nurse Handoff Report was reviewed with the receiving nurse. [place] Assessment: No data recorded  Lines:   Single Lumen Implantable Port 01/01/16 Right Subclavian (Active)       Single Lumen Implantable Port 05/11/23 Right Subclavian (Active)   Criteria for Appropriate Use Limited/no vessel suitable for conventional peripheral access 05/14/23 2000   Site Assessment Clean, dry & intact 05/14/23 2000   Line Care Ports disinfected 05/14/23 2000   Alcohol Cap Used Yes 05/14/23 2000   Dressing Type Transparent w/CHG gel 05/14/23 2000   Dressing Status Clean, dry & intact 05/14/23 2000        Opportunity for questions and clarification was provided.       Patient transported with:  [employer]

## 2023-05-15 NOTE — CONSULTS
Comprehensive Nutrition Assessment    Type and Reason for Visit:  Initial, Positive Nutrition Screen, Consult, Patient Education    Nutrition Recommendations/Plan:   Monitor GI symptoms and readiness for diet initiation following procedure     Malnutrition Assessment:  Malnutrition Status:  No malnutrition (05/15/23 1350)      Nutrition History and Allergies:   Past medical hx:  duodenal ulcer, GERD, sickle cell anemia, cholecystectomy. Pt reported having good appetite/ meal intake PTA; eats 3 meals daily. UBW is 130 lb, stable for past several years; but did weigh 150 lb in 2011. No known food allergies     Nutrition Assessment:    Pt admitted with c/o chest pain and sickle cell pain crisis; pt also with leg swelling. Pt currently NPO for EGD today; pt with dark stools, melena. GI following. Pt with CHF per Cardiology; consult received for diet education. Provided pt with information on dietary considerations for management of CHF; low Na and fluid restriction as needed per MD. Reviewed which foods pt should limit due to Na content and which foods may contribute to fluid intake. Recommended to pt to limit fluid intake if he notices he is have edema/ fluid retention. Pt currently has lower extremity edema, improving with lasix. Pt reported that he needs to consume 8 cups water/ fluids daily due to his sickle cell anemia per his MD. Encouraged pt to discuss these fluid recommendations with the Cardiologist as well in case they have a suggested fluid restriction amount. Educational handouts provided/ reviewed with pt. Questions answered to his satisfaction; he verbalized understanding the information provided. Pt stated that he already follows a low Na diet; rarely eats outside food, and does not eat much salty foods in general. RD contact information provided. Nutrition Related Findings:    BM 5/14.   +edema; on lasix. Pertinent meds: folic acid, protonix, bowel regimen.  Wound Type: None       Current

## 2023-05-16 VITALS
WEIGHT: 129 LBS | RESPIRATION RATE: 18 BRPM | TEMPERATURE: 98.3 F | DIASTOLIC BLOOD PRESSURE: 77 MMHG | HEART RATE: 59 BPM | OXYGEN SATURATION: 98 % | HEIGHT: 64 IN | SYSTOLIC BLOOD PRESSURE: 146 MMHG | BODY MASS INDEX: 22.02 KG/M2

## 2023-05-16 LAB
ABO + RH BLD: NORMAL
ANTIGENS PRESENT RBC DONR: NORMAL
BLD PROD TYP BPU: NORMAL
BLOOD BANK DISPENSE STATUS: NORMAL
BLOOD GROUP ANTIBODIES SERPL: NORMAL
BLOOD GROUP ANTIBODIES SERPL: NORMAL
BPU ID: NORMAL
CALLED TO: NORMAL
CALLED TO:,BCALL3: NORMAL
CALLED TO:: NORMAL
CROSSMATCH RESULT: NORMAL
HCT VFR BLD AUTO: 24.4 % (ref 36–48)
HGB BLD-MCNC: 8 G/DL (ref 13–16)
SPECIMEN EXP DATE BLD: NORMAL
UNIT DIVISION: 0

## 2023-05-16 PROCEDURE — 99239 HOSP IP/OBS DSCHRG MGMT >30: CPT | Performed by: INTERNAL MEDICINE

## 2023-05-16 PROCEDURE — 6370000000 HC RX 637 (ALT 250 FOR IP): Performed by: PHYSICIAN ASSISTANT

## 2023-05-16 PROCEDURE — 85018 HEMOGLOBIN: CPT

## 2023-05-16 PROCEDURE — 6370000000 HC RX 637 (ALT 250 FOR IP): Performed by: INTERNAL MEDICINE

## 2023-05-16 PROCEDURE — 2580000003 HC RX 258: Performed by: INTERNAL MEDICINE

## 2023-05-16 PROCEDURE — 97535 SELF CARE MNGMENT TRAINING: CPT

## 2023-05-16 PROCEDURE — C9113 INJ PANTOPRAZOLE SODIUM, VIA: HCPCS | Performed by: INTERNAL MEDICINE

## 2023-05-16 PROCEDURE — 6360000002 HC RX W HCPCS: Performed by: INTERNAL MEDICINE

## 2023-05-16 PROCEDURE — 85014 HEMATOCRIT: CPT

## 2023-05-16 PROCEDURE — 6370000000 HC RX 637 (ALT 250 FOR IP): Performed by: STUDENT IN AN ORGANIZED HEALTH CARE EDUCATION/TRAINING PROGRAM

## 2023-05-16 PROCEDURE — 2580000003 HC RX 258: Performed by: STUDENT IN AN ORGANIZED HEALTH CARE EDUCATION/TRAINING PROGRAM

## 2023-05-16 RX ORDER — PANTOPRAZOLE SODIUM 40 MG/1
40 TABLET, DELAYED RELEASE ORAL
Qty: 60 TABLET | Refills: 0 | Status: SHIPPED | OUTPATIENT
Start: 2023-05-16

## 2023-05-16 RX ORDER — PANTOPRAZOLE SODIUM 40 MG/1
40 TABLET, DELAYED RELEASE ORAL
Status: DISCONTINUED | OUTPATIENT
Start: 2023-05-16 | End: 2023-05-16 | Stop reason: HOSPADM

## 2023-05-16 RX ORDER — FUROSEMIDE 20 MG/1
20 TABLET ORAL DAILY
Qty: 30 TABLET | Refills: 0 | Status: SHIPPED | OUTPATIENT
Start: 2023-05-17

## 2023-05-16 RX ORDER — SENNA AND DOCUSATE SODIUM 50; 8.6 MG/1; MG/1
1 TABLET, FILM COATED ORAL DAILY
Qty: 30 TABLET | Refills: 0 | Status: SHIPPED | OUTPATIENT
Start: 2023-05-17

## 2023-05-16 RX ORDER — ONDANSETRON 4 MG/1
4 TABLET, ORALLY DISINTEGRATING ORAL EVERY 8 HOURS PRN
Qty: 20 TABLET | Refills: 0 | Status: SHIPPED | OUTPATIENT
Start: 2023-05-16

## 2023-05-16 RX ORDER — LOSARTAN POTASSIUM 25 MG/1
25 TABLET ORAL DAILY
Qty: 30 TABLET | Refills: 0 | Status: SHIPPED | OUTPATIENT
Start: 2023-05-17

## 2023-05-16 RX ORDER — FOLIC ACID 1 MG/1
1 TABLET ORAL DAILY
Qty: 30 TABLET | Refills: 0 | Status: SHIPPED | OUTPATIENT
Start: 2023-05-16

## 2023-05-16 RX ORDER — HEPARIN SODIUM (PORCINE) LOCK FLUSH IV SOLN 100 UNIT/ML 100 UNIT/ML
100 SOLUTION INTRAVENOUS
Status: COMPLETED | OUTPATIENT
Start: 2023-05-16 | End: 2023-05-16

## 2023-05-16 RX ADMIN — LORAZEPAM 0.5 MG: 0.5 TABLET ORAL at 04:59

## 2023-05-16 RX ADMIN — SODIUM CHLORIDE, PRESERVATIVE FREE 10 ML: 5 INJECTION INTRAVENOUS at 08:36

## 2023-05-16 RX ADMIN — HYDROMORPHONE HYDROCHLORIDE 0.5 MG: 1 INJECTION, SOLUTION INTRAMUSCULAR; INTRAVENOUS; SUBCUTANEOUS at 03:09

## 2023-05-16 RX ADMIN — FUROSEMIDE 20 MG: 20 TABLET ORAL at 08:32

## 2023-05-16 RX ADMIN — SODIUM CHLORIDE, PRESERVATIVE FREE 40 MG: 5 INJECTION INTRAVENOUS at 08:33

## 2023-05-16 RX ADMIN — SENNOSIDES AND DOCUSATE SODIUM 1 TABLET: 50; 8.6 TABLET ORAL at 08:32

## 2023-05-16 RX ADMIN — HEPARIN SODIUM (PORCINE) LOCK FLUSH IV SOLN 100 UNIT/ML 100 UNITS: 100 SOLUTION at 16:53

## 2023-05-16 RX ADMIN — FOLIC ACID 1 MG: 1 TABLET ORAL at 08:32

## 2023-05-16 RX ADMIN — LOSARTAN POTASSIUM 25 MG: 25 TABLET, FILM COATED ORAL at 08:32

## 2023-05-16 RX ADMIN — HYDROMORPHONE HYDROCHLORIDE 4 MG: 2 TABLET ORAL at 08:32

## 2023-05-16 RX ADMIN — HYDROMORPHONE HYDROCHLORIDE 0.5 MG: 1 INJECTION, SOLUTION INTRAMUSCULAR; INTRAVENOUS; SUBCUTANEOUS at 12:54

## 2023-05-16 ASSESSMENT — PAIN SCALES - GENERAL
PAINLEVEL_OUTOF10: 9
PAINLEVEL_OUTOF10: 8
PAINLEVEL_OUTOF10: 8

## 2023-05-16 ASSESSMENT — PAIN DESCRIPTION - LOCATION
LOCATION: GENERALIZED

## 2023-05-16 NOTE — CARE COORDINATION
CM spoke with patient, patient refused Home Health services, but is agreeable for DME [employer]. Patient's sister to transport patient patient home today at time of discharge. CM ordered DME [employer] [facility] on [place] with [employer], and CM let them know that CM delivered the [address] to patient out of [employer]. CM delivered [employer] to patient.              Donn Griggs, RN  Case Management [phone]

## 2023-05-16 NOTE — DISCHARGE INSTRUCTIONS
problems. You may have to stay overnight if you had treatment during the test. You may have a sore throat for a day or two after the test.  This care sheet gives you a general idea about what to expect after the test.  How can you care for yourself at home? Activity   Rest as much as you need to after you go home. You should be able to go back to your usual activities the day after the test.  Diet   Follow your doctor's directions for eating after the test.  Drink plenty of fluids (unless your doctor has told you not to). Medications   If you have a sore throat the day after the test, use an over-the-counter spray to numb your throat. Follow-up care is a key part of your treatment and safety. Be sure to make and go to all appointments, and call your doctor if you are having problems. It's also a good idea to know your test results and keep a list of the medicines you take. When should you call for help? Call 911 anytime you think you may need emergency care. For example, call if:    You passed out (lost consciousness). You have trouble breathing. You pass maroon or bloody stools. Call your doctor now or seek immediate medical care if:    You have pain that does not get better after your take pain medicine. You have new or worse belly pain. You have blood in your stools. You are sick to your stomach and cannot keep fluids down. You have a fever. You cannot pass stools or gas. Watch closely for changes in your health, and be sure to contact your doctor if:    Your throat still hurts after a day or two. You do not get better as expected. Where can you learn more? Go to http://www.woods.com/ and enter J454 to learn more about \"Upper GI Endoscopy: What to Expect at Home. \"  Current as of: June 6, 2022               Content Version: 13.6  © 6982-2046 [employer]. Care instructions adapted under license by [facility] ([facility]).  If you have questions about

## 2023-05-16 NOTE — PLAN OF CARE
Problem: Pain  Goal: Verbalizes/displays adequate comfort level or baseline comfort level  5/14/2023 2040 by Rachna Cordoba RN  Outcome: Progressing  5/14/2023 1327 by Luana العراقي RN  Outcome: Progressing     Problem: ABCDS Injury Assessment  Goal: Absence of physical injury  5/14/2023 2040 by Rachna Cordoba RN  Outcome: Progressing  5/14/2023 1327 by Luana العراقي RN  Outcome: Progressing  Flowsheets (Taken 5/14/2023 1325)  Absence of Physical Injury: Implement safety measures based on patient assessment     Problem: Safety - Adult  Goal: Free from fall injury  5/14/2023 2040 by Rachna Cordoba RN  Outcome: Progressing  5/14/2023 1327 by Luana العراقي RN  Outcome: Progressing  Flowsheets (Taken 5/14/2023 1325)  Free From Fall Injury: Instruct family/caregiver on patient safety     Problem: Physical Therapy - Adult  Goal: By Discharge: Performs mobility at highest level of function for planned discharge setting. See evaluation for individualized goals. Description: Physical Therapy Goals:  Initiated 5/14/2023 to be met within 7-10 days. 1.  Patient will move from supine to sit and sit to supine  in bed with modified independence. 2.  Patient will transfer from bed to chair and chair to bed with modified independence using the least restrictive device. 3.  Patient will perform sit to stand with modified independence. 4.  Patient will ambulate with modified independence for 300 feet with the least restrictive device. 5.  Patient will ascend/descend 2 stairs with B handrail(s) with modified independence. PLOF: Pt lives alone in a [facility] with 2 AFUA.  Indep, occasional use of a [facility]      5/14/2023 1417 by Kandis Del Valle PT  Outcome: Progressing
Problem: Pain  Goal: Verbalizes/displays adequate comfort level or baseline comfort level  Outcome: Progressing     Problem: ABCDS Injury Assessment  Goal: Absence of physical injury  Outcome: Progressing     Problem: Safety - Adult  Goal: Free from fall injury  Outcome: Progressing     Problem: Nutrition Deficit:  Goal: Optimize nutritional status  Outcome: Progressing  Flowsheets (Taken 5/15/2023 1350 by Destiny Saldivar RD)  Nutrient intake appropriate for improving, restoring, or maintaining nutritional needs: Assess nutritional status and recommend course of action
Problem: Physical Therapy - Adult  Goal: By Discharge: Performs mobility at highest level of function for planned discharge setting. See evaluation for individualized goals. Description: Physical Therapy Goals:  Initiated 5/14/2023 to be met within 7-10 days. 1.  Patient will move from supine to sit and sit to supine  in bed with modified independence. 2.  Patient will transfer from bed to chair and chair to bed with modified independence using the least restrictive device. 3.  Patient will perform sit to stand with modified independence. 4.  Patient will ambulate with modified independence for 300 feet with the least restrictive device. 5.  Patient will ascend/descend 2 stairs with B handrail(s) with modified independence. PLOF: Pt lives alone in a [facility] [place] with 2 AFUA. Indep, occasional use of a SPC      Outcome: Adequate for Discharge   PHYSICAL THERAPY TREATMENT/DISCHARGE    Patient: Juan Carlos Ch (24 y.o. male)  Date: 5/15/2023  Diagnosis: Leg swelling [M79.89]  Sickle-cell anemia with crisis ([address] [D57.00]  Sickle cell pain crisis ([address] [D57.00]  Anemia, unspecified type [D64.9] Sickle cell crisis ([address]  Procedure(s) (LRB):  ESOPHAGOGASTRODUODENOSCOPY (N/A)    Precautions: Fall Risk    ASSESSMENT:  Pt cleared to participate in PT session, pt received sitting on EOB and agreeable to therapy session. Pt completing sit to stand with [place], ambulating with antalgic gait pattern, improving in distance. Pt ambulating x75 feet with SPC and supervision. Pt returned to sitting on EOB, requesting to rest. Pt positioned for comfort and educated to call for assist before getting up, pt verbalized understanding. Pt left with all needs met and call bell in reach. RN notified of position and participation. Will sign off. PLAN:  Maximum therapeutic gains met at current level of care and patient will be discharged from physical therapy at this time.   Rationale for discharge:  [x]     Goals Achieved  []
shows that an AM-PAC score of 18 or greater is associated with a discharge to the patient's home setting. Based on an AM-PAC score and their current ADL deficits; it is recommended that the patient have 2-3 sessions per week of Occupational Therapy at d/c to increase the patient's independence. SUBJECTIVE:   Patient stated, \"Can I eat yet? \"    OBJECTIVE DATA SUMMARY:     Functional Mobility and Transfers for ADLs:   Bed Mobility:  Bed Mobility Training  Bed Mobility Training: Yes  Supine to Sit: Supervision  Scooting: Supervision   Transfers:  Transfer Training  Transfer Training: Yes  Sit to Stand: Supervision  Stand to Sit: Supervision    Balance:  Balance  Sitting: Intact  Standing: With support    ADL Intervention:     LE Dressing: Stand by assistance     Pain:  Pain level pre-treatment: 3/10 stomach  Pain level post-treatment: 3/10  Pain Intervention(s): Rest, Ice, Repositioning   Response to intervention: Nurse notified    Activity Tolerance:    Activity Tolerance: Patient tolerated treatment well  Please refer to the flowsheet for vital signs taken during this treatment. After treatment:   [x]  Patient left in no apparent distress sitting up EOB   []  Patient left in no apparent distress in bed  [x]  Call bell left within reach  [x]  Nursing notified  []  Caregiver present  [x]  Bed alarm activated    COMMUNICATION/EDUCATION:   Patient Education  Education Given To: Patient  Education Provided: Role of Therapy;Plan of Care;Energy Conservation; ADL Adaptive Strategies; Fall Prevention Strategies;Transfer Training  Education Method: Teach Back;Verbal;Demonstration  Barriers to Learning: None  Education Outcome: Continued education needed;Verbalized understanding      Thank you for this referral.  KWASI White  Minutes: 13

## 2023-05-16 NOTE — CARE COORDINATION
D/C order noted for today. Orders reviewed. DME [employer] [facility] delivered to patient. Patient refused Home Health services. Patient's sister to transport patient home today for discharge. CM remains available if needed.              Burke Prader, [phone]

## 2023-05-16 NOTE — CARE COORDINATION
05/16/23 1125   Service Assessment   Patient Orientation Alert and Oriented;Person;Place;Situation;Self   Cognition Alert   History Provided By Patient   Primary Caregiver Self   Support Systems Family Members   PCP Verified by CM Yes   Prior Functional Level Independent in ADLs/IADLs   Current Functional Level Independent in ADLs/IADLs   Can patient return to prior living arrangement Yes   Ability to make needs known: Good   Family able to assist with home care needs: Yes   Social/Functional History   Lives With Alone   Type of [address] One level   Home Access Stairs to enter with rails   Entrance Stairs - Number of Steps 2 Steps to enter the home. Entrance Stairs - Rails Both   Bathroom Shower/Tub Tub/Shower unit; Shower chair with back   [address], standard   ADL Assistance Independent   Homemaking Assistance Independent   Ambulation Assistance Independent   Transfer Assistance Independent   Active  Yes   Occupation Full time employment   Type of Occupation Patient works for [employer]. Discharge Planning   Type of [facility] [place]   Potential DME Needed Walker   DME Ordered?  Walker  (Rolling Walker)   Potential Assistance Purchasing Medications No   Type of Home Care Services None  (Patient refused [address]   Patient expects to be discharged to: [address] Discharge   Transition of Care Consult (CM Consult) N/A   Services At/After Discharge None   Mode of Transport at Discharge Self  (Patient's sister to transport patient home at time of discharge.)

## 2023-05-16 NOTE — DISCHARGE SUMMARY
[facility] Hospitalist Discharge Summary    Patient: Isela Warren               Sex: male          DOA: 5/11/2023         YOB: 1960      Age:  60 y.o.        LOS:  LOS: 4 days                Admit Date: 5/11/2023    Discharge Date: 5/16/2023    Admission Diagnoses: Leg swelling [M79.89]  Sickle-cell anemia with crisis ([address] [D57.00]  Sickle cell pain crisis ([address] [D57.00]  Anemia, unspecified type [D64.9]    Discharge Diagnoses:      1. Sickle cell pain episodes, much better  2. Sickle cell anemia, s/p PRBC  3. Hypertension  4. Leg swelling/edema due to #5  5. New onset cardiomyopathy with EF of 45 percentage  6. Duodenal ulcer  7. Hypomagnesemia, resolved    Patient Active Problem List   Diagnosis    Cholangitis    Fever    Sickle-cell anemia (HCC)    Sickle cell anemia (HCC)    Occult blood in stools    Infected venous access port    Sickle cell anemia with pain (HCC)    Sickle cell anemia with crisis ([address]    Nosocomial pneumonia    Sickle cell crisis ([address]    Anemia    Recurrent right inguinal hernia    Cholestatic jaundice    Benign obstructive jaundice    Biliary stone    Other hemoglobinopathies (HCC)    Intractable pain    Leg swelling    Sickle-cell anemia with crisis ([address]    Primary hypertension    Sickle cell pain crisis ([address]    Dark stools         Discharge Condition:  Improved    Hospital Course: Patient presented to hospital on May 12, 2023 with complaint of chest pain and bilateral leg swelling. Please refer to hospital admission H&P for further detail. Patient was admitted with a diagnosis of sickle cell pain episode due to anemia. Initially patient required 1 unit of blood transfusion. However, he continues to have drop in his hemoglobin posttransfusion. His Hemoccult was positive. GI was consulted and patient underwent endoscopy which showed duodenal ulcer without any active bleeding. After 2 units of blood transfusion, his hemoglobin remained stable.   He was

## 2023-05-17 NOTE — PROGRESS NOTES
[facility] - Progress Note  Admit Date: 5/11/2023    Assessment:     -Sickle cell crisis. -Sickle cell anemia.   -Acute HFmrEF exacerbation, lower extremity edema, elevated proBNP, new mild LV dysfunction by Echo. -Hypoalbuminemia, 2.4 on 5/13/23.   -mild LV dysfunction, new dx this admission. Echo with EF 45-50%   -HTN      No Primary cardiologist, consult by Dr. Sadaf Macdonald:     Noted plans for EGD today. Plan to transition to maintenance diuretics today. No BB due to borderline low heart rate. Continue Losartan. Nutrition consult for CHF diet education. Plan outpatient nuclear stress test and followup echo.    ------------------------------------------------------  Creatinine better. We will transition to oral Lasix. We will plan outpatient nuclear stress test for completeness and followup echo. I saw, examined, and evaluated this patient and performed the substantive portion of the encounter for > 50% of the time including extensive history, physical exam, and medical decision making as discussed with patient and next-of-kin as needed. I personally reviewed the patient's labs, tests, vitals, orders, medications, updated history, and other providers assessments as well. I personally agree with the findings as stated and the plan as documented. Morris Bird MD        Subjective:     No new complaints. Leg swelling improved, denies CP or SOB.      Objective:      Patient Vitals for the past 8 hrs:   Temp Pulse Resp BP SpO2   05/15/23 0750 97.4 °F (36.3 °C) 54 18 (!) 152/84 100 %   05/15/23 0345 97.9 °F (36.6 °C) 61 18 (!) 158/76 --           Patient Vitals for the past 96 hrs:   Weight   05/12/23 0733 129 lb (58.5 kg)   05/12/23 0543 129 lb (58.5 kg)                      Intake/Output Summary (Last 24 hours) at 5/15/2023 0907  Last data filed at 5/15/2023 0659  Gross per 24 hour   Intake 240 ml   Output 1500 ml   Net -1260 ml         Physical Exam:  General:  alert, appears
[facility] Hospitalist Group  Progress Note    Patient: Davon Putnam Age: 60 y.o. : 1960 MR#: [id number] SSN: [id number]  Date/Time: 5/15/2023    Subjective:     Patient was seen earlier today. He is slow in response. No chest or abdominal pain. No nausea or vomiting. No headaches or dizziness. Chronic leg and back pain present. Assessment:     1. Sickle cell pain episodes, much better  2. Sickle cell anemia, s/p PRBC  3. Hypertension  4. Leg swelling/edema due to #5  5. New onset cardiomyopathy with EF of 45 percentage  6. Dark stool and azotemia could be due to upper GI bleed  7. Hypomagnesemia, resolved    PLAN:    Repeat H&H in the morning  Discussed with GI nurse petitioner: Continue PPI and patient is going for endoscopy today. Dr. Lanny Haines to follow this patient as an outpatient  We will discontinue IV fluid after endoscopy.   Continue low-dose Cozaar and Lasix as per cardiologist.  continue Folvite  Continue Dilaudid and Zofran as needed for symptomatic treatment  Doppler is negative for DVT  PT and OT to follow    Discharge disposition: Can be discharged home on Tuesday if remains stable clinically    Total time of taking care of this patient was greater than 35 minutes    Case discussed with:  [x]Patient  []Family  [x]Nursing  []Case Management    DVT Prophylaxis:  [x]Lovenox  []Hep SQ  []SCDs  []Coumadin   []On Heparin gtt    Objective:     VS: BP (!) 152/84   Pulse 54   Temp 97.4 °F (36.3 °C) (Oral)   Resp 18   Ht 5' 4\" (1.626 m)   Wt 129 lb (58.5 kg)   SpO2 100%   BMI 22.14 kg/m²    Tmax/24hrs: Temp (24hrs), Av °F (36.7 °C), Min:97.4 °F (36.3 °C), Max:99 °F (37.2 °C)    Input/Output:   Intake/Output Summary (Last 24 hours) at 5/15/2023 0901  Last data filed at 5/15/2023 0659  Gross per 24 hour   Intake 240 ml   Output 1500 ml   Net -1260 ml       General appearance -opens eyes and follows verbal commands, cachectic, and in no distress  Chest
[facility] Hospitalist Group  Progress Note    Patient: Domingo Cruz Age: 60 y.o. : 1960 MR#: [id number] SSN: [id number]  Date/Time: 2023    Subjective:     Patient is feeling much better. He is tolerating current diet. No nausea or vomiting. No abdominal pain. No more shortness of breath or cough. Chronic leg and back pain. Patient remembers seeing Dr. Aron Chandra this morning. Assessment:     1. Sickle cell pain episodes, much better  2. Sickle cell anemia, s/p PRBC  3. Hypertension  4. Leg swelling/edema due to #5  5. New onset cardiomyopathy with EF of 45 percentage  6. Duodenal ulcer  7.   Hypomagnesemia, resolved    PLAN:    GI input noted: No NSAID, continue PPI, outpatient follow-up with GI  Dr. Aron Chandra to follow this patient as an outpatient  Continue low-dose Cozaar and Lasix as per cardiologist.  continue Folvite  Continue Dilaudid and Zofran as needed for symptomatic treatment  Doppler is negative for DVT  PT and OT to follow    Discharge disposition: Can be discharged home today    Total time of taking care of this patient was greater than 35 minutes    Case discussed with:  [x]Patient  []Family  [x]Nursing  []Case Management    DVT Prophylaxis:  [x]Lovenox  []Hep SQ  []SCDs  []Coumadin   []On Heparin gtt    Objective:     VS: BP (!) 144/77   Pulse 62   Temp 97.7 °F (36.5 °C) (Oral)   Resp 20   Ht 5' 4\" (1.626 m)   Wt 129 lb (58.5 kg)   SpO2 99%   BMI 22.14 kg/m²    Tmax/24hrs: Temp (24hrs), Av.6 °F (36.4 °C), Min:97.1 °F (36.2 °C), Max:98 °F (36.7 °C)    Input/Output:   Intake/Output Summary (Last 24 hours) at 2023 1213  Last data filed at 2023 1023  Gross per 24 hour   Intake 1206.25 ml   Output 400 ml   Net 806.25 ml       General appearance -opens eyes and follows verbal commands, cachectic, and in no distress  Chest -clear air entry noted in bases, no wheezes today  Heart - S1 and S2 normal, murmur present  Abdomen - soft,
Pt declines to participate in OT skilled tx intervention d/t currently NPO while awaiting procedure and c/o feeling very hungry, states he will not perform well while feeling so hungry-nursing notified. OT to follow.
[email or web address]  [phone]    Gastroenterology Progress Note    Impression:  1. Multifactorial Anemia w/ melena   - Hgb as low as 5.5 during admission w/ dark stools noted last 5/14. EGD 5/15 showed grade A esophagitis as well as duodenal bulb stricture w/ ulcer w/ no active bleeding but likely the source of patient's recent bleeding. 5/16 Hgb 8.0.  - 8/2012 EGD-[place] nml duodenum, grade 1 esophagitis, HH, chronic inactive HP negative gastritis; colo nml. 2. Sickle Cell Crisis   3. Acute CHF - EF 45  4. Mild LV dysfunction   5. HTN  6. H/O PUD  7. 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 - s/p CCY 2005  - 2/2012 EUS scarred dudodenal bulb (likely fromn PUD) unable to pass descending duodenum. - 4/2015 ERCP attempted w/ duodenal stricutre and CBD stones w/ massively dilated biliary tree on imaging. Plan:  1. Recommend PPI BID until seen as an OP by GLST. Will arrange follow up in 2-3mo and consider repeat EGD to evaluate for ulcer healing and/or stricture dilation. 2. Monitor H/H, transfuse if Hgb <7.  3. Advance diet as tolerated. 4. Avoid NSAID use for life. 5. Continue medical management per primary. Thank you for this consultation. We will not actively follow, but remain available for questions. Chief Complaint: PUD, dark stools, anemia      Subjective:  Doing well today, wishes to go home as soon as possible. does not want to have a colonoscopy as an IP.     ROS: Denies any fevers, chills, nausea, vomiting, or abdominal pain.        Patient Active Problem List   Diagnosis    Cholangitis    Fever    Sickle-cell anemia (HCC)    Sickle cell anemia (HCC)    Occult blood in stools    Infected venous access port    Sickle cell anemia with pain (HCC)    Sickle cell anemia with crisis ([address]    Nosocomial pneumonia    Sickle cell crisis ([address]    Anemia    Recurrent right inguinal hernia    Cholestatic jaundice    Benign obstructive jaundice    Biliary stone
[email or web address]  [phone]    Gastroenterology Progress Note    Impression:  1. Multifactorial Anemia w/ melena   - Hgb as low as 5.5 during admission w/ dark stools noted last 5/14.  - 8/2012 EGD-[place] nml duodenum, grade 1 esophagitis, HH, chronic inactive HP negative gastritis; colo nml. 2. Sickle Cell Crisis   3. Acute CHF - EF 45  4. Mild LV dysfunction   5. HTN  6. H/O PUD  7. 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 - s/p CCY 2005  - 2/2012 EUS scarred dudodenal bulb (likely fromn PUD) unable to pass descending duodenum. - 4/2015 ERCP attempted w/ duodenal stricutre and CBD stones w/ massively dilated biliary tree on imaging. Plan:  1. Tentatively plan on EGD today. Continue NPO status. Hold anticoagulation. Procedure posted to board. Patient will need a colonoscopy for CRCS as an OP. 2. IV PPI BID  3. Monitor H/H, transfuse if Hgb <7.  4. Continue medical management per primary. Chief Complaint: anemia, dark stools. Subjective:  Patient is overall doing well, has mild periumbilical pain. Offered patient both an EGD-[place] this week; however, patient declines colonoscopy at this time as he wishes to eat and be discharged as soon as possible. ROS: Denies any fevers, chills, nausea, vomiting.        Patient Active Problem List   Diagnosis    Cholangitis    Fever    Sickle-cell anemia (HCC)    Sickle cell anemia (HCC)    Occult blood in stools    Infected venous access port    Sickle cell anemia with pain (HCC)    Sickle cell anemia with crisis ([address]    Nosocomial pneumonia    Sickle cell crisis ([address]    Anemia    Recurrent right inguinal hernia    Cholestatic jaundice    Benign obstructive jaundice    Biliary stone    Other hemoglobinopathies (HCC)    Intractable pain    Leg swelling    Sickle-cell anemia with crisis ([address]    Primary hypertension    Sickle cell pain crisis (HCC)    Dark stools         BP (!) 158/76   Pulse 61   Temp 97.9 °F
bilaterally  Mouth - mucous membranes moist, pharynx normal without lesions  Neck - supple, no significant adenopathy  Lymphatics - no palpable lymphadenopathy, no hepatosplenomegaly  Chest - clear to auscultation, no wheezes, rales or rhonchi, symmetric air entry  Heart - normal rate, regular rhythm, normal S1, S2, no murmurs, rubs, clicks or gallops  Abdomen - soft, nontender, nondistended, no masses or organomegaly  Back exam - full range of motion, no tenderness, palpable spasm or pain on motion  Neurological - alert, oriented, normal speech, no focal findings or movement disorder noted  Extremities - peripheral pulses normal, no pedal edema, no clubbing or cyanosis  Skin - normal coloration and turgor, no rashes, no suspicious skin lesions noted          Labs:  Basic Metabolic Profile   Recent Labs     05/14/23  1057 05/15/23  0550    142   CO2 25 24   BUN 62* 46*   GLUCOSE 128* 101*   CALCIUM 8.1* 8.1*        CBC w/Diff    Recent Labs     05/14/23  1057 05/15/23  0550 05/16/23  0400   WBC  --  10.3  --    HCT 26.8* 23.6* 24.4*    No results for input(s): BANDS, SEGS in the last 72 hours. Invalid input(s): LYMPHOCYTES     Hepatic Panel   Hepatic Function  Lab Results   Component Value Date    ALBUMIN 0.6 (L) 05/13/2023    ALKPHOS 90 05/13/2023         Coagulation   No results for input(s): INR, APTT in the last 72 hours.     Invalid input(s): PT       Current medications reviewed    Niko Grimaldo MD   [facility] [phone]
grossly normal bilaterally  Mouth - mucous membranes moist, pharynx normal without lesions  Neck - supple, no significant adenopathy  Lymphatics - no palpable lymphadenopathy, no hepatosplenomegaly  Chest - clear to auscultation, no wheezes, rales or rhonchi, symmetric air entry  Heart - normal rate, regular rhythm, normal S1, S2, no murmurs, rubs, clicks or gallops  Abdomen - soft, nontender, nondistended, no masses or organomegaly  Back exam - full range of motion, no tenderness, palpable spasm or pain on motion  Neurological - alert, oriented, normal speech, no focal findings or movement disorder noted  Extremities - peripheral pulses normal, no pedal edema, no clubbing or cyanosis  Skin - normal coloration and turgor, no rashes, no suspicious skin lesions noted          Labs:  Basic Metabolic Profile   Recent Labs     05/13/23  0515 05/14/23  1057 05/15/23  0550    138 142   CO2 27 25 24   BUN 32* 62* 46*   GLUCOSE 88 128* 101*   CALCIUM 7.9* 8.1* 8.1*        CBC w/Diff    Recent Labs     05/13/23  0515 05/13/23  1503 05/14/23  1057 05/15/23  0550   WBC 8.8  --   --  10.3   HCT 19.6* 16.7* 26.8* 23.6*    No results for input(s): BANDS, SEGS in the last 72 hours. Invalid input(s): LYMPHOCYTES     Hepatic Panel   Hepatic Function  Lab Results   Component Value Date    ALBUMIN 0.6 (L) 05/13/2023    ALKPHOS 90 05/13/2023         Coagulation   No results for input(s): INR, APTT in the last 72 hours.     Invalid input(s): PT       Current medications reviewed  Jenaro Amaya MD   [facility] [phone]

## 2023-06-26 ENCOUNTER — OFFICE VISIT (OUTPATIENT)
Age: 63
End: 2023-06-26
Payer: COMMERCIAL

## 2023-06-26 VITALS
DIASTOLIC BLOOD PRESSURE: 64 MMHG | HEART RATE: 68 BPM | SYSTOLIC BLOOD PRESSURE: 124 MMHG | WEIGHT: 123 LBS | HEIGHT: 64 IN | BODY MASS INDEX: 21 KG/M2 | OXYGEN SATURATION: 94 %

## 2023-06-26 DIAGNOSIS — D57.09 SICKLE CELL DISEASE WITH CRISIS AND OTHER COMPLICATION (HCC): ICD-10-CM

## 2023-06-26 DIAGNOSIS — I42.9 CARDIOMYOPATHY, UNSPECIFIED TYPE (HCC): Primary | ICD-10-CM

## 2023-06-26 DIAGNOSIS — R60.9 EDEMA, UNSPECIFIED TYPE: ICD-10-CM

## 2023-06-26 PROCEDURE — 99214 OFFICE O/P EST MOD 30 MIN: CPT | Performed by: INTERNAL MEDICINE

## 2023-06-26 PROCEDURE — 3074F SYST BP LT 130 MM HG: CPT | Performed by: INTERNAL MEDICINE

## 2023-06-26 PROCEDURE — 3078F DIAST BP <80 MM HG: CPT | Performed by: INTERNAL MEDICINE

## 2023-06-26 ASSESSMENT — ENCOUNTER SYMPTOMS
RESPIRATORY NEGATIVE: 1
EYES NEGATIVE: 1
GASTROINTESTINAL NEGATIVE: 1

## 2023-06-28 ENCOUNTER — TELEPHONE (OUTPATIENT)
Age: 63
End: 2023-06-28

## 2023-07-07 ENCOUNTER — HOSPITAL ENCOUNTER (OUTPATIENT)
Facility: HOSPITAL | Age: 63
Setting detail: INFUSION SERIES
End: 2023-07-07
Payer: COMMERCIAL

## 2023-07-07 VITALS
HEART RATE: 76 BPM | OXYGEN SATURATION: 97 % | TEMPERATURE: 98.4 F | RESPIRATION RATE: 16 BRPM | SYSTOLIC BLOOD PRESSURE: 127 MMHG | DIASTOLIC BLOOD PRESSURE: 64 MMHG

## 2023-07-07 LAB — HISTORY CHECK: NORMAL

## 2023-07-07 PROCEDURE — 86920 COMPATIBILITY TEST SPIN: CPT

## 2023-07-07 PROCEDURE — 86850 RBC ANTIBODY SCREEN: CPT

## 2023-07-07 PROCEDURE — 86901 BLOOD TYPING SEROLOGIC RH(D): CPT

## 2023-07-07 PROCEDURE — 86921 COMPATIBILITY TEST INCUBATE: CPT

## 2023-07-07 PROCEDURE — 36591 DRAW BLOOD OFF VENOUS DEVICE: CPT

## 2023-07-07 PROCEDURE — 2580000003 HC RX 258: Performed by: INTERNAL MEDICINE

## 2023-07-07 PROCEDURE — 86902 BLOOD TYPE ANTIGEN DONOR EA: CPT

## 2023-07-07 PROCEDURE — 85660 RBC SICKLE CELL TEST: CPT

## 2023-07-07 PROCEDURE — 86900 BLOOD TYPING SEROLOGIC ABO: CPT

## 2023-07-07 PROCEDURE — 86870 RBC ANTIBODY IDENTIFICATION: CPT

## 2023-07-07 PROCEDURE — 6360000002 HC RX W HCPCS

## 2023-07-07 PROCEDURE — 86922 COMPATIBILITY TEST ANTIGLOB: CPT

## 2023-07-07 RX ORDER — HEPARIN 100 UNIT/ML
SYRINGE INTRAVENOUS
Status: COMPLETED
Start: 2023-07-07 | End: 2023-07-07

## 2023-07-07 RX ORDER — SODIUM CHLORIDE 0.9 % (FLUSH) 0.9 %
5-40 SYRINGE (ML) INJECTION 2 TIMES DAILY
Status: DISCONTINUED | OUTPATIENT
Start: 2023-07-07 | End: 2023-09-29

## 2023-07-07 RX ORDER — HEPARIN 100 UNIT/ML
500 SYRINGE INTRAVENOUS PRN
Status: DISCONTINUED | OUTPATIENT
Start: 2023-07-07 | End: 2023-09-29

## 2023-07-07 RX ADMIN — HEPARIN 500 UNITS: 100 SYRINGE at 09:10

## 2023-07-07 RX ADMIN — SODIUM CHLORIDE, PRESERVATIVE FREE 30 ML: 5 INJECTION INTRAVENOUS at 09:10

## 2023-07-10 ENCOUNTER — HOSPITAL ENCOUNTER (OUTPATIENT)
Facility: HOSPITAL | Age: 63
Setting detail: INFUSION SERIES
End: 2023-07-10
Payer: COMMERCIAL

## 2023-07-10 VITALS
TEMPERATURE: 97.2 F | SYSTOLIC BLOOD PRESSURE: 132 MMHG | DIASTOLIC BLOOD PRESSURE: 64 MMHG | OXYGEN SATURATION: 100 % | HEART RATE: 58 BPM | RESPIRATION RATE: 16 BRPM

## 2023-07-10 PROCEDURE — 2580000003 HC RX 258: Performed by: INTERNAL MEDICINE

## 2023-07-10 PROCEDURE — 36430 TRANSFUSION BLD/BLD COMPNT: CPT

## 2023-07-10 PROCEDURE — P9040 RBC LEUKOREDUCED IRRADIATED: HCPCS

## 2023-07-10 PROCEDURE — 6370000000 HC RX 637 (ALT 250 FOR IP): Performed by: INTERNAL MEDICINE

## 2023-07-10 PROCEDURE — 6360000002 HC RX W HCPCS: Performed by: INTERNAL MEDICINE

## 2023-07-10 RX ORDER — SODIUM CHLORIDE 0.9 % (FLUSH) 0.9 %
5-40 SYRINGE (ML) INJECTION PRN
Status: DISCONTINUED | OUTPATIENT
Start: 2023-07-10 | End: 2023-09-29

## 2023-07-10 RX ORDER — DIPHENHYDRAMINE HCL 25 MG
25 CAPSULE ORAL ONCE
Status: COMPLETED | OUTPATIENT
Start: 2023-07-10 | End: 2023-07-10

## 2023-07-10 RX ORDER — SODIUM CHLORIDE 9 MG/ML
INJECTION, SOLUTION INTRAVENOUS PRN
Status: COMPLETED | OUTPATIENT
Start: 2023-07-10 | End: 2023-07-10

## 2023-07-10 RX ORDER — HEPARIN 100 UNIT/ML
500 SYRINGE INTRAVENOUS PRN
Status: DISCONTINUED | OUTPATIENT
Start: 2023-07-10 | End: 2023-09-29

## 2023-07-10 RX ORDER — ACETAMINOPHEN 325 MG/1
650 TABLET ORAL ONCE
Status: COMPLETED | OUTPATIENT
Start: 2023-07-10 | End: 2023-07-10

## 2023-07-10 RX ADMIN — ACETAMINOPHEN 650 MG: 325 TABLET, FILM COATED ORAL at 09:25

## 2023-07-10 RX ADMIN — DIPHENHYDRAMINE HYDROCHLORIDE 25 MG: 25 CAPSULE ORAL at 09:25

## 2023-07-10 RX ADMIN — SODIUM CHLORIDE: 9 INJECTION, SOLUTION INTRAVENOUS at 09:35

## 2023-07-10 RX ADMIN — HEPARIN 500 UNITS: 100 SYRINGE at 13:10

## 2023-07-10 RX ADMIN — SODIUM CHLORIDE, PRESERVATIVE FREE 20 ML: 5 INJECTION INTRAVENOUS at 13:10

## 2023-07-10 RX ADMIN — SODIUM CHLORIDE, PRESERVATIVE FREE 10 ML: 5 INJECTION INTRAVENOUS at 09:30

## 2023-07-10 ASSESSMENT — PAIN - FUNCTIONAL ASSESSMENT: PAIN_FUNCTIONAL_ASSESSMENT: 0-10

## 2023-07-10 ASSESSMENT — PAIN DESCRIPTION - DESCRIPTORS: DESCRIPTORS: THROBBING

## 2023-07-24 ENCOUNTER — HOSPITAL ENCOUNTER (OUTPATIENT)
Facility: HOSPITAL | Age: 63
Setting detail: INFUSION SERIES
End: 2023-07-24
Payer: COMMERCIAL

## 2023-07-24 VITALS
SYSTOLIC BLOOD PRESSURE: 152 MMHG | HEART RATE: 85 BPM | RESPIRATION RATE: 16 BRPM | DIASTOLIC BLOOD PRESSURE: 67 MMHG | TEMPERATURE: 98.4 F | OXYGEN SATURATION: 94 %

## 2023-07-24 PROCEDURE — 2580000003 HC RX 258: Performed by: INTERNAL MEDICINE

## 2023-07-24 PROCEDURE — 86922 COMPATIBILITY TEST ANTIGLOB: CPT

## 2023-07-24 PROCEDURE — 86900 BLOOD TYPING SEROLOGIC ABO: CPT

## 2023-07-24 PROCEDURE — 85660 RBC SICKLE CELL TEST: CPT

## 2023-07-24 PROCEDURE — 86870 RBC ANTIBODY IDENTIFICATION: CPT

## 2023-07-24 PROCEDURE — 86921 COMPATIBILITY TEST INCUBATE: CPT

## 2023-07-24 PROCEDURE — 36591 DRAW BLOOD OFF VENOUS DEVICE: CPT

## 2023-07-24 PROCEDURE — 6360000002 HC RX W HCPCS: Performed by: INTERNAL MEDICINE

## 2023-07-24 PROCEDURE — 86901 BLOOD TYPING SEROLOGIC RH(D): CPT

## 2023-07-24 PROCEDURE — 86920 COMPATIBILITY TEST SPIN: CPT

## 2023-07-24 PROCEDURE — 86902 BLOOD TYPE ANTIGEN DONOR EA: CPT

## 2023-07-24 PROCEDURE — 86850 RBC ANTIBODY SCREEN: CPT

## 2023-07-24 RX ORDER — SODIUM CHLORIDE 0.9 % (FLUSH) 0.9 %
5-40 SYRINGE (ML) INJECTION 2 TIMES DAILY
Status: DISCONTINUED | OUTPATIENT
Start: 2023-07-24 | End: 2023-09-29

## 2023-07-24 RX ORDER — HEPARIN SODIUM (PORCINE) LOCK FLUSH IV SOLN 100 UNIT/ML 100 UNIT/ML
500 SOLUTION INTRAVENOUS PRN
Status: DISCONTINUED | OUTPATIENT
Start: 2023-07-24 | End: 2023-09-29

## 2023-07-24 RX ADMIN — SODIUM CHLORIDE, PRESERVATIVE FREE 30 ML: 5 INJECTION INTRAVENOUS at 09:27

## 2023-07-24 RX ADMIN — HEPARIN 500 UNITS: 100 SYRINGE at 09:27

## 2023-07-24 ASSESSMENT — PAIN DESCRIPTION - LOCATION: LOCATION: LEG;HIP

## 2023-07-24 ASSESSMENT — PAIN DESCRIPTION - ORIENTATION: ORIENTATION: LEFT;RIGHT

## 2023-07-24 ASSESSMENT — PAIN SCALES - GENERAL: PAINLEVEL_OUTOF10: 7

## 2023-07-26 ENCOUNTER — HOSPITAL ENCOUNTER (OUTPATIENT)
Facility: HOSPITAL | Age: 63
Setting detail: INFUSION SERIES
End: 2023-07-26
Payer: COMMERCIAL

## 2023-07-26 VITALS
OXYGEN SATURATION: 99 % | DIASTOLIC BLOOD PRESSURE: 74 MMHG | HEART RATE: 63 BPM | SYSTOLIC BLOOD PRESSURE: 154 MMHG | TEMPERATURE: 98.1 F | RESPIRATION RATE: 16 BRPM

## 2023-07-26 LAB — HISTORY CHECK: NORMAL

## 2023-07-26 PROCEDURE — 2580000003 HC RX 258: Performed by: INTERNAL MEDICINE

## 2023-07-26 PROCEDURE — 36430 TRANSFUSION BLD/BLD COMPNT: CPT

## 2023-07-26 PROCEDURE — P9040 RBC LEUKOREDUCED IRRADIATED: HCPCS

## 2023-07-26 PROCEDURE — 6360000002 HC RX W HCPCS: Performed by: INTERNAL MEDICINE

## 2023-07-26 PROCEDURE — 6370000000 HC RX 637 (ALT 250 FOR IP): Performed by: INTERNAL MEDICINE

## 2023-07-26 RX ORDER — HEPARIN SODIUM (PORCINE) LOCK FLUSH IV SOLN 100 UNIT/ML 100 UNIT/ML
500 SOLUTION INTRAVENOUS PRN
Status: DISCONTINUED | OUTPATIENT
Start: 2023-07-26 | End: 2023-09-29

## 2023-07-26 RX ORDER — SODIUM CHLORIDE 0.9 % (FLUSH) 0.9 %
5-40 SYRINGE (ML) INJECTION PRN
Status: DISCONTINUED | OUTPATIENT
Start: 2023-07-26 | End: 2023-09-29

## 2023-07-26 RX ORDER — ACETAMINOPHEN 325 MG/1
650 TABLET ORAL ONCE
Status: COMPLETED | OUTPATIENT
Start: 2023-07-26 | End: 2023-07-26

## 2023-07-26 RX ORDER — SODIUM CHLORIDE 9 MG/ML
INJECTION, SOLUTION INTRAVENOUS PRN
Status: COMPLETED | OUTPATIENT
Start: 2023-07-26 | End: 2023-07-26

## 2023-07-26 RX ORDER — DIPHENHYDRAMINE HCL 25 MG
25 CAPSULE ORAL ONCE
Status: COMPLETED | OUTPATIENT
Start: 2023-07-26 | End: 2023-07-26

## 2023-07-26 RX ADMIN — HEPARIN 500 UNITS: 100 SYRINGE at 13:17

## 2023-07-26 RX ADMIN — SODIUM CHLORIDE, PRESERVATIVE FREE 10 ML: 5 INJECTION INTRAVENOUS at 13:16

## 2023-07-26 RX ADMIN — ACETAMINOPHEN 650 MG: 325 TABLET ORAL at 09:23

## 2023-07-26 RX ADMIN — SODIUM CHLORIDE, PRESERVATIVE FREE 10 ML: 5 INJECTION INTRAVENOUS at 09:22

## 2023-07-26 RX ADMIN — DIPHENHYDRAMINE HYDROCHLORIDE 25 MG: 25 CAPSULE ORAL at 09:23

## 2023-07-26 RX ADMIN — SODIUM CHLORIDE: 9 INJECTION, SOLUTION INTRAVENOUS at 09:28

## 2023-07-26 ASSESSMENT — PAIN SCALES - GENERAL: PAINLEVEL_OUTOF10: 6

## 2023-07-26 ASSESSMENT — PAIN DESCRIPTION - LOCATION: LOCATION: HIP

## 2023-07-26 ASSESSMENT — PAIN DESCRIPTION - PAIN TYPE: TYPE: CHRONIC PAIN

## 2023-07-26 ASSESSMENT — PAIN DESCRIPTION - ORIENTATION: ORIENTATION: LEFT;RIGHT

## 2023-07-26 ASSESSMENT — PAIN DESCRIPTION - DESCRIPTORS: DESCRIPTORS: THROBBING

## 2023-08-15 ENCOUNTER — HOSPITAL ENCOUNTER (OUTPATIENT)
Facility: HOSPITAL | Age: 63
Setting detail: INFUSION SERIES
End: 2023-08-15
Payer: COMMERCIAL

## 2023-08-15 VITALS
RESPIRATION RATE: 16 BRPM | HEART RATE: 72 BPM | OXYGEN SATURATION: 96 % | DIASTOLIC BLOOD PRESSURE: 65 MMHG | TEMPERATURE: 98.7 F | SYSTOLIC BLOOD PRESSURE: 131 MMHG

## 2023-08-15 LAB — HISTORY CHECK: NORMAL

## 2023-08-15 PROCEDURE — 86901 BLOOD TYPING SEROLOGIC RH(D): CPT

## 2023-08-15 PROCEDURE — 6360000002 HC RX W HCPCS: Performed by: INTERNAL MEDICINE

## 2023-08-15 PROCEDURE — 86902 BLOOD TYPE ANTIGEN DONOR EA: CPT

## 2023-08-15 PROCEDURE — 86920 COMPATIBILITY TEST SPIN: CPT

## 2023-08-15 PROCEDURE — 86922 COMPATIBILITY TEST ANTIGLOB: CPT

## 2023-08-15 PROCEDURE — 2580000003 HC RX 258: Performed by: INTERNAL MEDICINE

## 2023-08-15 PROCEDURE — 86870 RBC ANTIBODY IDENTIFICATION: CPT

## 2023-08-15 PROCEDURE — 86921 COMPATIBILITY TEST INCUBATE: CPT

## 2023-08-15 PROCEDURE — 86850 RBC ANTIBODY SCREEN: CPT

## 2023-08-15 PROCEDURE — 36415 COLL VENOUS BLD VENIPUNCTURE: CPT

## 2023-08-15 PROCEDURE — 86900 BLOOD TYPING SEROLOGIC ABO: CPT

## 2023-08-15 PROCEDURE — 36591 DRAW BLOOD OFF VENOUS DEVICE: CPT

## 2023-08-15 RX ORDER — HEPARIN SODIUM (PORCINE) LOCK FLUSH IV SOLN 100 UNIT/ML 100 UNIT/ML
500 SOLUTION INTRAVENOUS PRN
Status: DISCONTINUED | OUTPATIENT
Start: 2023-08-15 | End: 2023-09-29

## 2023-08-15 RX ORDER — SODIUM CHLORIDE 0.9 % (FLUSH) 0.9 %
5-40 SYRINGE (ML) INJECTION PRN
Status: DISCONTINUED | OUTPATIENT
Start: 2023-08-15 | End: 2023-09-29

## 2023-08-15 RX ADMIN — HEPARIN 500 UNITS: 100 SYRINGE at 14:20

## 2023-08-15 RX ADMIN — SODIUM CHLORIDE, PRESERVATIVE FREE 30 ML: 5 INJECTION INTRAVENOUS at 14:20

## 2023-08-15 ASSESSMENT — PAIN - FUNCTIONAL ASSESSMENT: PAIN_FUNCTIONAL_ASSESSMENT: 0-10

## 2023-08-15 ASSESSMENT — PAIN DESCRIPTION - DESCRIPTORS: DESCRIPTORS: THROBBING

## 2023-08-17 ENCOUNTER — HOSPITAL ENCOUNTER (OUTPATIENT)
Facility: HOSPITAL | Age: 63
Setting detail: INFUSION SERIES
End: 2023-08-17
Payer: COMMERCIAL

## 2023-08-17 VITALS
TEMPERATURE: 97.8 F | SYSTOLIC BLOOD PRESSURE: 146 MMHG | OXYGEN SATURATION: 100 % | RESPIRATION RATE: 18 BRPM | DIASTOLIC BLOOD PRESSURE: 73 MMHG | HEART RATE: 63 BPM

## 2023-08-17 PROCEDURE — P9040 RBC LEUKOREDUCED IRRADIATED: HCPCS

## 2023-08-17 PROCEDURE — 6360000002 HC RX W HCPCS: Performed by: INTERNAL MEDICINE

## 2023-08-17 PROCEDURE — 2580000003 HC RX 258: Performed by: INTERNAL MEDICINE

## 2023-08-17 PROCEDURE — 85660 RBC SICKLE CELL TEST: CPT

## 2023-08-17 PROCEDURE — 36430 TRANSFUSION BLD/BLD COMPNT: CPT

## 2023-08-17 PROCEDURE — 6370000000 HC RX 637 (ALT 250 FOR IP): Performed by: INTERNAL MEDICINE

## 2023-08-17 RX ORDER — DIPHENHYDRAMINE HCL 25 MG
25 CAPSULE ORAL ONCE
Status: COMPLETED | OUTPATIENT
Start: 2023-08-17 | End: 2023-08-17

## 2023-08-17 RX ORDER — SODIUM CHLORIDE 9 MG/ML
INJECTION, SOLUTION INTRAVENOUS CONTINUOUS
Status: DISCONTINUED | OUTPATIENT
Start: 2023-08-17 | End: 2023-09-03

## 2023-08-17 RX ORDER — SODIUM CHLORIDE 9 MG/ML
INJECTION, SOLUTION INTRAVENOUS PRN
Status: DISCONTINUED | OUTPATIENT
Start: 2023-08-17 | End: 2023-09-29

## 2023-08-17 RX ORDER — HEPARIN 100 UNIT/ML
500 SYRINGE INTRAVENOUS PRN
Status: DISCONTINUED | OUTPATIENT
Start: 2023-08-17 | End: 2023-09-29

## 2023-08-17 RX ORDER — ACETAMINOPHEN 325 MG/1
650 TABLET ORAL ONCE
Status: COMPLETED | OUTPATIENT
Start: 2023-08-17 | End: 2023-08-17

## 2023-08-17 RX ORDER — SODIUM CHLORIDE 0.9 % (FLUSH) 0.9 %
5-40 SYRINGE (ML) INJECTION PRN
Status: DISCONTINUED | OUTPATIENT
Start: 2023-08-17 | End: 2023-09-29

## 2023-08-17 RX ADMIN — SODIUM CHLORIDE: 9 INJECTION, SOLUTION INTRAVENOUS at 09:20

## 2023-08-17 RX ADMIN — DIPHENHYDRAMINE HYDROCHLORIDE 25 MG: 25 CAPSULE ORAL at 09:25

## 2023-08-17 RX ADMIN — Medication 10 ML: at 13:07

## 2023-08-17 RX ADMIN — HEPARIN 500 UNITS: 100 SYRINGE at 13:08

## 2023-08-17 RX ADMIN — Medication 10 ML: at 09:20

## 2023-08-17 RX ADMIN — ACETAMINOPHEN 650 MG: 325 TABLET ORAL at 09:25

## 2023-08-17 RX ADMIN — Medication 10 ML: at 13:08

## 2023-08-17 ASSESSMENT — PAIN DESCRIPTION - LOCATION: LOCATION: HIP;LEG

## 2023-08-17 ASSESSMENT — PAIN DESCRIPTION - ORIENTATION: ORIENTATION: RIGHT;LEFT

## 2023-08-17 ASSESSMENT — PAIN SCALES - GENERAL: PAINLEVEL_OUTOF10: 8

## 2023-08-31 ENCOUNTER — APPOINTMENT (OUTPATIENT)
Facility: HOSPITAL | Age: 63
DRG: 812 | End: 2023-08-31
Payer: COMMERCIAL

## 2023-08-31 ENCOUNTER — HOSPITAL ENCOUNTER (INPATIENT)
Facility: HOSPITAL | Age: 63
LOS: 2 days | Discharge: HOME | DRG: 812 | End: 2023-09-03
Attending: EMERGENCY MEDICINE | Admitting: INTERNAL MEDICINE
Payer: COMMERCIAL

## 2023-08-31 DIAGNOSIS — D57.00 SICKLE CELL PAIN CRISIS (HCC): ICD-10-CM

## 2023-08-31 DIAGNOSIS — D64.9 SYMPTOMATIC ANEMIA: ICD-10-CM

## 2023-08-31 DIAGNOSIS — N17.9 AKI (ACUTE KIDNEY INJURY) (HCC): Primary | ICD-10-CM

## 2023-08-31 LAB
ALBUMIN SERPL-MCNC: 3 G/DL (ref 3.4–5)
ALBUMIN/GLOB SERPL: 0.7 (ref 0.8–1.7)
ALP SERPL-CCNC: 114 U/L (ref 45–117)
ALT SERPL-CCNC: 36 U/L (ref 16–61)
ANION GAP SERPL CALC-SCNC: 4 MMOL/L (ref 3–18)
AST SERPL-CCNC: 66 U/L (ref 10–38)
BASOPHILS # BLD: 0 K/UL (ref 0–0.1)
BASOPHILS NFR BLD: 0 % (ref 0–2)
BILIRUB SERPL-MCNC: 3.2 MG/DL (ref 0.2–1)
BUN SERPL-MCNC: 43 MG/DL (ref 7–18)
BUN/CREAT SERPL: 20 (ref 12–20)
CALCIUM SERPL-MCNC: 7.3 MG/DL (ref 8.5–10.1)
CHLORIDE SERPL-SCNC: 105 MMOL/L (ref 100–111)
CO2 SERPL-SCNC: 23 MMOL/L (ref 21–32)
CREAT SERPL-MCNC: 2.16 MG/DL (ref 0.6–1.3)
DIFFERENTIAL METHOD BLD: ABNORMAL
EOSINOPHIL # BLD: 0 K/UL (ref 0–0.4)
EOSINOPHIL NFR BLD: 0 % (ref 0–5)
ERYTHROCYTE [DISTWIDTH] IN BLOOD BY AUTOMATED COUNT: 17.1 % (ref 11.6–14.5)
GLOBULIN SER CALC-MCNC: 4.5 G/DL (ref 2–4)
GLUCOSE SERPL-MCNC: 122 MG/DL (ref 74–99)
HCT VFR BLD AUTO: 18.1 % (ref 36–48)
HGB BLD-MCNC: 6.2 G/DL (ref 13–16)
HISTORY CHECK: NORMAL
IMM GRANULOCYTES # BLD AUTO: 0 K/UL (ref 0–0.04)
IMM GRANULOCYTES NFR BLD AUTO: 0 % (ref 0–0.5)
LYMPHOCYTES # BLD: 0.9 K/UL (ref 0.9–3.6)
LYMPHOCYTES NFR BLD: 8 % (ref 21–52)
MCH RBC QN AUTO: 30.4 PG (ref 24–34)
MCHC RBC AUTO-ENTMCNC: 34.3 G/DL (ref 31–37)
MCV RBC AUTO: 88.7 FL (ref 78–100)
MONOCYTES # BLD: 0.8 K/UL (ref 0.05–1.2)
MONOCYTES NFR BLD: 7 % (ref 3–10)
NEUTS SEG # BLD: 9.9 K/UL (ref 1.8–8)
NEUTS SEG NFR BLD: 85 % (ref 40–73)
NRBC # BLD: 0.07 K/UL (ref 0–0.01)
NRBC BLD-RTO: 0.6 PER 100 WBC
PLATELET # BLD AUTO: 153 K/UL (ref 135–420)
PLATELET COMMENT: ABNORMAL
PMV BLD AUTO: 9.9 FL (ref 9.2–11.8)
POTASSIUM SERPL-SCNC: 4.3 MMOL/L (ref 3.5–5.5)
PROT SERPL-MCNC: 7.5 G/DL (ref 6.4–8.2)
RBC # BLD AUTO: 2.04 M/UL (ref 4.35–5.65)
RBC MORPH BLD: ABNORMAL
RETICS/RBC NFR AUTO: 5.1 % (ref 0.5–2.5)
SODIUM SERPL-SCNC: 132 MMOL/L (ref 136–145)
WBC # BLD AUTO: 11.6 K/UL (ref 4.6–13.2)

## 2023-08-31 PROCEDURE — 80053 COMPREHEN METABOLIC PANEL: CPT

## 2023-08-31 PROCEDURE — 86900 BLOOD TYPING SEROLOGIC ABO: CPT

## 2023-08-31 PROCEDURE — 86880 COOMBS TEST DIRECT: CPT

## 2023-08-31 PROCEDURE — 82746 ASSAY OF FOLIC ACID SERUM: CPT

## 2023-08-31 PROCEDURE — 82728 ASSAY OF FERRITIN: CPT

## 2023-08-31 PROCEDURE — 86850 RBC ANTIBODY SCREEN: CPT

## 2023-08-31 PROCEDURE — 86901 BLOOD TYPING SEROLOGIC RH(D): CPT

## 2023-08-31 PROCEDURE — 84484 ASSAY OF TROPONIN QUANT: CPT

## 2023-08-31 PROCEDURE — 83010 ASSAY OF HAPTOGLOBIN QUANT: CPT

## 2023-08-31 PROCEDURE — 86904 BLOOD TYPING PATIENT SERUM: CPT

## 2023-08-31 PROCEDURE — 71045 X-RAY EXAM CHEST 1 VIEW: CPT

## 2023-08-31 PROCEDURE — 86922 COMPATIBILITY TEST ANTIGLOB: CPT

## 2023-08-31 PROCEDURE — 86870 RBC ANTIBODY IDENTIFICATION: CPT

## 2023-08-31 PROCEDURE — 93005 ELECTROCARDIOGRAM TRACING: CPT | Performed by: EMERGENCY MEDICINE

## 2023-08-31 PROCEDURE — 84145 PROCALCITONIN (PCT): CPT

## 2023-08-31 PROCEDURE — 83540 ASSAY OF IRON: CPT

## 2023-08-31 PROCEDURE — 2580000003 HC RX 258: Performed by: EMERGENCY MEDICINE

## 2023-08-31 PROCEDURE — 85045 AUTOMATED RETICULOCYTE COUNT: CPT

## 2023-08-31 PROCEDURE — 96376 TX/PRO/DX INJ SAME DRUG ADON: CPT

## 2023-08-31 PROCEDURE — 82607 VITAMIN B-12: CPT

## 2023-08-31 PROCEDURE — 85025 COMPLETE CBC W/AUTO DIFF WBC: CPT

## 2023-08-31 PROCEDURE — 6360000002 HC RX W HCPCS: Performed by: EMERGENCY MEDICINE

## 2023-08-31 PROCEDURE — 96374 THER/PROPH/DIAG INJ IV PUSH: CPT

## 2023-08-31 PROCEDURE — 99285 EMERGENCY DEPT VISIT HI MDM: CPT

## 2023-08-31 PROCEDURE — 86920 COMPATIBILITY TEST SPIN: CPT

## 2023-08-31 PROCEDURE — 83550 IRON BINDING TEST: CPT

## 2023-08-31 PROCEDURE — 83880 ASSAY OF NATRIURETIC PEPTIDE: CPT

## 2023-08-31 RX ORDER — SODIUM CHLORIDE 9 MG/ML
INJECTION, SOLUTION INTRAVENOUS PRN
Status: DISCONTINUED | OUTPATIENT
Start: 2023-08-31 | End: 2023-09-03 | Stop reason: HOSPADM

## 2023-08-31 RX ORDER — HYDROMORPHONE HYDROCHLORIDE 2 MG/ML
2 INJECTION, SOLUTION INTRAMUSCULAR; INTRAVENOUS; SUBCUTANEOUS
Status: DISCONTINUED | OUTPATIENT
Start: 2023-08-31 | End: 2023-09-03 | Stop reason: HOSPADM

## 2023-08-31 RX ORDER — HYDROMORPHONE HYDROCHLORIDE 2 MG/1
4 TABLET ORAL
Status: DISCONTINUED | OUTPATIENT
Start: 2023-08-31 | End: 2023-08-31

## 2023-08-31 RX ORDER — HYDROMORPHONE HYDROCHLORIDE 1 MG/ML
1 INJECTION, SOLUTION INTRAMUSCULAR; INTRAVENOUS; SUBCUTANEOUS ONCE
Status: COMPLETED | OUTPATIENT
Start: 2023-08-31 | End: 2023-08-31

## 2023-08-31 RX ORDER — NALOXONE HYDROCHLORIDE 1 MG/ML
1 INJECTION INTRAMUSCULAR; INTRAVENOUS; SUBCUTANEOUS PRN
Status: DISCONTINUED | OUTPATIENT
Start: 2023-08-31 | End: 2023-09-03 | Stop reason: HOSPADM

## 2023-08-31 RX ORDER — 0.9 % SODIUM CHLORIDE 0.9 %
1000 INTRAVENOUS SOLUTION INTRAVENOUS ONCE
Status: COMPLETED | OUTPATIENT
Start: 2023-08-31 | End: 2023-08-31

## 2023-08-31 RX ADMIN — HYDROMORPHONE HYDROCHLORIDE 2 MG: 2 INJECTION, SOLUTION INTRAMUSCULAR; INTRAVENOUS; SUBCUTANEOUS at 23:28

## 2023-08-31 RX ADMIN — HYDROMORPHONE HYDROCHLORIDE 1 MG: 1 INJECTION, SOLUTION INTRAMUSCULAR; INTRAVENOUS; SUBCUTANEOUS at 21:28

## 2023-08-31 RX ADMIN — HYDROMORPHONE HYDROCHLORIDE 1 MG: 1 INJECTION, SOLUTION INTRAMUSCULAR; INTRAVENOUS; SUBCUTANEOUS at 20:18

## 2023-08-31 RX ADMIN — SODIUM CHLORIDE 1000 ML: 9 INJECTION, SOLUTION INTRAVENOUS at 20:18

## 2023-08-31 ASSESSMENT — PAIN SCALES - GENERAL
PAINLEVEL_OUTOF10: 10

## 2023-08-31 ASSESSMENT — LIFESTYLE VARIABLES
HOW MANY STANDARD DRINKS CONTAINING ALCOHOL DO YOU HAVE ON A TYPICAL DAY: PATIENT DOES NOT DRINK
HOW OFTEN DO YOU HAVE A DRINK CONTAINING ALCOHOL: NEVER

## 2023-08-31 ASSESSMENT — PAIN DESCRIPTION - LOCATION: LOCATION: HIP

## 2023-08-31 ASSESSMENT — PAIN - FUNCTIONAL ASSESSMENT: PAIN_FUNCTIONAL_ASSESSMENT: 0-10

## 2023-08-31 NOTE — ED TRIAGE NOTES
Pt c/o of sickle cell pain to arms, hips and legs. Reports he normally takes Dilaudid 4 mg. Pt noted with [employer] for frequent blood transfusions.

## 2023-09-01 ENCOUNTER — APPOINTMENT (OUTPATIENT)
Facility: HOSPITAL | Age: 63
DRG: 812 | End: 2023-09-01
Payer: COMMERCIAL

## 2023-09-01 PROBLEM — N17.9 AKI (ACUTE KIDNEY INJURY) (HCC): Status: ACTIVE | Noted: 2023-09-01

## 2023-09-01 LAB
ANION GAP SERPL CALC-SCNC: 3 MMOL/L (ref 3–18)
APPEARANCE UR: CLEAR
B PERT DNA SPEC QL NAA+PROBE: NOT DETECTED
BACTERIA URNS QL MICRO: NEGATIVE /HPF
BILIRUB UR QL: NEGATIVE
BORDETELLA PARAPERTUSSIS BY PCR: NOT DETECTED
BUN SERPL-MCNC: 36 MG/DL (ref 7–18)
BUN/CREAT SERPL: 22 (ref 12–20)
C PNEUM DNA SPEC QL NAA+PROBE: NOT DETECTED
CALCIUM SERPL-MCNC: 7.8 MG/DL (ref 8.5–10.1)
CHLORIDE SERPL-SCNC: 109 MMOL/L (ref 100–111)
CO2 SERPL-SCNC: 22 MMOL/L (ref 21–32)
COLOR UR: YELLOW
CREAT SERPL-MCNC: 1.61 MG/DL (ref 0.6–1.3)
CREAT UR-MCNC: 34 MG/DL (ref 30–125)
EKG ATRIAL RATE: 81 BPM
EKG ATRIAL RATE: 81 BPM
EKG DIAGNOSIS: NORMAL
EKG DIAGNOSIS: NORMAL
EKG P AXIS: 55 DEGREES
EKG P AXIS: 57 DEGREES
EKG P-R INTERVAL: 146 MS
EKG P-R INTERVAL: 148 MS
EKG Q-T INTERVAL: 470 MS
EKG Q-T INTERVAL: 490 MS
EKG QRS DURATION: 86 MS
EKG QRS DURATION: 88 MS
EKG QTC CALCULATION (BAZETT): 545 MS
EKG QTC CALCULATION (BAZETT): 569 MS
EKG R AXIS: -13 DEGREES
EKG R AXIS: -16 DEGREES
EKG T AXIS: 158 DEGREES
EKG T AXIS: 79 DEGREES
EKG VENTRICULAR RATE: 81 BPM
EKG VENTRICULAR RATE: 81 BPM
EPITH CASTS URNS QL MICRO: NORMAL /LPF (ref 0–5)
ERYTHROCYTE [DISTWIDTH] IN BLOOD BY AUTOMATED COUNT: 17 % (ref 11.6–14.5)
FERRITIN SERPL-MCNC: 4310 NG/ML (ref 8–388)
FLUAV SUBTYP SPEC NAA+PROBE: NOT DETECTED
FLUBV RNA SPEC QL NAA+PROBE: NOT DETECTED
FOLATE SERPL-MCNC: 5.2 NG/ML (ref 3.1–17.5)
GLUCOSE SERPL-MCNC: 104 MG/DL (ref 74–99)
GLUCOSE UR STRIP.AUTO-MCNC: NEGATIVE MG/DL
HADV DNA SPEC QL NAA+PROBE: NOT DETECTED
HAPTOGLOB SERPL-MCNC: <8 MG/DL (ref 30–200)
HCOV 229E RNA SPEC QL NAA+PROBE: NOT DETECTED
HCOV HKU1 RNA SPEC QL NAA+PROBE: NOT DETECTED
HCOV NL63 RNA SPEC QL NAA+PROBE: NOT DETECTED
HCOV OC43 RNA SPEC QL NAA+PROBE: NOT DETECTED
HCT VFR BLD AUTO: 19.6 % (ref 36–48)
HGB BLD-MCNC: 6.5 G/DL (ref 13–16)
HGB UR QL STRIP: ABNORMAL
HMPV RNA SPEC QL NAA+PROBE: NOT DETECTED
HPIV1 RNA SPEC QL NAA+PROBE: NOT DETECTED
HPIV2 RNA SPEC QL NAA+PROBE: NOT DETECTED
HPIV3 RNA SPEC QL NAA+PROBE: NOT DETECTED
HPIV4 RNA SPEC QL NAA+PROBE: NOT DETECTED
IRON SATN MFR SERPL: 43 % (ref 20–50)
IRON SERPL-MCNC: 75 UG/DL (ref 50–175)
KETONES UR QL STRIP.AUTO: NEGATIVE MG/DL
LEUKOCYTE ESTERASE UR QL STRIP.AUTO: NEGATIVE
M PNEUMO DNA SPEC QL NAA+PROBE: NOT DETECTED
MAGNESIUM SERPL-MCNC: 1.8 MG/DL (ref 1.6–2.6)
MCH RBC QN AUTO: 30 PG (ref 24–34)
MCHC RBC AUTO-ENTMCNC: 33.2 G/DL (ref 31–37)
MCV RBC AUTO: 90.3 FL (ref 78–100)
MUCOUS THREADS URNS QL MICRO: NEGATIVE /LPF
NITRITE UR QL STRIP.AUTO: NEGATIVE
NRBC # BLD: 0.05 K/UL (ref 0–0.01)
NRBC BLD-RTO: 0.5 PER 100 WBC
NT PRO BNP: 812 PG/ML (ref 0–900)
PH UR STRIP: 5.5 (ref 5–8)
PHOSPHATE SERPL-MCNC: 3.5 MG/DL (ref 2.5–4.9)
PLATELET # BLD AUTO: 162 K/UL (ref 135–420)
PMV BLD AUTO: 11.1 FL (ref 9.2–11.8)
POTASSIUM SERPL-SCNC: 4.8 MMOL/L (ref 3.5–5.5)
PROCALCITONIN SERPL-MCNC: 0.62 NG/ML
PROT UR STRIP-MCNC: 30 MG/DL
RBC # BLD AUTO: 2.17 M/UL (ref 4.35–5.65)
RBC #/AREA URNS HPF: NEGATIVE /HPF (ref 0–5)
RSV RNA SPEC QL NAA+PROBE: NOT DETECTED
RV+EV RNA SPEC QL NAA+PROBE: NOT DETECTED
SARS-COV-2 RNA RESP QL NAA+PROBE: NOT DETECTED
SODIUM SERPL-SCNC: 134 MMOL/L (ref 136–145)
SODIUM UR-SCNC: 60 MMOL/L (ref 20–110)
SP GR UR REFRACTOMETRY: 1.01 (ref 1–1.03)
TIBC SERPL-MCNC: 176 UG/DL (ref 250–450)
TROPONIN I SERPL HS-MCNC: 21 NG/L (ref 0–78)
TROPONIN I SERPL HS-MCNC: 24 NG/L (ref 0–78)
UROBILINOGEN UR QL STRIP.AUTO: 1 EU/DL (ref 0.2–1)
VIT B12 SERPL-MCNC: 273 PG/ML (ref 211–911)
WBC # BLD AUTO: 9.7 K/UL (ref 4.6–13.2)
WBC URNS QL MICRO: NEGATIVE /HPF (ref 0–4)

## 2023-09-01 PROCEDURE — 36415 COLL VENOUS BLD VENIPUNCTURE: CPT

## 2023-09-01 PROCEDURE — 0202U NFCT DS 22 TRGT SARS-COV-2: CPT

## 2023-09-01 PROCEDURE — 6360000002 HC RX W HCPCS: Performed by: EMERGENCY MEDICINE

## 2023-09-01 PROCEDURE — 30233N1 TRANSFUSION OF NONAUTOLOGOUS RED BLOOD CELLS INTO PERIPHERAL VEIN, PERCUTANEOUS APPROACH: ICD-10-PCS | Performed by: INTERNAL MEDICINE

## 2023-09-01 PROCEDURE — 2580000003 HC RX 258: Performed by: INTERNAL MEDICINE

## 2023-09-01 PROCEDURE — 84100 ASSAY OF PHOSPHORUS: CPT

## 2023-09-01 PROCEDURE — 84300 ASSAY OF URINE SODIUM: CPT

## 2023-09-01 PROCEDURE — 80048 BASIC METABOLIC PNL TOTAL CA: CPT

## 2023-09-01 PROCEDURE — P9040 RBC LEUKOREDUCED IRRADIATED: HCPCS

## 2023-09-01 PROCEDURE — 85027 COMPLETE CBC AUTOMATED: CPT

## 2023-09-01 PROCEDURE — 86921 COMPATIBILITY TEST INCUBATE: CPT

## 2023-09-01 PROCEDURE — 93010 ELECTROCARDIOGRAM REPORT: CPT | Performed by: INTERNAL MEDICINE

## 2023-09-01 PROCEDURE — 87040 BLOOD CULTURE FOR BACTERIA: CPT

## 2023-09-01 PROCEDURE — 1100000003 HC PRIVATE W/ TELEMETRY

## 2023-09-01 PROCEDURE — 83735 ASSAY OF MAGNESIUM: CPT

## 2023-09-01 PROCEDURE — 82570 ASSAY OF URINE CREATININE: CPT

## 2023-09-01 PROCEDURE — 36430 TRANSFUSION BLD/BLD COMPNT: CPT

## 2023-09-01 PROCEDURE — 76770 US EXAM ABDO BACK WALL COMP: CPT

## 2023-09-01 PROCEDURE — 6370000000 HC RX 637 (ALT 250 FOR IP): Performed by: INTERNAL MEDICINE

## 2023-09-01 PROCEDURE — 86922 COMPATIBILITY TEST ANTIGLOB: CPT

## 2023-09-01 PROCEDURE — 86902 BLOOD TYPE ANTIGEN DONOR EA: CPT

## 2023-09-01 PROCEDURE — 81001 URINALYSIS AUTO W/SCOPE: CPT

## 2023-09-01 PROCEDURE — 85660 RBC SICKLE CELL TEST: CPT

## 2023-09-01 PROCEDURE — 99222 1ST HOSP IP/OBS MODERATE 55: CPT | Performed by: INTERNAL MEDICINE

## 2023-09-01 RX ORDER — ONDANSETRON 4 MG/1
4 TABLET, ORALLY DISINTEGRATING ORAL EVERY 8 HOURS PRN
Status: DISCONTINUED | OUTPATIENT
Start: 2023-09-01 | End: 2023-09-03 | Stop reason: HOSPADM

## 2023-09-01 RX ORDER — ACETAMINOPHEN 325 MG/1
650 TABLET ORAL EVERY 6 HOURS PRN
Status: DISCONTINUED | OUTPATIENT
Start: 2023-09-01 | End: 2023-09-03 | Stop reason: HOSPADM

## 2023-09-01 RX ORDER — POLYETHYLENE GLYCOL 3350 17 G/17G
17 POWDER, FOR SOLUTION ORAL DAILY PRN
Status: DISCONTINUED | OUTPATIENT
Start: 2023-09-01 | End: 2023-09-03 | Stop reason: HOSPADM

## 2023-09-01 RX ORDER — SODIUM FLUORIDE 6 MG/ML
PASTE, DENTIFRICE DENTAL
COMMUNITY
Start: 2023-08-24

## 2023-09-01 RX ORDER — ENOXAPARIN SODIUM 100 MG/ML
30 INJECTION SUBCUTANEOUS DAILY
Status: DISCONTINUED | OUTPATIENT
Start: 2023-09-01 | End: 2023-09-03 | Stop reason: HOSPADM

## 2023-09-01 RX ORDER — HYDROMORPHONE HYDROCHLORIDE 4 MG/1
4 TABLET ORAL EVERY 4 HOURS PRN
Status: DISCONTINUED | OUTPATIENT
Start: 2023-09-01 | End: 2023-09-03 | Stop reason: HOSPADM

## 2023-09-01 RX ORDER — SODIUM CHLORIDE 9 MG/ML
INJECTION, SOLUTION INTRAVENOUS PRN
Status: DISCONTINUED | OUTPATIENT
Start: 2023-09-01 | End: 2023-09-03 | Stop reason: HOSPADM

## 2023-09-01 RX ORDER — FOLIC ACID 1 MG/1
1 TABLET ORAL DAILY
Status: DISCONTINUED | OUTPATIENT
Start: 2023-09-01 | End: 2023-09-03 | Stop reason: HOSPADM

## 2023-09-01 RX ORDER — PANTOPRAZOLE SODIUM 40 MG/1
40 TABLET, DELAYED RELEASE ORAL
Status: DISCONTINUED | OUTPATIENT
Start: 2023-09-01 | End: 2023-09-03 | Stop reason: HOSPADM

## 2023-09-01 RX ORDER — ACETAMINOPHEN 650 MG/1
650 SUPPOSITORY RECTAL EVERY 6 HOURS PRN
Status: DISCONTINUED | OUTPATIENT
Start: 2023-09-01 | End: 2023-09-03 | Stop reason: HOSPADM

## 2023-09-01 RX ORDER — SODIUM CHLORIDE 0.9 % (FLUSH) 0.9 %
5-40 SYRINGE (ML) INJECTION PRN
Status: DISCONTINUED | OUTPATIENT
Start: 2023-09-01 | End: 2023-09-03 | Stop reason: HOSPADM

## 2023-09-01 RX ORDER — ONDANSETRON 2 MG/ML
4 INJECTION INTRAMUSCULAR; INTRAVENOUS EVERY 6 HOURS PRN
Status: DISCONTINUED | OUTPATIENT
Start: 2023-09-01 | End: 2023-09-03 | Stop reason: HOSPADM

## 2023-09-01 RX ORDER — SODIUM CHLORIDE 9 MG/ML
INJECTION, SOLUTION INTRAVENOUS CONTINUOUS
Status: DISPENSED | OUTPATIENT
Start: 2023-09-01 | End: 2023-09-02

## 2023-09-01 RX ORDER — AMOXICILLIN 500 MG/1
500 CAPSULE ORAL 2 TIMES DAILY
Status: ON HOLD | COMMUNITY
Start: 2023-08-24 | End: 2023-09-03 | Stop reason: HOSPADM

## 2023-09-01 RX ORDER — SODIUM CHLORIDE 0.9 % (FLUSH) 0.9 %
5-40 SYRINGE (ML) INJECTION EVERY 12 HOURS SCHEDULED
Status: DISCONTINUED | OUTPATIENT
Start: 2023-09-01 | End: 2023-09-03 | Stop reason: HOSPADM

## 2023-09-01 RX ADMIN — HYDROMORPHONE HYDROCHLORIDE 2 MG: 2 INJECTION, SOLUTION INTRAMUSCULAR; INTRAVENOUS; SUBCUTANEOUS at 01:49

## 2023-09-01 RX ADMIN — PANTOPRAZOLE SODIUM 40 MG: 40 TABLET, DELAYED RELEASE ORAL at 14:38

## 2023-09-01 RX ADMIN — HYDROMORPHONE HYDROCHLORIDE 4 MG: 4 TABLET ORAL at 10:59

## 2023-09-01 RX ADMIN — HYDROMORPHONE HYDROCHLORIDE 2 MG: 2 INJECTION, SOLUTION INTRAMUSCULAR; INTRAVENOUS; SUBCUTANEOUS at 22:04

## 2023-09-01 RX ADMIN — HYDROMORPHONE HYDROCHLORIDE 4 MG: 4 TABLET ORAL at 17:08

## 2023-09-01 RX ADMIN — HYDROMORPHONE HYDROCHLORIDE 2 MG: 2 INJECTION, SOLUTION INTRAMUSCULAR; INTRAVENOUS; SUBCUTANEOUS at 08:51

## 2023-09-01 RX ADMIN — SODIUM CHLORIDE, PRESERVATIVE FREE 10 ML: 5 INJECTION INTRAVENOUS at 09:26

## 2023-09-01 RX ADMIN — HYDROMORPHONE HYDROCHLORIDE 2 MG: 2 INJECTION, SOLUTION INTRAMUSCULAR; INTRAVENOUS; SUBCUTANEOUS at 04:17

## 2023-09-01 RX ADMIN — SODIUM CHLORIDE: 9 INJECTION, SOLUTION INTRAVENOUS at 04:17

## 2023-09-01 RX ADMIN — SODIUM CHLORIDE: 9 INJECTION, SOLUTION INTRAVENOUS at 09:45

## 2023-09-01 RX ADMIN — FOLIC ACID 1 MG: 1 TABLET ORAL at 09:24

## 2023-09-01 RX ADMIN — HYDROMORPHONE HYDROCHLORIDE 2 MG: 2 INJECTION, SOLUTION INTRAMUSCULAR; INTRAVENOUS; SUBCUTANEOUS at 19:36

## 2023-09-01 RX ADMIN — SODIUM CHLORIDE, PRESERVATIVE FREE 10 ML: 5 INJECTION INTRAVENOUS at 22:14

## 2023-09-01 RX ADMIN — HYDROMORPHONE HYDROCHLORIDE 2 MG: 2 INJECTION, SOLUTION INTRAMUSCULAR; INTRAVENOUS; SUBCUTANEOUS at 12:44

## 2023-09-01 RX ADMIN — HYDROMORPHONE HYDROCHLORIDE 2 MG: 2 INJECTION, SOLUTION INTRAMUSCULAR; INTRAVENOUS; SUBCUTANEOUS at 14:37

## 2023-09-01 ASSESSMENT — PAIN DESCRIPTION - DESCRIPTORS
DESCRIPTORS: ACHING

## 2023-09-01 ASSESSMENT — PAIN SCALES - GENERAL
PAINLEVEL_OUTOF10: 10
PAINLEVEL_OUTOF10: 9
PAINLEVEL_OUTOF10: 10
PAINLEVEL_OUTOF10: 10
PAINLEVEL_OUTOF10: 0
PAINLEVEL_OUTOF10: 10
PAINLEVEL_OUTOF10: 9

## 2023-09-01 ASSESSMENT — PAIN DESCRIPTION - LOCATION
LOCATION: LEG;BACK
LOCATION: BACK;LEG
LOCATION: GENERALIZED
LOCATION: GENERALIZED

## 2023-09-01 ASSESSMENT — PAIN DESCRIPTION - ORIENTATION: ORIENTATION: LEFT;RIGHT

## 2023-09-01 NOTE — ED PROVIDER NOTES
EMERGENCY DEPARTMENT HISTORY AND PHYSICAL EXAM      Patient Name: Valentina Garcia  MRN: [id number]  YOB: 1960  Provider: Bea Kingston MD  PCP: Hattie Osman MD   Time/Date of evaluation: 9:56 PM EDT on 8/31/23    History of Presenting Illness     Chief Complaint   Patient presents with    Sickle Cell Pain Crisis       History Provided By: Patient     History Moses Orris):   Valentina Garcia is a 58 y.o. male with a PMHX of sickle cell anemia, hemoglobin SS, cardiomyopathy, GERD, and pulmonary hypertension  who presents to the emergency department  by POV C/O sickle cell pain crisis. The patient states that he woke up in the middle of the night last night and was screaming in pain. He took his regular pain medications and he could not get rid of the pain. He is currently having pain in his chest, arms, legs, and back. He states that locations are the normal sides of his pain crises. He denies any fevers. He is somewhat transfusion dependent. He states that his last transfusion was 2 to 3 months ago. He sees Dr. Betzy Campo from hematology oncology.         Past History     Past Medical History:  Past Medical History:   Diagnosis Date    Anemia     Cardiomyopathy ([address])     Duodenal ulcer     GERD (gastroesophageal reflux disease)     History of blood transfusion     last transfusion 7/10/2023    Other ill-defined conditions(799.89) Sickle cell    Pulmonary HTN (HCC)     mild per echo 5/11/23 -36 mmHG RVSP    Sickle cell anemia (HCC)        Past Surgical History:  Past Surgical History:   Procedure Laterality Date    CHOLECYSTECTOMY      IR BILIARY DRAIN INT AND EXT  11/7/2017    IR BILIARY DRAIN INT AND EXT 11/7/2017 [address] RAD ANGIO IR    RETINAL DETACHMENT SURGERY Right     UPPER GASTROINTESTINAL ENDOSCOPY N/A 5/15/2023    ESOPHAGOGASTRODUODENOSCOPY with bxs performed by Jessie Buchanan MD at [facility]       Family History:  Family History   Problem

## 2023-09-01 NOTE — CONSENT
Informed Consent for Blood Component Transfusion Note    I have discussed with the patient the rationale for blood component transfusion; its benefits in treating or preventing fatigue, organ damage, or death; and its risk which includes mild transfusion reactions, rare risk of blood borne infection, or more serious but rare reactions. I have discussed the alternatives to transfusion, including the risk and consequences of not receiving transfusion. The patient had an opportunity to ask questions and had agreed to proceed with transfusion of blood components.     Electronically signed by Jerad Jules DO on 9/1/23 at 10:50 AM EDT

## 2023-09-01 NOTE — ED NOTES
TRANSFER - OUT REPORT:    Verbal report given to LIZZETH Taveras on Galileo Castillo  being transferred to 0 for routine progression of patient care       Report consisted of patient's Situation, Background, Assessment and   Recommendations(SBAR). Information from the following report(s) ED SBAR was reviewed with the receiving nurse. [place] Assessment:    Presents to emergency department  because of falls (Syncope, seizure, or loss of consciousness): No  Age > 70: No  Altered Mental Status, Intoxication with alcohol or substance confusion (Disorientation, impaired judgment, poor safety awaremess, or inability to follow instructions): No  Impaired Mobility: Ambulates or transfers with assistive devices or assistance; Unable to ambulate or transer.: No  Nursing Judgement: No          Lines:   Single Lumen Implantable Port 08/31/23 Right Subclavian (Active)        Opportunity for questions and clarification was provided.       Patient transported with:  Candis Holt RN  09/01/23 4508

## 2023-09-01 NOTE — ED NOTES
Blood bank called to follow up on blood per staff pt has  a lot of antibodies and it will be a while before it gets here.      Cayetano Carranza RN  09/01/23 3786

## 2023-09-01 NOTE — ED NOTES
Patient started yelling and screaming in pain, states the blankets were too heavy, once blankets removed, patient stated he wanted to utilize the urinal, patient assisted to standing position, patient endorses pain to right arm.        Baldev Lopes RN  09/01/23 9737

## 2023-09-01 NOTE — H&P
joint or muscle pain, ++ back pain, no recent trauma. DERMATOLOGIC: No rash, no itching, no lesions. ENDOCRINE: No polyuria, polydipsia, No recent change in weight. HEMATOLOGICAL: No easy bruising or bleeding. NEUROLOGIC: No headache, No seizures, ++ generalized weakness         Past Medical History:   Diagnosis Date    WILFRIDO (acute kidney injury) ([address]) 2023    Anemia     Cardiomyopathy (HCC)     Duodenal ulcer     GERD (gastroesophageal reflux disease)     History of blood transfusion     last transfusion 7/10/2023    Other ill-defined conditions(799.89) Sickle cell    Pulmonary HTN (HCC)     mild per echo 23 -36 mmHG RVSP    Sickle cell anemia (HCC)        Past Surgical History:   Procedure Laterality Date    CHOLECYSTECTOMY      IR BILIARY DRAIN INT AND EXT  2017    IR BILIARY DRAIN INT AND EXT 2017 [address] RAD ANGIO IR    RETINAL DETACHMENT SURGERY Right     UPPER GASTROINTESTINAL ENDOSCOPY N/A 5/15/2023    ESOPHAGOGASTRODUODENOSCOPY with bxs performed by Quirino Cook MD at [facility]      double lumen [facility]       Family History   Problem Relation Age of Onset    Sickle Cell Anemia Sister          in her 42's from [place] from flu vaccine    Stroke Neg Hx     Heart Attack Neg Hx        Social History     Socioeconomic History    Marital status:    Tobacco Use    Smoking status: Never     Passive exposure: Never    Smokeless tobacco: Never   Vaping Use    Vaping Use: Never used   Substance and Sexual Activity    Alcohol use: No    Drug use: No       Prior to Admission medications    Medication Sig Start Date End Date Taking?  Authorizing Provider   amoxicillin (AMOXIL) 500 MG capsule Take 1 capsule by mouth 2 times daily 23   Historical Provider, MD   PREVIDENT 5000 BOOSTER PLUS 1.1 % PSTE  23   Historical Provider, MD   Elastic Bandages & Supports ([address]) Virgilio Pang 1 each by Does not apply route daily as needed (Remove at

## 2023-09-01 NOTE — ED NOTES
Patient remains in bed, able to stand and use urinal w/o assistance. Endorses pain with movement, especially right shoulder pain. Pending blood from blood bank, d/t several antibodies.        aZk Beltre RN  09/01/23 7741 H Plasty Text: Given the location of the defect, shape of the defect and the proximity to free margins a H-plasty was deemed most appropriate for repair.  Using a sterile surgical marker, the appropriate advancement arms of the H-plasty were drawn incorporating the defect and placing the expected incisions within the relaxed skin tension lines where possible. The area thus outlined was incised deep to adipose tissue with a #15 scalpel blade. The skin margins were undermined to an appropriate distance in all directions utilizing iris scissors.  The opposing advancement arms were then advanced into place in opposite direction and anchored with interrupted buried subcutaneous sutures.

## 2023-09-02 LAB
ALBUMIN SERPL-MCNC: 2.6 G/DL (ref 3.4–5)
ALBUMIN/GLOB SERPL: 0.6 (ref 0.8–1.7)
ALP SERPL-CCNC: 91 U/L (ref 45–117)
ALT SERPL-CCNC: 31 U/L (ref 16–61)
ANION GAP SERPL CALC-SCNC: 5 MMOL/L (ref 3–18)
AST SERPL-CCNC: 53 U/L (ref 10–38)
BASOPHILS # BLD: 0.1 K/UL (ref 0–0.1)
BASOPHILS NFR BLD: 1 % (ref 0–2)
BILIRUB SERPL-MCNC: 2.3 MG/DL (ref 0.2–1)
BUN SERPL-MCNC: 35 MG/DL (ref 7–18)
BUN/CREAT SERPL: 25 (ref 12–20)
CALCIUM SERPL-MCNC: 8.2 MG/DL (ref 8.5–10.1)
CHLORIDE SERPL-SCNC: 111 MMOL/L (ref 100–111)
CO2 SERPL-SCNC: 24 MMOL/L (ref 21–32)
CREAT SERPL-MCNC: 1.4 MG/DL (ref 0.6–1.3)
DIFFERENTIAL METHOD BLD: ABNORMAL
EOSINOPHIL # BLD: 0.3 K/UL (ref 0–0.4)
EOSINOPHIL NFR BLD: 4 % (ref 0–5)
ERYTHROCYTE [DISTWIDTH] IN BLOOD BY AUTOMATED COUNT: 16.9 % (ref 11.6–14.5)
GLOBULIN SER CALC-MCNC: 4.4 G/DL (ref 2–4)
GLUCOSE SERPL-MCNC: 110 MG/DL (ref 74–99)
HCT VFR BLD AUTO: 21.3 % (ref 36–48)
HGB BLD-MCNC: 7.1 G/DL (ref 13–16)
IMM GRANULOCYTES # BLD AUTO: 0 K/UL (ref 0–0.04)
IMM GRANULOCYTES NFR BLD AUTO: 0 % (ref 0–0.5)
LYMPHOCYTES # BLD: 1.5 K/UL (ref 0.9–3.6)
LYMPHOCYTES NFR BLD: 22 % (ref 21–52)
MCH RBC QN AUTO: 30 PG (ref 24–34)
MCHC RBC AUTO-ENTMCNC: 33.3 G/DL (ref 31–37)
MCV RBC AUTO: 89.9 FL (ref 78–100)
MONOCYTES # BLD: 1 K/UL (ref 0.05–1.2)
MONOCYTES NFR BLD: 14 % (ref 3–10)
NEUTS SEG # BLD: 4.3 K/UL (ref 1.8–8)
NEUTS SEG NFR BLD: 60 % (ref 40–73)
NRBC # BLD: 0.06 K/UL (ref 0–0.01)
NRBC BLD-RTO: 0.8 PER 100 WBC
PHOSPHATE SERPL-MCNC: 3 MG/DL (ref 2.5–4.9)
PLATELET # BLD AUTO: 149 K/UL (ref 135–420)
PMV BLD AUTO: 10.3 FL (ref 9.2–11.8)
POTASSIUM SERPL-SCNC: 4.5 MMOL/L (ref 3.5–5.5)
PROT SERPL-MCNC: 7 G/DL (ref 6.4–8.2)
RBC # BLD AUTO: 2.37 M/UL (ref 4.35–5.65)
RETICS/RBC NFR AUTO: 3.9 % (ref 0.5–2.5)
SODIUM SERPL-SCNC: 140 MMOL/L (ref 136–145)
WBC # BLD AUTO: 7.1 K/UL (ref 4.6–13.2)

## 2023-09-02 PROCEDURE — 6360000002 HC RX W HCPCS: Performed by: INTERNAL MEDICINE

## 2023-09-02 PROCEDURE — 80053 COMPREHEN METABOLIC PANEL: CPT

## 2023-09-02 PROCEDURE — 2580000003 HC RX 258: Performed by: INTERNAL MEDICINE

## 2023-09-02 PROCEDURE — 6360000002 HC RX W HCPCS: Performed by: EMERGENCY MEDICINE

## 2023-09-02 PROCEDURE — 84100 ASSAY OF PHOSPHORUS: CPT

## 2023-09-02 PROCEDURE — 6370000000 HC RX 637 (ALT 250 FOR IP): Performed by: INTERNAL MEDICINE

## 2023-09-02 PROCEDURE — 85025 COMPLETE CBC W/AUTO DIFF WBC: CPT

## 2023-09-02 PROCEDURE — 85045 AUTOMATED RETICULOCYTE COUNT: CPT

## 2023-09-02 PROCEDURE — 1100000003 HC PRIVATE W/ TELEMETRY

## 2023-09-02 PROCEDURE — 99222 1ST HOSP IP/OBS MODERATE 55: CPT | Performed by: INTERNAL MEDICINE

## 2023-09-02 RX ADMIN — PANTOPRAZOLE SODIUM 40 MG: 40 TABLET, DELAYED RELEASE ORAL at 16:50

## 2023-09-02 RX ADMIN — ENOXAPARIN SODIUM 30 MG: 100 INJECTION SUBCUTANEOUS at 07:54

## 2023-09-02 RX ADMIN — HYDROMORPHONE HYDROCHLORIDE 2 MG: 2 INJECTION, SOLUTION INTRAMUSCULAR; INTRAVENOUS; SUBCUTANEOUS at 07:54

## 2023-09-02 RX ADMIN — SODIUM CHLORIDE, PRESERVATIVE FREE 10 ML: 5 INJECTION INTRAVENOUS at 07:54

## 2023-09-02 RX ADMIN — SODIUM CHLORIDE, PRESERVATIVE FREE 10 ML: 5 INJECTION INTRAVENOUS at 20:43

## 2023-09-02 RX ADMIN — HYDROMORPHONE HYDROCHLORIDE 4 MG: 4 TABLET ORAL at 10:01

## 2023-09-02 RX ADMIN — HYDROMORPHONE HYDROCHLORIDE 2 MG: 2 INJECTION, SOLUTION INTRAMUSCULAR; INTRAVENOUS; SUBCUTANEOUS at 14:20

## 2023-09-02 RX ADMIN — HYDROMORPHONE HYDROCHLORIDE 4 MG: 4 TABLET ORAL at 20:43

## 2023-09-02 RX ADMIN — HYDROMORPHONE HYDROCHLORIDE 2 MG: 2 INJECTION, SOLUTION INTRAMUSCULAR; INTRAVENOUS; SUBCUTANEOUS at 22:57

## 2023-09-02 RX ADMIN — HYDROMORPHONE HYDROCHLORIDE 2 MG: 2 INJECTION, SOLUTION INTRAMUSCULAR; INTRAVENOUS; SUBCUTANEOUS at 02:20

## 2023-09-02 RX ADMIN — HYDROMORPHONE HYDROCHLORIDE 4 MG: 4 TABLET ORAL at 16:50

## 2023-09-02 RX ADMIN — HYDROMORPHONE HYDROCHLORIDE 4 MG: 4 TABLET ORAL at 00:39

## 2023-09-02 RX ADMIN — FOLIC ACID 1 MG: 1 TABLET ORAL at 07:54

## 2023-09-02 RX ADMIN — HYDROMORPHONE HYDROCHLORIDE 2 MG: 2 INJECTION, SOLUTION INTRAMUSCULAR; INTRAVENOUS; SUBCUTANEOUS at 18:08

## 2023-09-02 RX ADMIN — PANTOPRAZOLE SODIUM 40 MG: 40 TABLET, DELAYED RELEASE ORAL at 07:54

## 2023-09-02 ASSESSMENT — PAIN DESCRIPTION - ORIENTATION
ORIENTATION: RIGHT;LEFT;LOWER
ORIENTATION: LOWER;LEFT;RIGHT
ORIENTATION: RIGHT;LEFT
ORIENTATION: RIGHT;LEFT

## 2023-09-02 ASSESSMENT — PAIN DESCRIPTION - DESCRIPTORS
DESCRIPTORS: ACHING;CRAMPING
DESCRIPTORS: ACHING;CRAMPING
DESCRIPTORS: ACHING
DESCRIPTORS: CRAMPING;ACHING
DESCRIPTORS: THROBBING
DESCRIPTORS: THROBBING
DESCRIPTORS: ACHING

## 2023-09-02 ASSESSMENT — PAIN DESCRIPTION - LOCATION
LOCATION: GENERALIZED
LOCATION: BACK;LEG
LOCATION: BACK;LEG
LOCATION: GENERALIZED
LOCATION: BACK;LEG
LOCATION: BACK;LEG
LOCATION: GENERALIZED

## 2023-09-02 ASSESSMENT — PAIN SCALES - GENERAL
PAINLEVEL_OUTOF10: 9
PAINLEVEL_OUTOF10: 5
PAINLEVEL_OUTOF10: 7
PAINLEVEL_OUTOF10: 8
PAINLEVEL_OUTOF10: 9
PAINLEVEL_OUTOF10: 5
PAINLEVEL_OUTOF10: 3
PAINLEVEL_OUTOF10: 9
PAINLEVEL_OUTOF10: 8
PAINLEVEL_OUTOF10: 10

## 2023-09-03 VITALS
BODY MASS INDEX: 22.02 KG/M2 | RESPIRATION RATE: 17 BRPM | TEMPERATURE: 98.9 F | WEIGHT: 129 LBS | SYSTOLIC BLOOD PRESSURE: 124 MMHG | DIASTOLIC BLOOD PRESSURE: 68 MMHG | HEART RATE: 57 BPM | HEIGHT: 64 IN | OXYGEN SATURATION: 97 %

## 2023-09-03 LAB
ALBUMIN SERPL-MCNC: 2.9 G/DL (ref 3.4–5)
ALBUMIN/GLOB SERPL: 0.6 (ref 0.8–1.7)
ALP SERPL-CCNC: 101 U/L (ref 45–117)
ALT SERPL-CCNC: 34 U/L (ref 16–61)
ANION GAP SERPL CALC-SCNC: 4 MMOL/L (ref 3–18)
AST SERPL-CCNC: 59 U/L (ref 10–38)
BILIRUB SERPL-MCNC: 2.6 MG/DL (ref 0.2–1)
BUN SERPL-MCNC: 32 MG/DL (ref 7–18)
BUN/CREAT SERPL: 23 (ref 12–20)
CALCIUM SERPL-MCNC: 8.6 MG/DL (ref 8.5–10.1)
CHLORIDE SERPL-SCNC: 111 MMOL/L (ref 100–111)
CO2 SERPL-SCNC: 25 MMOL/L (ref 21–32)
CREAT SERPL-MCNC: 1.38 MG/DL (ref 0.6–1.3)
ERYTHROCYTE [DISTWIDTH] IN BLOOD BY AUTOMATED COUNT: 17.1 % (ref 11.6–14.5)
GLOBULIN SER CALC-MCNC: 4.7 G/DL (ref 2–4)
GLUCOSE SERPL-MCNC: 91 MG/DL (ref 74–99)
HCT VFR BLD AUTO: 23 % (ref 36–48)
HGB BLD-MCNC: 7.7 G/DL (ref 13–16)
MCH RBC QN AUTO: 30.2 PG (ref 24–34)
MCHC RBC AUTO-ENTMCNC: 33.5 G/DL (ref 31–37)
MCV RBC AUTO: 90.2 FL (ref 78–100)
NRBC # BLD: 0.04 K/UL (ref 0–0.01)
NRBC BLD-RTO: 0.5 PER 100 WBC
PLATELET # BLD AUTO: 170 K/UL (ref 135–420)
PMV BLD AUTO: 10.3 FL (ref 9.2–11.8)
POTASSIUM SERPL-SCNC: 4.8 MMOL/L (ref 3.5–5.5)
PROT SERPL-MCNC: 7.6 G/DL (ref 6.4–8.2)
RBC # BLD AUTO: 2.55 M/UL (ref 4.35–5.65)
SODIUM SERPL-SCNC: 140 MMOL/L (ref 136–145)
WBC # BLD AUTO: 7.9 K/UL (ref 4.6–13.2)

## 2023-09-03 PROCEDURE — 85027 COMPLETE CBC AUTOMATED: CPT

## 2023-09-03 PROCEDURE — 2580000003 HC RX 258: Performed by: INTERNAL MEDICINE

## 2023-09-03 PROCEDURE — 6370000000 HC RX 637 (ALT 250 FOR IP): Performed by: INTERNAL MEDICINE

## 2023-09-03 PROCEDURE — 6360000002 HC RX W HCPCS: Performed by: INTERNAL MEDICINE

## 2023-09-03 PROCEDURE — 6360000002 HC RX W HCPCS: Performed by: EMERGENCY MEDICINE

## 2023-09-03 PROCEDURE — 80053 COMPREHEN METABOLIC PANEL: CPT

## 2023-09-03 RX ORDER — SODIUM CHLORIDE 450 MG/100ML
INJECTION, SOLUTION INTRAVENOUS CONTINUOUS
Status: DISCONTINUED | OUTPATIENT
Start: 2023-09-03 | End: 2023-09-03 | Stop reason: HOSPADM

## 2023-09-03 RX ADMIN — ENOXAPARIN SODIUM 30 MG: 100 INJECTION SUBCUTANEOUS at 08:01

## 2023-09-03 RX ADMIN — HYDROMORPHONE HYDROCHLORIDE 2 MG: 2 INJECTION, SOLUTION INTRAMUSCULAR; INTRAVENOUS; SUBCUTANEOUS at 10:44

## 2023-09-03 RX ADMIN — HYDROMORPHONE HYDROCHLORIDE 2 MG: 2 INJECTION, SOLUTION INTRAMUSCULAR; INTRAVENOUS; SUBCUTANEOUS at 14:51

## 2023-09-03 RX ADMIN — HYDROMORPHONE HYDROCHLORIDE 2 MG: 2 INJECTION, SOLUTION INTRAMUSCULAR; INTRAVENOUS; SUBCUTANEOUS at 05:24

## 2023-09-03 RX ADMIN — FOLIC ACID 1 MG: 1 TABLET ORAL at 08:02

## 2023-09-03 RX ADMIN — SODIUM CHLORIDE, PRESERVATIVE FREE 10 ML: 5 INJECTION INTRAVENOUS at 08:03

## 2023-09-03 RX ADMIN — HYDROMORPHONE HYDROCHLORIDE 4 MG: 4 TABLET ORAL at 12:21

## 2023-09-03 RX ADMIN — PANTOPRAZOLE SODIUM 40 MG: 40 TABLET, DELAYED RELEASE ORAL at 06:00

## 2023-09-03 RX ADMIN — HYDROMORPHONE HYDROCHLORIDE 4 MG: 4 TABLET ORAL at 02:48

## 2023-09-03 RX ADMIN — HYDROMORPHONE HYDROCHLORIDE 4 MG: 4 TABLET ORAL at 08:02

## 2023-09-03 ASSESSMENT — PAIN SCALES - GENERAL
PAINLEVEL_OUTOF10: 8
PAINLEVEL_OUTOF10: 5
PAINLEVEL_OUTOF10: 9
PAINLEVEL_OUTOF10: 8
PAINLEVEL_OUTOF10: 9

## 2023-09-03 ASSESSMENT — PAIN DESCRIPTION - DESCRIPTORS
DESCRIPTORS: THROBBING
DESCRIPTORS: ACHING;CRAMPING
DESCRIPTORS: CRAMPING
DESCRIPTORS: ACHING;CRAMPING
DESCRIPTORS: THROBBING

## 2023-09-03 ASSESSMENT — PAIN DESCRIPTION - LOCATION
LOCATION: GENERALIZED
LOCATION: BACK;LEG
LOCATION: GENERALIZED
LOCATION: BACK;LEG
LOCATION: GENERALIZED
LOCATION: GENERALIZED

## 2023-09-03 ASSESSMENT — PAIN DESCRIPTION - ORIENTATION
ORIENTATION: LEFT;RIGHT;LOWER
ORIENTATION: RIGHT;LEFT;LOWER

## 2023-09-03 NOTE — DISCHARGE INSTRUCTIONS
Hello you came in with a sickle cell crisis. Your hemoglobin has improved and your not in any major pain. Please follow closely with Dr. Josie Hoang and your other doctors. Take care.

## 2023-09-03 NOTE — CARE COORDINATION
Case Management Assessment  Initial Evaluation    Date/Time of Evaluation: 9/3/2023 9:32 AM  Assessment Completed by: Crescencio Barbosa RN    If patient is discharged prior to next notation, then this note serves as note for discharge by case management. Patient Name: Juan Peraza                   YOB: 1960  Diagnosis: Sickle cell crisis ([address]) [D57.00]  WILFRIDO (acute kidney injury) ([address]) [N17.9]  Sickle cell pain crisis ([address]) [D57.00]  Symptomatic anemia [D64.9]                   Date / Time: 8/31/2023  6:48 PM    Patient Admission Status: Inpatient   Readmission Risk (Low < 19, Mod (19-27), High > 27): Readmission Risk Score: 17.4    Current PCP: Derrick Adkins MD  PCP verified by CM? Yes    Chart Reviewed: Yes      History Provided by: Patient  Patient Orientation: Alert and Oriented    Patient Cognition: Alert    Hospitalization in the last 30 days (Readmission):  No    If yes, Readmission Assessment in  Navigator will be completed. Advance Directives:      Code Status: Full Code   Patient's Primary Decision Maker is:        Discharge Planning:    Patient lives with: Alone Type of Home: House  Primary Care Giver: Self  Patient Support Systems include: None   Current Financial resources:    Current community resources:    Current services prior to admission: None            Current DME:              Type of Home Care services:  None    ADLS  Prior functional level: Independent in ADLs/IADLs  Current functional level:  Independent in ADLs/IADLs    PT AM-PAC:   /24  OT AM-PAC:   /24    Family can provide assistance at DC: No  Would you like Case Management to discuss the discharge plan with any other family members/significant others, and if so, who? (P) No  Plans to Return to Present Housing: Yes  Other Identified Issues/Barriers to RETURNING to current housing: none noted  Potential Assistance needed at discharge: N/A            Potential DME:    Patient expects to discharge to: [address] [place] [address]

## 2023-09-03 NOTE — PLAN OF CARE
Problem: Discharge Planning  Goal: Discharge to home or other facility with appropriate resources  9/3/2023 1432 by Alexandra Stover RN  Outcome: Adequate for Discharge  9/3/2023 0826 by Alexandra Stover RN  Outcome: Queenie Hams (Taken 9/2/2023 2040 by Christiano Phipps RN)  Discharge to home or other facility with appropriate resources: Identify barriers to discharge with patient and caregiver     Problem: Pain  Goal: Verbalizes/displays adequate comfort level or baseline comfort level  9/3/2023 1432 by Alexandra Stover RN  Outcome: Adequate for Discharge  9/3/2023 0826 by Alexandra Stover RN  Outcome: Progressing     Problem: Safety - Adult  Goal: Free from fall injury  9/3/2023 1432 by Alexandra Stover RN  Outcome: Adequate for Discharge  9/3/2023 0826 by Alexandra Stover RN  Outcome: Progressing     Problem: Skin/Tissue Integrity  Goal: Absence of new skin breakdown  Description: 1. Monitor for areas of redness and/or skin breakdown  2. Assess vascular access sites hourly  3. Every 4-6 hours minimum:  Change oxygen saturation probe site  4. Every 4-6 hours:  If on nasal continuous positive airway pressure, respiratory therapy assess nares and determine need for appliance change or resting period.   9/3/2023 1432 by Alexandra Stover RN  Outcome: Adequate for Discharge  9/3/2023 8595 by Alexandra Stover RN  Outcome: Progressing
Problem: Discharge Planning  Goal: Discharge to home or other facility with appropriate resources  Outcome: Progressing     Problem: Pain  Goal: Verbalizes/displays adequate comfort level or baseline comfort level  9/2/2023 0819 by Elgin Gilliam RN  Outcome: Progressing  9/2/2023 0129 by Zofia Weber RN  Outcome: Not Progressing     Problem: Safety - Adult  Goal: Free from fall injury  Outcome: Progressing     Problem: Skin/Tissue Integrity  Goal: Absence of new skin breakdown  Description: 1. Monitor for areas of redness and/or skin breakdown  2. Assess vascular access sites hourly  3. Every 4-6 hours minimum:  Change oxygen saturation probe site  4. Every 4-6 hours:  If on nasal continuous positive airway pressure, respiratory therapy assess nares and determine need for appliance change or resting period.   Outcome: Progressing     Problem: Pain  Goal: Verbalizes/displays adequate comfort level or baseline comfort level  9/2/2023 0819 by Elgin Gilliam RN  Outcome: Progressing  9/2/2023 0129 by Zofia Weber RN  Outcome: Not Progressing
Problem: Discharge Planning  Goal: Discharge to home or other facility with appropriate resources  Outcome: Progressing  Flowsheets (Taken 9/2/2023 2040 by Coty Pickering RN)  Discharge to home or other facility with appropriate resources: Identify barriers to discharge with patient and caregiver     Problem: Pain  Goal: Verbalizes/displays adequate comfort level or baseline comfort level  Outcome: Progressing     Problem: Safety - Adult  Goal: Free from fall injury  Outcome: Progressing     Problem: Skin/Tissue Integrity  Goal: Absence of new skin breakdown  Description: 1. Monitor for areas of redness and/or skin breakdown  2. Assess vascular access sites hourly  3. Every 4-6 hours minimum:  Change oxygen saturation probe site  4. Every 4-6 hours:  If on nasal continuous positive airway pressure, respiratory therapy assess nares and determine need for appliance change or resting period.   Outcome: Progressing
Problem: Pain  Goal: Verbalizes/displays adequate comfort level or baseline comfort level  Outcome: Not Progressing     Problem: Pain  Goal: Verbalizes/displays adequate comfort level or baseline comfort level  Outcome: Not Progressing
regular

## 2023-09-04 LAB
ABO + RH BLD: NORMAL
ANTIGENS PRESENT RBC DONR: NORMAL
ANTIGENS PRESENT RBC DONR: NORMAL
BLD PROD TYP BPU: NORMAL
BLD PROD TYP BPU: NORMAL
BLOOD BANK DISPENSE STATUS: NORMAL
BLOOD BANK DISPENSE STATUS: NORMAL
BLOOD GROUP ANTIBODIES SERPL: NORMAL
BLOOD GROUP ANTIBODIES SERPL: NORMAL
BPU ID: NORMAL
BPU ID: NORMAL
CALLED TO: NORMAL
CROSSMATCH RESULT: NORMAL
CROSSMATCH RESULT: NORMAL
SPECIMEN EXP DATE BLD: NORMAL
UNIT DIVISION: 0
UNIT DIVISION: 0

## 2023-09-07 LAB
BACTERIA SPEC CULT: NORMAL
BACTERIA SPEC CULT: NORMAL
SERVICE CMNT-IMP: NORMAL
SERVICE CMNT-IMP: NORMAL

## 2023-09-18 ENCOUNTER — HOSPITAL ENCOUNTER (OUTPATIENT)
Facility: HOSPITAL | Age: 63
Setting detail: INFUSION SERIES
End: 2023-09-18
Payer: COMMERCIAL

## 2023-09-18 VITALS
TEMPERATURE: 97.9 F | RESPIRATION RATE: 18 BRPM | HEART RATE: 84 BPM | OXYGEN SATURATION: 98 % | SYSTOLIC BLOOD PRESSURE: 126 MMHG | DIASTOLIC BLOOD PRESSURE: 59 MMHG

## 2023-09-18 LAB — HISTORY CHECK: NORMAL

## 2023-09-18 PROCEDURE — 86900 BLOOD TYPING SEROLOGIC ABO: CPT

## 2023-09-18 PROCEDURE — 86922 COMPATIBILITY TEST ANTIGLOB: CPT

## 2023-09-18 PROCEDURE — 86870 RBC ANTIBODY IDENTIFICATION: CPT

## 2023-09-18 PROCEDURE — 86921 COMPATIBILITY TEST INCUBATE: CPT

## 2023-09-18 PROCEDURE — 86920 COMPATIBILITY TEST SPIN: CPT

## 2023-09-18 PROCEDURE — 36591 DRAW BLOOD OFF VENOUS DEVICE: CPT

## 2023-09-18 PROCEDURE — 86850 RBC ANTIBODY SCREEN: CPT

## 2023-09-18 PROCEDURE — 85660 RBC SICKLE CELL TEST: CPT

## 2023-09-18 PROCEDURE — 6360000002 HC RX W HCPCS: Performed by: INTERNAL MEDICINE

## 2023-09-18 PROCEDURE — 2580000003 HC RX 258: Performed by: INTERNAL MEDICINE

## 2023-09-18 PROCEDURE — 86901 BLOOD TYPING SEROLOGIC RH(D): CPT

## 2023-09-18 RX ORDER — SODIUM CHLORIDE 0.9 % (FLUSH) 0.9 %
5-40 SYRINGE (ML) INJECTION PRN
Status: DISCONTINUED | OUTPATIENT
Start: 2023-09-18 | End: 2023-09-29

## 2023-09-18 RX ORDER — HEPARIN SODIUM (PORCINE) LOCK FLUSH IV SOLN 100 UNIT/ML 100 UNIT/ML
500 SOLUTION INTRAVENOUS PRN
Status: DISCONTINUED | OUTPATIENT
Start: 2023-09-18 | End: 2023-09-29

## 2023-09-18 RX ORDER — HEPARIN 100 UNIT/ML
SYRINGE INTRAVENOUS
Status: DISPENSED
Start: 2023-09-18 | End: 2023-09-18

## 2023-09-18 RX ADMIN — HEPARIN SODIUM (PORCINE) LOCK FLUSH IV SOLN 100 UNIT/ML 500 UNITS: 100 SOLUTION at 09:26

## 2023-09-18 RX ADMIN — Medication 10 ML: at 09:23

## 2023-09-18 RX ADMIN — Medication 10 ML: at 09:25

## 2023-09-18 ASSESSMENT — PAIN DESCRIPTION - LOCATION: LOCATION: BACK;HIP;LEG

## 2023-09-18 ASSESSMENT — PAIN SCALES - GENERAL: PAINLEVEL_OUTOF10: 10

## 2023-09-18 ASSESSMENT — PAIN DESCRIPTION - PAIN TYPE: TYPE: CHRONIC PAIN

## 2023-09-20 ENCOUNTER — HOSPITAL ENCOUNTER (OUTPATIENT)
Facility: HOSPITAL | Age: 63
Setting detail: INFUSION SERIES
End: 2023-09-20
Payer: COMMERCIAL

## 2023-09-20 VITALS
RESPIRATION RATE: 16 BRPM | SYSTOLIC BLOOD PRESSURE: 157 MMHG | OXYGEN SATURATION: 100 % | TEMPERATURE: 98.5 F | HEART RATE: 63 BPM | DIASTOLIC BLOOD PRESSURE: 73 MMHG

## 2023-09-20 PROCEDURE — P9040 RBC LEUKOREDUCED IRRADIATED: HCPCS

## 2023-09-20 PROCEDURE — 2580000003 HC RX 258: Performed by: INTERNAL MEDICINE

## 2023-09-20 PROCEDURE — 6360000002 HC RX W HCPCS: Performed by: INTERNAL MEDICINE

## 2023-09-20 PROCEDURE — 36430 TRANSFUSION BLD/BLD COMPNT: CPT

## 2023-09-20 PROCEDURE — 6370000000 HC RX 637 (ALT 250 FOR IP): Performed by: INTERNAL MEDICINE

## 2023-09-20 RX ORDER — HEPARIN SODIUM (PORCINE) LOCK FLUSH IV SOLN 100 UNIT/ML 100 UNIT/ML
500 SOLUTION INTRAVENOUS PRN
Status: DISCONTINUED | OUTPATIENT
Start: 2023-09-20 | End: 2023-09-29

## 2023-09-20 RX ORDER — SODIUM CHLORIDE 9 MG/ML
INJECTION, SOLUTION INTRAVENOUS PRN
Status: COMPLETED | OUTPATIENT
Start: 2023-09-20 | End: 2023-09-20

## 2023-09-20 RX ORDER — SODIUM CHLORIDE 0.9 % (FLUSH) 0.9 %
5-40 SYRINGE (ML) INJECTION PRN
Status: DISCONTINUED | OUTPATIENT
Start: 2023-09-20 | End: 2023-09-29

## 2023-09-20 RX ORDER — DIPHENHYDRAMINE HCL 25 MG
25 CAPSULE ORAL ONCE
Status: COMPLETED | OUTPATIENT
Start: 2023-09-20 | End: 2023-09-20

## 2023-09-20 RX ORDER — ACETAMINOPHEN 325 MG/1
650 TABLET ORAL ONCE
Status: COMPLETED | OUTPATIENT
Start: 2023-09-20 | End: 2023-09-20

## 2023-09-20 RX ADMIN — SODIUM CHLORIDE, PRESERVATIVE FREE 10 ML: 5 INJECTION INTRAVENOUS at 09:45

## 2023-09-20 RX ADMIN — DIPHENHYDRAMINE HYDROCHLORIDE 25 MG: 25 CAPSULE ORAL at 09:35

## 2023-09-20 RX ADMIN — SODIUM CHLORIDE, PRESERVATIVE FREE 20 ML: 5 INJECTION INTRAVENOUS at 13:20

## 2023-09-20 RX ADMIN — HEPARIN 500 UNITS: 100 SYRINGE at 13:20

## 2023-09-20 RX ADMIN — ACETAMINOPHEN 650 MG: 325 TABLET ORAL at 09:35

## 2023-09-20 RX ADMIN — SODIUM CHLORIDE: 9 INJECTION, SOLUTION INTRAVENOUS at 09:50

## 2023-09-20 ASSESSMENT — PAIN DESCRIPTION - DESCRIPTORS: DESCRIPTORS: ACHING;THROBBING

## 2023-09-20 ASSESSMENT — PAIN - FUNCTIONAL ASSESSMENT: PAIN_FUNCTIONAL_ASSESSMENT: 0-10

## 2023-09-21 LAB
ABO + RH BLD: NORMAL
ANTIGENS PRESENT RBC DONR: NORMAL
BLD PROD TYP BPU: NORMAL
BLOOD BANK CMNT PATIENT-IMP: NORMAL
BLOOD BANK DISPENSE STATUS: NORMAL
BLOOD GROUP ANTIBODIES SERPL: NORMAL
BLOOD GROUP ANTIBODIES SERPL: NORMAL
BPU ID: NORMAL
CROSSMATCH RESULT: NORMAL
SPECIMEN EXP DATE BLD: NORMAL
UNIT DIVISION: 0

## 2023-10-02 ENCOUNTER — HOSPITAL ENCOUNTER (OUTPATIENT)
Facility: HOSPITAL | Age: 63
Setting detail: INFUSION SERIES
End: 2023-10-02
Payer: COMMERCIAL

## 2023-10-02 VITALS
RESPIRATION RATE: 16 BRPM | HEART RATE: 70 BPM | DIASTOLIC BLOOD PRESSURE: 66 MMHG | SYSTOLIC BLOOD PRESSURE: 133 MMHG | OXYGEN SATURATION: 97 % | TEMPERATURE: 97.9 F

## 2023-10-02 PROCEDURE — 86900 BLOOD TYPING SEROLOGIC ABO: CPT

## 2023-10-02 PROCEDURE — 86901 BLOOD TYPING SEROLOGIC RH(D): CPT

## 2023-10-02 PROCEDURE — 2580000003 HC RX 258: Performed by: INTERNAL MEDICINE

## 2023-10-02 PROCEDURE — 6360000002 HC RX W HCPCS: Performed by: INTERNAL MEDICINE

## 2023-10-02 PROCEDURE — 85660 RBC SICKLE CELL TEST: CPT

## 2023-10-02 PROCEDURE — 86922 COMPATIBILITY TEST ANTIGLOB: CPT

## 2023-10-02 PROCEDURE — 86850 RBC ANTIBODY SCREEN: CPT

## 2023-10-02 PROCEDURE — 86921 COMPATIBILITY TEST INCUBATE: CPT

## 2023-10-02 PROCEDURE — 86870 RBC ANTIBODY IDENTIFICATION: CPT

## 2023-10-02 PROCEDURE — 86902 BLOOD TYPE ANTIGEN DONOR EA: CPT

## 2023-10-02 PROCEDURE — 86920 COMPATIBILITY TEST SPIN: CPT

## 2023-10-02 PROCEDURE — 36591 DRAW BLOOD OFF VENOUS DEVICE: CPT

## 2023-10-02 RX ORDER — SODIUM CHLORIDE 0.9 % (FLUSH) 0.9 %
5-40 SYRINGE (ML) INJECTION PRN
Status: DISCONTINUED | OUTPATIENT
Start: 2023-10-02 | End: 2023-10-27 | Stop reason: HOSPADM

## 2023-10-02 RX ORDER — HEPARIN SODIUM (PORCINE) LOCK FLUSH IV SOLN 100 UNIT/ML 100 UNIT/ML
500 SOLUTION INTRAVENOUS PRN
Status: DISCONTINUED | OUTPATIENT
Start: 2023-10-02 | End: 2023-10-27 | Stop reason: HOSPADM

## 2023-10-02 RX ADMIN — SODIUM CHLORIDE, PRESERVATIVE FREE 30 ML: 5 INJECTION INTRAVENOUS at 14:40

## 2023-10-02 RX ADMIN — HEPARIN 500 UNITS: 100 SYRINGE at 14:40

## 2023-10-03 LAB — HISTORY CHECK: NORMAL

## 2023-10-04 ENCOUNTER — HOSPITAL ENCOUNTER (OUTPATIENT)
Facility: HOSPITAL | Age: 63
Setting detail: INFUSION SERIES
End: 2023-10-04
Payer: COMMERCIAL

## 2023-10-04 VITALS
RESPIRATION RATE: 16 BRPM | HEART RATE: 61 BPM | SYSTOLIC BLOOD PRESSURE: 147 MMHG | DIASTOLIC BLOOD PRESSURE: 70 MMHG | TEMPERATURE: 97.6 F | OXYGEN SATURATION: 100 %

## 2023-10-04 PROCEDURE — 36430 TRANSFUSION BLD/BLD COMPNT: CPT

## 2023-10-04 PROCEDURE — 6360000002 HC RX W HCPCS: Performed by: INTERNAL MEDICINE

## 2023-10-04 PROCEDURE — P9040 RBC LEUKOREDUCED IRRADIATED: HCPCS

## 2023-10-04 PROCEDURE — 2580000003 HC RX 258: Performed by: INTERNAL MEDICINE

## 2023-10-04 PROCEDURE — 6370000000 HC RX 637 (ALT 250 FOR IP): Performed by: INTERNAL MEDICINE

## 2023-10-04 RX ORDER — SODIUM CHLORIDE 9 MG/ML
INJECTION, SOLUTION INTRAVENOUS PRN
Status: COMPLETED | OUTPATIENT
Start: 2023-10-04 | End: 2023-10-04

## 2023-10-04 RX ORDER — ACETAMINOPHEN 325 MG/1
650 TABLET ORAL ONCE
Status: COMPLETED | OUTPATIENT
Start: 2023-10-04 | End: 2023-10-04

## 2023-10-04 RX ORDER — SODIUM CHLORIDE 0.9 % (FLUSH) 0.9 %
5-40 SYRINGE (ML) INJECTION PRN
Status: DISCONTINUED | OUTPATIENT
Start: 2023-10-04 | End: 2023-10-27 | Stop reason: HOSPADM

## 2023-10-04 RX ORDER — HEPARIN SODIUM (PORCINE) LOCK FLUSH IV SOLN 100 UNIT/ML 100 UNIT/ML
500 SOLUTION INTRAVENOUS PRN
Status: DISCONTINUED | OUTPATIENT
Start: 2023-10-04 | End: 2023-10-27 | Stop reason: HOSPADM

## 2023-10-04 RX ORDER — DIPHENHYDRAMINE HCL 25 MG
25 CAPSULE ORAL ONCE
Status: COMPLETED | OUTPATIENT
Start: 2023-10-04 | End: 2023-10-04

## 2023-10-04 RX ADMIN — ACETAMINOPHEN 650 MG: 325 TABLET ORAL at 08:36

## 2023-10-04 RX ADMIN — SODIUM CHLORIDE, PRESERVATIVE FREE 30 ML: 5 INJECTION INTRAVENOUS at 11:36

## 2023-10-04 RX ADMIN — SODIUM CHLORIDE, PRESERVATIVE FREE 10 ML: 5 INJECTION INTRAVENOUS at 08:35

## 2023-10-04 RX ADMIN — SODIUM CHLORIDE, PRESERVATIVE FREE 10 ML: 5 INJECTION INTRAVENOUS at 12:07

## 2023-10-04 RX ADMIN — DIPHENHYDRAMINE HYDROCHLORIDE 25 MG: 25 CAPSULE ORAL at 08:37

## 2023-10-04 RX ADMIN — SODIUM CHLORIDE: 9 INJECTION, SOLUTION INTRAVENOUS at 08:40

## 2023-10-04 RX ADMIN — HEPARIN 500 UNITS: 100 SYRINGE at 12:08

## 2023-10-04 ASSESSMENT — PAIN SCALES - GENERAL: PAINLEVEL_OUTOF10: 10

## 2023-10-16 ENCOUNTER — HOSPITAL ENCOUNTER (OUTPATIENT)
Facility: HOSPITAL | Age: 63
Setting detail: INFUSION SERIES
End: 2023-10-16
Payer: COMMERCIAL

## 2023-10-16 VITALS
DIASTOLIC BLOOD PRESSURE: 69 MMHG | TEMPERATURE: 98.8 F | SYSTOLIC BLOOD PRESSURE: 133 MMHG | RESPIRATION RATE: 16 BRPM | HEART RATE: 73 BPM

## 2023-10-16 PROCEDURE — 86870 RBC ANTIBODY IDENTIFICATION: CPT

## 2023-10-16 PROCEDURE — 86920 COMPATIBILITY TEST SPIN: CPT

## 2023-10-16 PROCEDURE — 86921 COMPATIBILITY TEST INCUBATE: CPT

## 2023-10-16 PROCEDURE — 36591 DRAW BLOOD OFF VENOUS DEVICE: CPT

## 2023-10-16 PROCEDURE — 86900 BLOOD TYPING SEROLOGIC ABO: CPT

## 2023-10-16 PROCEDURE — P9040 RBC LEUKOREDUCED IRRADIATED: HCPCS

## 2023-10-16 PROCEDURE — 6360000002 HC RX W HCPCS: Performed by: INTERNAL MEDICINE

## 2023-10-16 PROCEDURE — 86922 COMPATIBILITY TEST ANTIGLOB: CPT

## 2023-10-16 PROCEDURE — 2580000003 HC RX 258: Performed by: INTERNAL MEDICINE

## 2023-10-16 PROCEDURE — 86850 RBC ANTIBODY SCREEN: CPT

## 2023-10-16 PROCEDURE — 86901 BLOOD TYPING SEROLOGIC RH(D): CPT

## 2023-10-16 PROCEDURE — 86902 BLOOD TYPE ANTIGEN DONOR EA: CPT

## 2023-10-16 PROCEDURE — 85660 RBC SICKLE CELL TEST: CPT

## 2023-10-16 RX ORDER — HEPARIN SODIUM (PORCINE) LOCK FLUSH IV SOLN 100 UNIT/ML 100 UNIT/ML
500 SOLUTION INTRAVENOUS EVERY 8 HOURS PRN
Status: DISCONTINUED | OUTPATIENT
Start: 2023-10-16 | End: 2023-10-27 | Stop reason: HOSPADM

## 2023-10-16 RX ORDER — SODIUM CHLORIDE 0.9 % (FLUSH) 0.9 %
5-40 SYRINGE (ML) INJECTION PRN
Status: DISCONTINUED | OUTPATIENT
Start: 2023-10-16 | End: 2023-10-27 | Stop reason: HOSPADM

## 2023-10-16 RX ADMIN — SODIUM CHLORIDE, PRESERVATIVE FREE 30 ML: 5 INJECTION INTRAVENOUS at 11:13

## 2023-10-16 RX ADMIN — HEPARIN 500 UNITS: 100 SYRINGE at 11:14

## 2023-10-16 ASSESSMENT — PAIN DESCRIPTION - PAIN TYPE: TYPE: CHRONIC PAIN

## 2023-10-16 ASSESSMENT — PAIN SCALES - GENERAL: PAINLEVEL_OUTOF10: 9

## 2023-10-16 ASSESSMENT — PAIN DESCRIPTION - LOCATION: LOCATION: GENERALIZED

## 2023-10-17 LAB — HISTORY CHECK: NORMAL

## 2023-10-18 ENCOUNTER — HOSPITAL ENCOUNTER (OUTPATIENT)
Facility: HOSPITAL | Age: 63
Setting detail: INFUSION SERIES
End: 2023-10-18
Payer: COMMERCIAL

## 2023-10-18 VITALS
TEMPERATURE: 98.2 F | DIASTOLIC BLOOD PRESSURE: 66 MMHG | OXYGEN SATURATION: 99 % | RESPIRATION RATE: 16 BRPM | SYSTOLIC BLOOD PRESSURE: 153 MMHG | HEART RATE: 62 BPM

## 2023-10-18 PROCEDURE — P9040 RBC LEUKOREDUCED IRRADIATED: HCPCS

## 2023-10-18 PROCEDURE — 36430 TRANSFUSION BLD/BLD COMPNT: CPT

## 2023-10-18 PROCEDURE — 6370000000 HC RX 637 (ALT 250 FOR IP): Performed by: INTERNAL MEDICINE

## 2023-10-18 PROCEDURE — 6360000002 HC RX W HCPCS: Performed by: INTERNAL MEDICINE

## 2023-10-18 PROCEDURE — 2580000003 HC RX 258: Performed by: INTERNAL MEDICINE

## 2023-10-18 RX ORDER — ACETAMINOPHEN 325 MG/1
650 TABLET ORAL ONCE
Status: COMPLETED | OUTPATIENT
Start: 2023-10-18 | End: 2023-10-18

## 2023-10-18 RX ORDER — SODIUM CHLORIDE 9 MG/ML
INJECTION, SOLUTION INTRAVENOUS PRN
Status: DISCONTINUED | OUTPATIENT
Start: 2023-10-18 | End: 2023-10-18

## 2023-10-18 RX ORDER — SODIUM CHLORIDE 9 MG/ML
INJECTION, SOLUTION INTRAVENOUS PRN
Status: COMPLETED | OUTPATIENT
Start: 2023-10-18 | End: 2023-10-18

## 2023-10-18 RX ORDER — SODIUM CHLORIDE 0.9 % (FLUSH) 0.9 %
5-40 SYRINGE (ML) INJECTION PRN
Status: DISCONTINUED | OUTPATIENT
Start: 2023-10-18 | End: 2023-10-27 | Stop reason: HOSPADM

## 2023-10-18 RX ORDER — DIPHENHYDRAMINE HCL 25 MG
25 CAPSULE ORAL ONCE
Status: COMPLETED | OUTPATIENT
Start: 2023-10-18 | End: 2023-10-18

## 2023-10-18 RX ORDER — HEPARIN SODIUM (PORCINE) LOCK FLUSH IV SOLN 100 UNIT/ML 100 UNIT/ML
500 SOLUTION INTRAVENOUS PRN
Status: DISCONTINUED | OUTPATIENT
Start: 2023-10-18 | End: 2023-10-27 | Stop reason: HOSPADM

## 2023-10-18 RX ADMIN — SODIUM CHLORIDE: 9 INJECTION, SOLUTION INTRAVENOUS at 11:40

## 2023-10-18 RX ADMIN — SODIUM CHLORIDE, PRESERVATIVE FREE 10 ML: 5 INJECTION INTRAVENOUS at 11:40

## 2023-10-18 RX ADMIN — SODIUM CHLORIDE, PRESERVATIVE FREE 10 ML: 5 INJECTION INTRAVENOUS at 14:58

## 2023-10-18 RX ADMIN — HEPARIN 500 UNITS: 100 SYRINGE at 14:58

## 2023-10-18 RX ADMIN — DIPHENHYDRAMINE HYDROCHLORIDE 25 MG: 25 CAPSULE ORAL at 11:01

## 2023-10-18 RX ADMIN — ACETAMINOPHEN 650 MG: 325 TABLET ORAL at 11:01

## 2023-10-18 ASSESSMENT — PAIN DESCRIPTION - LOCATION: LOCATION: GENERALIZED

## 2023-10-18 ASSESSMENT — PAIN DESCRIPTION - PAIN TYPE: TYPE: CHRONIC PAIN

## 2023-10-18 ASSESSMENT — PAIN SCALES - GENERAL: PAINLEVEL_OUTOF10: 8

## 2023-11-04 ENCOUNTER — HOSPITAL ENCOUNTER (INPATIENT)
Facility: HOSPITAL | Age: 63
LOS: 2 days | Discharge: HOME | DRG: 812 | End: 2023-11-07
Attending: EMERGENCY MEDICINE | Admitting: STUDENT IN AN ORGANIZED HEALTH CARE EDUCATION/TRAINING PROGRAM
Payer: COMMERCIAL

## 2023-11-04 DIAGNOSIS — D57.09 SICKLE CELL DISEASE WITH CRISIS AND OTHER COMPLICATION (HCC): Primary | ICD-10-CM

## 2023-11-04 LAB
BASOPHILS # BLD: 0.1 K/UL (ref 0–0.1)
BASOPHILS NFR BLD: 0 % (ref 0–2)
DIFFERENTIAL METHOD BLD: ABNORMAL
EOSINOPHIL # BLD: 0.3 K/UL (ref 0–0.4)
EOSINOPHIL NFR BLD: 2 % (ref 0–5)
ERYTHROCYTE [DISTWIDTH] IN BLOOD BY AUTOMATED COUNT: 20.5 % (ref 11.6–14.5)
HCT VFR BLD AUTO: 17.2 % (ref 36–48)
HGB BLD-MCNC: 5.8 G/DL (ref 13–16)
IMM GRANULOCYTES # BLD AUTO: 0.1 K/UL (ref 0–0.04)
IMM GRANULOCYTES NFR BLD AUTO: 1 % (ref 0–0.5)
INR PPP: 1.4 (ref 0.9–1.1)
LYMPHOCYTES # BLD: 1.2 K/UL (ref 0.9–3.6)
LYMPHOCYTES NFR BLD: 10 % (ref 21–52)
MCH RBC QN AUTO: 28.9 PG (ref 24–34)
MCHC RBC AUTO-ENTMCNC: 33.7 G/DL (ref 31–37)
MCV RBC AUTO: 85.6 FL (ref 78–100)
MONOCYTES # BLD: 1.8 K/UL (ref 0.05–1.2)
MONOCYTES NFR BLD: 14 % (ref 3–10)
NEUTS SEG # BLD: 9.1 K/UL (ref 1.8–8)
NEUTS SEG NFR BLD: 73 % (ref 40–73)
NRBC # BLD: 0.16 K/UL (ref 0–0.01)
NRBC BLD-RTO: 1.3 PER 100 WBC
PLATELET # BLD AUTO: 178 K/UL (ref 135–420)
PMV BLD AUTO: 10.5 FL (ref 9.2–11.8)
PROTHROMBIN TIME: 16.9 SEC (ref 11.9–14.7)
RBC # BLD AUTO: 2.01 M/UL (ref 4.35–5.65)
RETICS/RBC NFR AUTO: 4.8 % (ref 0.5–2.5)
WBC # BLD AUTO: 12.5 K/UL (ref 4.6–13.2)

## 2023-11-04 PROCEDURE — 86850 RBC ANTIBODY SCREEN: CPT

## 2023-11-04 PROCEDURE — 85025 COMPLETE CBC W/AUTO DIFF WBC: CPT

## 2023-11-04 PROCEDURE — 86870 RBC ANTIBODY IDENTIFICATION: CPT

## 2023-11-04 PROCEDURE — 86901 BLOOD TYPING SEROLOGIC RH(D): CPT

## 2023-11-04 PROCEDURE — 96374 THER/PROPH/DIAG INJ IV PUSH: CPT

## 2023-11-04 PROCEDURE — 99285 EMERGENCY DEPT VISIT HI MDM: CPT

## 2023-11-04 PROCEDURE — 86920 COMPATIBILITY TEST SPIN: CPT

## 2023-11-04 PROCEDURE — 85045 AUTOMATED RETICULOCYTE COUNT: CPT

## 2023-11-04 PROCEDURE — 85610 PROTHROMBIN TIME: CPT

## 2023-11-04 PROCEDURE — 93005 ELECTROCARDIOGRAM TRACING: CPT | Performed by: EMERGENCY MEDICINE

## 2023-11-04 PROCEDURE — 86900 BLOOD TYPING SEROLOGIC ABO: CPT

## 2023-11-04 PROCEDURE — 80053 COMPREHEN METABOLIC PANEL: CPT

## 2023-11-04 ASSESSMENT — PAIN DESCRIPTION - LOCATION: LOCATION: GENERALIZED

## 2023-11-04 ASSESSMENT — PAIN - FUNCTIONAL ASSESSMENT
PAIN_FUNCTIONAL_ASSESSMENT: PREVENTS OR INTERFERES WITH MANY ACTIVE NOT PASSIVE ACTIVITIES
PAIN_FUNCTIONAL_ASSESSMENT: 0-10

## 2023-11-04 ASSESSMENT — PAIN SCALES - GENERAL: PAINLEVEL_OUTOF10: 10

## 2023-11-04 ASSESSMENT — PAIN DESCRIPTION - PAIN TYPE: TYPE: CHRONIC PAIN

## 2023-11-04 ASSESSMENT — PAIN DESCRIPTION - FREQUENCY: FREQUENCY: CONTINUOUS

## 2023-11-04 ASSESSMENT — PAIN DESCRIPTION - DESCRIPTORS: DESCRIPTORS: ACHING

## 2023-11-05 PROBLEM — I50.32 CHRONIC DIASTOLIC CONGESTIVE HEART FAILURE (HCC): Status: ACTIVE | Noted: 2023-11-05

## 2023-11-05 LAB
ALBUMIN SERPL-MCNC: 3.2 G/DL (ref 3.4–5)
ALBUMIN/GLOB SERPL: 0.7 (ref 0.8–1.7)
ALP SERPL-CCNC: 130 U/L (ref 45–117)
ALT SERPL-CCNC: 40 U/L (ref 16–61)
ANION GAP SERPL CALC-SCNC: 2 MMOL/L (ref 3–18)
AST SERPL-CCNC: 48 U/L (ref 10–38)
BILIRUB SERPL-MCNC: 2.6 MG/DL (ref 0.2–1)
BUN SERPL-MCNC: 40 MG/DL (ref 7–18)
BUN/CREAT SERPL: 23 (ref 12–20)
CALCIUM SERPL-MCNC: 8.4 MG/DL (ref 8.5–10.1)
CHLORIDE SERPL-SCNC: 111 MMOL/L (ref 100–111)
CO2 SERPL-SCNC: 24 MMOL/L (ref 21–32)
CREAT SERPL-MCNC: 1.76 MG/DL (ref 0.6–1.3)
EKG ATRIAL RATE: 92 BPM
EKG DIAGNOSIS: NORMAL
EKG P AXIS: 48 DEGREES
EKG P-R INTERVAL: 140 MS
EKG Q-T INTERVAL: 360 MS
EKG QRS DURATION: 84 MS
EKG QTC CALCULATION (BAZETT): 445 MS
EKG R AXIS: 9 DEGREES
EKG T AXIS: 65 DEGREES
EKG VENTRICULAR RATE: 92 BPM
GLOBULIN SER CALC-MCNC: 4.7 G/DL (ref 2–4)
GLUCOSE SERPL-MCNC: 112 MG/DL (ref 74–99)
HCT VFR BLD AUTO: 19.4 % (ref 36–48)
HGB BLD-MCNC: 6.4 G/DL (ref 13–16)
POTASSIUM SERPL-SCNC: 5.3 MMOL/L (ref 3.5–5.5)
PROT SERPL-MCNC: 7.9 G/DL (ref 6.4–8.2)
SODIUM SERPL-SCNC: 137 MMOL/L (ref 136–145)

## 2023-11-05 PROCEDURE — 86922 COMPATIBILITY TEST ANTIGLOB: CPT

## 2023-11-05 PROCEDURE — P9040 RBC LEUKOREDUCED IRRADIATED: HCPCS

## 2023-11-05 PROCEDURE — 1100000000 HC RM PRIVATE

## 2023-11-05 PROCEDURE — 93010 ELECTROCARDIOGRAM REPORT: CPT | Performed by: INTERNAL MEDICINE

## 2023-11-05 PROCEDURE — 6360000002 HC RX W HCPCS: Performed by: EMERGENCY MEDICINE

## 2023-11-05 PROCEDURE — 6370000000 HC RX 637 (ALT 250 FOR IP): Performed by: STUDENT IN AN ORGANIZED HEALTH CARE EDUCATION/TRAINING PROGRAM

## 2023-11-05 PROCEDURE — 2580000003 HC RX 258: Performed by: STUDENT IN AN ORGANIZED HEALTH CARE EDUCATION/TRAINING PROGRAM

## 2023-11-05 PROCEDURE — 36430 TRANSFUSION BLD/BLD COMPNT: CPT

## 2023-11-05 PROCEDURE — 86921 COMPATIBILITY TEST INCUBATE: CPT

## 2023-11-05 PROCEDURE — 86902 BLOOD TYPE ANTIGEN DONOR EA: CPT

## 2023-11-05 PROCEDURE — 99222 1ST HOSP IP/OBS MODERATE 55: CPT | Performed by: STUDENT IN AN ORGANIZED HEALTH CARE EDUCATION/TRAINING PROGRAM

## 2023-11-05 PROCEDURE — 6360000002 HC RX W HCPCS: Performed by: STUDENT IN AN ORGANIZED HEALTH CARE EDUCATION/TRAINING PROGRAM

## 2023-11-05 PROCEDURE — 30233N1 TRANSFUSION OF NONAUTOLOGOUS RED BLOOD CELLS INTO PERIPHERAL VEIN, PERCUTANEOUS APPROACH: ICD-10-PCS | Performed by: FAMILY MEDICINE

## 2023-11-05 PROCEDURE — 85014 HEMATOCRIT: CPT

## 2023-11-05 PROCEDURE — 85660 RBC SICKLE CELL TEST: CPT

## 2023-11-05 PROCEDURE — 85018 HEMOGLOBIN: CPT

## 2023-11-05 RX ORDER — 0.9 % SODIUM CHLORIDE 0.9 %
500 INTRAVENOUS SOLUTION INTRAVENOUS ONCE
Status: DISCONTINUED | OUTPATIENT
Start: 2023-11-05 | End: 2023-11-07 | Stop reason: HOSPADM

## 2023-11-05 RX ORDER — ACETAMINOPHEN 325 MG/1
650 TABLET ORAL EVERY 6 HOURS PRN
Status: DISCONTINUED | OUTPATIENT
Start: 2023-11-05 | End: 2023-11-07 | Stop reason: HOSPADM

## 2023-11-05 RX ORDER — ONDANSETRON 4 MG/1
4 TABLET, ORALLY DISINTEGRATING ORAL EVERY 8 HOURS PRN
Status: DISCONTINUED | OUTPATIENT
Start: 2023-11-05 | End: 2023-11-07 | Stop reason: HOSPADM

## 2023-11-05 RX ORDER — PANTOPRAZOLE SODIUM 40 MG/1
40 TABLET, DELAYED RELEASE ORAL
Status: DISCONTINUED | OUTPATIENT
Start: 2023-11-05 | End: 2023-11-07 | Stop reason: HOSPADM

## 2023-11-05 RX ORDER — ACETAMINOPHEN 650 MG/1
650 SUPPOSITORY RECTAL EVERY 6 HOURS PRN
Status: DISCONTINUED | OUTPATIENT
Start: 2023-11-05 | End: 2023-11-07 | Stop reason: HOSPADM

## 2023-11-05 RX ORDER — SODIUM CHLORIDE 0.9 % (FLUSH) 0.9 %
5-40 SYRINGE (ML) INJECTION PRN
Status: DISCONTINUED | OUTPATIENT
Start: 2023-11-05 | End: 2023-11-07 | Stop reason: HOSPADM

## 2023-11-05 RX ORDER — MORPHINE SULFATE 4 MG/ML
4 INJECTION, SOLUTION INTRAMUSCULAR; INTRAVENOUS
Status: COMPLETED | OUTPATIENT
Start: 2023-11-05 | End: 2023-11-05

## 2023-11-05 RX ORDER — POTASSIUM CHLORIDE 20 MEQ/1
40 TABLET, EXTENDED RELEASE ORAL PRN
Status: DISCONTINUED | OUTPATIENT
Start: 2023-11-05 | End: 2023-11-07 | Stop reason: HOSPADM

## 2023-11-05 RX ORDER — ENOXAPARIN SODIUM 100 MG/ML
40 INJECTION SUBCUTANEOUS DAILY
Status: DISCONTINUED | OUTPATIENT
Start: 2023-11-05 | End: 2023-11-07 | Stop reason: HOSPADM

## 2023-11-05 RX ORDER — SODIUM CHLORIDE 0.9 % (FLUSH) 0.9 %
5-40 SYRINGE (ML) INJECTION EVERY 12 HOURS SCHEDULED
Status: DISCONTINUED | OUTPATIENT
Start: 2023-11-05 | End: 2023-11-07 | Stop reason: HOSPADM

## 2023-11-05 RX ORDER — POTASSIUM CHLORIDE 7.45 MG/ML
10 INJECTION INTRAVENOUS PRN
Status: DISCONTINUED | OUTPATIENT
Start: 2023-11-05 | End: 2023-11-07 | Stop reason: HOSPADM

## 2023-11-05 RX ORDER — SODIUM CHLORIDE, SODIUM LACTATE, POTASSIUM CHLORIDE, CALCIUM CHLORIDE 600; 310; 30; 20 MG/100ML; MG/100ML; MG/100ML; MG/100ML
INJECTION, SOLUTION INTRAVENOUS CONTINUOUS
Status: DISCONTINUED | OUTPATIENT
Start: 2023-11-05 | End: 2023-11-07 | Stop reason: HOSPADM

## 2023-11-05 RX ORDER — MAGNESIUM SULFATE IN WATER 40 MG/ML
2000 INJECTION, SOLUTION INTRAVENOUS PRN
Status: DISCONTINUED | OUTPATIENT
Start: 2023-11-05 | End: 2023-11-07 | Stop reason: HOSPADM

## 2023-11-05 RX ORDER — HYDROMORPHONE HYDROCHLORIDE 2 MG/1
4 TABLET ORAL EVERY 4 HOURS PRN
Status: DISCONTINUED | OUTPATIENT
Start: 2023-11-05 | End: 2023-11-07 | Stop reason: HOSPADM

## 2023-11-05 RX ORDER — ONDANSETRON 2 MG/ML
4 INJECTION INTRAMUSCULAR; INTRAVENOUS EVERY 6 HOURS PRN
Status: DISCONTINUED | OUTPATIENT
Start: 2023-11-05 | End: 2023-11-07 | Stop reason: HOSPADM

## 2023-11-05 RX ORDER — SODIUM CHLORIDE 9 MG/ML
INJECTION, SOLUTION INTRAVENOUS PRN
Status: DISCONTINUED | OUTPATIENT
Start: 2023-11-05 | End: 2023-11-07 | Stop reason: HOSPADM

## 2023-11-05 RX ORDER — POLYETHYLENE GLYCOL 3350 17 G/17G
17 POWDER, FOR SOLUTION ORAL DAILY PRN
Status: DISCONTINUED | OUTPATIENT
Start: 2023-11-05 | End: 2023-11-07 | Stop reason: HOSPADM

## 2023-11-05 RX ORDER — HYDROMORPHONE HYDROCHLORIDE 2 MG/ML
2 INJECTION, SOLUTION INTRAMUSCULAR; INTRAVENOUS; SUBCUTANEOUS
Status: DISCONTINUED | OUTPATIENT
Start: 2023-11-05 | End: 2023-11-07 | Stop reason: HOSPADM

## 2023-11-05 RX ORDER — FOLIC ACID 1 MG/1
1 TABLET ORAL DAILY
Status: DISCONTINUED | OUTPATIENT
Start: 2023-11-05 | End: 2023-11-07 | Stop reason: HOSPADM

## 2023-11-05 RX ADMIN — PANTOPRAZOLE SODIUM 40 MG: 40 TABLET, DELAYED RELEASE ORAL at 07:18

## 2023-11-05 RX ADMIN — SODIUM CHLORIDE, PRESERVATIVE FREE 10 ML: 5 INJECTION INTRAVENOUS at 09:30

## 2023-11-05 RX ADMIN — FOLIC ACID 1 MG: 1 TABLET ORAL at 09:27

## 2023-11-05 RX ADMIN — HYDROMORPHONE HYDROCHLORIDE 4 MG: 2 TABLET ORAL at 23:16

## 2023-11-05 RX ADMIN — HYDROMORPHONE HYDROCHLORIDE 2 MG: 2 INJECTION, SOLUTION INTRAMUSCULAR; INTRAVENOUS; SUBCUTANEOUS at 10:35

## 2023-11-05 RX ADMIN — HYDROMORPHONE HYDROCHLORIDE 2 MG: 2 INJECTION, SOLUTION INTRAMUSCULAR; INTRAVENOUS; SUBCUTANEOUS at 14:54

## 2023-11-05 RX ADMIN — HYDROMORPHONE HYDROCHLORIDE 2 MG: 2 INJECTION, SOLUTION INTRAMUSCULAR; INTRAVENOUS; SUBCUTANEOUS at 04:25

## 2023-11-05 RX ADMIN — PANTOPRAZOLE SODIUM 40 MG: 40 TABLET, DELAYED RELEASE ORAL at 14:54

## 2023-11-05 RX ADMIN — HYDROMORPHONE HYDROCHLORIDE 2 MG: 2 INJECTION, SOLUTION INTRAMUSCULAR; INTRAVENOUS; SUBCUTANEOUS at 20:00

## 2023-11-05 RX ADMIN — HYDROMORPHONE HYDROCHLORIDE 2 MG: 2 INJECTION, SOLUTION INTRAMUSCULAR; INTRAVENOUS; SUBCUTANEOUS at 07:18

## 2023-11-05 RX ADMIN — MORPHINE SULFATE 4 MG: 4 INJECTION, SOLUTION INTRAMUSCULAR; INTRAVENOUS at 03:30

## 2023-11-05 RX ADMIN — SODIUM CHLORIDE, PRESERVATIVE FREE 10 ML: 5 INJECTION INTRAVENOUS at 20:00

## 2023-11-05 RX ADMIN — ENOXAPARIN SODIUM 40 MG: 100 INJECTION SUBCUTANEOUS at 09:27

## 2023-11-05 ASSESSMENT — PAIN DESCRIPTION - DESCRIPTORS
DESCRIPTORS: ACHING
DESCRIPTORS: CRAMPING
DESCRIPTORS: THROBBING;SHARP
DESCRIPTORS: THROBBING;SHARP
DESCRIPTORS: ACHING
DESCRIPTORS: ACHING

## 2023-11-05 ASSESSMENT — PAIN - FUNCTIONAL ASSESSMENT
PAIN_FUNCTIONAL_ASSESSMENT: PREVENTS OR INTERFERES SOME ACTIVE ACTIVITIES AND ADLS
PAIN_FUNCTIONAL_ASSESSMENT: ACTIVITIES ARE NOT PREVENTED
PAIN_FUNCTIONAL_ASSESSMENT: PREVENTS OR INTERFERES SOME ACTIVE ACTIVITIES AND ADLS
PAIN_FUNCTIONAL_ASSESSMENT: ACTIVITIES ARE NOT PREVENTED

## 2023-11-05 ASSESSMENT — PAIN SCALES - GENERAL
PAINLEVEL_OUTOF10: 10
PAINLEVEL_OUTOF10: 7
PAINLEVEL_OUTOF10: 10
PAINLEVEL_OUTOF10: 7
PAINLEVEL_OUTOF10: 8

## 2023-11-05 ASSESSMENT — PAIN DESCRIPTION - LOCATION
LOCATION: GENERALIZED
LOCATION: ARM;LEG;BACK
LOCATION: BACK;HIP;LEG
LOCATION: LEG
LOCATION: GENERALIZED
LOCATION: GENERALIZED
LOCATION: HIP;LEG

## 2023-11-05 ASSESSMENT — PAIN DESCRIPTION - ORIENTATION
ORIENTATION: POSTERIOR;ANTERIOR
ORIENTATION: ANTERIOR;POSTERIOR
ORIENTATION: RIGHT;LEFT
ORIENTATION: ANTERIOR;POSTERIOR
ORIENTATION: RIGHT;LEFT
ORIENTATION: RIGHT;LEFT;LOWER;MID

## 2023-11-05 ASSESSMENT — ENCOUNTER SYMPTOMS
ANAL BLEEDING: 0
GASTROINTESTINAL NEGATIVE: 1
RESPIRATORY NEGATIVE: 1

## 2023-11-05 NOTE — ED PROVIDER NOTES
[facility] EMERGENCY DEPT  EMERGENCY DEPARTMENT ENCOUNTER      Pt Name: Gus Guardado  MRN: [id number]  [address] [facility] 1960  Date of evaluation: 11/4/2023  Provider: Malika Uriostegui MD    CHIEF COMPLAINT       Chief Complaint   Patient presents with    Sickle Cell Pain Crisis         HISTORY OF PRESENT ILLNESS   (Location/Symptom, Timing/Onset, Context/Setting, Quality, Duration, Modifying Factors, Severity)  Note limiting factors. Gus Guardado is a 58 y.o. male who presents to the emergency department   56-year-old male with multiple medical issues including cardiomyopathy anemia pulmonary hypertension sickle cell disease presents emergency department with weakness. Patient said he went to hematologist on Wednesday and was told his hemoglobin was borderline. He was supposed to get a transfusion but they had no body to do it as an outpatient until November 14. Patient also has sickle cell exacerbation pain in arms legs and back. He has no shortness of breath but he does have weakness. He feels that his hemoglobin is low and he needs a transfusion. Patient has no chest pain no headache no nausea or vomiting no abdominal pain no other issues expressed. The history is provided by the patient and medical records. No  was used. Nursing Notes were reviewed. REVIEW OF SYSTEMS    (2-9 systems for level 4, 10 or more for level 5)     Review of Systems   Respiratory: Negative. Cardiovascular: Negative. Gastrointestinal: Negative. Negative for anal bleeding. Neurological:  Positive for weakness. All other systems reviewed and are negative. Except as noted above the remainder of the review of systems was reviewed and negative.        PAST MEDICAL HISTORY     Past Medical History:   Diagnosis Date    WILFRIDO (acute kidney injury) ([address]) 9/1/2023    Anemia     Cardiomyopathy ([address])     Duodenal ulcer     GERD (gastroesophageal reflux disease)     History of blood transfusion     last
No

## 2023-11-06 LAB
ABO + RH BLD: NORMAL
ALBUMIN SERPL-MCNC: 2.7 G/DL (ref 3.4–5)
ALBUMIN/GLOB SERPL: 0.6 (ref 0.8–1.7)
ALP SERPL-CCNC: 117 U/L (ref 45–117)
ALT SERPL-CCNC: 31 U/L (ref 16–61)
ANION GAP SERPL CALC-SCNC: 1 MMOL/L (ref 3–18)
ANTIGENS PRESENT RBC DONR: NORMAL
ANTIGENS PRESENT RBC DONR: NORMAL
AST SERPL-CCNC: 40 U/L (ref 10–38)
BASOPHILS # BLD: 0.1 K/UL (ref 0–0.1)
BASOPHILS NFR BLD: 1 % (ref 0–2)
BILIRUB SERPL-MCNC: 2.1 MG/DL (ref 0.2–1)
BLD PROD TYP BPU: NORMAL
BLD PROD TYP BPU: NORMAL
BLOOD BANK DISPENSE STATUS: NORMAL
BLOOD BANK DISPENSE STATUS: NORMAL
BLOOD GROUP ANTIBODIES SERPL: NORMAL
BLOOD GROUP ANTIBODIES SERPL: NORMAL
BPU ID: NORMAL
BPU ID: NORMAL
BUN SERPL-MCNC: 28 MG/DL (ref 7–18)
BUN/CREAT SERPL: 19 (ref 12–20)
CALCIUM SERPL-MCNC: 8.4 MG/DL (ref 8.5–10.1)
CALLED TO: NORMAL
CHLORIDE SERPL-SCNC: 115 MMOL/L (ref 100–111)
CO2 SERPL-SCNC: 25 MMOL/L (ref 21–32)
CREAT SERPL-MCNC: 1.45 MG/DL (ref 0.6–1.3)
CROSSMATCH RESULT: NORMAL
CROSSMATCH RESULT: NORMAL
DIFFERENTIAL METHOD BLD: ABNORMAL
EOSINOPHIL # BLD: 0.3 K/UL (ref 0–0.4)
EOSINOPHIL NFR BLD: 3 % (ref 0–5)
ERYTHROCYTE [DISTWIDTH] IN BLOOD BY AUTOMATED COUNT: 18.5 % (ref 11.6–14.5)
GLOBULIN SER CALC-MCNC: 4.4 G/DL (ref 2–4)
GLUCOSE SERPL-MCNC: 105 MG/DL (ref 74–99)
HCT VFR BLD AUTO: 22.7 % (ref 36–48)
HGB BLD-MCNC: 7.6 G/DL (ref 13–16)
HISTORY CHECK: NORMAL
IMM GRANULOCYTES # BLD AUTO: 0 K/UL (ref 0–0.04)
IMM GRANULOCYTES NFR BLD AUTO: 0 % (ref 0–0.5)
LYMPHOCYTES # BLD: 1.6 K/UL (ref 0.9–3.6)
LYMPHOCYTES NFR BLD: 18 % (ref 21–52)
MCH RBC QN AUTO: 28.7 PG (ref 24–34)
MCHC RBC AUTO-ENTMCNC: 33.5 G/DL (ref 31–37)
MCV RBC AUTO: 85.7 FL (ref 78–100)
MONOCYTES # BLD: 1.3 K/UL (ref 0.05–1.2)
MONOCYTES NFR BLD: 16 % (ref 3–10)
NEUTS SEG # BLD: 5.3 K/UL (ref 1.8–8)
NEUTS SEG NFR BLD: 62 % (ref 40–73)
NRBC # BLD: 0.07 K/UL (ref 0–0.01)
NRBC BLD-RTO: 0.8 PER 100 WBC
PLATELET # BLD AUTO: 166 K/UL (ref 135–420)
PMV BLD AUTO: 10 FL (ref 9.2–11.8)
POTASSIUM SERPL-SCNC: 5.1 MMOL/L (ref 3.5–5.5)
PROT SERPL-MCNC: 7.1 G/DL (ref 6.4–8.2)
RBC # BLD AUTO: 2.65 M/UL (ref 4.35–5.65)
SODIUM SERPL-SCNC: 141 MMOL/L (ref 136–145)
SPECIMEN EXP DATE BLD: NORMAL
UNIT DIVISION: 0
UNIT DIVISION: 0
WBC # BLD AUTO: 8.6 K/UL (ref 4.6–13.2)

## 2023-11-06 PROCEDURE — 6370000000 HC RX 637 (ALT 250 FOR IP): Performed by: STUDENT IN AN ORGANIZED HEALTH CARE EDUCATION/TRAINING PROGRAM

## 2023-11-06 PROCEDURE — 99231 SBSQ HOSP IP/OBS SF/LOW 25: CPT | Performed by: FAMILY MEDICINE

## 2023-11-06 PROCEDURE — 6360000002 HC RX W HCPCS: Performed by: STUDENT IN AN ORGANIZED HEALTH CARE EDUCATION/TRAINING PROGRAM

## 2023-11-06 PROCEDURE — 6370000000 HC RX 637 (ALT 250 FOR IP): Performed by: FAMILY MEDICINE

## 2023-11-06 PROCEDURE — 80053 COMPREHEN METABOLIC PANEL: CPT

## 2023-11-06 PROCEDURE — 2580000003 HC RX 258: Performed by: STUDENT IN AN ORGANIZED HEALTH CARE EDUCATION/TRAINING PROGRAM

## 2023-11-06 PROCEDURE — 94761 N-INVAS EAR/PLS OXIMETRY MLT: CPT

## 2023-11-06 PROCEDURE — 85025 COMPLETE CBC W/AUTO DIFF WBC: CPT

## 2023-11-06 PROCEDURE — 1100000000 HC RM PRIVATE

## 2023-11-06 RX ORDER — AMMONIUM LACTATE 12 G/100G
LOTION TOPICAL 2 TIMES DAILY
Status: DISCONTINUED | OUTPATIENT
Start: 2023-11-06 | End: 2023-11-07 | Stop reason: HOSPADM

## 2023-11-06 RX ADMIN — HYDROMORPHONE HYDROCHLORIDE 2 MG: 2 INJECTION, SOLUTION INTRAMUSCULAR; INTRAVENOUS; SUBCUTANEOUS at 02:07

## 2023-11-06 RX ADMIN — HYDROMORPHONE HYDROCHLORIDE 2 MG: 2 INJECTION, SOLUTION INTRAMUSCULAR; INTRAVENOUS; SUBCUTANEOUS at 08:42

## 2023-11-06 RX ADMIN — HYDROMORPHONE HYDROCHLORIDE 2 MG: 2 INJECTION, SOLUTION INTRAMUSCULAR; INTRAVENOUS; SUBCUTANEOUS at 21:04

## 2023-11-06 RX ADMIN — Medication: at 21:06

## 2023-11-06 RX ADMIN — HYDROMORPHONE HYDROCHLORIDE 2 MG: 2 INJECTION, SOLUTION INTRAMUSCULAR; INTRAVENOUS; SUBCUTANEOUS at 17:54

## 2023-11-06 RX ADMIN — PANTOPRAZOLE SODIUM 40 MG: 40 TABLET, DELAYED RELEASE ORAL at 05:31

## 2023-11-06 RX ADMIN — SODIUM CHLORIDE, POTASSIUM CHLORIDE, SODIUM LACTATE AND CALCIUM CHLORIDE: 600; 310; 30; 20 INJECTION, SOLUTION INTRAVENOUS at 08:52

## 2023-11-06 RX ADMIN — PANTOPRAZOLE SODIUM 40 MG: 40 TABLET, DELAYED RELEASE ORAL at 14:58

## 2023-11-06 RX ADMIN — SODIUM CHLORIDE, POTASSIUM CHLORIDE, SODIUM LACTATE AND CALCIUM CHLORIDE: 600; 310; 30; 20 INJECTION, SOLUTION INTRAVENOUS at 17:57

## 2023-11-06 RX ADMIN — HYDROMORPHONE HYDROCHLORIDE 2 MG: 2 INJECTION, SOLUTION INTRAMUSCULAR; INTRAVENOUS; SUBCUTANEOUS at 12:07

## 2023-11-06 RX ADMIN — SODIUM CHLORIDE, PRESERVATIVE FREE 10 ML: 5 INJECTION INTRAVENOUS at 08:44

## 2023-11-06 RX ADMIN — SODIUM CHLORIDE, PRESERVATIVE FREE 10 ML: 5 INJECTION INTRAVENOUS at 21:07

## 2023-11-06 RX ADMIN — FOLIC ACID 1 MG: 1 TABLET ORAL at 08:43

## 2023-11-06 RX ADMIN — HYDROMORPHONE HYDROCHLORIDE 2 MG: 2 INJECTION, SOLUTION INTRAMUSCULAR; INTRAVENOUS; SUBCUTANEOUS at 14:55

## 2023-11-06 RX ADMIN — Medication: at 12:07

## 2023-11-06 RX ADMIN — HYDROMORPHONE HYDROCHLORIDE 4 MG: 2 TABLET ORAL at 05:31

## 2023-11-06 RX ADMIN — ENOXAPARIN SODIUM 40 MG: 100 INJECTION SUBCUTANEOUS at 08:43

## 2023-11-06 ASSESSMENT — PAIN SCALES - GENERAL
PAINLEVEL_OUTOF10: 9
PAINLEVEL_OUTOF10: 10
PAINLEVEL_OUTOF10: 4
PAINLEVEL_OUTOF10: 0
PAINLEVEL_OUTOF10: 10
PAINLEVEL_OUTOF10: 7
PAINLEVEL_OUTOF10: 5
PAINLEVEL_OUTOF10: 7
PAINLEVEL_OUTOF10: 10
PAINLEVEL_OUTOF10: 4
PAINLEVEL_OUTOF10: 10
PAINLEVEL_OUTOF10: 10
PAINLEVEL_OUTOF10: 4
PAINLEVEL_OUTOF10: 8
PAINLEVEL_OUTOF10: 7
PAINLEVEL_OUTOF10: 5
PAINLEVEL_OUTOF10: 10
PAINLEVEL_OUTOF10: 0
PAINLEVEL_OUTOF10: 10

## 2023-11-06 ASSESSMENT — PAIN DESCRIPTION - DESCRIPTORS
DESCRIPTORS: ACHING
DESCRIPTORS: THROBBING;SHARP
DESCRIPTORS: THROBBING
DESCRIPTORS: ACHING;THROBBING
DESCRIPTORS: SHARP;THROBBING
DESCRIPTORS: THROBBING
DESCRIPTORS: THROBBING
DESCRIPTORS: ACHING;THROBBING
DESCRIPTORS: THROBBING;DISCOMFORT

## 2023-11-06 ASSESSMENT — PAIN DESCRIPTION - LOCATION
LOCATION: BACK
LOCATION: HIP
LOCATION: HIP
LOCATION: BACK
LOCATION: GENERALIZED
LOCATION: HIP
LOCATION: BACK
LOCATION: GENERALIZED
LOCATION: BACK

## 2023-11-06 ASSESSMENT — PAIN DESCRIPTION - ORIENTATION
ORIENTATION: LOWER
ORIENTATION: LEFT;RIGHT
ORIENTATION: POSTERIOR;ANTERIOR
ORIENTATION: LOWER
ORIENTATION: ANTERIOR;POSTERIOR
ORIENTATION: LEFT;RIGHT
ORIENTATION: LOWER
ORIENTATION: RIGHT;LEFT

## 2023-11-06 ASSESSMENT — PAIN - FUNCTIONAL ASSESSMENT
PAIN_FUNCTIONAL_ASSESSMENT: ACTIVITIES ARE NOT PREVENTED

## 2023-11-06 ASSESSMENT — PAIN DESCRIPTION - FREQUENCY: FREQUENCY: CONTINUOUS

## 2023-11-06 NOTE — PLAN OF CARE
Problem: Pain  Goal: Verbalizes/displays adequate comfort level or baseline comfort level  11/5/2023 2045 by Roque Raymundo RN  Outcome: Progressing  Flowsheets (Taken 11/5/2023 1945)  Verbalizes/displays adequate comfort level or baseline comfort level:   Encourage patient to monitor pain and request assistance   Assess pain using appropriate pain scale   Administer analgesics based on type and severity of pain and evaluate response   Implement non-pharmacological measures as appropriate and evaluate response  11/5/2023 1835 by Bess Burroughs RN  Outcome: Progressing     Problem: Safety - Adult  Goal: Free from fall injury  11/5/2023 2045 by Roque Raymundo RN  Outcome: Progressing  11/5/2023 [address] by Bess Burroughs RN  Outcome: Progressing     Problem: ABCDS Injury Assessment  Goal: Absence of physical injury  Outcome: Progressing     Problem: Skin/Tissue Integrity - Adult  Goal: Skin integrity remains intact  Outcome: Progressing     Problem: Hematologic - Adult  Goal: Maintains hematologic stability  Outcome: Progressing

## 2023-11-06 NOTE — PLAN OF CARE
Problem: Pain  Goal: Verbalizes/displays adequate comfort level or baseline comfort level  11/6/2023 0936 by Sarina Nathan RN  Outcome: Progressing  Flowsheets (Taken 11/6/2023 6943)  Verbalizes/displays adequate comfort level or baseline comfort level:   Encourage patient to monitor pain and request assistance   Assess pain using appropriate pain scale   Administer analgesics based on type and severity of pain and evaluate response   Implement non-pharmacological measures as appropriate and evaluate response   Consider cultural and social influences on pain and pain management   Notify Licensed Independent Practitioner if interventions unsuccessful or patient reports new pain  11/5/2023 2045 by Solo Echevarria RN  Outcome: Progressing  Flowsheets (Taken 11/5/2023 1945)  Verbalizes/displays adequate comfort level or baseline comfort level:   Encourage patient to monitor pain and request assistance   Assess pain using appropriate pain scale   Administer analgesics based on type and severity of pain and evaluate response   Implement non-pharmacological measures as appropriate and evaluate response     Problem: Safety - Adult  Goal: Free from fall injury  11/6/2023 0936 by Sarina Nathan RN  Outcome: Progressing  11/5/2023 2045 by Solo Echevarria RN  Outcome: Progressing     Problem: ABCDS Injury Assessment  Goal: Absence of physical injury  11/6/2023 0936 by Sarina Nathan RN  Outcome: Progressing  11/5/2023 2045 by Solo Echevarria RN  Outcome: Progressing     Problem: Skin/Tissue Integrity - Adult  Goal: Skin integrity remains intact  11/6/2023 0936 by Sarina Nathan RN  Outcome: Progressing  11/5/2023 2045 by Solo Echevarria RN  Outcome: Progressing     Problem: Hematologic - Adult  Goal: Maintains hematologic stability  11/6/2023 0936 by Sarina Nathan RN  Outcome: Progressing  11/5/2023 2045 by Solo Echevarria RN  Outcome: Progressing

## 2023-11-07 VITALS
OXYGEN SATURATION: 98 % | HEART RATE: 60 BPM | BODY MASS INDEX: 20.35 KG/M2 | RESPIRATION RATE: 18 BRPM | DIASTOLIC BLOOD PRESSURE: 71 MMHG | TEMPERATURE: 98.2 F | SYSTOLIC BLOOD PRESSURE: 144 MMHG | HEIGHT: 65 IN | WEIGHT: 122.14 LBS

## 2023-11-07 LAB
ALBUMIN SERPL-MCNC: 2.5 G/DL (ref 3.4–5)
ALBUMIN/GLOB SERPL: 0.6 (ref 0.8–1.7)
ALP SERPL-CCNC: 118 U/L (ref 45–117)
ALT SERPL-CCNC: 26 U/L (ref 16–61)
ANION GAP SERPL CALC-SCNC: 4 MMOL/L (ref 3–18)
AST SERPL-CCNC: 39 U/L (ref 10–38)
BASOPHILS # BLD: 0.1 K/UL (ref 0–0.1)
BASOPHILS NFR BLD: 1 % (ref 0–2)
BILIRUB SERPL-MCNC: 1.9 MG/DL (ref 0.2–1)
BUN SERPL-MCNC: 22 MG/DL (ref 7–18)
BUN/CREAT SERPL: 15 (ref 12–20)
CALCIUM SERPL-MCNC: 8.1 MG/DL (ref 8.5–10.1)
CHLORIDE SERPL-SCNC: 114 MMOL/L (ref 100–111)
CO2 SERPL-SCNC: 27 MMOL/L (ref 21–32)
CREAT SERPL-MCNC: 1.51 MG/DL (ref 0.6–1.3)
DIFFERENTIAL METHOD BLD: ABNORMAL
EOSINOPHIL # BLD: 0.3 K/UL (ref 0–0.4)
EOSINOPHIL NFR BLD: 3 % (ref 0–5)
ERYTHROCYTE [DISTWIDTH] IN BLOOD BY AUTOMATED COUNT: 19 % (ref 11.6–14.5)
GLOBULIN SER CALC-MCNC: 4.4 G/DL (ref 2–4)
GLUCOSE SERPL-MCNC: 115 MG/DL (ref 74–99)
HCT VFR BLD AUTO: 21.4 % (ref 36–48)
HGB BLD-MCNC: 7.1 G/DL (ref 13–16)
IMM GRANULOCYTES # BLD AUTO: 0 K/UL (ref 0–0.04)
IMM GRANULOCYTES NFR BLD AUTO: 0 % (ref 0–0.5)
LYMPHOCYTES # BLD: 1.4 K/UL (ref 0.9–3.6)
LYMPHOCYTES NFR BLD: 19 % (ref 21–52)
MCH RBC QN AUTO: 29.1 PG (ref 24–34)
MCHC RBC AUTO-ENTMCNC: 33.2 G/DL (ref 31–37)
MCV RBC AUTO: 87.7 FL (ref 78–100)
MONOCYTES # BLD: 1.2 K/UL (ref 0.05–1.2)
MONOCYTES NFR BLD: 16 % (ref 3–10)
NEUTS SEG # BLD: 4.6 K/UL (ref 1.8–8)
NEUTS SEG NFR BLD: 61 % (ref 40–73)
NRBC # BLD: 0.03 K/UL (ref 0–0.01)
NRBC BLD-RTO: 0.4 PER 100 WBC
PLATELET # BLD AUTO: 168 K/UL (ref 135–420)
PMV BLD AUTO: 11 FL (ref 9.2–11.8)
POTASSIUM SERPL-SCNC: 4.9 MMOL/L (ref 3.5–5.5)
PROT SERPL-MCNC: 6.9 G/DL (ref 6.4–8.2)
RBC # BLD AUTO: 2.44 M/UL (ref 4.35–5.65)
SODIUM SERPL-SCNC: 145 MMOL/L (ref 136–145)
WBC # BLD AUTO: 7.5 K/UL (ref 4.6–13.2)

## 2023-11-07 PROCEDURE — 6370000000 HC RX 637 (ALT 250 FOR IP): Performed by: STUDENT IN AN ORGANIZED HEALTH CARE EDUCATION/TRAINING PROGRAM

## 2023-11-07 PROCEDURE — 6360000002 HC RX W HCPCS: Performed by: STUDENT IN AN ORGANIZED HEALTH CARE EDUCATION/TRAINING PROGRAM

## 2023-11-07 PROCEDURE — 99238 HOSP IP/OBS DSCHRG MGMT 30/<: CPT | Performed by: FAMILY MEDICINE

## 2023-11-07 PROCEDURE — 85025 COMPLETE CBC W/AUTO DIFF WBC: CPT

## 2023-11-07 PROCEDURE — 80053 COMPREHEN METABOLIC PANEL: CPT

## 2023-11-07 PROCEDURE — 2580000003 HC RX 258: Performed by: STUDENT IN AN ORGANIZED HEALTH CARE EDUCATION/TRAINING PROGRAM

## 2023-11-07 PROCEDURE — 94761 N-INVAS EAR/PLS OXIMETRY MLT: CPT

## 2023-11-07 RX ORDER — AMMONIUM LACTATE 12 G/100G
LOTION TOPICAL
Qty: 567 G | Refills: 0 | Status: SHIPPED | OUTPATIENT
Start: 2023-11-07

## 2023-11-07 RX ADMIN — PANTOPRAZOLE SODIUM 40 MG: 40 TABLET, DELAYED RELEASE ORAL at 06:38

## 2023-11-07 RX ADMIN — FOLIC ACID 1 MG: 1 TABLET ORAL at 07:47

## 2023-11-07 RX ADMIN — ENOXAPARIN SODIUM 40 MG: 100 INJECTION SUBCUTANEOUS at 08:30

## 2023-11-07 RX ADMIN — SODIUM CHLORIDE, PRESERVATIVE FREE 10 ML: 5 INJECTION INTRAVENOUS at 07:47

## 2023-11-07 RX ADMIN — HYDROMORPHONE HYDROCHLORIDE 2 MG: 2 INJECTION, SOLUTION INTRAMUSCULAR; INTRAVENOUS; SUBCUTANEOUS at 07:46

## 2023-11-07 RX ADMIN — HYDROMORPHONE HYDROCHLORIDE 2 MG: 2 INJECTION, SOLUTION INTRAMUSCULAR; INTRAVENOUS; SUBCUTANEOUS at 04:28

## 2023-11-07 RX ADMIN — SODIUM CHLORIDE, POTASSIUM CHLORIDE, SODIUM LACTATE AND CALCIUM CHLORIDE: 600; 310; 30; 20 INJECTION, SOLUTION INTRAVENOUS at 04:35

## 2023-11-07 RX ADMIN — Medication: at 08:31

## 2023-11-07 RX ADMIN — HYDROMORPHONE HYDROCHLORIDE 2 MG: 2 INJECTION, SOLUTION INTRAMUSCULAR; INTRAVENOUS; SUBCUTANEOUS at 11:44

## 2023-11-07 RX ADMIN — HYDROMORPHONE HYDROCHLORIDE 2 MG: 2 INJECTION, SOLUTION INTRAMUSCULAR; INTRAVENOUS; SUBCUTANEOUS at 00:19

## 2023-11-07 ASSESSMENT — PAIN DESCRIPTION - DESCRIPTORS
DESCRIPTORS: ACHING
DESCRIPTORS: ACHING
DESCRIPTORS: SHARP
DESCRIPTORS: ACHING
DESCRIPTORS: SHARP

## 2023-11-07 ASSESSMENT — PAIN DESCRIPTION - ORIENTATION
ORIENTATION: LOWER;RIGHT;LEFT
ORIENTATION: RIGHT;LEFT
ORIENTATION: LOWER;RIGHT;LEFT
ORIENTATION: RIGHT;LEFT

## 2023-11-07 ASSESSMENT — PAIN DESCRIPTION - LOCATION
LOCATION: GENERALIZED
LOCATION: LEG
LOCATION: BACK;HIP
LOCATION: LEG
LOCATION: BACK;HIP

## 2023-11-07 ASSESSMENT — PAIN SCALES - GENERAL
PAINLEVEL_OUTOF10: 3
PAINLEVEL_OUTOF10: 0
PAINLEVEL_OUTOF10: 7
PAINLEVEL_OUTOF10: 8
PAINLEVEL_OUTOF10: 6
PAINLEVEL_OUTOF10: 0
PAINLEVEL_OUTOF10: 9
PAINLEVEL_OUTOF10: 0
PAINLEVEL_OUTOF10: 0
PAINLEVEL_OUTOF10: 8

## 2023-11-07 ASSESSMENT — PAIN - FUNCTIONAL ASSESSMENT
PAIN_FUNCTIONAL_ASSESSMENT: ACTIVITIES ARE NOT PREVENTED
PAIN_FUNCTIONAL_ASSESSMENT: ACTIVITIES ARE NOT PREVENTED

## 2023-11-07 NOTE — PLAN OF CARE
Problem: Pain  Goal: Verbalizes/displays adequate comfort level or baseline comfort level  11/7/2023 1042 by Berry Perez RN  Outcome: Progressing  Flowsheets (Taken 11/7/2023 0758)  Verbalizes/displays adequate comfort level or baseline comfort level:   Encourage patient to monitor pain and request assistance   Assess pain using appropriate pain scale   Administer analgesics based on type and severity of pain and evaluate response   Implement non-pharmacological measures as appropriate and evaluate response   Consider cultural and social influences on pain and pain management   Notify Licensed Independent Practitioner if interventions unsuccessful or patient reports new pain  11/7/2023 0052 by Familia Delgadillo RN  Outcome: Progressing     Problem: Safety - Adult  Goal: Free from fall injury  11/7/2023 1042 by Berry Perez RN  Outcome: Progressing  11/7/2023 0052 by Familia Delgadillo RN  Outcome: Progressing     Problem: ABCDS Injury Assessment  Goal: Absence of physical injury  11/7/2023 1042 by Berry Perez RN  Outcome: Progressing  11/7/2023 0052 by Familia Delgadillo RN  Outcome: Progressing     Problem: Skin/Tissue Integrity - Adult  Goal: Skin integrity remains intact  11/7/2023 1042 by Berry Perez RN  Outcome: Progressing  Flowsheets (Taken 11/7/2023 1034)  Skin Integrity Remains Intact: Monitor for areas of redness and/or skin breakdown  11/7/2023 0052 by Familia Delgadillo RN  Outcome: Progressing

## 2023-11-07 NOTE — PLAN OF CARE
Problem: Pain  Goal: Verbalizes/displays adequate comfort level or baseline comfort level  Outcome: Progressing     Problem: Safety - Adult  Goal: Free from fall injury  Outcome: Progressing     Problem: ABCDS Injury Assessment  Goal: Absence of physical injury  Outcome: Progressing     Problem: Skin/Tissue Integrity - Adult  Goal: Skin integrity remains intact  Outcome: Progressing     Problem: Hematologic - Adult  Goal: Maintains hematologic stability  Outcome: Progressing  Flowsheets (Taken 11/6/2023 2120)  Maintains hematologic stability: Assess for signs and symptoms of bleeding or hemorrhage     Problem: Chronic Conditions and Co-morbidities  Goal: Patient's chronic conditions and co-morbidity symptoms are monitored and maintained or improved  Outcome: Progressing

## 2023-11-07 NOTE — DISCHARGE INSTRUCTIONS
DISCHARGE SUMMARY from Nurse    PATIENT INSTRUCTIONS:    After general anesthesia or intravenous sedation, for 24 hours or while taking prescription Narcotics:  Limit your activities  Do not drive and operate hazardous machinery  Do not make important personal or business decisions  Do  not drink alcoholic beverages  If you have not urinated within 8 hours after discharge, please contact your surgeon on call. Report the following to your surgeon:  Excessive pain, swelling, redness or odor of or around the surgical area  Temperature over 100.5  Nausea and vomiting lasting longer than 4 hours or if unable to take medications  Any signs of decreased circulation or nerve impairment to extremity: change in color, persistent  numbness, tingling, coldness or increase pain  Any questions    What to do at Home:  Recommended activity: activity as tolerated,     If you experience any of the following symptoms Chest pain, shortness of breath ,chills,fever greater than 100.4, pain unrelieved by medication please follow up with Dr Eduardo Marcelo. *  Please give a list of your current medications to your Primary Care Provider. *  Please update this list whenever your medications are discontinued, doses are      changed, or new medications (including over-the-counter products) are added. *  Please carry medication information at all times in case of emergency situations. These are general instructions for a healthy lifestyle:    No smoking/ No tobacco products/ Avoid exposure to second hand smoke  Surgeon General's Warning:  Quitting smoking now greatly reduces serious risk to your health.     Obesity, smoking, and sedentary lifestyle greatly increases your risk for illness    A healthy diet, regular physical exercise & weight monitoring are important for maintaining a healthy lifestyle    You may be retaining fluid if you have a history of heart failure or if you experience any of the following symptoms:  Weight gain of 3

## 2023-11-14 ENCOUNTER — HOSPITAL ENCOUNTER (OUTPATIENT)
Facility: HOSPITAL | Age: 63
Setting detail: INFUSION SERIES
Discharge: HOME | End: 2023-11-17
Payer: COMMERCIAL

## 2023-11-14 VITALS
DIASTOLIC BLOOD PRESSURE: 64 MMHG | HEART RATE: 60 BPM | RESPIRATION RATE: 20 BRPM | OXYGEN SATURATION: 99 % | SYSTOLIC BLOOD PRESSURE: 122 MMHG | TEMPERATURE: 97.4 F

## 2023-11-14 PROCEDURE — 86870 RBC ANTIBODY IDENTIFICATION: CPT

## 2023-11-14 PROCEDURE — 86850 RBC ANTIBODY SCREEN: CPT

## 2023-11-14 PROCEDURE — 86920 COMPATIBILITY TEST SPIN: CPT

## 2023-11-14 PROCEDURE — 86902 BLOOD TYPE ANTIGEN DONOR EA: CPT

## 2023-11-14 PROCEDURE — 86921 COMPATIBILITY TEST INCUBATE: CPT

## 2023-11-14 PROCEDURE — 86900 BLOOD TYPING SEROLOGIC ABO: CPT

## 2023-11-14 PROCEDURE — 2580000003 HC RX 258: Performed by: INTERNAL MEDICINE

## 2023-11-14 PROCEDURE — 86922 COMPATIBILITY TEST ANTIGLOB: CPT

## 2023-11-14 PROCEDURE — 85660 RBC SICKLE CELL TEST: CPT

## 2023-11-14 PROCEDURE — 36591 DRAW BLOOD OFF VENOUS DEVICE: CPT

## 2023-11-14 PROCEDURE — 86860 RBC ANTIBODY ELUTION: CPT

## 2023-11-14 PROCEDURE — 6360000002 HC RX W HCPCS

## 2023-11-14 PROCEDURE — 86880 COOMBS TEST DIRECT: CPT

## 2023-11-14 PROCEDURE — 86901 BLOOD TYPING SEROLOGIC RH(D): CPT

## 2023-11-14 RX ORDER — HEPARIN SODIUM (PORCINE) LOCK FLUSH IV SOLN 100 UNIT/ML 100 UNIT/ML
500 SOLUTION INTRAVENOUS PRN
Status: DISCONTINUED | OUTPATIENT
Start: 2023-11-14 | End: 2023-11-18 | Stop reason: HOSPADM

## 2023-11-14 RX ORDER — SODIUM CHLORIDE 0.9 % (FLUSH) 0.9 %
5-40 SYRINGE (ML) INJECTION PRN
Status: DISCONTINUED | OUTPATIENT
Start: 2023-11-14 | End: 2023-11-18 | Stop reason: HOSPADM

## 2023-11-14 RX ORDER — HEPARIN 100 UNIT/ML
SYRINGE INTRAVENOUS
Status: COMPLETED
Start: 2023-11-14 | End: 2023-11-14

## 2023-11-14 RX ADMIN — HEPARIN 500 UNITS: 100 SYRINGE at 15:25

## 2023-11-14 RX ADMIN — SODIUM CHLORIDE, PRESERVATIVE FREE 30 ML: 5 INJECTION INTRAVENOUS at 15:25

## 2023-11-14 RX ADMIN — HEPARIN SODIUM (PORCINE) LOCK FLUSH IV SOLN 100 UNIT/ML 500 UNITS: 100 SOLUTION at 15:25

## 2023-11-14 ASSESSMENT — PAIN DESCRIPTION - DESCRIPTORS: DESCRIPTORS: ACHING

## 2023-11-14 ASSESSMENT — PAIN - FUNCTIONAL ASSESSMENT: PAIN_FUNCTIONAL_ASSESSMENT: 0-10

## 2023-11-15 LAB — HISTORY CHECK: NORMAL

## 2023-11-16 ENCOUNTER — HOSPITAL ENCOUNTER (OUTPATIENT)
Facility: HOSPITAL | Age: 63
Setting detail: INFUSION SERIES
End: 2023-11-16
Payer: COMMERCIAL

## 2023-11-16 VITALS
TEMPERATURE: 98.2 F | HEART RATE: 63 BPM | SYSTOLIC BLOOD PRESSURE: 141 MMHG | DIASTOLIC BLOOD PRESSURE: 74 MMHG | OXYGEN SATURATION: 100 % | RESPIRATION RATE: 18 BRPM

## 2023-11-16 PROCEDURE — 6360000002 HC RX W HCPCS

## 2023-11-16 PROCEDURE — 2580000003 HC RX 258: Performed by: INTERNAL MEDICINE

## 2023-11-16 PROCEDURE — P9040 RBC LEUKOREDUCED IRRADIATED: HCPCS

## 2023-11-16 PROCEDURE — 36430 TRANSFUSION BLD/BLD COMPNT: CPT

## 2023-11-16 PROCEDURE — 6370000000 HC RX 637 (ALT 250 FOR IP): Performed by: INTERNAL MEDICINE

## 2023-11-16 RX ORDER — SODIUM CHLORIDE 9 MG/ML
INJECTION, SOLUTION INTRAVENOUS ONCE
Status: COMPLETED | OUTPATIENT
Start: 2023-11-16 | End: 2023-11-16

## 2023-11-16 RX ORDER — HEPARIN 100 UNIT/ML
SYRINGE INTRAVENOUS
Status: COMPLETED
Start: 2023-11-16 | End: 2023-11-16

## 2023-11-16 RX ORDER — DIPHENHYDRAMINE HCL 25 MG
25 CAPSULE ORAL ONCE
Status: COMPLETED | OUTPATIENT
Start: 2023-11-16 | End: 2023-11-16

## 2023-11-16 RX ORDER — ACETAMINOPHEN 325 MG/1
650 TABLET ORAL ONCE
Status: COMPLETED | OUTPATIENT
Start: 2023-11-16 | End: 2023-11-16

## 2023-11-16 RX ORDER — HEPARIN SODIUM (PORCINE) LOCK FLUSH IV SOLN 100 UNIT/ML 100 UNIT/ML
500 SOLUTION INTRAVENOUS PRN
Status: DISCONTINUED | OUTPATIENT
Start: 2023-11-16 | End: 2023-11-20 | Stop reason: HOSPADM

## 2023-11-16 RX ORDER — SODIUM CHLORIDE 0.9 % (FLUSH) 0.9 %
5-40 SYRINGE (ML) INJECTION PRN
Status: DISCONTINUED | OUTPATIENT
Start: 2023-11-16 | End: 2023-11-20 | Stop reason: HOSPADM

## 2023-11-16 RX ADMIN — HEPARIN SODIUM (PORCINE) LOCK FLUSH IV SOLN 100 UNIT/ML 500 UNITS: 100 SOLUTION at 13:40

## 2023-11-16 RX ADMIN — ACETAMINOPHEN 650 MG: 325 TABLET ORAL at 09:34

## 2023-11-16 RX ADMIN — HEPARIN 500 UNITS: 100 SYRINGE at 13:40

## 2023-11-16 RX ADMIN — SODIUM CHLORIDE, PRESERVATIVE FREE 10 ML: 5 INJECTION INTRAVENOUS at 09:45

## 2023-11-16 RX ADMIN — SODIUM CHLORIDE, PRESERVATIVE FREE 20 ML: 5 INJECTION INTRAVENOUS at 13:39

## 2023-11-16 RX ADMIN — SODIUM CHLORIDE 250 ML: 9 INJECTION, SOLUTION INTRAVENOUS at 09:45

## 2023-11-16 RX ADMIN — DIPHENHYDRAMINE HYDROCHLORIDE 25 MG: 25 CAPSULE ORAL at 09:34

## 2023-11-18 LAB
ABO + RH BLD: NORMAL
ANTIGENS PRESENT RBC DONR: NORMAL
BLD PROD TYP BPU: NORMAL
BLOOD BANK DISPENSE STATUS: NORMAL
BLOOD GROUP ANTIBODIES SERPL: NORMAL
BLOOD GROUP ANTIBODIES SERPL: NORMAL
BPU ID: NORMAL
CROSSMATCH RESULT: NORMAL
DAT C3B/C3D, C3D: NORMAL
DAT IGG-SP REAG RBC QL: NORMAL
DAT POLY-SP REAG RBC QL: NORMAL
SPECIMEN EXP DATE BLD: NORMAL
UNIT DIVISION: 0

## 2023-12-02 ENCOUNTER — HOSPITAL ENCOUNTER (INPATIENT)
Facility: HOSPITAL | Age: 63
LOS: 3 days | Discharge: HOME | DRG: 811 | End: 2023-12-05
Attending: EMERGENCY MEDICINE | Admitting: HOSPITALIST
Payer: COMMERCIAL

## 2023-12-02 ENCOUNTER — APPOINTMENT (OUTPATIENT)
Facility: HOSPITAL | Age: 63
DRG: 811 | End: 2023-12-02
Payer: COMMERCIAL

## 2023-12-02 DIAGNOSIS — N17.9 AKI (ACUTE KIDNEY INJURY) (HCC): ICD-10-CM

## 2023-12-02 DIAGNOSIS — D64.9 ANEMIA, UNSPECIFIED TYPE: ICD-10-CM

## 2023-12-02 DIAGNOSIS — M79.89 LEG SWELLING: ICD-10-CM

## 2023-12-02 DIAGNOSIS — D57.00 SICKLE CELL PAIN CRISIS (HCC): Primary | ICD-10-CM

## 2023-12-02 PROBLEM — N18.9 ACUTE-ON-CHRONIC KIDNEY INJURY (HCC): Status: ACTIVE | Noted: 2023-12-02

## 2023-12-02 PROBLEM — Z91.148 NON COMPLIANCE W MEDICATION REGIMEN: Status: ACTIVE | Noted: 2023-12-02

## 2023-12-02 LAB
ALBUMIN SERPL-MCNC: 3.2 G/DL (ref 3.4–5)
ALBUMIN/GLOB SERPL: 0.7 (ref 0.8–1.7)
ALP SERPL-CCNC: 149 U/L (ref 45–117)
ALT SERPL-CCNC: 41 U/L (ref 16–61)
ANION GAP SERPL CALC-SCNC: 5 MMOL/L (ref 3–18)
AST SERPL-CCNC: 61 U/L (ref 10–38)
BASOPHILS # BLD: 0 K/UL (ref 0–0.1)
BASOPHILS NFR BLD: 0 % (ref 0–2)
BILIRUB SERPL-MCNC: 2.6 MG/DL (ref 0.2–1)
BUN SERPL-MCNC: 36 MG/DL (ref 7–18)
BUN/CREAT SERPL: 20 (ref 12–20)
CALCIUM SERPL-MCNC: 7.9 MG/DL (ref 8.5–10.1)
CHLORIDE SERPL-SCNC: 107 MMOL/L (ref 100–111)
CO2 SERPL-SCNC: 24 MMOL/L (ref 21–32)
CREAT SERPL-MCNC: 1.81 MG/DL (ref 0.6–1.3)
DIFFERENTIAL METHOD BLD: ABNORMAL
EKG ATRIAL RATE: 70 BPM
EKG DIAGNOSIS: NORMAL
EKG P AXIS: 66 DEGREES
EKG P-R INTERVAL: 154 MS
EKG Q-T INTERVAL: 440 MS
EKG QRS DURATION: 90 MS
EKG QTC CALCULATION (BAZETT): 475 MS
EKG R AXIS: -4 DEGREES
EKG T AXIS: 41 DEGREES
EKG VENTRICULAR RATE: 70 BPM
EOSINOPHIL # BLD: 0 K/UL (ref 0–0.4)
EOSINOPHIL NFR BLD: 0 % (ref 0–5)
ERYTHROCYTE [DISTWIDTH] IN BLOOD BY AUTOMATED COUNT: 18.5 % (ref 11.6–14.5)
GLOBULIN SER CALC-MCNC: 4.5 G/DL (ref 2–4)
GLUCOSE SERPL-MCNC: 86 MG/DL (ref 74–99)
HCT VFR BLD AUTO: 18.5 % (ref 36–48)
HGB BLD-MCNC: 6.2 G/DL (ref 13–16)
HISTORY CHECK: NORMAL
IMM GRANULOCYTES # BLD AUTO: 0 K/UL
IMM GRANULOCYTES NFR BLD AUTO: 0 %
LYMPHOCYTES # BLD: 0.7 K/UL (ref 0.9–3.6)
LYMPHOCYTES NFR BLD: 9 % (ref 21–52)
MCH RBC QN AUTO: 29.1 PG (ref 24–34)
MCHC RBC AUTO-ENTMCNC: 33.5 G/DL (ref 31–37)
MCV RBC AUTO: 86.9 FL (ref 78–100)
MONOCYTES # BLD: 0.6 K/UL (ref 0.05–1.2)
MONOCYTES NFR BLD: 7 % (ref 3–10)
NEUTS SEG # BLD: 6.8 K/UL (ref 1.8–8)
NEUTS SEG NFR BLD: 84 % (ref 40–73)
NRBC # BLD: 0.22 K/UL (ref 0–0.01)
NRBC BLD-RTO: 2.7 PER 100 WBC
NT PRO BNP: 4247 PG/ML (ref 0–900)
PLATELET # BLD AUTO: 149 K/UL (ref 135–420)
PLATELET COMMENT: ABNORMAL
PMV BLD AUTO: 10.4 FL (ref 9.2–11.8)
POTASSIUM SERPL-SCNC: 4.6 MMOL/L (ref 3.5–5.5)
PROT SERPL-MCNC: 7.7 G/DL (ref 6.4–8.2)
RBC # BLD AUTO: 2.13 M/UL (ref 4.35–5.65)
RBC MORPH BLD: ABNORMAL
RETICS/RBC NFR AUTO: 5.5 % (ref 0.5–2.5)
SODIUM SERPL-SCNC: 136 MMOL/L (ref 136–145)
TROPONIN I SERPL HS-MCNC: 38 NG/L (ref 0–78)
TROPONIN I SERPL HS-MCNC: 39 NG/L (ref 0–78)
WBC # BLD AUTO: 8.1 K/UL (ref 4.6–13.2)

## 2023-12-02 PROCEDURE — 6360000002 HC RX W HCPCS

## 2023-12-02 PROCEDURE — 99223 1ST HOSP IP/OBS HIGH 75: CPT

## 2023-12-02 PROCEDURE — 86900 BLOOD TYPING SEROLOGIC ABO: CPT

## 2023-12-02 PROCEDURE — 93005 ELECTROCARDIOGRAM TRACING: CPT | Performed by: NURSE PRACTITIONER

## 2023-12-02 PROCEDURE — 93010 ELECTROCARDIOGRAM REPORT: CPT | Performed by: INTERNAL MEDICINE

## 2023-12-02 PROCEDURE — 6370000000 HC RX 637 (ALT 250 FOR IP)

## 2023-12-02 PROCEDURE — 83880 ASSAY OF NATRIURETIC PEPTIDE: CPT

## 2023-12-02 PROCEDURE — 2580000003 HC RX 258: Performed by: NURSE PRACTITIONER

## 2023-12-02 PROCEDURE — 86901 BLOOD TYPING SEROLOGIC RH(D): CPT

## 2023-12-02 PROCEDURE — 85660 RBC SICKLE CELL TEST: CPT

## 2023-12-02 PROCEDURE — 86920 COMPATIBILITY TEST SPIN: CPT

## 2023-12-02 PROCEDURE — 2580000003 HC RX 258

## 2023-12-02 PROCEDURE — 86870 RBC ANTIBODY IDENTIFICATION: CPT

## 2023-12-02 PROCEDURE — 86921 COMPATIBILITY TEST INCUBATE: CPT

## 2023-12-02 PROCEDURE — 86850 RBC ANTIBODY SCREEN: CPT

## 2023-12-02 PROCEDURE — 96374 THER/PROPH/DIAG INJ IV PUSH: CPT

## 2023-12-02 PROCEDURE — 85025 COMPLETE CBC W/AUTO DIFF WBC: CPT

## 2023-12-02 PROCEDURE — 71046 X-RAY EXAM CHEST 2 VIEWS: CPT

## 2023-12-02 PROCEDURE — 1100000000 HC RM PRIVATE

## 2023-12-02 PROCEDURE — 84484 ASSAY OF TROPONIN QUANT: CPT

## 2023-12-02 PROCEDURE — 80053 COMPREHEN METABOLIC PANEL: CPT

## 2023-12-02 PROCEDURE — 85045 AUTOMATED RETICULOCYTE COUNT: CPT

## 2023-12-02 PROCEDURE — 6360000002 HC RX W HCPCS: Performed by: NURSE PRACTITIONER

## 2023-12-02 PROCEDURE — 86902 BLOOD TYPE ANTIGEN DONOR EA: CPT

## 2023-12-02 PROCEDURE — 99285 EMERGENCY DEPT VISIT HI MDM: CPT

## 2023-12-02 PROCEDURE — 86922 COMPATIBILITY TEST ANTIGLOB: CPT

## 2023-12-02 RX ORDER — ACETAMINOPHEN 650 MG/1
650 SUPPOSITORY RECTAL EVERY 6 HOURS PRN
Status: DISCONTINUED | OUTPATIENT
Start: 2023-12-02 | End: 2023-12-05 | Stop reason: HOSPADM

## 2023-12-02 RX ORDER — PANTOPRAZOLE SODIUM 40 MG/1
40 TABLET, DELAYED RELEASE ORAL
Status: DISCONTINUED | OUTPATIENT
Start: 2023-12-02 | End: 2023-12-05 | Stop reason: HOSPADM

## 2023-12-02 RX ORDER — 0.9 % SODIUM CHLORIDE 0.9 %
1000 INTRAVENOUS SOLUTION INTRAVENOUS ONCE
Status: COMPLETED | OUTPATIENT
Start: 2023-12-02 | End: 2023-12-02

## 2023-12-02 RX ORDER — SODIUM CHLORIDE 0.9 % (FLUSH) 0.9 %
5-40 SYRINGE (ML) INJECTION EVERY 12 HOURS SCHEDULED
Status: DISCONTINUED | OUTPATIENT
Start: 2023-12-02 | End: 2023-12-05 | Stop reason: HOSPADM

## 2023-12-02 RX ORDER — SODIUM CHLORIDE 0.9 % (FLUSH) 0.9 %
5-40 SYRINGE (ML) INJECTION PRN
Status: DISCONTINUED | OUTPATIENT
Start: 2023-12-02 | End: 2023-12-05 | Stop reason: HOSPADM

## 2023-12-02 RX ORDER — ACETAMINOPHEN 325 MG/1
650 TABLET ORAL EVERY 6 HOURS PRN
Status: DISCONTINUED | OUTPATIENT
Start: 2023-12-02 | End: 2023-12-05 | Stop reason: HOSPADM

## 2023-12-02 RX ORDER — HYDROMORPHONE HYDROCHLORIDE 1 MG/ML
1 INJECTION, SOLUTION INTRAMUSCULAR; INTRAVENOUS; SUBCUTANEOUS EVERY 4 HOURS PRN
Status: DISCONTINUED | OUTPATIENT
Start: 2023-12-02 | End: 2023-12-02

## 2023-12-02 RX ORDER — POTASSIUM CHLORIDE 7.45 MG/ML
10 INJECTION INTRAVENOUS PRN
Status: DISCONTINUED | OUTPATIENT
Start: 2023-12-02 | End: 2023-12-05 | Stop reason: HOSPADM

## 2023-12-02 RX ORDER — HYDROMORPHONE HYDROCHLORIDE 2 MG/1
4 TABLET ORAL EVERY 4 HOURS PRN
Status: DISCONTINUED | OUTPATIENT
Start: 2023-12-02 | End: 2023-12-03

## 2023-12-02 RX ORDER — HYDROMORPHONE HYDROCHLORIDE 2 MG/ML
2 INJECTION, SOLUTION INTRAMUSCULAR; INTRAVENOUS; SUBCUTANEOUS
Status: COMPLETED | OUTPATIENT
Start: 2023-12-02 | End: 2023-12-02

## 2023-12-02 RX ORDER — HYDROMORPHONE HYDROCHLORIDE 2 MG/ML
2 INJECTION, SOLUTION INTRAMUSCULAR; INTRAVENOUS; SUBCUTANEOUS EVERY 4 HOURS PRN
Status: DISCONTINUED | OUTPATIENT
Start: 2023-12-02 | End: 2023-12-03

## 2023-12-02 RX ORDER — NALOXONE HYDROCHLORIDE 0.4 MG/ML
0.4 INJECTION, SOLUTION INTRAMUSCULAR; INTRAVENOUS; SUBCUTANEOUS PRN
Status: DISCONTINUED | OUTPATIENT
Start: 2023-12-02 | End: 2023-12-05 | Stop reason: HOSPADM

## 2023-12-02 RX ORDER — ONDANSETRON 4 MG/1
4 TABLET, ORALLY DISINTEGRATING ORAL EVERY 8 HOURS PRN
Status: DISCONTINUED | OUTPATIENT
Start: 2023-12-02 | End: 2023-12-05 | Stop reason: HOSPADM

## 2023-12-02 RX ORDER — MAGNESIUM SULFATE IN WATER 40 MG/ML
2000 INJECTION, SOLUTION INTRAVENOUS PRN
Status: DISCONTINUED | OUTPATIENT
Start: 2023-12-02 | End: 2023-12-05 | Stop reason: HOSPADM

## 2023-12-02 RX ORDER — SODIUM CHLORIDE 9 MG/ML
INJECTION, SOLUTION INTRAVENOUS PRN
Status: DISCONTINUED | OUTPATIENT
Start: 2023-12-02 | End: 2023-12-03

## 2023-12-02 RX ORDER — ONDANSETRON 2 MG/ML
4 INJECTION INTRAMUSCULAR; INTRAVENOUS EVERY 6 HOURS PRN
Status: DISCONTINUED | OUTPATIENT
Start: 2023-12-02 | End: 2023-12-05 | Stop reason: HOSPADM

## 2023-12-02 RX ORDER — SODIUM CHLORIDE 9 MG/ML
INJECTION, SOLUTION INTRAVENOUS CONTINUOUS
Status: DISPENSED | OUTPATIENT
Start: 2023-12-02 | End: 2023-12-03

## 2023-12-02 RX ORDER — POLYETHYLENE GLYCOL 3350 17 G/17G
17 POWDER, FOR SOLUTION ORAL DAILY PRN
Status: DISCONTINUED | OUTPATIENT
Start: 2023-12-02 | End: 2023-12-05 | Stop reason: HOSPADM

## 2023-12-02 RX ORDER — POTASSIUM CHLORIDE 20 MEQ/1
40 TABLET, EXTENDED RELEASE ORAL PRN
Status: DISCONTINUED | OUTPATIENT
Start: 2023-12-02 | End: 2023-12-05 | Stop reason: HOSPADM

## 2023-12-02 RX ADMIN — HYDROMORPHONE HYDROCHLORIDE 2 MG: 2 INJECTION, SOLUTION INTRAMUSCULAR; INTRAVENOUS; SUBCUTANEOUS at 23:28

## 2023-12-02 RX ADMIN — SODIUM CHLORIDE 1000 ML: 9 INJECTION, SOLUTION INTRAVENOUS at 10:18

## 2023-12-02 RX ADMIN — HYDROMORPHONE HYDROCHLORIDE 2 MG: 2 INJECTION, SOLUTION INTRAMUSCULAR; INTRAVENOUS; SUBCUTANEOUS at 15:36

## 2023-12-02 RX ADMIN — HYDROMORPHONE HYDROCHLORIDE 2 MG: 2 INJECTION, SOLUTION INTRAMUSCULAR; INTRAVENOUS; SUBCUTANEOUS at 10:17

## 2023-12-02 RX ADMIN — HYDROMORPHONE HYDROCHLORIDE 2 MG: 2 INJECTION, SOLUTION INTRAMUSCULAR; INTRAVENOUS; SUBCUTANEOUS at 19:05

## 2023-12-02 RX ADMIN — PANTOPRAZOLE SODIUM 40 MG: 40 TABLET, DELAYED RELEASE ORAL at 19:06

## 2023-12-02 RX ADMIN — SODIUM CHLORIDE: 9 INJECTION, SOLUTION INTRAVENOUS at 19:11

## 2023-12-02 ASSESSMENT — ENCOUNTER SYMPTOMS
BACK PAIN: 1
GASTROINTESTINAL NEGATIVE: 1
BLOOD IN STOOL: 0
SHORTNESS OF BREATH: 0

## 2023-12-02 ASSESSMENT — PAIN SCALES - GENERAL
PAINLEVEL_OUTOF10: 10
PAINLEVEL_OUTOF10: 5

## 2023-12-02 ASSESSMENT — PAIN DESCRIPTION - LOCATION
LOCATION: GENERALIZED
LOCATION: BACK

## 2023-12-02 ASSESSMENT — PAIN DESCRIPTION - DESCRIPTORS
DESCRIPTORS: ACHING
DESCRIPTORS: ACHING;DISCOMFORT

## 2023-12-02 NOTE — H&P
Duration 90 ms    Q-T Interval 440 ms    QTc Calculation (Bazett) 475 ms    P Axis 66 degrees    R Axis -4 degrees    T Axis 41 degrees    Diagnosis       Normal sinus rhythm  Moderate voltage criteria for LVH, may be normal variant ( Sokolow-Glaser ,   [place] product )  Borderline ECG  Confirmed by Supa Ayala MD, Dany Donaldson ([id number]) on 12/2/2023 11:17:05 AM     CMP    Collection Time: 12/02/23 10:19 AM   Result Value Ref Range    Sodium 136 136 - 145 mmol/L    Potassium 4.6 3.5 - 5.5 mmol/L    Chloride 107 100 - 111 mmol/L    CO2 24 21 - 32 mmol/L    Anion Gap 5 3.0 - 18 mmol/L    Glucose 86 74 - 99 mg/dL    BUN 36 (H) 7.0 - 18 MG/DL    Creatinine 1.81 (H) 0.6 - 1.3 MG/DL    Bun/Cre Ratio 20 12 - 20      Est, Glom Filt Rate 41 (L) >60 ml/min/1.73m2    Calcium 7.9 (L) 8.5 - 10.1 MG/DL    Total Bilirubin 2.6 (H) 0.2 - 1.0 MG/DL    ALT 41 16 - 61 U/L    AST 61 (H) 10 - 38 U/L    Alk Phosphatase 149 (H) 45 - 117 U/L    Total Protein 7.7 6.4 - 8.2 g/dL    Albumin 3.2 (L) 3.4 - 5.0 g/dL    Globulin 4.5 (H) 2.0 - 4.0 g/dL    Albumin/Globulin Ratio 0.7 (L) 0.8 - 1.7     CBC with Auto Differential    Collection Time: 12/02/23 10:19 AM   Result Value Ref Range    WBC 8.1 4.6 - 13.2 K/uL    RBC 2.13 (L) 4.35 - 5.65 M/uL    Hemoglobin 6.2 (L) 13.0 - 16.0 g/dL    Hematocrit 18.5 (L) 36.0 - 48.0 %    MCV 86.9 78.0 - 100.0 FL    MCH 29.1 24.0 - 34.0 PG    MCHC 33.5 31.0 - 37.0 g/dL    RDW 18.5 (H) 11.6 - 14.5 %    Platelets 887 271 - 668 K/uL    MPV 10.4 9.2 - 11.8 FL    Nucleated RBCs 2.7 (H) 0  WBC    nRBC 0.22 (H) 0.00 - 0.01 K/uL    Neutrophils % 84 (H) 40 - 73 %    Lymphocytes % 9 (L) 21 - 52 %    Monocytes % 7 3 - 10 %    Eosinophils % 0 0 - 5 %    Basophils % 0 0 - 2 %    Immature Granulocytes 0 %    Neutrophils Absolute 6.8 1.8 - 8.0 K/UL    Lymphocytes Absolute 0.7 (L) 0.9 - 3.6 K/UL    Monocytes Absolute 0.6 0.05 - 1.2 K/UL    Eosinophils Absolute 0.0 0.0 - 0.4 K/UL    Basophils Absolute 0.0 0.0 - 0.1 K/UL    Absolute

## 2023-12-02 NOTE — ED TRIAGE NOTES
Client reports severe bilateral  legs and hip pain over past 2 days. Client reports to having hx of sickle cell and believes he is in crisis. Pain 10/10.

## 2023-12-02 NOTE — ED PROVIDER NOTES
Clinical Impression:   1. Sickle cell pain crisis ([address])    2. WILFRIDO (acute kidney injury) ([address])    3. Anemia, unspecified type        Disposition: admission      No follow-up provider specified. Medication List        CONTINUE taking these medications      Medical Compression Stockings Misc  1 each by Does not apply route daily as needed (Remove at night) Knee-high, moderate compression            ASK your doctor about these medications      ammonium lactate 12 % lotion  Commonly known as: LAC-HYDRIN  Apply topically as needed. dapagliflozin 5 MG tablet  Commonly known as: Farxiga  Take 1 tablet by mouth every morning     ergocalciferol 1.25 MG (73048 UT) capsule  Commonly known as: ERGOCALCIFEROL     folic acid 1 MG tablet  Commonly known as: FOLVITE  Take 1 tablet by mouth daily     furosemide 20 MG tablet  Commonly known as: LASIX  Take 1 tablet by mouth daily     HYDROmorphone 4 MG tablet  Commonly known as: DILAUDID     pantoprazole 40 MG tablet  Commonly known as: PROTONIX  Take 1 tablet by mouth 2 times daily (before meals)     sacubitril-valsartan 24-26 MG per tablet  Commonly known as: ENTRESTO  Take 1 tablet by mouth 2 times daily               Dictation disclaimer:  Please note that this dictation was completed with [employer], the [employer] voice recognition software. Quite often unanticipated grammatical, syntax, homophones, and other interpretive errors are inadvertently transcribed by the computer software. Please disregard these errors. Please excuse any errors that have escaped final proofreading.          DIOGENES Muniz NP  12/02/23 3128

## 2023-12-02 NOTE — ED NOTES
Per Sunil Whittaker from blood bank pt has multiple antibodies so blood has to be ordered from [facility]. Will contact us if any delays and ETA. Provider Ronna Guerrero made aware.       Valentina Nichole RN  12/02/23 6315

## 2023-12-03 LAB
ANION GAP SERPL CALC-SCNC: 3 MMOL/L (ref 3–18)
BASOPHILS # BLD: 0 K/UL (ref 0–0.1)
BASOPHILS NFR BLD: 0 % (ref 0–2)
BUN SERPL-MCNC: 27 MG/DL (ref 7–18)
BUN/CREAT SERPL: 19 (ref 12–20)
CALCIUM SERPL-MCNC: 8.3 MG/DL (ref 8.5–10.1)
CHLORIDE SERPL-SCNC: 114 MMOL/L (ref 100–111)
CO2 SERPL-SCNC: 24 MMOL/L (ref 21–32)
CREAT SERPL-MCNC: 1.41 MG/DL (ref 0.6–1.3)
DIFFERENTIAL METHOD BLD: ABNORMAL
EOSINOPHIL # BLD: 0.3 K/UL (ref 0–0.4)
EOSINOPHIL NFR BLD: 4 % (ref 0–5)
ERYTHROCYTE [DISTWIDTH] IN BLOOD BY AUTOMATED COUNT: 18.3 % (ref 11.6–14.5)
GLUCOSE SERPL-MCNC: 115 MG/DL (ref 74–99)
HCT VFR BLD AUTO: 23.4 % (ref 36–48)
HGB BLD-MCNC: 7.8 G/DL (ref 13–16)
IMM GRANULOCYTES # BLD AUTO: 0 K/UL
IMM GRANULOCYTES NFR BLD AUTO: 0 %
LYMPHOCYTES # BLD: 0.7 K/UL (ref 0.9–3.6)
LYMPHOCYTES NFR BLD: 9 % (ref 21–52)
MCH RBC QN AUTO: 29.4 PG (ref 24–34)
MCHC RBC AUTO-ENTMCNC: 33.3 G/DL (ref 31–37)
MCV RBC AUTO: 88.3 FL (ref 78–100)
MONOCYTES # BLD: 0.4 K/UL (ref 0.05–1.2)
MONOCYTES NFR BLD: 5 % (ref 3–10)
NEUTS SEG # BLD: 6.2 K/UL (ref 1.8–8)
NEUTS SEG NFR BLD: 82 % (ref 40–73)
NRBC # BLD: 0.21 K/UL (ref 0–0.01)
NRBC BLD-RTO: 2.8 PER 100 WBC
PLATELET # BLD AUTO: 180 K/UL (ref 135–420)
PLATELET COMMENT: ABNORMAL
PMV BLD AUTO: 10.8 FL (ref 9.2–11.8)
POTASSIUM SERPL-SCNC: 4.8 MMOL/L (ref 3.5–5.5)
RBC # BLD AUTO: 2.65 M/UL (ref 4.35–5.65)
RBC MORPH BLD: ABNORMAL
SODIUM SERPL-SCNC: 141 MMOL/L (ref 136–145)
WBC # BLD AUTO: 7.6 K/UL (ref 4.6–13.2)

## 2023-12-03 PROCEDURE — 97530 THERAPEUTIC ACTIVITIES: CPT

## 2023-12-03 PROCEDURE — 36430 TRANSFUSION BLD/BLD COMPNT: CPT

## 2023-12-03 PROCEDURE — 99232 SBSQ HOSP IP/OBS MODERATE 35: CPT | Performed by: HOSPITALIST

## 2023-12-03 PROCEDURE — 30233N1 TRANSFUSION OF NONAUTOLOGOUS RED BLOOD CELLS INTO PERIPHERAL VEIN, PERCUTANEOUS APPROACH: ICD-10-PCS | Performed by: HOSPITALIST

## 2023-12-03 PROCEDURE — 6360000002 HC RX W HCPCS

## 2023-12-03 PROCEDURE — 80048 BASIC METABOLIC PNL TOTAL CA: CPT

## 2023-12-03 PROCEDURE — 1100000000 HC RM PRIVATE

## 2023-12-03 PROCEDURE — 97165 OT EVAL LOW COMPLEX 30 MIN: CPT

## 2023-12-03 PROCEDURE — P9040 RBC LEUKOREDUCED IRRADIATED: HCPCS

## 2023-12-03 PROCEDURE — 2580000003 HC RX 258

## 2023-12-03 PROCEDURE — 85025 COMPLETE CBC W/AUTO DIFF WBC: CPT

## 2023-12-03 PROCEDURE — 6360000002 HC RX W HCPCS: Performed by: HOSPITALIST

## 2023-12-03 PROCEDURE — 97161 PT EVAL LOW COMPLEX 20 MIN: CPT

## 2023-12-03 PROCEDURE — 6370000000 HC RX 637 (ALT 250 FOR IP)

## 2023-12-03 RX ORDER — HYDROMORPHONE HYDROCHLORIDE 2 MG/ML
2 INJECTION, SOLUTION INTRAMUSCULAR; INTRAVENOUS; SUBCUTANEOUS
Status: DISCONTINUED | OUTPATIENT
Start: 2023-12-03 | End: 2023-12-04

## 2023-12-03 RX ORDER — HYDROMORPHONE HYDROCHLORIDE 2 MG/1
4 TABLET ORAL
Status: DISCONTINUED | OUTPATIENT
Start: 2023-12-03 | End: 2023-12-04

## 2023-12-03 RX ORDER — FUROSEMIDE 10 MG/ML
20 INJECTION INTRAMUSCULAR; INTRAVENOUS ONCE
Status: COMPLETED | OUTPATIENT
Start: 2023-12-03 | End: 2023-12-03

## 2023-12-03 RX ADMIN — HYDROMORPHONE HYDROCHLORIDE 2 MG: 2 INJECTION, SOLUTION INTRAMUSCULAR; INTRAVENOUS; SUBCUTANEOUS at 20:21

## 2023-12-03 RX ADMIN — PANTOPRAZOLE SODIUM 40 MG: 40 TABLET, DELAYED RELEASE ORAL at 08:42

## 2023-12-03 RX ADMIN — SODIUM CHLORIDE: 9 INJECTION, SOLUTION INTRAVENOUS at 13:01

## 2023-12-03 RX ADMIN — FUROSEMIDE 20 MG: 10 INJECTION, SOLUTION INTRAMUSCULAR; INTRAVENOUS at 12:37

## 2023-12-03 RX ADMIN — PANTOPRAZOLE SODIUM 40 MG: 40 TABLET, DELAYED RELEASE ORAL at 16:30

## 2023-12-03 RX ADMIN — HYDROMORPHONE HYDROCHLORIDE 2 MG: 2 INJECTION, SOLUTION INTRAMUSCULAR; INTRAVENOUS; SUBCUTANEOUS at 08:42

## 2023-12-03 RX ADMIN — HYDROMORPHONE HYDROCHLORIDE 2 MG: 2 INJECTION, SOLUTION INTRAMUSCULAR; INTRAVENOUS; SUBCUTANEOUS at 23:27

## 2023-12-03 RX ADMIN — SODIUM CHLORIDE, PRESERVATIVE FREE 10 ML: 5 INJECTION INTRAVENOUS at 21:39

## 2023-12-03 RX ADMIN — HYDROMORPHONE HYDROCHLORIDE 2 MG: 2 INJECTION, SOLUTION INTRAMUSCULAR; INTRAVENOUS; SUBCUTANEOUS at 12:48

## 2023-12-03 RX ADMIN — HYDROMORPHONE HYDROCHLORIDE 2 MG: 2 INJECTION, SOLUTION INTRAMUSCULAR; INTRAVENOUS; SUBCUTANEOUS at 16:30

## 2023-12-03 RX ADMIN — HYDROMORPHONE HYDROCHLORIDE 2 MG: 2 INJECTION, SOLUTION INTRAMUSCULAR; INTRAVENOUS; SUBCUTANEOUS at 03:28

## 2023-12-03 ASSESSMENT — PAIN SCALES - GENERAL
PAINLEVEL_OUTOF10: 9
PAINLEVEL_OUTOF10: 4
PAINLEVEL_OUTOF10: 10

## 2023-12-03 ASSESSMENT — PAIN DESCRIPTION - LOCATION
LOCATION: LEG
LOCATION: BACK;LEG
LOCATION: BACK

## 2023-12-03 ASSESSMENT — PAIN DESCRIPTION - ORIENTATION
ORIENTATION: RIGHT;LEFT;POSTERIOR
ORIENTATION: RIGHT;LEFT

## 2023-12-03 ASSESSMENT — PAIN DESCRIPTION - DESCRIPTORS
DESCRIPTORS: ACHING
DESCRIPTORS: ACHING

## 2023-12-03 ASSESSMENT — PAIN DESCRIPTION - PAIN TYPE
TYPE: CHRONIC PAIN
TYPE: CHRONIC PAIN

## 2023-12-03 NOTE — ED NOTES
Pt medicated per MAR, dinner provided, pt placed on 2 liters NC for Low O2 sats and sickle cell pain. Report given to night shift RN. RN to assume care.       Daniella Vo RN  12/02/23 [id number]

## 2023-12-03 NOTE — CONSENT
Informed Consent for Blood Component Transfusion Note    I have discussed with the patient the rationale for blood component transfusion; its benefits in treating or preventing fatigue, organ damage, or death; and its risk which includes mild transfusion reactions, rare risk of blood borne infection, or more serious but rare reactions. I have discussed the alternatives to transfusion, including the risk and consequences of not receiving transfusion. The patient had an opportunity to ask questions and had agreed to proceed with transfusion of blood components.     Electronically signed by DIOGENES Nava NP on 12/2/23 at 7:59 PM EST

## 2023-12-03 NOTE — ED NOTES
Report provided to floor RN, transportation arranged.      Samy Melissa, [address]  12/02/23 7004

## 2023-12-04 LAB
ABO + RH BLD: NORMAL
ANION GAP SERPL CALC-SCNC: 3 MMOL/L (ref 3–18)
ANTIGENS PRESENT RBC DONR: NORMAL
BASOPHILS # BLD: 0 K/UL (ref 0–0.1)
BASOPHILS NFR BLD: 1 % (ref 0–2)
BLD PROD TYP BPU: NORMAL
BLOOD BANK DISPENSE STATUS: NORMAL
BLOOD GROUP ANTIBODIES SERPL: NORMAL
BLOOD GROUP ANTIBODIES SERPL: NORMAL
BPU ID: NORMAL
BUN SERPL-MCNC: 27 MG/DL (ref 7–18)
BUN/CREAT SERPL: 18 (ref 12–20)
CALCIUM SERPL-MCNC: 7.9 MG/DL (ref 8.5–10.1)
CALLED TO: NORMAL
CHLORIDE SERPL-SCNC: 114 MMOL/L (ref 100–111)
CO2 SERPL-SCNC: 25 MMOL/L (ref 21–32)
CREAT SERPL-MCNC: 1.49 MG/DL (ref 0.6–1.3)
CROSSMATCH RESULT: NORMAL
DIFFERENTIAL METHOD BLD: ABNORMAL
EOSINOPHIL # BLD: 0.3 K/UL (ref 0–0.4)
EOSINOPHIL NFR BLD: 4 % (ref 0–5)
ERYTHROCYTE [DISTWIDTH] IN BLOOD BY AUTOMATED COUNT: 18.4 % (ref 11.6–14.5)
GLUCOSE SERPL-MCNC: 116 MG/DL (ref 74–99)
HCT VFR BLD AUTO: 23.6 % (ref 36–48)
HGB BLD-MCNC: 7.9 G/DL (ref 13–16)
IMM GRANULOCYTES # BLD AUTO: 0 K/UL (ref 0–0.04)
IMM GRANULOCYTES NFR BLD AUTO: 0 % (ref 0–0.5)
LYMPHOCYTES # BLD: 1.3 K/UL (ref 0.9–3.6)
LYMPHOCYTES NFR BLD: 18 % (ref 21–52)
MCH RBC QN AUTO: 29.3 PG (ref 24–34)
MCHC RBC AUTO-ENTMCNC: 33.5 G/DL (ref 31–37)
MCV RBC AUTO: 87.4 FL (ref 78–100)
MONOCYTES # BLD: 0.9 K/UL (ref 0.05–1.2)
MONOCYTES NFR BLD: 12 % (ref 3–10)
NEUTS SEG # BLD: 5.1 K/UL (ref 1.8–8)
NEUTS SEG NFR BLD: 66 % (ref 40–73)
NRBC # BLD: 0.2 K/UL (ref 0–0.01)
NRBC BLD-RTO: 2.6 PER 100 WBC
PLATELET # BLD AUTO: 160 K/UL (ref 135–420)
PMV BLD AUTO: 10.5 FL (ref 9.2–11.8)
POTASSIUM SERPL-SCNC: 4.4 MMOL/L (ref 3.5–5.5)
RBC # BLD AUTO: 2.7 M/UL (ref 4.35–5.65)
RETICS/RBC NFR AUTO: 4.5 % (ref 0.5–2.5)
SODIUM SERPL-SCNC: 142 MMOL/L (ref 136–145)
SPECIMEN EXP DATE BLD: NORMAL
UNIT DIVISION: 0
WBC # BLD AUTO: 7.7 K/UL (ref 4.6–13.2)

## 2023-12-04 PROCEDURE — 6370000000 HC RX 637 (ALT 250 FOR IP)

## 2023-12-04 PROCEDURE — 85025 COMPLETE CBC W/AUTO DIFF WBC: CPT

## 2023-12-04 PROCEDURE — 1100000000 HC RM PRIVATE

## 2023-12-04 PROCEDURE — 85045 AUTOMATED RETICULOCYTE COUNT: CPT

## 2023-12-04 PROCEDURE — 6360000002 HC RX W HCPCS: Performed by: HOSPITALIST

## 2023-12-04 PROCEDURE — 6370000000 HC RX 637 (ALT 250 FOR IP): Performed by: INTERNAL MEDICINE

## 2023-12-04 PROCEDURE — 2580000003 HC RX 258

## 2023-12-04 PROCEDURE — 94761 N-INVAS EAR/PLS OXIMETRY MLT: CPT

## 2023-12-04 PROCEDURE — 99232 SBSQ HOSP IP/OBS MODERATE 35: CPT | Performed by: HOSPITALIST

## 2023-12-04 PROCEDURE — 80048 BASIC METABOLIC PNL TOTAL CA: CPT

## 2023-12-04 PROCEDURE — 2700000000 HC OXYGEN THERAPY PER DAY

## 2023-12-04 RX ORDER — HYDROMORPHONE HYDROCHLORIDE 2 MG/1
4 TABLET ORAL EVERY 4 HOURS
Status: DISCONTINUED | OUTPATIENT
Start: 2023-12-04 | End: 2023-12-05 | Stop reason: HOSPADM

## 2023-12-04 RX ORDER — HYDROMORPHONE HYDROCHLORIDE 1 MG/ML
1 INJECTION, SOLUTION INTRAMUSCULAR; INTRAVENOUS; SUBCUTANEOUS EVERY 4 HOURS PRN
Status: DISCONTINUED | OUTPATIENT
Start: 2023-12-04 | End: 2023-12-05 | Stop reason: HOSPADM

## 2023-12-04 RX ADMIN — ACETAMINOPHEN 325MG 650 MG: 325 TABLET ORAL at 11:41

## 2023-12-04 RX ADMIN — HYDROMORPHONE HYDROCHLORIDE 1 MG: 1 INJECTION, SOLUTION INTRAMUSCULAR; INTRAVENOUS; SUBCUTANEOUS at 23:27

## 2023-12-04 RX ADMIN — HYDROMORPHONE HYDROCHLORIDE 4 MG: 2 TABLET ORAL at 09:23

## 2023-12-04 RX ADMIN — HYDROMORPHONE HYDROCHLORIDE 4 MG: 2 TABLET ORAL at 14:47

## 2023-12-04 RX ADMIN — HYDROMORPHONE HYDROCHLORIDE 2 MG: 2 INJECTION, SOLUTION INTRAMUSCULAR; INTRAVENOUS; SUBCUTANEOUS at 05:38

## 2023-12-04 RX ADMIN — HYDROMORPHONE HYDROCHLORIDE 2 MG: 2 INJECTION, SOLUTION INTRAMUSCULAR; INTRAVENOUS; SUBCUTANEOUS at 02:40

## 2023-12-04 RX ADMIN — SODIUM CHLORIDE, PRESERVATIVE FREE 10 ML: 5 INJECTION INTRAVENOUS at 09:24

## 2023-12-04 RX ADMIN — PANTOPRAZOLE SODIUM 40 MG: 40 TABLET, DELAYED RELEASE ORAL at 17:24

## 2023-12-04 RX ADMIN — HYDROMORPHONE HYDROCHLORIDE 4 MG: 2 TABLET ORAL at 17:24

## 2023-12-04 RX ADMIN — HYDROMORPHONE HYDROCHLORIDE 4 MG: 2 TABLET ORAL at 21:19

## 2023-12-04 RX ADMIN — PANTOPRAZOLE SODIUM 40 MG: 40 TABLET, DELAYED RELEASE ORAL at 05:38

## 2023-12-04 RX ADMIN — SODIUM CHLORIDE, PRESERVATIVE FREE 10 ML: 5 INJECTION INTRAVENOUS at 23:29

## 2023-12-04 ASSESSMENT — PAIN SCALES - GENERAL
PAINLEVEL_OUTOF10: 10
PAINLEVEL_OUTOF10: 7
PAINLEVEL_OUTOF10: 10

## 2023-12-04 ASSESSMENT — PAIN DESCRIPTION - DESCRIPTORS
DESCRIPTORS: ACHING;THROBBING
DESCRIPTORS: ACHING;CRAMPING;SHARP
DESCRIPTORS: ACHING
DESCRIPTORS: ACHING;THROBBING
DESCRIPTORS: ACHING
DESCRIPTORS: ACHING;SHARP;GNAWING

## 2023-12-04 ASSESSMENT — PAIN DESCRIPTION - ORIENTATION
ORIENTATION: RIGHT;LEFT
ORIENTATION: RIGHT;LEFT;POSTERIOR
ORIENTATION: RIGHT;LEFT

## 2023-12-04 ASSESSMENT — PAIN DESCRIPTION - LOCATION
LOCATION: GENERALIZED
LOCATION: GENERALIZED
LOCATION: BACK;LEG
LOCATION: BACK;LEG
LOCATION: GENERALIZED
LOCATION: LEG
LOCATION: GENERALIZED
LOCATION: GENERALIZED

## 2023-12-05 VITALS
WEIGHT: 122 LBS | HEIGHT: 65 IN | HEART RATE: 64 BPM | RESPIRATION RATE: 18 BRPM | SYSTOLIC BLOOD PRESSURE: 169 MMHG | TEMPERATURE: 98.2 F | OXYGEN SATURATION: 97 % | BODY MASS INDEX: 20.33 KG/M2 | DIASTOLIC BLOOD PRESSURE: 86 MMHG

## 2023-12-05 LAB
ALBUMIN SERPL-MCNC: 3 G/DL (ref 3.4–5)
ALBUMIN/GLOB SERPL: 0.6 (ref 0.8–1.7)
ALP SERPL-CCNC: 131 U/L (ref 45–117)
ALT SERPL-CCNC: 49 U/L (ref 16–61)
ANION GAP SERPL CALC-SCNC: 1 MMOL/L (ref 3–18)
AST SERPL-CCNC: 67 U/L (ref 10–38)
BASOPHILS # BLD: 0.1 K/UL (ref 0–0.1)
BASOPHILS NFR BLD: 1 % (ref 0–2)
BILIRUB SERPL-MCNC: 2.2 MG/DL (ref 0.2–1)
BUN SERPL-MCNC: 22 MG/DL (ref 7–18)
BUN/CREAT SERPL: 17 (ref 12–20)
CALCIUM SERPL-MCNC: 8.4 MG/DL (ref 8.5–10.1)
CHLORIDE SERPL-SCNC: 113 MMOL/L (ref 100–111)
CO2 SERPL-SCNC: 27 MMOL/L (ref 21–32)
CREAT SERPL-MCNC: 1.31 MG/DL (ref 0.6–1.3)
DIFFERENTIAL METHOD BLD: ABNORMAL
EOSINOPHIL # BLD: 0.4 K/UL (ref 0–0.4)
EOSINOPHIL NFR BLD: 5 % (ref 0–5)
ERYTHROCYTE [DISTWIDTH] IN BLOOD BY AUTOMATED COUNT: 18.4 % (ref 11.6–14.5)
GLOBULIN SER CALC-MCNC: 4.9 G/DL (ref 2–4)
GLUCOSE SERPL-MCNC: 101 MG/DL (ref 74–99)
HCT VFR BLD AUTO: 24.8 % (ref 36–48)
HGB BLD-MCNC: 8.2 G/DL (ref 13–16)
IMM GRANULOCYTES # BLD AUTO: 0 K/UL (ref 0–0.04)
IMM GRANULOCYTES NFR BLD AUTO: 0 % (ref 0–0.5)
LYMPHOCYTES # BLD: 1.5 K/UL (ref 0.9–3.6)
LYMPHOCYTES NFR BLD: 21 % (ref 21–52)
MCH RBC QN AUTO: 29.3 PG (ref 24–34)
MCHC RBC AUTO-ENTMCNC: 33.1 G/DL (ref 31–37)
MCV RBC AUTO: 88.6 FL (ref 78–100)
MONOCYTES # BLD: 1 K/UL (ref 0.05–1.2)
MONOCYTES NFR BLD: 14 % (ref 3–10)
NEUTS SEG # BLD: 4.2 K/UL (ref 1.8–8)
NEUTS SEG NFR BLD: 59 % (ref 40–73)
NRBC # BLD: 0.13 K/UL (ref 0–0.01)
NRBC BLD-RTO: 1.8 PER 100 WBC
PLATELET # BLD AUTO: 170 K/UL (ref 135–420)
PMV BLD AUTO: 11.1 FL (ref 9.2–11.8)
POTASSIUM SERPL-SCNC: 4.4 MMOL/L (ref 3.5–5.5)
PROT SERPL-MCNC: 7.9 G/DL (ref 6.4–8.2)
RBC # BLD AUTO: 2.8 M/UL (ref 4.35–5.65)
RETICS/RBC NFR AUTO: 3.6 % (ref 0.5–2.5)
SODIUM SERPL-SCNC: 141 MMOL/L (ref 136–145)
WBC # BLD AUTO: 7.1 K/UL (ref 4.6–13.2)

## 2023-12-05 PROCEDURE — 85025 COMPLETE CBC W/AUTO DIFF WBC: CPT

## 2023-12-05 PROCEDURE — 85045 AUTOMATED RETICULOCYTE COUNT: CPT

## 2023-12-05 PROCEDURE — 80053 COMPREHEN METABOLIC PANEL: CPT

## 2023-12-05 PROCEDURE — 6370000000 HC RX 637 (ALT 250 FOR IP)

## 2023-12-05 PROCEDURE — 36415 COLL VENOUS BLD VENIPUNCTURE: CPT

## 2023-12-05 PROCEDURE — 2580000003 HC RX 258

## 2023-12-05 PROCEDURE — 6370000000 HC RX 637 (ALT 250 FOR IP): Performed by: INTERNAL MEDICINE

## 2023-12-05 PROCEDURE — 6360000002 HC RX W HCPCS: Performed by: HOSPITALIST

## 2023-12-05 PROCEDURE — 94761 N-INVAS EAR/PLS OXIMETRY MLT: CPT

## 2023-12-05 RX ORDER — FUROSEMIDE 20 MG/1
20 TABLET ORAL
Qty: 12 TABLET | Refills: 0 | Status: SHIPPED | OUTPATIENT
Start: 2023-12-06

## 2023-12-05 RX ADMIN — HYDROMORPHONE HYDROCHLORIDE 1 MG: 1 INJECTION, SOLUTION INTRAMUSCULAR; INTRAVENOUS; SUBCUTANEOUS at 11:30

## 2023-12-05 RX ADMIN — PANTOPRAZOLE SODIUM 40 MG: 40 TABLET, DELAYED RELEASE ORAL at 05:40

## 2023-12-05 RX ADMIN — HYDROMORPHONE HYDROCHLORIDE 4 MG: 2 TABLET ORAL at 05:40

## 2023-12-05 RX ADMIN — HYDROMORPHONE HYDROCHLORIDE 4 MG: 2 TABLET ORAL at 01:43

## 2023-12-05 RX ADMIN — HYDROMORPHONE HYDROCHLORIDE 4 MG: 2 TABLET ORAL at 13:32

## 2023-12-05 RX ADMIN — HYDROMORPHONE HYDROCHLORIDE 4 MG: 2 TABLET ORAL at 09:13

## 2023-12-05 RX ADMIN — SODIUM CHLORIDE, PRESERVATIVE FREE 10 ML: 5 INJECTION INTRAVENOUS at 09:13

## 2023-12-05 RX ADMIN — PANTOPRAZOLE SODIUM 40 MG: 40 TABLET, DELAYED RELEASE ORAL at 16:42

## 2023-12-05 RX ADMIN — HYDROMORPHONE HYDROCHLORIDE 4 MG: 2 TABLET ORAL at 16:42

## 2023-12-05 ASSESSMENT — PAIN DESCRIPTION - LOCATION
LOCATION: BACK;LEG
LOCATION: LEG
LOCATION: BACK;LEG
LOCATION: LEG

## 2023-12-05 ASSESSMENT — PAIN SCALES - GENERAL
PAINLEVEL_OUTOF10: 10
PAINLEVEL_OUTOF10: 7
PAINLEVEL_OUTOF10: 10
PAINLEVEL_OUTOF10: 6
PAINLEVEL_OUTOF10: 7
PAINLEVEL_OUTOF10: 5

## 2023-12-05 ASSESSMENT — PAIN DESCRIPTION - DESCRIPTORS
DESCRIPTORS: ACHING
DESCRIPTORS: ACHING

## 2023-12-05 ASSESSMENT — PAIN DESCRIPTION - ORIENTATION
ORIENTATION: LEFT;RIGHT
ORIENTATION: LOWER;RIGHT;LEFT
ORIENTATION: LOWER;RIGHT;LEFT
ORIENTATION: RIGHT;LEFT

## 2023-12-05 NOTE — DISCHARGE SUMMARY
Discharge Summary    Patient: Alexa Ahumada               Sex: male          DOA: 12/2/2023         YOB: 1960      Age:  58 y.o.        LOS:  LOS: 3 days                Admit Date: 12/2/2023    Discharge Date: 12/5/2023    Admission Diagnoses: WILFRIDO (acute kidney injury) ([address]) [N17.9]  Sickle cell pain crisis ([address]) [D57.00]  Anemia, unspecified type [D64.9]    Reason for admission:    58 y.o. male with a PMHx of sickle cell, GERD who presented to the ED with complaints of BLE pain and back pain that has been progressively worsening over the last two days. Patient states he went to Dr Re Sepulveda office yesterday and had labs drawn and was told his Hgb in 7.2. Upon arrival to the ED, Hgb 6.2. Denies any signs of bleeding at this time. Denies hematemesis, hematochezia, melena. Patient states this feels like his prior crisis and also felt fatigued like his Hgb was low prior to arrival. Currently, patient resting in bed in no distress. VSS. BLE edema without SOB. Patient also states he has not taken his lasix/ entresto in one week as he has ran out. Denies chest pain, sob, fever, chills, nvd, abdominal pain, headache, cough, dizziness. Denies smoking, drinking, illicit drug use. In the ED, temp 99.0, Resp 14, HR 77, BP [address], 96% on RA. Cr 1.81. proBNP 4,247. Reticulocyte count 5.5. Alk Phos 149, AST 61, Tbili 2.6. Hgb 6.2, Hct 18. 5. Type and screen done in the ED. CXR negative. EKG with NSR. Received dilaudid, 1 L NS bolus in the ED. Discharge Condition:    good    Hospital Course:  1. Sickle cell crisis. Decrease IV pain meds. Continue IV fluids. 2. Anemia requiring transfusion. 1 unit PRBC 12/3/2023. Follow repeat H&H.  3. Acute on chronic anemia  4. WILFRIDO. Follow repeat BMP. Okay to draw labs off [facility]ip[facility]. 5.  Acute on chronic HFrEF. Lasix IV x 1 now. 6. Hypertension. Lasix now. 7. Hx PUD and GI Bleed, no evidence of bleeding at this time   8.  Medication Noncompliance - has not taken

## 2023-12-05 NOTE — PLAN OF CARE
Problem: Pain  Goal: Verbalizes/displays adequate comfort level or baseline comfort level  Outcome: Progressing     Problem: Safety - Adult  Goal: Free from fall injury  Outcome: Progressing     Problem: Chronic Conditions and Co-morbidities  Goal: Patient's chronic conditions and co-morbidity symptoms are monitored and maintained or improved  Outcome: Progressing     Problem: Skin/Tissue Integrity  Goal: Absence of new skin breakdown  Description: 1. Monitor for areas of redness and/or skin breakdown  2. Assess vascular access sites hourly  3. Every 4-6 hours minimum:  Change oxygen saturation probe site  4. Every 4-6 hours:  If on nasal continuous positive airway pressure, respiratory therapy assess nares and determine need for appliance change or resting period.   Outcome: Progressing
Supine: Supervision  Scooting: Supervision  Transfers:  Transfer Training  Transfer Training: Yes  Sit to Stand: Stand-by assistance  Stand to Sit: Stand-by assistance    ADL Assessment:   Feeding: Modified independent   Grooming: Modified independent   UE Bathing: Modified independent   LE Bathing: Modified independent   UE Dressing: Modified independent   LE Dressing: Modified independent   Toileting: Modified independent   Functional Mobility: Modified independent     Pain:  Pain level pre-treatment: 10/10   Pain level post-treatment: 10/10   Pain Intervention(s): Rest, Ice, Repositioning   Response to intervention: Nurse notified    Activity Tolerance:   Activity Tolerance: Patient limited by pain  Please refer to the flowsheet for vital signs taken during this treatment.     After treatment:   [] Patient left in no apparent distress sitting up in chair  [x] Patient left in no apparent distress in bed  [x] Call bell left within reach  [] Nursing notified  [] Caregiver present  [] Bed alarm activated    COMMUNICATION/EDUCATION:   Patient Education  Education Given To: Patient  Education Provided: Plan of Care  Education Method: Teach Back  Barriers to Learning: None  Education Outcome: Verbalized understanding    Thank you for this referral.  Harpal Burnham OT  Minutes: 8    Eval Complexity: Decision Making: Low Complexity
Surgical History:   Procedure Laterality Date    CHOLECYSTECTOMY      IR BILIARY DRAIN INT AND EXT  11/7/2017    IR BILIARY DRAIN INT AND EXT 11/7/2017 [facility] RAD ANGIO IR    RETINAL DETACHMENT SURGERY Right     UPPER GASTROINTESTINAL ENDOSCOPY N/A 5/15/2023    ESOPHAGOGASTRODUODENOSCOPY with bxs performed by Rachel Singh MD at [facility]       Home Situation:  Social/Functional History  Lives With: Alone  Type of Home: House  Home Layout: One level  Home Equipment: Cane  Critical Behavior:  Orientation  Overall Orientation Status: Within Normal Limits  Orientation Level: Oriented to place;Oriented to person;Oriented to situation       Strength:    Strength: Generally decreased, functional    Tone & Sensation:   Tone: Normal  Sensation: Intact    Range Of Motion:  AROM: Within functional limits       Functional Mobility:  Bed Mobility:     Bed Mobility Training  Bed Mobility Training: Yes  Supine to Sit: Supervision  Sit to Supine: Supervision  Scooting: Supervision  Transfers:     Transfer Training  Transfer Training: Yes  Sit to Stand: Stand-by assistance  Stand to Sit: Stand-by assistance  Balance:        Balance  Sitting: Intact  Standing: Impaired  Standing - Static: Good  Standing - Dynamic: Fair    Pain:  Pain level pre-treatment: 0/10   Pain level post-treatment: 0/10   Pain Intervention(s): Medication (see MAR); Rest, Ice, Repositioning  Response to intervention: Nurse notified     Activity Tolerance:   Activity Tolerance: Patient limited by pain  Please refer to the flowsheet for vital signs taken during this treatment.     After treatment:   []         Patient left in no apparent distress sitting up in chair  [x]         Patient left in no apparent distress in bed  [x]         Call bell left within reach  [x]         Nursing notified  []         Caregiver present  []         Bed alarm activated  []         SCDs applied    COMMUNICATION/EDUCATION:   Patient

## 2023-12-05 NOTE — CARE COORDINATION
12/04/23 0931   Readmission Assessment   Number of Days since last admission? 8-30 days   Previous Disposition Home Alone   Who is being Interviewed Patient   Who advised the patient to return to the hospital? Self-referral  (related to pain associated with sickle-cell crisis)   Does the patient report anything that got in the way of taking their medications?  No     Pilar Mc RN CDCES  Case Management
Discharge order noted for today. Orders received. No needs identified at this time. Case management remains available as needed. Patients sister to transport pt home at time of discharge.     [facility] RN OLIVERIO  Case Management
Alone   Type of [facility]  One level   Bathroom Shower/Tub Tub/Shower unit; Walk-in shower   [address] Help From Family   ADL Assistance Independent   Homemaking Assistance Independent   Ambulation Assistance Independent   Transfer Assistance Independent   Active  Yes   Occupation Full time employment   Type of Occupation chemistry lab at [employer]   Type of [address] Family Members   Current Services Prior To Admission None   DME Ordered? No   Potential Assistance Purchasing Medications No   Type of Home Care Services None   Patient expects to be discharged to: [facility] Discharge   Transition of Care Consult (CM Consult) Discharge [facility] None   [employer] Information Provided?  No   Mode of Transport at Discharge Self  (pt states that his sister will transport him home at time of discharge.)   Confirm Follow Up Transport Self   Condition of Participation: Discharge Planning   The Plan for Transition of Care is related to the following treatment goals: home with family support

## 2023-12-26 ENCOUNTER — HOSPITAL ENCOUNTER (OUTPATIENT)
Facility: HOSPITAL | Age: 63
Setting detail: INFUSION SERIES
Discharge: HOME | End: 2023-12-29
Payer: COMMERCIAL

## 2023-12-26 VITALS
OXYGEN SATURATION: 98 % | RESPIRATION RATE: 20 BRPM | SYSTOLIC BLOOD PRESSURE: 172 MMHG | HEART RATE: 78 BPM | TEMPERATURE: 98.3 F | DIASTOLIC BLOOD PRESSURE: 73 MMHG

## 2023-12-26 PROCEDURE — 6360000002 HC RX W HCPCS

## 2023-12-26 PROCEDURE — 86922 COMPATIBILITY TEST ANTIGLOB: CPT

## 2023-12-26 PROCEDURE — 86850 RBC ANTIBODY SCREEN: CPT

## 2023-12-26 PROCEDURE — 36591 DRAW BLOOD OFF VENOUS DEVICE: CPT

## 2023-12-26 PROCEDURE — 86870 RBC ANTIBODY IDENTIFICATION: CPT

## 2023-12-26 PROCEDURE — 85660 RBC SICKLE CELL TEST: CPT

## 2023-12-26 PROCEDURE — 86901 BLOOD TYPING SEROLOGIC RH(D): CPT

## 2023-12-26 PROCEDURE — 86920 COMPATIBILITY TEST SPIN: CPT

## 2023-12-26 PROCEDURE — 86900 BLOOD TYPING SEROLOGIC ABO: CPT

## 2023-12-26 PROCEDURE — 2580000003 HC RX 258: Performed by: INTERNAL MEDICINE

## 2023-12-26 PROCEDURE — 86902 BLOOD TYPE ANTIGEN DONOR EA: CPT

## 2023-12-26 PROCEDURE — 86921 COMPATIBILITY TEST INCUBATE: CPT

## 2023-12-26 RX ORDER — HEPARIN 100 UNIT/ML
SYRINGE INTRAVENOUS
Status: COMPLETED
Start: 2023-12-26 | End: 2023-12-26

## 2023-12-26 RX ORDER — SODIUM CHLORIDE 0.9 % (FLUSH) 0.9 %
5-40 SYRINGE (ML) INJECTION PRN
Status: DISCONTINUED | OUTPATIENT
Start: 2023-12-26 | End: 2023-12-30 | Stop reason: HOSPADM

## 2023-12-26 RX ORDER — ACETAMINOPHEN 325 MG/1
650 TABLET ORAL ONCE
Status: COMPLETED | OUTPATIENT
Start: 2023-12-28 | End: 2023-12-28

## 2023-12-26 RX ORDER — HEPARIN SODIUM (PORCINE) LOCK FLUSH IV SOLN 100 UNIT/ML 100 UNIT/ML
500 SOLUTION INTRAVENOUS PRN
Status: DISCONTINUED | OUTPATIENT
Start: 2023-12-26 | End: 2023-12-30 | Stop reason: HOSPADM

## 2023-12-26 RX ORDER — DIPHENHYDRAMINE HCL 25 MG
25 CAPSULE ORAL ONCE
Status: COMPLETED | OUTPATIENT
Start: 2023-12-28 | End: 2023-12-28

## 2023-12-26 RX ADMIN — SODIUM CHLORIDE, PRESERVATIVE FREE 30 ML: 5 INJECTION INTRAVENOUS at 13:30

## 2023-12-26 RX ADMIN — HEPARIN 500 UNITS: 100 SYRINGE at 13:30

## 2023-12-26 RX ADMIN — HEPARIN SODIUM (PORCINE) LOCK FLUSH IV SOLN 100 UNIT/ML 500 UNITS: 100 SOLUTION at 13:30

## 2023-12-26 ASSESSMENT — PAIN - FUNCTIONAL ASSESSMENT: PAIN_FUNCTIONAL_ASSESSMENT: 0-10

## 2023-12-26 ASSESSMENT — PAIN DESCRIPTION - DESCRIPTORS: DESCRIPTORS: ACHING;THROBBING

## 2023-12-27 LAB — HISTORY CHECK: NORMAL

## 2023-12-28 ENCOUNTER — HOSPITAL ENCOUNTER (OUTPATIENT)
Facility: HOSPITAL | Age: 63
Setting detail: INFUSION SERIES
End: 2023-12-28
Payer: COMMERCIAL

## 2023-12-28 VITALS
SYSTOLIC BLOOD PRESSURE: 112 MMHG | OXYGEN SATURATION: 97 % | RESPIRATION RATE: 18 BRPM | DIASTOLIC BLOOD PRESSURE: 60 MMHG | TEMPERATURE: 98.2 F | HEART RATE: 65 BPM

## 2023-12-28 PROCEDURE — 36430 TRANSFUSION BLD/BLD COMPNT: CPT

## 2023-12-28 PROCEDURE — 6370000000 HC RX 637 (ALT 250 FOR IP): Performed by: INTERNAL MEDICINE

## 2023-12-28 PROCEDURE — P9040 RBC LEUKOREDUCED IRRADIATED: HCPCS

## 2023-12-28 PROCEDURE — 6360000002 HC RX W HCPCS: Performed by: INTERNAL MEDICINE

## 2023-12-28 PROCEDURE — 2580000003 HC RX 258: Performed by: INTERNAL MEDICINE

## 2023-12-28 RX ORDER — HEPARIN SODIUM (PORCINE) LOCK FLUSH IV SOLN 100 UNIT/ML 100 UNIT/ML
500 SOLUTION INTRAVENOUS PRN
Status: DISCONTINUED | OUTPATIENT
Start: 2023-12-28 | End: 2024-01-01 | Stop reason: HOSPADM

## 2023-12-28 RX ORDER — SODIUM CHLORIDE 9 MG/ML
INJECTION, SOLUTION INTRAVENOUS PRN
Status: COMPLETED | OUTPATIENT
Start: 2023-12-28 | End: 2023-12-28

## 2023-12-28 RX ORDER — HEPARIN SODIUM (PORCINE) LOCK FLUSH IV SOLN 100 UNIT/ML 100 UNIT/ML
100 SOLUTION INTRAVENOUS PRN
Status: DISCONTINUED | OUTPATIENT
Start: 2023-12-28 | End: 2023-12-28

## 2023-12-28 RX ORDER — SODIUM CHLORIDE 0.9 % (FLUSH) 0.9 %
5-40 SYRINGE (ML) INJECTION PRN
Status: DISCONTINUED | OUTPATIENT
Start: 2023-12-28 | End: 2024-01-01 | Stop reason: HOSPADM

## 2023-12-28 RX ADMIN — ACETAMINOPHEN 650 MG: 325 TABLET ORAL at 10:35

## 2023-12-28 RX ADMIN — HEPARIN 500 UNITS: 100 SYRINGE at 14:35

## 2023-12-28 RX ADMIN — SODIUM CHLORIDE: 9 INJECTION, SOLUTION INTRAVENOUS at 10:50

## 2023-12-28 RX ADMIN — SODIUM CHLORIDE, PRESERVATIVE FREE 10 ML: 5 INJECTION INTRAVENOUS at 10:45

## 2023-12-28 RX ADMIN — DIPHENHYDRAMINE HYDROCHLORIDE 25 MG: 25 CAPSULE ORAL at 10:35

## 2023-12-28 ASSESSMENT — PAIN DESCRIPTION - DESCRIPTORS: DESCRIPTORS: ACHING;THROBBING

## 2023-12-28 ASSESSMENT — PAIN DESCRIPTION - LOCATION: LOCATION: BACK

## 2023-12-28 ASSESSMENT — PAIN DESCRIPTION - ORIENTATION: ORIENTATION: LOWER

## 2023-12-28 ASSESSMENT — PAIN DESCRIPTION - PAIN TYPE: TYPE: CHRONIC PAIN

## 2023-12-28 ASSESSMENT — PAIN SCALES - GENERAL: PAINLEVEL_OUTOF10: 9

## 2023-12-28 NOTE — PROGRESS NOTES
.    Verbal  patient education on Blood Transfusion and associated potential side effects and adverse reactions, as well as discharge instructions was provided, and Mr. Mat Osorio verbalized understanding. His RIGHT CHEST SINGLE LUMEN PORT was accessed without incident at 0835, and brisk blood return was obtained.  ml IV BAG was initiated to infuse @ KVO PRN throughout treatment today. The following pre-medications were administered per order, approximately 30 minutes prior to starting the blood transfusion: TYLENOL 650 MG PO & BENADRYL 25 MG PO.       1 UNIT OF PRBC was administered over approximately 2 hours, per order and without incident. Mr. Mat Osorio was observed for approximately 30 minutes after completion of the blood transfusion. After completion of the observation period, his port was flushed well per policy with NS & Heparin, and then the breaux needle was removed and gauze//bandaid was applied. Mr. Mat Osorio tolerated the transfusion well today, and voiced no complaints. Mr. Mat Osorio was discharged from [address] [place] [address] in stable condition at 1210. He has no further [facility] appointments scheduled at this time.       Santhosh Candelario RN  December 28, 2023  5:58 PM

## 2024-01-08 RX ORDER — ACETAMINOPHEN 325 MG/1
650 TABLET ORAL ONCE
Status: COMPLETED | OUTPATIENT
Start: 2024-01-11 | End: 2024-01-11

## 2024-01-08 RX ORDER — DIPHENHYDRAMINE HCL 25 MG
25 CAPSULE ORAL ONCE
Status: COMPLETED | OUTPATIENT
Start: 2024-01-11 | End: 2024-01-11

## 2024-01-09 ENCOUNTER — HOSPITAL ENCOUNTER (OUTPATIENT)
Facility: HOSPITAL | Age: 64
Setting detail: INFUSION SERIES
Discharge: HOME | End: 2024-01-12
Payer: COMMERCIAL

## 2024-01-09 VITALS
OXYGEN SATURATION: 96 % | HEART RATE: 80 BPM | RESPIRATION RATE: 16 BRPM | TEMPERATURE: 98.8 F | DIASTOLIC BLOOD PRESSURE: 65 MMHG | SYSTOLIC BLOOD PRESSURE: 113 MMHG

## 2024-01-09 LAB — HISTORY CHECK: NORMAL

## 2024-01-09 PROCEDURE — 86921 COMPATIBILITY TEST INCUBATE: CPT

## 2024-01-09 PROCEDURE — 86922 COMPATIBILITY TEST ANTIGLOB: CPT

## 2024-01-09 PROCEDURE — 86880 COOMBS TEST DIRECT: CPT

## 2024-01-09 PROCEDURE — 85660 RBC SICKLE CELL TEST: CPT

## 2024-01-09 PROCEDURE — 86901 BLOOD TYPING SEROLOGIC RH(D): CPT

## 2024-01-09 PROCEDURE — 86902 BLOOD TYPE ANTIGEN DONOR EA: CPT

## 2024-01-09 PROCEDURE — 6360000002 HC RX W HCPCS

## 2024-01-09 PROCEDURE — 2580000003 HC RX 258: Performed by: INTERNAL MEDICINE

## 2024-01-09 PROCEDURE — 86850 RBC ANTIBODY SCREEN: CPT

## 2024-01-09 PROCEDURE — 86860 RBC ANTIBODY ELUTION: CPT

## 2024-01-09 PROCEDURE — 86920 COMPATIBILITY TEST SPIN: CPT

## 2024-01-09 PROCEDURE — 86870 RBC ANTIBODY IDENTIFICATION: CPT

## 2024-01-09 PROCEDURE — 36591 DRAW BLOOD OFF VENOUS DEVICE: CPT

## 2024-01-09 PROCEDURE — 86900 BLOOD TYPING SEROLOGIC ABO: CPT

## 2024-01-09 RX ORDER — HEPARIN SODIUM (PORCINE) LOCK FLUSH IV SOLN 100 UNIT/ML 100 UNIT/ML
500 SOLUTION INTRAVENOUS PRN
Status: DISCONTINUED | OUTPATIENT
Start: 2024-01-09 | End: 2024-01-13 | Stop reason: HOSPADM

## 2024-01-09 RX ORDER — HEPARIN 100 UNIT/ML
SYRINGE INTRAVENOUS
Status: COMPLETED
Start: 2024-01-09 | End: 2024-01-09

## 2024-01-09 RX ORDER — SODIUM CHLORIDE 0.9 % (FLUSH) 0.9 %
5-40 SYRINGE (ML) INJECTION PRN
Status: DISCONTINUED | OUTPATIENT
Start: 2024-01-09 | End: 2024-01-13 | Stop reason: HOSPADM

## 2024-01-09 RX ADMIN — SODIUM CHLORIDE, PRESERVATIVE FREE 30 ML: 5 INJECTION INTRAVENOUS at 13:40

## 2024-01-09 RX ADMIN — HEPARIN SODIUM (PORCINE) LOCK FLUSH IV SOLN 100 UNIT/ML 500 UNITS: 100 SOLUTION at 13:40

## 2024-01-09 RX ADMIN — HEPARIN 500 UNITS: 100 SYRINGE at 13:40

## 2024-01-09 ASSESSMENT — PAIN - FUNCTIONAL ASSESSMENT: PAIN_FUNCTIONAL_ASSESSMENT: 0-10

## 2024-01-09 ASSESSMENT — PAIN DESCRIPTION - DESCRIPTORS: DESCRIPTORS: ACHING;THROBBING

## 2024-01-09 NOTE — PROGRESS NOTES
[facility] Progress Note    Date: 2024    Name: Kayden Samuel    MRN: [id number]         : 1960    Mr. Samuel arrived in the [facility] today at 1315, in stable condition, here for his TYPE & CROSSMATCH + TYPE & SCREEN FOR ONE UNIT PRBC. He was assessed and education was provided.     Mr. Samuel's vitals were reviewed.  Vitals:    24 1315   BP: 113/65   Pulse: 80   Resp: 16   Temp: 98.8 °F (37.1 °C)   SpO2: 96%           His RIGHT CHEST SINGLE LUMEN PORT was accessed without incident at 1340, and blood for the ordered Type & Crossmatch + Type & Screen (3 Pink Top Tubes) was drawn. Then, the port was flushed well per policy with NS & Heparin, and then the breaux needle was removed and gauze//bandaid was applied.     The blood was sent out by  to the [facility] blood bank for processing.     The required Blood Bank armband was securely applied to his LEFT WRIST, and he was reminded to keep the armband completely dry and intact on his arm until after the completion of the blood transfusion on Thursday, and he verbalized understanding.       Mr. Samuel tolerated well and voiced no complaints.     [facility] condition at 1350.     He is to return on Thursday, 24 at 0900, for his next appointment, to receive 1 Unit PRBC.     Car Amin RN  2024  3:15 PM

## 2024-01-11 ENCOUNTER — HOSPITAL ENCOUNTER (OUTPATIENT)
Facility: HOSPITAL | Age: 64
Setting detail: INFUSION SERIES
End: 2024-01-11
Payer: COMMERCIAL

## 2024-01-11 VITALS
SYSTOLIC BLOOD PRESSURE: 108 MMHG | TEMPERATURE: 97.9 F | HEART RATE: 60 BPM | RESPIRATION RATE: 20 BRPM | OXYGEN SATURATION: 99 % | DIASTOLIC BLOOD PRESSURE: 56 MMHG

## 2024-01-11 PROCEDURE — P9040 RBC LEUKOREDUCED IRRADIATED: HCPCS

## 2024-01-11 PROCEDURE — 6370000000 HC RX 637 (ALT 250 FOR IP): Performed by: INTERNAL MEDICINE

## 2024-01-11 PROCEDURE — 2580000003 HC RX 258: Performed by: INTERNAL MEDICINE

## 2024-01-11 PROCEDURE — 36430 TRANSFUSION BLD/BLD COMPNT: CPT

## 2024-01-11 PROCEDURE — 6360000002 HC RX W HCPCS: Performed by: INTERNAL MEDICINE

## 2024-01-11 RX ORDER — SODIUM CHLORIDE 0.9 % (FLUSH) 0.9 %
5-40 SYRINGE (ML) INJECTION PRN
Status: DISCONTINUED | OUTPATIENT
Start: 2024-01-11 | End: 2024-01-15 | Stop reason: HOSPADM

## 2024-01-11 RX ORDER — HEPARIN SODIUM (PORCINE) LOCK FLUSH IV SOLN 100 UNIT/ML 100 UNIT/ML
100 SOLUTION INTRAVENOUS PRN
Status: DISCONTINUED | OUTPATIENT
Start: 2024-01-11 | End: 2024-01-15 | Stop reason: HOSPADM

## 2024-01-11 RX ORDER — 0.9 % SODIUM CHLORIDE 0.9 %
250 INTRAVENOUS SOLUTION INTRAVENOUS ONCE
Status: COMPLETED | OUTPATIENT
Start: 2024-01-11 | End: 2024-01-11

## 2024-01-11 RX ADMIN — HEPARIN 500 UNITS: 100 SYRINGE at 14:21

## 2024-01-11 RX ADMIN — SODIUM CHLORIDE 250 ML: 9 INJECTION, SOLUTION INTRAVENOUS at 09:50

## 2024-01-11 RX ADMIN — SODIUM CHLORIDE, PRESERVATIVE FREE 20 ML: 5 INJECTION INTRAVENOUS at 14:21

## 2024-01-11 RX ADMIN — ACETAMINOPHEN 650 MG: 325 TABLET ORAL at 09:40

## 2024-01-11 RX ADMIN — DIPHENHYDRAMINE HYDROCHLORIDE 25 MG: 25 CAPSULE ORAL at 09:40

## 2024-01-11 RX ADMIN — SODIUM CHLORIDE, PRESERVATIVE FREE 10 ML: 5 INJECTION INTRAVENOUS at 09:50

## 2024-01-11 ASSESSMENT — PAIN DESCRIPTION - LOCATION: LOCATION: HIP;LEG

## 2024-01-11 ASSESSMENT — PAIN DESCRIPTION - PAIN TYPE: TYPE: CHRONIC PAIN

## 2024-01-11 ASSESSMENT — PAIN DESCRIPTION - ORIENTATION: ORIENTATION: LEFT

## 2024-01-11 ASSESSMENT — PAIN DESCRIPTION - DESCRIPTORS: DESCRIPTORS: ACHING

## 2024-01-11 ASSESSMENT — PAIN SCALES - GENERAL: PAINLEVEL_OUTOF10: 9

## 2024-01-11 NOTE — PROGRESS NOTES
[facility] Progress Note    Date: 2024    Name: Kayden Samuel    MRN: [id number]         : 1960    Mr. Samuel arrived in the [facility] today at 0920, in stable condition, here to receive 1 UNIT PRBC. He was assessed and education was provided.     Mr. Samuel's vitals were reviewed.  Vitals:    24 1415   BP: (!) 108/56   Pulse: 60   Resp: 20   Temp: 97.9 °F (36.6 °C)   SpO2: 99%            SIGNED BLOOD TRANSFUSION CONSENT NOTE WAS VIEWED IN HIS ELECTRONIC RECORD.      WRITTEN BLOOD TRANSFUSION CONSENT WAS VIEWED IN HIS ELECTRONIC RECORD.      His most recent CBC results from 24 revealed a low HGB of 7.2 & low HCT of 20.9.    Verbal and written patient education on Blood Transfusion and associated potential side effects and adverse reactions, as well as discharge instructions were provided, and Mr. Samuel verbalized understanding and patient education sheet signed.       His RIGHT CHEST SINGLE LUMEN PORT was accessed without incident at 0950, and brisk blood return was obtained.       ml IV BAG was initiated to infuse @ KVO PRN throughout treatment today.       The following pre-medications were administered per order, approximately 30 minutes prior to starting the blood transfusion: TYLENOL 650 MG PO & BENADRYL 25 MG PO.       1 UNIT OF PRBC was administered over approximately 2 hours, per order and without incident.       Mr. Samuel was observed for approximately 60 minutes after completion of the blood transfusion.      After completion of the observation period, his port was flushed well per policy with NS & Heparin, and then the breaux needle was removed and gauze//bandaid was applied.      Mr. Samuel tolerated the transfusion well today, and voiced no complaints.     Discharge instructions reviewed with patient and he expressed understanding. Patient's armbands were removed and shredded.     [facility] condition at 1425.       He has no further [facility] appointments scheduled at this time, and was instructed to follow up with his referring provider.       Razia Azul RN  January 11, 2024  6:59 PM

## 2024-01-12 LAB
ABO + RH BLD: NORMAL
ANTIGENS PRESENT RBC DONR: NORMAL
BLD GP AB SCN SERPL QL ELUTION: NORMAL
BLD PROD TYP BPU: NORMAL
BLOOD BANK DISPENSE STATUS: NORMAL
BLOOD GROUP ANTIBODIES SERPL: NORMAL
BLOOD GROUP ANTIBODIES SERPL: NORMAL
BPU ID: NORMAL
CROSSMATCH RESULT: NORMAL
DAT C3B/C3D, C3D: NORMAL
DAT IGG-SP REAG RBC QL: NORMAL
DAT POLY-SP REAG RBC QL: NORMAL
SPECIMEN EXP DATE BLD: NORMAL
UNIT DIVISION: 0

## 2024-01-23 RX ORDER — ACETAMINOPHEN 325 MG/1
650 TABLET ORAL ONCE
OUTPATIENT
Start: 2024-01-26

## 2024-01-23 RX ORDER — DIPHENHYDRAMINE HCL 25 MG
25 CAPSULE ORAL ONCE
OUTPATIENT
Start: 2024-01-23

## 2024-01-24 ENCOUNTER — HOSPITAL ENCOUNTER (OUTPATIENT)
Facility: HOSPITAL | Age: 64
Setting detail: INFUSION SERIES
Discharge: HOME | End: 2024-01-27
Payer: COMMERCIAL

## 2024-01-24 VITALS
HEART RATE: 75 BPM | SYSTOLIC BLOOD PRESSURE: 129 MMHG | TEMPERATURE: 98.4 F | RESPIRATION RATE: 20 BRPM | OXYGEN SATURATION: 96 % | DIASTOLIC BLOOD PRESSURE: 60 MMHG

## 2024-01-24 LAB — HISTORY CHECK: NORMAL

## 2024-01-24 PROCEDURE — 6360000002 HC RX W HCPCS: Performed by: INTERNAL MEDICINE

## 2024-01-24 PROCEDURE — 86920 COMPATIBILITY TEST SPIN: CPT

## 2024-01-24 PROCEDURE — 86900 BLOOD TYPING SEROLOGIC ABO: CPT

## 2024-01-24 PROCEDURE — 36415 COLL VENOUS BLD VENIPUNCTURE: CPT

## 2024-01-24 PROCEDURE — 86901 BLOOD TYPING SEROLOGIC RH(D): CPT

## 2024-01-24 PROCEDURE — 86870 RBC ANTIBODY IDENTIFICATION: CPT

## 2024-01-24 PROCEDURE — 86850 RBC ANTIBODY SCREEN: CPT

## 2024-01-24 PROCEDURE — 2580000003 HC RX 258: Performed by: INTERNAL MEDICINE

## 2024-01-24 PROCEDURE — 36591 DRAW BLOOD OFF VENOUS DEVICE: CPT

## 2024-01-24 RX ORDER — HEPARIN SODIUM (PORCINE) LOCK FLUSH IV SOLN 100 UNIT/ML 100 UNIT/ML
500 SOLUTION INTRAVENOUS PRN
Status: DISCONTINUED | OUTPATIENT
Start: 2024-01-24 | End: 2024-01-28 | Stop reason: HOSPADM

## 2024-01-24 RX ORDER — SODIUM CHLORIDE 0.9 % (FLUSH) 0.9 %
5-40 SYRINGE (ML) INJECTION PRN
Status: DISCONTINUED | OUTPATIENT
Start: 2024-01-24 | End: 2024-01-28 | Stop reason: HOSPADM

## 2024-01-24 RX ADMIN — HEPARIN 500 UNITS: 100 SYRINGE at 13:41

## 2024-01-24 RX ADMIN — SODIUM CHLORIDE, PRESERVATIVE FREE 10 ML: 5 INJECTION INTRAVENOUS at 13:34

## 2024-01-24 RX ADMIN — SODIUM CHLORIDE, PRESERVATIVE FREE 20 ML: 5 INJECTION INTRAVENOUS at 13:40

## 2024-01-24 ASSESSMENT — PAIN SCALES - GENERAL: PAINLEVEL_OUTOF10: 8

## 2024-01-24 ASSESSMENT — PAIN DESCRIPTION - DESCRIPTORS: DESCRIPTORS: ACHING

## 2024-01-24 ASSESSMENT — PAIN DESCRIPTION - LOCATION: LOCATION: HIP;LEG

## 2024-01-24 ASSESSMENT — PAIN DESCRIPTION - ORIENTATION: ORIENTATION: LEFT

## 2024-01-24 ASSESSMENT — PAIN DESCRIPTION - PAIN TYPE: TYPE: CHRONIC PAIN

## 2024-01-24 NOTE — PROGRESS NOTES
[facility] Progress Note    Date: 2024    Name: Kayden Samuel    MRN: [id number]         : 1960    Mr. Samuel arrived in the [facility] today at 1320, in stable condition, here for his TYPE & CROSSMATCH + TYPE & SCREEN FOR ONE UNIT IRRADIATED PRBC. He was assessed and education was provided.     Mr. Samuel's vitals were reviewed.  Vitals:    24 1320   BP: 129/60   Pulse: 75   Resp: 20   Temp: 98.4 °F (36.9 °C)   SpO2: 96%         His RIGHT CHEST SINGLE LUMEN PORT was accessed without incident at 1330, and blood for the ordered Type & Crossmatch + Type & Screen (3 Pink Top Tubes) was drawn. Then, the port was flushed well per policy with NS & Heparin, and then the breaux needle was removed and gauze//bandaid was applied.     The blood was sent out by  to the [facility] blood bank for processing. Per blood bank, patient's blood must be irradiated since it has previously been ordered that way.    The required Blood Bank armband was securely applied to his LEFT WRIST, and he was reminded to keep the armband completely dry and intact on his arm until after the completion of the blood transfusion on Friday, and he verbalized understanding.     Mr. Samuel tolerated the blood draw from his mediport well and voiced no complaints.     Discharge instructions reviewed with patient, who expressed understanding. Patient's armband removed and shredded.    [facility] in stable condition at 1400.     He is to return on Friday, 24 at 0900, for his next appointment, to receive 1 Unit Irradiated PRBC.       Razia Azul RN  2024  2:00 PM

## 2024-01-26 ENCOUNTER — HOSPITAL ENCOUNTER (OUTPATIENT)
Facility: HOSPITAL | Age: 64
Setting detail: INFUSION SERIES
End: 2024-01-26
Payer: COMMERCIAL

## 2024-01-26 VITALS
TEMPERATURE: 98.1 F | OXYGEN SATURATION: 98 % | DIASTOLIC BLOOD PRESSURE: 71 MMHG | SYSTOLIC BLOOD PRESSURE: 125 MMHG | HEART RATE: 65 BPM | RESPIRATION RATE: 20 BRPM

## 2024-01-26 PROCEDURE — 86922 COMPATIBILITY TEST ANTIGLOB: CPT

## 2024-01-26 PROCEDURE — 86902 BLOOD TYPE ANTIGEN DONOR EA: CPT

## 2024-01-26 PROCEDURE — 36430 TRANSFUSION BLD/BLD COMPNT: CPT

## 2024-01-26 PROCEDURE — 85660 RBC SICKLE CELL TEST: CPT

## 2024-01-26 PROCEDURE — 86921 COMPATIBILITY TEST INCUBATE: CPT

## 2024-01-26 PROCEDURE — 6360000002 HC RX W HCPCS: Performed by: INTERNAL MEDICINE

## 2024-01-26 PROCEDURE — P9040 RBC LEUKOREDUCED IRRADIATED: HCPCS

## 2024-01-26 PROCEDURE — 2580000003 HC RX 258: Performed by: INTERNAL MEDICINE

## 2024-01-26 PROCEDURE — 6370000000 HC RX 637 (ALT 250 FOR IP): Performed by: INTERNAL MEDICINE

## 2024-01-26 RX ORDER — SODIUM CHLORIDE 0.9 % (FLUSH) 0.9 %
5-40 SYRINGE (ML) INJECTION PRN
Status: DISCONTINUED | OUTPATIENT
Start: 2024-01-26 | End: 2024-01-30 | Stop reason: HOSPADM

## 2024-01-26 RX ORDER — DIPHENHYDRAMINE HCL 25 MG
25 CAPSULE ORAL EVERY 6 HOURS PRN
Status: DISCONTINUED | OUTPATIENT
Start: 2024-01-26 | End: 2024-01-30 | Stop reason: HOSPADM

## 2024-01-26 RX ORDER — HEPARIN SODIUM (PORCINE) LOCK FLUSH IV SOLN 100 UNIT/ML 100 UNIT/ML
500 SOLUTION INTRAVENOUS PRN
Status: DISCONTINUED | OUTPATIENT
Start: 2024-01-26 | End: 2024-01-30 | Stop reason: HOSPADM

## 2024-01-26 RX ORDER — ACETAMINOPHEN 325 MG/1
650 TABLET ORAL PRN
Status: DISCONTINUED | OUTPATIENT
Start: 2024-01-26 | End: 2024-01-30 | Stop reason: HOSPADM

## 2024-01-26 RX ADMIN — HEPARIN 500 UNITS: 100 SYRINGE at 13:27

## 2024-01-26 RX ADMIN — SODIUM CHLORIDE, PRESERVATIVE FREE 20 ML: 5 INJECTION INTRAVENOUS at 13:26

## 2024-01-26 RX ADMIN — DIPHENHYDRAMINE HYDROCHLORIDE 25 MG: 25 CAPSULE ORAL at 09:44

## 2024-01-26 RX ADMIN — ACETAMINOPHEN 650 MG: 325 TABLET ORAL at 09:44

## 2024-01-26 ASSESSMENT — PAIN SCALES - GENERAL: PAINLEVEL_OUTOF10: 0

## 2024-01-26 NOTE — PROGRESS NOTES
[facility] Progress Note    Date: 2024    Name: Kayden Samuel    MRN: [id number]         : 1960      Mr. Samuel arrived to [facility] ambulatory with a cane at 0912.    Mr. Samuel was assessed and education was provided. Risks and benefits of blood transfusion were discussed with patient, including the risk of transfusion reaction and disease transmission. Patient verbalized understanding and signed consent placed on chart.    Mr. Samuel's vitals were reviewed.  Vitals:    24 0944 24 1015 24 1040 24 1229   BP: 106/68 106/60 107/60 125/71   Pulse: 65 67 67 65   Resp: 20 18 20 20   Temp: 98.8 °F (37.1 °C) 98.5 °F (36.9 °C) 98.2 °F (36.8 °C) 98.1 °F (36.7 °C)   TempSrc: Oral Oral Oral Oral   SpO2:  97% 97% 98%          Patient's single lumen right subclavian port was accessed at 0920; was positive for blood return and flushed without difficulty.    Normal saline infusion was started at 25 mL/hr as KVO.    Pre-medications (Tylenol 650 mg and Benadryl 25 mg) were administered as ordered.     One unit of packed red blood cells was initiated @ 75 ml/hr at 1025. Fifteen minutes into infusion, Vital signs are stable and patient has denied complaints of shortness of breath, itching, hives, lip/tongue/facial swelling, chest pain or other symptoms. Infusion rate was increased to 150 ml/hr at 1040 for the remainder of the transfusion. Unit finished @ 1228. Vital signs stable and no transfusion reaction suspected.    Mr. Samuel tolerated infusion without complaints. Patient's port was flushed with 20 mL of 0.9% normal saline followed by a 5 mL heparin flush. Patient's single lumen right subclavian port was de-accessed at 1328; site was absent of irritation, bleeding, or noted hematoma. Site was dressed with a band-aid.    Discharge instructions reviewed with patient  who was instructed to report shortness of breath, chest pain, elevated temperature, back pain, or other symptoms of  transfusion reaction to medical provider or to be seen at nearest emergency department.  Patient verbalized understanding of information received and confirmed with teach back.     Patient were removed and shredded.    [facility] in [facility] condition at 1339.

## 2024-01-30 ENCOUNTER — HOSPITAL ENCOUNTER (OUTPATIENT)
Facility: HOSPITAL | Age: 64
Discharge: HOME | End: 2024-02-02
Attending: INTERNAL MEDICINE
Payer: COMMERCIAL

## 2024-01-30 DIAGNOSIS — Z87.19 HISTORY OF GI BLEED: ICD-10-CM

## 2024-01-30 DIAGNOSIS — R63.4 WEIGHT LOSS: ICD-10-CM

## 2024-01-30 DIAGNOSIS — R73.9 HYPERGLYCEMIA: ICD-10-CM

## 2024-01-30 DIAGNOSIS — D58.2: ICD-10-CM

## 2024-01-30 DIAGNOSIS — R42 DIZZINESS: ICD-10-CM

## 2024-01-30 DIAGNOSIS — D57.1 SICKLE CELL DISEASE WITHOUT CRISIS (HCC): ICD-10-CM

## 2024-01-30 DIAGNOSIS — N17.9 ACUTE RENAL FAILURE, UNSPECIFIED ACUTE RENAL FAILURE TYPE (HCC): ICD-10-CM

## 2024-01-30 DIAGNOSIS — R51.9 NONINTRACTABLE HEADACHE, UNSPECIFIED CHRONICITY PATTERN, UNSPECIFIED HEADACHE TYPE: ICD-10-CM

## 2024-01-30 DIAGNOSIS — Z76.89 ENCOUNTER FOR CHOLECYSTECTOMY: ICD-10-CM

## 2024-01-30 LAB — CREAT UR-MCNC: 2 MG/DL (ref 0.6–1.3)

## 2024-01-30 PROCEDURE — 74176 CT ABD & PELVIS W/O CONTRAST: CPT

## 2024-01-30 PROCEDURE — 6360000002 HC RX W HCPCS: Performed by: INTERNAL MEDICINE

## 2024-01-30 PROCEDURE — 70553 MRI BRAIN STEM W/O & W/DYE: CPT

## 2024-01-30 PROCEDURE — 82565 ASSAY OF CREATININE: CPT

## 2024-01-30 PROCEDURE — A9577 INJ MULTIHANCE: HCPCS | Performed by: INTERNAL MEDICINE

## 2024-01-30 PROCEDURE — 6360000004 HC RX CONTRAST MEDICATION: Performed by: INTERNAL MEDICINE

## 2024-01-30 RX ORDER — HEPARIN SODIUM (PORCINE) LOCK FLUSH IV SOLN 100 UNIT/ML 100 UNIT/ML
500 SOLUTION INTRAVENOUS PRN
Status: DISCONTINUED | OUTPATIENT
Start: 2024-01-30 | End: 2024-02-03 | Stop reason: HOSPADM

## 2024-01-30 RX ADMIN — GADOBENATE DIMEGLUMINE 5 ML: 529 INJECTION, SOLUTION INTRAVENOUS at 09:34

## 2024-01-30 RX ADMIN — HEPARIN SODIUM (PORCINE) LOCK FLUSH IV SOLN 100 UNIT/ML 500 UNITS: 100 SOLUTION at 09:27

## 2024-02-04 ENCOUNTER — HOSPITAL ENCOUNTER (INPATIENT)
Facility: HOSPITAL | Age: 64
LOS: 2 days | Discharge: HOME | DRG: 812 | End: 2024-02-06
Attending: STUDENT IN AN ORGANIZED HEALTH CARE EDUCATION/TRAINING PROGRAM | Admitting: STUDENT IN AN ORGANIZED HEALTH CARE EDUCATION/TRAINING PROGRAM
Payer: COMMERCIAL

## 2024-02-04 ENCOUNTER — APPOINTMENT (OUTPATIENT)
Facility: HOSPITAL | Age: 64
DRG: 812 | End: 2024-02-04
Payer: COMMERCIAL

## 2024-02-04 DIAGNOSIS — D64.9 ANEMIA, UNSPECIFIED TYPE: ICD-10-CM

## 2024-02-04 DIAGNOSIS — D57.00 SICKLE CELL PAIN CRISIS (HCC): Primary | ICD-10-CM

## 2024-02-04 LAB
ANION GAP SERPL CALC-SCNC: 4 MMOL/L (ref 3–18)
BASOPHILS # BLD: 0 K/UL (ref 0–0.1)
BASOPHILS NFR BLD: 0 % (ref 0–2)
BUN SERPL-MCNC: 39 MG/DL (ref 7–18)
BUN/CREAT SERPL: 23 (ref 12–20)
CALCIUM SERPL-MCNC: 8.5 MG/DL (ref 8.5–10.1)
CHLORIDE SERPL-SCNC: 115 MMOL/L (ref 100–111)
CO2 SERPL-SCNC: 21 MMOL/L (ref 21–32)
CREAT SERPL-MCNC: 1.69 MG/DL (ref 0.6–1.3)
DIFFERENTIAL METHOD BLD: ABNORMAL
EKG ATRIAL RATE: 74 BPM
EKG DIAGNOSIS: NORMAL
EKG P AXIS: 61 DEGREES
EKG P-R INTERVAL: 160 MS
EKG Q-T INTERVAL: 420 MS
EKG QRS DURATION: 100 MS
EKG QTC CALCULATION (BAZETT): 466 MS
EKG R AXIS: 8 DEGREES
EKG T AXIS: 30 DEGREES
EKG VENTRICULAR RATE: 74 BPM
EOSINOPHIL # BLD: 0.2 K/UL (ref 0–0.4)
EOSINOPHIL NFR BLD: 3 % (ref 0–5)
ERYTHROCYTE [DISTWIDTH] IN BLOOD BY AUTOMATED COUNT: 21.4 % (ref 11.6–14.5)
GLUCOSE SERPL-MCNC: 110 MG/DL (ref 74–99)
HCT VFR BLD AUTO: 16.4 % (ref 36–48)
HGB BLD-MCNC: 5.5 G/DL (ref 13–16)
HISTORY CHECK: NORMAL
IMM GRANULOCYTES # BLD AUTO: 0 K/UL
IMM GRANULOCYTES NFR BLD AUTO: 0 %
LYMPHOCYTES # BLD: 0.8 K/UL (ref 0.9–3.6)
LYMPHOCYTES NFR BLD: 11 % (ref 21–52)
MCH RBC QN AUTO: 28.2 PG (ref 24–34)
MCHC RBC AUTO-ENTMCNC: 33.5 G/DL (ref 31–37)
MCV RBC AUTO: 84.1 FL (ref 78–100)
MONOCYTES # BLD: 0.4 K/UL (ref 0.05–1.2)
MONOCYTES NFR BLD: 5 % (ref 3–10)
NEUTS SEG # BLD: 6 K/UL (ref 1.8–8)
NEUTS SEG NFR BLD: 81 % (ref 40–73)
NRBC # BLD: 0.14 K/UL (ref 0–0.01)
NRBC BLD-RTO: 1.9 PER 100 WBC
PLATELET # BLD AUTO: 154 K/UL (ref 135–420)
PLATELET COMMENT: ABNORMAL
PMV BLD AUTO: 11.1 FL (ref 9.2–11.8)
POTASSIUM SERPL-SCNC: 5 MMOL/L (ref 3.5–5.5)
RBC # BLD AUTO: 1.95 M/UL (ref 4.35–5.65)
RBC MORPH BLD: ABNORMAL
RETICS/RBC NFR AUTO: 4.4 % (ref 0.5–2.5)
SODIUM SERPL-SCNC: 140 MMOL/L (ref 136–145)
WBC # BLD AUTO: 7.4 K/UL (ref 4.6–13.2)

## 2024-02-04 PROCEDURE — 94761 N-INVAS EAR/PLS OXIMETRY MLT: CPT

## 2024-02-04 PROCEDURE — 85025 COMPLETE CBC W/AUTO DIFF WBC: CPT

## 2024-02-04 PROCEDURE — 6360000002 HC RX W HCPCS: Performed by: STUDENT IN AN ORGANIZED HEALTH CARE EDUCATION/TRAINING PROGRAM

## 2024-02-04 PROCEDURE — 86850 RBC ANTIBODY SCREEN: CPT

## 2024-02-04 PROCEDURE — 30233N1 TRANSFUSION OF NONAUTOLOGOUS RED BLOOD CELLS INTO PERIPHERAL VEIN, PERCUTANEOUS APPROACH: ICD-10-PCS | Performed by: HOSPITALIST

## 2024-02-04 PROCEDURE — 85660 RBC SICKLE CELL TEST: CPT

## 2024-02-04 PROCEDURE — 99285 EMERGENCY DEPT VISIT HI MDM: CPT

## 2024-02-04 PROCEDURE — 86920 COMPATIBILITY TEST SPIN: CPT

## 2024-02-04 PROCEDURE — 86902 BLOOD TYPE ANTIGEN DONOR EA: CPT

## 2024-02-04 PROCEDURE — 36430 TRANSFUSION BLD/BLD COMPNT: CPT

## 2024-02-04 PROCEDURE — 85045 AUTOMATED RETICULOCYTE COUNT: CPT

## 2024-02-04 PROCEDURE — P9040 RBC LEUKOREDUCED IRRADIATED: HCPCS

## 2024-02-04 PROCEDURE — 86921 COMPATIBILITY TEST INCUBATE: CPT

## 2024-02-04 PROCEDURE — 86900 BLOOD TYPING SEROLOGIC ABO: CPT

## 2024-02-04 PROCEDURE — 6370000000 HC RX 637 (ALT 250 FOR IP): Performed by: STUDENT IN AN ORGANIZED HEALTH CARE EDUCATION/TRAINING PROGRAM

## 2024-02-04 PROCEDURE — 6360000002 HC RX W HCPCS: Performed by: PHYSICIAN ASSISTANT

## 2024-02-04 PROCEDURE — 96374 THER/PROPH/DIAG INJ IV PUSH: CPT

## 2024-02-04 PROCEDURE — 1100000003 HC PRIVATE W/ TELEMETRY

## 2024-02-04 PROCEDURE — 2580000003 HC RX 258: Performed by: STUDENT IN AN ORGANIZED HEALTH CARE EDUCATION/TRAINING PROGRAM

## 2024-02-04 PROCEDURE — 99223 1ST HOSP IP/OBS HIGH 75: CPT | Performed by: STUDENT IN AN ORGANIZED HEALTH CARE EDUCATION/TRAINING PROGRAM

## 2024-02-04 PROCEDURE — 93010 ELECTROCARDIOGRAM REPORT: CPT | Performed by: INTERNAL MEDICINE

## 2024-02-04 PROCEDURE — 2580000003 HC RX 258: Performed by: PHYSICIAN ASSISTANT

## 2024-02-04 PROCEDURE — 86870 RBC ANTIBODY IDENTIFICATION: CPT

## 2024-02-04 PROCEDURE — 80048 BASIC METABOLIC PNL TOTAL CA: CPT

## 2024-02-04 PROCEDURE — 86901 BLOOD TYPING SEROLOGIC RH(D): CPT

## 2024-02-04 PROCEDURE — 86922 COMPATIBILITY TEST ANTIGLOB: CPT

## 2024-02-04 PROCEDURE — 71045 X-RAY EXAM CHEST 1 VIEW: CPT

## 2024-02-04 PROCEDURE — 93005 ELECTROCARDIOGRAM TRACING: CPT | Performed by: PHYSICIAN ASSISTANT

## 2024-02-04 RX ORDER — POTASSIUM CHLORIDE 20 MEQ/1
40 TABLET, EXTENDED RELEASE ORAL PRN
Status: DISCONTINUED | OUTPATIENT
Start: 2024-02-04 | End: 2024-02-06 | Stop reason: HOSPADM

## 2024-02-04 RX ORDER — SODIUM CHLORIDE 9 MG/ML
INJECTION, SOLUTION INTRAVENOUS PRN
Status: DISCONTINUED | OUTPATIENT
Start: 2024-02-04 | End: 2024-02-06 | Stop reason: HOSPADM

## 2024-02-04 RX ORDER — MAGNESIUM SULFATE IN WATER 40 MG/ML
2000 INJECTION, SOLUTION INTRAVENOUS PRN
Status: DISCONTINUED | OUTPATIENT
Start: 2024-02-04 | End: 2024-02-06 | Stop reason: HOSPADM

## 2024-02-04 RX ORDER — ONDANSETRON 2 MG/ML
4 INJECTION INTRAMUSCULAR; INTRAVENOUS EVERY 6 HOURS PRN
Status: DISCONTINUED | OUTPATIENT
Start: 2024-02-04 | End: 2024-02-06 | Stop reason: HOSPADM

## 2024-02-04 RX ORDER — POTASSIUM CHLORIDE 7.45 MG/ML
10 INJECTION INTRAVENOUS PRN
Status: DISCONTINUED | OUTPATIENT
Start: 2024-02-04 | End: 2024-02-06 | Stop reason: HOSPADM

## 2024-02-04 RX ORDER — HYDROMORPHONE HYDROCHLORIDE 1 MG/ML
1 INJECTION, SOLUTION INTRAMUSCULAR; INTRAVENOUS; SUBCUTANEOUS
Status: DISCONTINUED | OUTPATIENT
Start: 2024-02-04 | End: 2024-02-06 | Stop reason: HOSPADM

## 2024-02-04 RX ORDER — HYDROMORPHONE HYDROCHLORIDE 1 MG/ML
1 INJECTION, SOLUTION INTRAMUSCULAR; INTRAVENOUS; SUBCUTANEOUS
Status: COMPLETED | OUTPATIENT
Start: 2024-02-04 | End: 2024-02-04

## 2024-02-04 RX ORDER — SODIUM CHLORIDE 0.9 % (FLUSH) 0.9 %
5-40 SYRINGE (ML) INJECTION PRN
Status: DISCONTINUED | OUTPATIENT
Start: 2024-02-04 | End: 2024-02-06 | Stop reason: HOSPADM

## 2024-02-04 RX ORDER — PANTOPRAZOLE SODIUM 40 MG/1
40 TABLET, DELAYED RELEASE ORAL
Status: DISCONTINUED | OUTPATIENT
Start: 2024-02-04 | End: 2024-02-06 | Stop reason: HOSPADM

## 2024-02-04 RX ORDER — HYDROMORPHONE HYDROCHLORIDE 4 MG/1
4 TABLET ORAL EVERY 4 HOURS PRN
Status: DISCONTINUED | OUTPATIENT
Start: 2024-02-04 | End: 2024-02-06 | Stop reason: HOSPADM

## 2024-02-04 RX ORDER — ENOXAPARIN SODIUM 100 MG/ML
40 INJECTION SUBCUTANEOUS DAILY
Status: DISCONTINUED | OUTPATIENT
Start: 2024-02-04 | End: 2024-02-06 | Stop reason: HOSPADM

## 2024-02-04 RX ORDER — ACETAMINOPHEN 325 MG/1
650 TABLET ORAL EVERY 6 HOURS PRN
Status: DISCONTINUED | OUTPATIENT
Start: 2024-02-04 | End: 2024-02-06 | Stop reason: HOSPADM

## 2024-02-04 RX ORDER — SODIUM CHLORIDE 9 MG/ML
INJECTION, SOLUTION INTRAVENOUS CONTINUOUS
Status: DISCONTINUED | OUTPATIENT
Start: 2024-02-04 | End: 2024-02-05

## 2024-02-04 RX ORDER — ACETAMINOPHEN 650 MG/1
650 SUPPOSITORY RECTAL EVERY 6 HOURS PRN
Status: DISCONTINUED | OUTPATIENT
Start: 2024-02-04 | End: 2024-02-06 | Stop reason: HOSPADM

## 2024-02-04 RX ORDER — HYDROMORPHONE HYDROCHLORIDE 1 MG/ML
1 INJECTION, SOLUTION INTRAMUSCULAR; INTRAVENOUS; SUBCUTANEOUS ONCE
Status: COMPLETED | OUTPATIENT
Start: 2024-02-04 | End: 2024-02-04

## 2024-02-04 RX ORDER — ONDANSETRON 4 MG/1
4 TABLET, ORALLY DISINTEGRATING ORAL EVERY 8 HOURS PRN
Status: DISCONTINUED | OUTPATIENT
Start: 2024-02-04 | End: 2024-02-06 | Stop reason: HOSPADM

## 2024-02-04 RX ORDER — POLYETHYLENE GLYCOL 3350 17 G/17G
17 POWDER, FOR SOLUTION ORAL DAILY PRN
Status: DISCONTINUED | OUTPATIENT
Start: 2024-02-04 | End: 2024-02-06 | Stop reason: HOSPADM

## 2024-02-04 RX ORDER — SODIUM CHLORIDE 0.9 % (FLUSH) 0.9 %
5-40 SYRINGE (ML) INJECTION EVERY 12 HOURS SCHEDULED
Status: DISCONTINUED | OUTPATIENT
Start: 2024-02-04 | End: 2024-02-06 | Stop reason: HOSPADM

## 2024-02-04 RX ADMIN — HYDROMORPHONE HYDROCHLORIDE 1 MG: 1 INJECTION, SOLUTION INTRAMUSCULAR; INTRAVENOUS; SUBCUTANEOUS at 22:59

## 2024-02-04 RX ADMIN — SODIUM CHLORIDE: 9 INJECTION, SOLUTION INTRAVENOUS at 12:47

## 2024-02-04 RX ADMIN — SODIUM CHLORIDE, PRESERVATIVE FREE 10 ML: 5 INJECTION INTRAVENOUS at 23:00

## 2024-02-04 RX ADMIN — HYDROMORPHONE HYDROCHLORIDE 1 MG: 1 INJECTION, SOLUTION INTRAMUSCULAR; INTRAVENOUS; SUBCUTANEOUS at 12:47

## 2024-02-04 RX ADMIN — HYDROMORPHONE HYDROCHLORIDE 1 MG: 1 INJECTION, SOLUTION INTRAMUSCULAR; INTRAVENOUS; SUBCUTANEOUS at 17:10

## 2024-02-04 RX ADMIN — HYDROMORPHONE HYDROCHLORIDE 1 MG: 1 INJECTION, SOLUTION INTRAMUSCULAR; INTRAVENOUS; SUBCUTANEOUS at 20:04

## 2024-02-04 RX ADMIN — ENOXAPARIN SODIUM 40 MG: 100 INJECTION SUBCUTANEOUS at 14:44

## 2024-02-04 RX ADMIN — PANTOPRAZOLE SODIUM 40 MG: 40 TABLET, DELAYED RELEASE ORAL at 17:09

## 2024-02-04 RX ADMIN — HYDROMORPHONE HYDROCHLORIDE 1 MG: 1 INJECTION, SOLUTION INTRAMUSCULAR; INTRAVENOUS; SUBCUTANEOUS at 14:09

## 2024-02-04 ASSESSMENT — PAIN SCALES - GENERAL
PAINLEVEL_OUTOF10: 9
PAINLEVEL_OUTOF10: 10

## 2024-02-04 ASSESSMENT — PAIN DESCRIPTION - LOCATION
LOCATION: BACK;HIP;LEG
LOCATION: HIP

## 2024-02-04 ASSESSMENT — PAIN DESCRIPTION - PAIN TYPE
TYPE: CHRONIC PAIN
TYPE: CHRONIC PAIN

## 2024-02-04 ASSESSMENT — PAIN DESCRIPTION - DESCRIPTORS: DESCRIPTORS: ACHING;THROBBING

## 2024-02-04 NOTE — ED NOTES
This nurse had a verbal conversation with MAGO Julian about accessing pts port. Iesha states that port can be accessed as it is always accessed when pt is in hospital./ Pt requesting port be used as he was stuck several times by EDT and they were unsuccessful. Using sterile technique and verified by second RN. Port was accessed successfully.

## 2024-02-04 NOTE — H&P
Hospitalist Admission History and Physical    NAME:  Kayden Samuel   :   1960   MRN:   [id number]     PCP:  Javid Vegas MD  Date/Time:  2024 2:05 PM  Subjective:   CHIEF COMPLAINT:      HISTORY OF PRESENT ILLNESS:     63-year-old male with multiple medical issues including cardiomyopathy, pulmonary hypertension, sickle cell disease presents with weakness.  Patient said he went to get blood work done on Friday. He was told that his hgb was 6.4. However, he would not be able to get a transfusion for a few weeks. He was told that if he feels worse to go to the ED. He's been having pain in hip, back, and legs since Friday night that has not improved. He stays hydrated.  He has intermittent chest pain and shortness of breath.     When patient presented to the ED, temperature 97.9, respiratory to 18, pulse 85, blood pressure 130/63, oxygen saturation 90% on room air.  Labs significant for hemoglobin 5.5, hematocrit 16.4, reticulocyte count 4.4%, BUN 39, creatinine 1.69.  Chest x-ray shows chronic bibasilar reticular opacities, likely representing scarring and/or atelectasis.  EKG shows sinus rhythm with PACs with a ventricular rate of 74 bpm.    Dr. Vegas was consulted from the ED and recommended 2 units of transfusion.  Patient has had a history of needing a Dilaudid PCA inpatient.  Patient was given Dilaudid 1 mg x 2.    Past Medical History:   Diagnosis Date    WILFRIDO (acute kidney injury) (HCC) 2023    Anemia     Cardiomyopathy (HCC)     Duodenal ulcer     GERD (gastroesophageal reflux disease)     History of blood transfusion     last transfusion 7/10/2023    Hypertension 2023    Other ill-defined conditions(799.89) Sickle cell    Pulmonary HTN (HCC)     mild per echo 23 -36 mmHG RVSP    Sickle cell anemia (HCC)         Past Surgical History:   Procedure Laterality Date    CHOLECYSTECTOMY      IR BILIARY DRAIN INT AND EXT  2017    IR BILIARY DRAIN INT AND EXT 2017 [facility] RAD

## 2024-02-04 NOTE — ED NOTES
Pt c/o pain, PA notified and morphine/IVF ordered. Pt medicated per order.    Verified blood consent signed for upcoming PRBC transfusion.

## 2024-02-04 NOTE — ED PROVIDER NOTES
diagnostic studies/labs  -re-evaluations  -documentation time    Aggregate critical care time was 32 minutes, which includes only time during which I was engaged in work directly related to the patient's care as described above, whether I was at bedside or elsewhere in the Emergency Department. It did not include time spent performing other reported procedures or the services of residents, students, or nurses.    Iesha Whitley PA-C    2:09 PM     Diagnosis     Clinical Impression:   1. Sickle cell pain crisis (HCC)    2. Anemia, unspecified type        Disposition: admission     No follow-up provider specified.        Medication List        CONTINUE taking these medications      Medical Compression Stockings Misc  1 each by Does not apply route daily as needed (Remove at night) Knee-high, moderate compression            ASK your doctor about these medications      ammonium lactate 12 % lotion  Commonly known as: LAC-HYDRIN  Apply topically as needed.     dapagliflozin 5 MG tablet  Commonly known as: Farxiga  Take 1 tablet by mouth every morning     ergocalciferol 1.25 MG (54237 UT) capsule  Commonly known as: ERGOCALCIFEROL     folic acid 1 MG tablet  Commonly known as: FOLVITE  Take 1 tablet by mouth daily     furosemide 20 MG tablet  Commonly known as: LASIX  Take 1 tablet by mouth three times a week     HYDROmorphone 4 MG tablet  Commonly known as: DILAUDID     pantoprazole 40 MG tablet  Commonly known as: PROTONIX  Take 1 tablet by mouth 2 times daily (before meals)     sacubitril-valsartan 24-26 MG per tablet  Commonly known as: ENTRESTO  Take 1 tablet by mouth 2 times daily               Dictation disclaimer:  Please note that this dictation was completed with [employer], the computer voice recognition software.  Quite often unanticipated grammatical, syntax, homophones, and other interpretive errors are inadvertently transcribed by the computer software.  Please disregard these errors.  Please excuse any errors

## 2024-02-04 NOTE — CONSENT
Informed Consent for Blood Component Transfusion Note    I have discussed with the patient the rationale for blood component transfusion; its benefits in treating or preventing fatigue, organ damage, or death; and its risk which includes mild transfusion reactions, rare risk of blood borne infection, or more serious but rare reactions. I have discussed the alternatives to transfusion, including the risk and consequences of not receiving transfusion. The patient had an opportunity to ask questions and had agreed to proceed with transfusion of blood components.    Electronically signed by Agueda Mcduffie DO on 2/4/24 at 6:14 PM EST

## 2024-02-04 NOTE — ED NOTES
Pt medicated for 10/10 pain as ordered. Pt now resting in bed, awake and alert. Maintenance IVF running. Call bell within reach.

## 2024-02-04 NOTE — ED TRIAGE NOTES
Pt arrived in ER complaining of pain all over and low hemoglobin of 6.4. Hx of sickle cell pain, HTN, and pulmonary HTN.

## 2024-02-05 LAB
ALBUMIN SERPL-MCNC: 2.9 G/DL (ref 3.4–5)
ALBUMIN/GLOB SERPL: 0.8 (ref 0.8–1.7)
ALP SERPL-CCNC: 108 U/L (ref 45–117)
ALT SERPL-CCNC: 37 U/L (ref 16–61)
ANION GAP SERPL CALC-SCNC: 1 MMOL/L (ref 3–18)
AST SERPL-CCNC: 58 U/L (ref 10–38)
BASOPHILS # BLD: 0.1 K/UL (ref 0–0.1)
BASOPHILS NFR BLD: 1 % (ref 0–2)
BILIRUB SERPL-MCNC: 3.1 MG/DL (ref 0.2–1)
BUN SERPL-MCNC: 32 MG/DL (ref 7–18)
BUN/CREAT SERPL: 24 (ref 12–20)
CALCIUM SERPL-MCNC: 8.6 MG/DL (ref 8.5–10.1)
CHLORIDE SERPL-SCNC: 119 MMOL/L (ref 100–111)
CO2 SERPL-SCNC: 22 MMOL/L (ref 21–32)
CREAT SERPL-MCNC: 1.31 MG/DL (ref 0.6–1.3)
DIFFERENTIAL METHOD BLD: ABNORMAL
EOSINOPHIL # BLD: 0.3 K/UL (ref 0–0.4)
EOSINOPHIL NFR BLD: 5 % (ref 0–5)
ERYTHROCYTE [DISTWIDTH] IN BLOOD BY AUTOMATED COUNT: 20.3 % (ref 11.6–14.5)
GLOBULIN SER CALC-MCNC: 3.7 G/DL (ref 2–4)
GLUCOSE SERPL-MCNC: 94 MG/DL (ref 74–99)
HCT VFR BLD AUTO: 18.4 % (ref 36–48)
HCT VFR BLD AUTO: 20.9 % (ref 36–48)
HGB BLD-MCNC: 6.2 G/DL (ref 13–16)
HGB BLD-MCNC: 6.9 G/DL (ref 13–16)
HISTORY CHECK: NORMAL
HISTORY CHECK: NORMAL
IMM GRANULOCYTES # BLD AUTO: 0.1 K/UL (ref 0–0.04)
IMM GRANULOCYTES NFR BLD AUTO: 1 % (ref 0–0.5)
LYMPHOCYTES # BLD: 1.3 K/UL (ref 0.9–3.6)
LYMPHOCYTES NFR BLD: 18 % (ref 21–52)
MCH RBC QN AUTO: 29.1 PG (ref 24–34)
MCHC RBC AUTO-ENTMCNC: 33.7 G/DL (ref 31–37)
MCV RBC AUTO: 86.4 FL (ref 78–100)
MONOCYTES # BLD: 0.8 K/UL (ref 0.05–1.2)
MONOCYTES NFR BLD: 12 % (ref 3–10)
NEUTS SEG # BLD: 4.5 K/UL (ref 1.8–8)
NEUTS SEG NFR BLD: 64 % (ref 40–73)
NRBC # BLD: 0.19 K/UL (ref 0–0.01)
NRBC BLD-RTO: 2.7 PER 100 WBC
PLATELET # BLD AUTO: 138 K/UL (ref 135–420)
PMV BLD AUTO: 11.3 FL (ref 9.2–11.8)
POTASSIUM SERPL-SCNC: 4.6 MMOL/L (ref 3.5–5.5)
PROT SERPL-MCNC: 6.6 G/DL (ref 6.4–8.2)
RBC # BLD AUTO: 2.13 M/UL (ref 4.35–5.65)
SODIUM SERPL-SCNC: 142 MMOL/L (ref 136–145)
WBC # BLD AUTO: 7 K/UL (ref 4.6–13.2)

## 2024-02-05 PROCEDURE — 6370000000 HC RX 637 (ALT 250 FOR IP): Performed by: STUDENT IN AN ORGANIZED HEALTH CARE EDUCATION/TRAINING PROGRAM

## 2024-02-05 PROCEDURE — 80053 COMPREHEN METABOLIC PANEL: CPT

## 2024-02-05 PROCEDURE — 6360000002 HC RX W HCPCS: Performed by: STUDENT IN AN ORGANIZED HEALTH CARE EDUCATION/TRAINING PROGRAM

## 2024-02-05 PROCEDURE — 2580000003 HC RX 258: Performed by: PHYSICIAN ASSISTANT

## 2024-02-05 PROCEDURE — 36430 TRANSFUSION BLD/BLD COMPNT: CPT

## 2024-02-05 PROCEDURE — 85025 COMPLETE CBC W/AUTO DIFF WBC: CPT

## 2024-02-05 PROCEDURE — 85018 HEMOGLOBIN: CPT

## 2024-02-05 PROCEDURE — 86922 COMPATIBILITY TEST ANTIGLOB: CPT

## 2024-02-05 PROCEDURE — 1100000003 HC PRIVATE W/ TELEMETRY

## 2024-02-05 PROCEDURE — P9040 RBC LEUKOREDUCED IRRADIATED: HCPCS

## 2024-02-05 PROCEDURE — 99232 SBSQ HOSP IP/OBS MODERATE 35: CPT | Performed by: HOSPITALIST

## 2024-02-05 PROCEDURE — 86902 BLOOD TYPE ANTIGEN DONOR EA: CPT

## 2024-02-05 PROCEDURE — 2580000003 HC RX 258: Performed by: STUDENT IN AN ORGANIZED HEALTH CARE EDUCATION/TRAINING PROGRAM

## 2024-02-05 PROCEDURE — 86921 COMPATIBILITY TEST INCUBATE: CPT

## 2024-02-05 PROCEDURE — 85014 HEMATOCRIT: CPT

## 2024-02-05 PROCEDURE — 94761 N-INVAS EAR/PLS OXIMETRY MLT: CPT

## 2024-02-05 RX ORDER — SODIUM CHLORIDE 9 MG/ML
INJECTION, SOLUTION INTRAVENOUS PRN
Status: DISCONTINUED | OUTPATIENT
Start: 2024-02-05 | End: 2024-02-06 | Stop reason: HOSPADM

## 2024-02-05 RX ADMIN — HYDROMORPHONE HYDROCHLORIDE 1 MG: 1 INJECTION, SOLUTION INTRAMUSCULAR; INTRAVENOUS; SUBCUTANEOUS at 02:08

## 2024-02-05 RX ADMIN — HYDROMORPHONE HYDROCHLORIDE 1 MG: 1 INJECTION, SOLUTION INTRAMUSCULAR; INTRAVENOUS; SUBCUTANEOUS at 12:05

## 2024-02-05 RX ADMIN — SODIUM CHLORIDE: 9 INJECTION, SOLUTION INTRAVENOUS at 04:14

## 2024-02-05 RX ADMIN — PANTOPRAZOLE SODIUM 40 MG: 40 TABLET, DELAYED RELEASE ORAL at 19:08

## 2024-02-05 RX ADMIN — HYDROMORPHONE HYDROCHLORIDE 1 MG: 1 INJECTION, SOLUTION INTRAMUSCULAR; INTRAVENOUS; SUBCUTANEOUS at 09:09

## 2024-02-05 RX ADMIN — HYDROMORPHONE HYDROCHLORIDE 1 MG: 1 INJECTION, SOLUTION INTRAMUSCULAR; INTRAVENOUS; SUBCUTANEOUS at 18:43

## 2024-02-05 RX ADMIN — PANTOPRAZOLE SODIUM 40 MG: 40 TABLET, DELAYED RELEASE ORAL at 06:15

## 2024-02-05 RX ADMIN — HYDROMORPHONE HYDROCHLORIDE 1 MG: 1 INJECTION, SOLUTION INTRAMUSCULAR; INTRAVENOUS; SUBCUTANEOUS at 21:27

## 2024-02-05 RX ADMIN — HYDROMORPHONE HYDROCHLORIDE 1 MG: 1 INJECTION, SOLUTION INTRAMUSCULAR; INTRAVENOUS; SUBCUTANEOUS at 15:15

## 2024-02-05 RX ADMIN — SODIUM CHLORIDE, PRESERVATIVE FREE 10 ML: 5 INJECTION INTRAVENOUS at 21:29

## 2024-02-05 RX ADMIN — HYDROMORPHONE HYDROCHLORIDE 1 MG: 1 INJECTION, SOLUTION INTRAMUSCULAR; INTRAVENOUS; SUBCUTANEOUS at 06:13

## 2024-02-05 RX ADMIN — SODIUM CHLORIDE, PRESERVATIVE FREE 10 ML: 5 INJECTION INTRAVENOUS at 12:07

## 2024-02-05 ASSESSMENT — PAIN SCALES - GENERAL
PAINLEVEL_OUTOF10: 7
PAINLEVEL_OUTOF10: 10
PAINLEVEL_OUTOF10: 7
PAINLEVEL_OUTOF10: 9
PAINLEVEL_OUTOF10: 8
PAINLEVEL_OUTOF10: 9
PAINLEVEL_OUTOF10: 7
PAINLEVEL_OUTOF10: 8
PAINLEVEL_OUTOF10: 6
PAINLEVEL_OUTOF10: 10

## 2024-02-05 ASSESSMENT — PAIN DESCRIPTION - DESCRIPTORS
DESCRIPTORS: ACHING;DISCOMFORT;SORE
DESCRIPTORS: ACHING;THROBBING

## 2024-02-05 ASSESSMENT — PAIN DESCRIPTION - LOCATION
LOCATION: FOOT;LEG
LOCATION: FOOT;LEG
LOCATION: HIP;LEG
LOCATION: FOOT;LEG
LOCATION: LEG;FOOT
LOCATION: GENERALIZED;LEG
LOCATION: HIP;LEG
LOCATION: HIP;LEG
LOCATION: LEG;GENERALIZED
LOCATION: LEG;FOOT
LOCATION: HIP;LEG;OTHER (COMMENT)

## 2024-02-05 ASSESSMENT — PAIN DESCRIPTION - PAIN TYPE
TYPE: CHRONIC PAIN

## 2024-02-05 ASSESSMENT — PAIN DESCRIPTION - ORIENTATION
ORIENTATION: RIGHT;LEFT
ORIENTATION: LEFT;RIGHT
ORIENTATION: RIGHT;LEFT
ORIENTATION: LEFT;RIGHT
ORIENTATION: LEFT;RIGHT
ORIENTATION: RIGHT;LEFT

## 2024-02-05 ASSESSMENT — PAIN - FUNCTIONAL ASSESSMENT
PAIN_FUNCTIONAL_ASSESSMENT: PREVENTS OR INTERFERES SOME ACTIVE ACTIVITIES AND ADLS

## 2024-02-05 ASSESSMENT — PAIN DESCRIPTION - FREQUENCY
FREQUENCY: CONTINUOUS
FREQUENCY: CONTINUOUS

## 2024-02-05 NOTE — ED NOTES
Assumed care of pt resting on stretcher cardiac monitor in place. Pt reports pain 10/10 pain. Pt awaiting blood transfusion. Pt has right double lumen  mediport accessed NS 100ml infusing w/o incident, cardiac monitor in place call ell in reach. Will place transport for bed placement

## 2024-02-05 NOTE — ED NOTES
TRANSFER - OUT REPORT:    Verbal report given to Madelyn on Kayden Samuel  being transferred to  for routine progression of patient care       Report consisted of patient's Situation, Background, Assessment and   Recommendations(SBAR).     Information from the following report(s) ED Encounter Summary was reviewed with the receiving nurse.    Chancellor Fall Assessment:    Presents to emergency department  because of falls (Syncope, seizure, or loss of consciousness): No  Age > 70: No  Altered Mental Status, Intoxication with alcohol or substance confusion (Disorientation, impaired judgment, poor safety awaremess, or inability to follow instructions): No  Impaired Mobility: Ambulates or transfers with assistive devices or assistance; Unable to ambulate or transer.: No  Nursing Judgement: No          Lines:   Single Lumen Implantable Port 12/28/23 Right Subclavian (Active)   Port A Cath Status Accessed 02/04/24 1203   Criteria for Appropriate Use Limited/no vessel suitable for conventional peripheral access 02/04/24 1203   Site Assessment Clean, dry & intact 02/04/24 1203   Line Care Cap changed;Connections checked and tightened;Chlorhexidine wipes;Line pulled back;Ports disinfected;Tubing changed 02/04/24 1203   Alcohol Cap Used Yes 02/04/24 1203   Dressing Type Securing device;Transparent w/CHG gel;Transparent 02/04/24 1203   Dressing Status New dressing applied 02/04/24 1203   Dressing Intervention New 02/04/24 1203   Access Attempts  1 02/04/24 1203   Access Needle Gauge 20 G 02/04/24 1203   Access Needle Length 1 inch 02/04/24 1203   Accessed By: LIZZETH DUARTE 02/04/24 1203        Opportunity for questions and clarification was provided.

## 2024-02-06 VITALS
RESPIRATION RATE: 16 BRPM | TEMPERATURE: 98.6 F | DIASTOLIC BLOOD PRESSURE: 74 MMHG | HEIGHT: 65 IN | SYSTOLIC BLOOD PRESSURE: 149 MMHG | BODY MASS INDEX: 20.66 KG/M2 | WEIGHT: 124 LBS | HEART RATE: 59 BPM | OXYGEN SATURATION: 100 %

## 2024-02-06 PROBLEM — N17.9 AKI (ACUTE KIDNEY INJURY) (HCC): Status: RESOLVED | Noted: 2023-09-01 | Resolved: 2024-02-06

## 2024-02-06 LAB
ALBUMIN SERPL-MCNC: 3.1 G/DL (ref 3.4–5)
ALBUMIN/GLOB SERPL: 0.8 (ref 0.8–1.7)
ALP SERPL-CCNC: 116 U/L (ref 45–117)
ALT SERPL-CCNC: 37 U/L (ref 16–61)
ANION GAP SERPL CALC-SCNC: 2 MMOL/L (ref 3–18)
AST SERPL-CCNC: 61 U/L (ref 10–38)
BASOPHILS # BLD: 0.1 K/UL (ref 0–0.1)
BASOPHILS NFR BLD: 1 % (ref 0–2)
BILIRUB SERPL-MCNC: 2.6 MG/DL (ref 0.2–1)
BUN SERPL-MCNC: 26 MG/DL (ref 7–18)
BUN/CREAT SERPL: 18 (ref 12–20)
CALCIUM SERPL-MCNC: 8.6 MG/DL (ref 8.5–10.1)
CHLORIDE SERPL-SCNC: 116 MMOL/L (ref 100–111)
CO2 SERPL-SCNC: 22 MMOL/L (ref 21–32)
CREAT SERPL-MCNC: 1.47 MG/DL (ref 0.6–1.3)
DIFFERENTIAL METHOD BLD: ABNORMAL
EOSINOPHIL # BLD: 0.4 K/UL (ref 0–0.4)
EOSINOPHIL NFR BLD: 6 % (ref 0–5)
ERYTHROCYTE [DISTWIDTH] IN BLOOD BY AUTOMATED COUNT: 18.7 % (ref 11.6–14.5)
GLOBULIN SER CALC-MCNC: 4 G/DL (ref 2–4)
GLUCOSE SERPL-MCNC: 131 MG/DL (ref 74–99)
HCT VFR BLD AUTO: 25.1 % (ref 36–48)
HGB BLD-MCNC: 8.4 G/DL (ref 13–16)
IMM GRANULOCYTES # BLD AUTO: 0 K/UL (ref 0–0.04)
IMM GRANULOCYTES NFR BLD AUTO: 0 % (ref 0–0.5)
LYMPHOCYTES # BLD: 1.3 K/UL (ref 0.9–3.6)
LYMPHOCYTES NFR BLD: 20 % (ref 21–52)
MCH RBC QN AUTO: 29 PG (ref 24–34)
MCHC RBC AUTO-ENTMCNC: 33.5 G/DL (ref 31–37)
MCV RBC AUTO: 86.6 FL (ref 78–100)
MONOCYTES # BLD: 0.6 K/UL (ref 0.05–1.2)
MONOCYTES NFR BLD: 9 % (ref 3–10)
NEUTS SEG # BLD: 4.2 K/UL (ref 1.8–8)
NEUTS SEG NFR BLD: 64 % (ref 40–73)
NRBC # BLD: 0.3 K/UL (ref 0–0.01)
NRBC BLD-RTO: 4.6 PER 100 WBC
PLATELET # BLD AUTO: 132 K/UL (ref 135–420)
PMV BLD AUTO: 10.5 FL (ref 9.2–11.8)
POTASSIUM SERPL-SCNC: 4.5 MMOL/L (ref 3.5–5.5)
PROT SERPL-MCNC: 7.1 G/DL (ref 6.4–8.2)
RBC # BLD AUTO: 2.9 M/UL (ref 4.35–5.65)
SODIUM SERPL-SCNC: 140 MMOL/L (ref 136–145)
WBC # BLD AUTO: 6.5 K/UL (ref 4.6–13.2)

## 2024-02-06 PROCEDURE — 6360000002 HC RX W HCPCS: Performed by: STUDENT IN AN ORGANIZED HEALTH CARE EDUCATION/TRAINING PROGRAM

## 2024-02-06 PROCEDURE — 99239 HOSP IP/OBS DSCHRG MGMT >30: CPT | Performed by: HOSPITALIST

## 2024-02-06 PROCEDURE — 80053 COMPREHEN METABOLIC PANEL: CPT

## 2024-02-06 PROCEDURE — 85025 COMPLETE CBC W/AUTO DIFF WBC: CPT

## 2024-02-06 PROCEDURE — 94761 N-INVAS EAR/PLS OXIMETRY MLT: CPT

## 2024-02-06 PROCEDURE — 2580000003 HC RX 258: Performed by: STUDENT IN AN ORGANIZED HEALTH CARE EDUCATION/TRAINING PROGRAM

## 2024-02-06 PROCEDURE — 6370000000 HC RX 637 (ALT 250 FOR IP): Performed by: STUDENT IN AN ORGANIZED HEALTH CARE EDUCATION/TRAINING PROGRAM

## 2024-02-06 RX ORDER — HEPARIN 100 UNIT/ML
100 SYRINGE INTRAVENOUS ONCE
Status: DISCONTINUED | OUTPATIENT
Start: 2024-02-06 | End: 2024-02-06

## 2024-02-06 RX ORDER — HEPARIN SODIUM (PORCINE) LOCK FLUSH IV SOLN 100 UNIT/ML 100 UNIT/ML
100 SOLUTION INTRAVENOUS ONCE
Status: COMPLETED | OUTPATIENT
Start: 2024-02-06 | End: 2024-02-06

## 2024-02-06 RX ADMIN — HYDROMORPHONE HYDROCHLORIDE 1 MG: 1 INJECTION, SOLUTION INTRAMUSCULAR; INTRAVENOUS; SUBCUTANEOUS at 00:33

## 2024-02-06 RX ADMIN — HYDROMORPHONE HYDROCHLORIDE 1 MG: 1 INJECTION, SOLUTION INTRAMUSCULAR; INTRAVENOUS; SUBCUTANEOUS at 10:36

## 2024-02-06 RX ADMIN — HYDROMORPHONE HYDROCHLORIDE 1 MG: 1 INJECTION, SOLUTION INTRAMUSCULAR; INTRAVENOUS; SUBCUTANEOUS at 06:38

## 2024-02-06 RX ADMIN — HEPARIN SODIUM (PORCINE) LOCK FLUSH IV SOLN 100 UNIT/ML 100 UNITS: 100 SOLUTION at 15:13

## 2024-02-06 RX ADMIN — SODIUM CHLORIDE, PRESERVATIVE FREE 10 ML: 5 INJECTION INTRAVENOUS at 10:39

## 2024-02-06 RX ADMIN — HYDROMORPHONE HYDROCHLORIDE 1 MG: 1 INJECTION, SOLUTION INTRAMUSCULAR; INTRAVENOUS; SUBCUTANEOUS at 03:19

## 2024-02-06 RX ADMIN — HYDROMORPHONE HYDROCHLORIDE 1 MG: 1 INJECTION, SOLUTION INTRAMUSCULAR; INTRAVENOUS; SUBCUTANEOUS at 12:54

## 2024-02-06 RX ADMIN — PANTOPRAZOLE SODIUM 40 MG: 40 TABLET, DELAYED RELEASE ORAL at 06:37

## 2024-02-06 ASSESSMENT — PAIN SCALES - GENERAL
PAINLEVEL_OUTOF10: 8
PAINLEVEL_OUTOF10: 7
PAINLEVEL_OUTOF10: 8
PAINLEVEL_OUTOF10: 9
PAINLEVEL_OUTOF10: 8
PAINLEVEL_OUTOF10: 7

## 2024-02-06 ASSESSMENT — PAIN DESCRIPTION - DESCRIPTORS
DESCRIPTORS: ACHING;SHOOTING;DISCOMFORT
DESCRIPTORS: ACHING;SORE;SHOOTING
DESCRIPTORS: ACHING
DESCRIPTORS: ACHING;DISCOMFORT;SHOOTING
DESCRIPTORS: THROBBING
DESCRIPTORS: ACHING

## 2024-02-06 ASSESSMENT — PAIN DESCRIPTION - ORIENTATION
ORIENTATION: POSTERIOR
ORIENTATION: INNER
ORIENTATION: RIGHT;LEFT
ORIENTATION: RIGHT;LEFT
ORIENTATION: LEFT;RIGHT

## 2024-02-06 ASSESSMENT — PAIN DESCRIPTION - LOCATION
LOCATION: GENERALIZED
LOCATION: BACK
LOCATION: HIP;LEG
LOCATION: HIP;LEG
LOCATION: GENERALIZED
LOCATION: HIP;LEG

## 2024-02-06 ASSESSMENT — PAIN DESCRIPTION - FREQUENCY
FREQUENCY: CONTINUOUS
FREQUENCY: CONTINUOUS

## 2024-02-06 ASSESSMENT — PAIN DESCRIPTION - PAIN TYPE
TYPE: CHRONIC PAIN
TYPE: CHRONIC PAIN

## 2024-02-06 NOTE — PROGRESS NOTES
conducted an initial consultation and Spiritual Assessment for Kayden Samuel, who is a 63 y.o.,male. Patient's Primary Language is: English.   According to the patient's EMR Faith Affiliation is: Pentecostalism.     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   Provided information about Spiritual Care Services.  Chart reviewed.    Assessment:  Patient does not have any Mosque/cultural needs that will affect patient's preferences in health care.  There are no spiritual or Mosque issues which require intervention at this time.     Plan:  Chaplains will continue to follow and will provide pastoral care on an as needed/requested basis.   recommends bedside caregivers page  on duty if patient shows signs of acute spiritual or emotional distress.    Chaplain Tamiko Vick  Spiritual Care 
[facility]  Progress Note    Patient: Kayden Samuel Age: 63 y.o. : 1960 MR#: [id number] SSN:   Date/Time: 2024     Subjective:     Kayden Samuel, 63 y.o. male, PMH sickle cell disease s/p splenectomy, requires chronic transfusions, recent hospitalization 2023 for sickle cell pain crisis; he is followed by hematology.  Pt presented to ED 1 day ago w/ cc of intermittent chest pain w/ SOB, bilateral hip, lower back, as well as bilateral UE/LE pain since Friday.  States he had routine bloodwork taken on Friday showing low hemoglobin (6.4) and that he was unable to get an appointment for his routine transfusion sessions for the last few weeks.      Patient endorses continued upper/lower pain bilaterally as well as bilat hip and lower back pain he rates as a 6/10, states it has only improved slightly since yesterday.  Denies following: recent illness, sick contacts, N/V/D, fever, chills, productive cough, hematuria, hematochezia.    Assessment/Plan:     Acute anemia secondary to sickle cell pain crisis  - Hgb 6.2 (increased from 5.5 on presentation) s/p transfusion 2 units RBCs  - CBC, iron panel consistent w/ anemia of chronic disease - normocytic anemia w/ elevated ferritin, decreased TIBC  - Folate, cobalamin WNLs  - Elevated bilirubin, elevated reticulocytes consistent w/ hemolytic anemia  - Elevated LFT(s) likely secondary to acute sickle cell hepatopathy  - Hold enoxaparin  - Goal hgb of 7, pt agrees to an additional 1 unit RBCs; recheck CBC daily  - IV dilaudid as needed for pain management    Chest pain, SOB likely secondary to severe anemia vs. Acute chest syndrome  - Pt reported SOB and chest pain in ED, pt states this has since resolved   - CXR does not show new infiltrate  - Lung auscultation: minor LLL crackles; all other fields CTA  - Pt has not required supp O2, he is not hypoxic    WILFRIDO likely secondary to hypovolemia vs. Hemolytic 
[facility]  Progress Note    Patient: Kayden Samuel Age: 63 y.o. : 1960 MR#: [id number] SSN: [id number]  Date/Time: 2024     Subjective: Patient sitting in the bed, feels good.  Pain controlled, no chest pain, no shortness of breath, no chest tightness.     Assessment/Plan:   Symptomatic anemia due to sickle cell crisis.  Hemoglobin 5.5  Sickle cell with pain crisis, no evidence of acute chest syndrome.    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  No signs of active bleeding, discussed with hematology recommending 1 more unit of PRBC.  Will transfuse PRBC and monitor  Will continue pain control with current regimen.  Given lower extremity edema and history of diastolic heart failure, will hold off IV fluids.  Discussed with RN to use Lasix as needed if patient develops short of breath with transfusion.    Disposition: Home tomorrow if clinically better and hemoglobin stable    Discussed with the patient at the bedside and explained about my above plan care.  Patient understood and agreed with the plan.        Case discussed with:  [x]Patient  [x]Family  [x]Nursing  []Case Management  DVT Prophylaxis:  []Lovenox  []Hep SQ  []SCDs  []Coumadin   []Eliquis/Xarelto     Objective:   VS: BP (!) 159/76   Pulse 63   Temp 98.4 °F (36.9 °C) (Oral)   Resp 20   Ht 1.651 m (5' 5\")   Wt 56.2 kg (124 lb)   SpO2 97%   BMI 20.63 kg/m²    Tmax/24hrs: Temp (24hrs), Av °F (36.7 °C), Min:97.4 °F (36.3 °C), Max:98.5 °F (36.9 °C)  IOBRIEF  Intake/Output Summary (Last 24 hours) at 2024 1457  Last data filed at 2024 1402  Gross per 24 hour   Intake 1969.33 ml   Output 1400 ml   Net 569.33 ml       General:  Alert, cooperative, no acute distress    HEENT: PERRLA, anicteric sclerae.  Pulmonary:  CTA 
Third unit of blood to be transfused but not avaliable from blood bank yet. BB sts that due to pts many antibodies it will be a while before blood is ready and that they will call.     1930 blood bank called and still in process of matching blood for pt. Will notify pt when available. Pt made aware.  
Verbal and written discharge instructions given to patient. Questions asked and answered. No further needs identified.  
nad  Eyes - not examined  Ears - hearing grossly normal bilaterally  Mouth - mucous membranes moist, pharynx normal without lesions  Neck - supple, no significant adenopathy  Lymphatics - no palpable lymphadenopathy, no hepatosplenomegaly  Chest - clear to auscultation, no wheezes, rales or rhonchi, symmetric air entry, basal coarse creps  Heart - normal rate, regular rhythm, normal S1, S2, no murmurs, rubs, clicks or gallops  Abdomen - soft, nontender, nondistended, no masses or organomegaly  Back exam - full range of motion, no tenderness, palpable spasm or pain on motion  Neurological - alert, oriented, normal speech, no focal findings or movement disorder noted  Extremities - peripheral pulses normal, no pedal edema, no clubbing or cyanosis  Skin - normal coloration and turgor, no rashes, no suspicious skin lesions noted          Labs:  Basic Metabolic Profile   Recent Labs     02/04/24  1203 02/05/24  0432    142   CO2 21 22   BUN 39* 32*   GLUCOSE 110* 94   CALCIUM 8.5 8.6        CBC w/Diff    Recent Labs     02/04/24  1203 02/05/24  0432   WBC 7.4 7.0   HCT 16.4* 18.4*    No results for input(s): \"BANDS\", \"SEGS\" in the last 72 hours.    Invalid input(s): \"LYMPHOCYTES\"     Hepatic Panel   Hepatic Function  Lab Results   Component Value Date    ALKPHOS 108 02/05/2024         Coagulation   No results for input(s): \"INR\", \"APTT\" in the last 72 hours.    Invalid input(s): \"PT\"       Current medications reviewed    Javid Vegas MD   [facility]  Office [phone]

## 2024-02-06 NOTE — DISCHARGE INSTRUCTIONS
Discharge Instructions    Patient: Kayden Samuel MRN: [id number]  CSN: [id number]    YOB: 1960  Age: 63 y.o.  Sex: male    DOA: 2/4/2024       DIET:  cardiac diet    ACTIVITY: activity as tolerated    ADDITIONAL INFORMATION: If you experience any of the following symptoms but not limited to Fever, chills, nausea, vomiting, diarrhea, change in mentation, falling, bleeding, shortness of breath, chest pain, please call your primary care physician or return to the emergency room if you cannot get hold of your doctor:     FOLLOW UP CARE:      Gabriele Salas MD  2/6/2024 10:42 AM

## 2024-02-06 NOTE — DISCHARGE SUMMARY
Discharge Summary    Patient: Kayden Samule MRN: [id number]  CSN: [id number]    YOB: 1960  Age: 63 y.o.  Sex: male    DOA: 2/4/2024 LOS:  LOS: 2 days        Disposition: Home     Discharge Date: 2/6/2024    Admission Diagnosis: Sickle cell anemia with pain (HCC) [D57.00]  Sickle cell pain crisis (HCC) [D57.00]  Anemia, unspecified type [D64.9]    Discharge Diagnosis:    Symptomatic anemia due to sickle cell crisis.  Hemoglobin 5.5  Sickle cell with pain crisis, no evidence of acute chest syndrome.    Acute on chronic anemia  Prerenal acidemia  Chronic HFmrEF, no evidence of decompensation   H/o PUD and GI bleed, no evidence of bleeding at this time    Discharge Condition: Stable      PHYSICAL EXAM  Visit Vitals  BP (!) 156/74   Pulse 50   Temp 98.4 °F (36.9 °C) (Oral)   Resp 18   Ht 1.651 m (5' 5\")   Wt 56.2 kg (124 lb)   SpO2 97%   BMI 20.63 kg/m²       General: Alert, cooperative, no acute distress    HEENT: PERRLA, EOMI. Anicteric sclerae.  Lungs:  CTA Bilaterally. No Wheezing/Rales.  Heart:             Regular rate and Rhythm.  Abdomen: Soft, Non distended, Non tender. + Bowel sounds.  Extremities: No edema.  Psych:   Good insight. Not anxious or agitated.  Neurologic:  AA, oriented X 3. Moves all ext                                 Hospital Course:   63-year-old male with multiple medical issues including cardiomyopathy, pulmonary hypertension, sickle cell disease presents with weakness.  Patient said he went to get blood work done on Friday. He was told that his hgb was 6.4. However, he would not be able to get a transfusion for a few weeks. He was told that if he feels worse to go to the ED. He's been having pain in hip, back, and legs since Friday night that has not improved. He stays hydrated.  He has intermittent chest pain and shortness of breath.      When patient presented to the ED, hemoglobin was noted to be 5.5 and reticulocyte count was 4.4.     Dr. Vegas was consulted from the ED

## 2024-02-08 LAB
ABO + RH BLD: NORMAL
ANTIGENS PRESENT RBC DONR: NORMAL
BLD PROD TYP BPU: NORMAL
BLOOD BANK DISPENSE STATUS: NORMAL
BLOOD GROUP ANTIBODIES SERPL: NORMAL
BLOOD GROUP ANTIBODIES SERPL: NORMAL
BPU ID: NORMAL
CALLED TO: NORMAL
CALLED TO:: NORMAL
CROSSMATCH RESULT: NORMAL
SPECIMEN EXP DATE BLD: NORMAL
UNIT DIVISION: 0

## 2024-02-29 ENCOUNTER — HOSPITAL ENCOUNTER (EMERGENCY)
Facility: HOSPITAL | Age: 64
Discharge: HOME | End: 2024-03-02
Attending: STUDENT IN AN ORGANIZED HEALTH CARE EDUCATION/TRAINING PROGRAM
Payer: COMMERCIAL

## 2024-02-29 ENCOUNTER — APPOINTMENT (OUTPATIENT)
Facility: HOSPITAL | Age: 64
End: 2024-02-29
Payer: COMMERCIAL

## 2024-02-29 DIAGNOSIS — D57.00 SICKLE CELL PAIN CRISIS (HCC): Primary | ICD-10-CM

## 2024-02-29 DIAGNOSIS — D63.8 ANEMIA OF CHRONIC DISEASE: ICD-10-CM

## 2024-02-29 DIAGNOSIS — N17.9 ACUTE KIDNEY INJURY SUPERIMPOSED ON CKD (HCC): ICD-10-CM

## 2024-02-29 DIAGNOSIS — N18.9 ACUTE KIDNEY INJURY SUPERIMPOSED ON CKD (HCC): ICD-10-CM

## 2024-02-29 DIAGNOSIS — R91.8 OPACITY OF LUNG ON IMAGING STUDY: ICD-10-CM

## 2024-02-29 LAB
ALBUMIN SERPL-MCNC: 3 G/DL (ref 3.4–5)
ALBUMIN/GLOB SERPL: 0.8 (ref 0.8–1.7)
ALP SERPL-CCNC: 134 U/L (ref 45–117)
ALT SERPL-CCNC: 54 U/L (ref 16–61)
ANION GAP SERPL CALC-SCNC: 6 MMOL/L (ref 3–18)
AST SERPL-CCNC: 67 U/L (ref 10–38)
BASOPHILS # BLD: 0.1 K/UL (ref 0–0.1)
BASOPHILS NFR BLD: 1 % (ref 0–2)
BILIRUB SERPL-MCNC: 1.7 MG/DL (ref 0.2–1)
BUN SERPL-MCNC: 26 MG/DL (ref 7–18)
BUN/CREAT SERPL: 16 (ref 12–20)
CALCIUM SERPL-MCNC: 7.5 MG/DL (ref 8.5–10.1)
CHLORIDE SERPL-SCNC: 116 MMOL/L (ref 100–111)
CO2 SERPL-SCNC: 20 MMOL/L (ref 21–32)
CREAT SERPL-MCNC: 1.62 MG/DL (ref 0.6–1.3)
DIFFERENTIAL METHOD BLD: ABNORMAL
EOSINOPHIL # BLD: 0.2 K/UL (ref 0–0.4)
EOSINOPHIL NFR BLD: 3 % (ref 0–5)
ERYTHROCYTE [DISTWIDTH] IN BLOOD BY AUTOMATED COUNT: 19.5 % (ref 11.6–14.5)
GLOBULIN SER CALC-MCNC: 3.8 G/DL (ref 2–4)
GLUCOSE SERPL-MCNC: 99 MG/DL (ref 74–99)
HCT VFR BLD AUTO: 15.8 % (ref 36–48)
HGB BLD-MCNC: 5.3 G/DL (ref 13–16)
IMM GRANULOCYTES # BLD AUTO: 0 K/UL (ref 0–0.04)
IMM GRANULOCYTES NFR BLD AUTO: 1 % (ref 0–0.5)
LYMPHOCYTES # BLD: 1.6 K/UL (ref 0.9–3.6)
LYMPHOCYTES NFR BLD: 26 % (ref 21–52)
MCH RBC QN AUTO: 29.3 PG (ref 24–34)
MCHC RBC AUTO-ENTMCNC: 33.5 G/DL (ref 31–37)
MCV RBC AUTO: 87.3 FL (ref 78–100)
MONOCYTES # BLD: 0.7 K/UL (ref 0.05–1.2)
MONOCYTES NFR BLD: 11 % (ref 3–10)
NEUTS SEG # BLD: 3.6 K/UL (ref 1.8–8)
NEUTS SEG NFR BLD: 58 % (ref 40–73)
NRBC # BLD: 0.09 K/UL (ref 0–0.01)
NRBC BLD-RTO: 1.4 PER 100 WBC
PLATELET # BLD AUTO: 224 K/UL (ref 135–420)
PMV BLD AUTO: 10.2 FL (ref 9.2–11.8)
POTASSIUM SERPL-SCNC: 4.3 MMOL/L (ref 3.5–5.5)
PROT SERPL-MCNC: 6.8 G/DL (ref 6.4–8.2)
RBC # BLD AUTO: 1.81 M/UL (ref 4.35–5.65)
RETICS/RBC NFR AUTO: 3.7 % (ref 0.5–2.5)
SODIUM SERPL-SCNC: 142 MMOL/L (ref 136–145)
WBC # BLD AUTO: 6.3 K/UL (ref 4.6–13.2)

## 2024-02-29 PROCEDURE — 86920 COMPATIBILITY TEST SPIN: CPT

## 2024-02-29 PROCEDURE — 86870 RBC ANTIBODY IDENTIFICATION: CPT

## 2024-02-29 PROCEDURE — 80053 COMPREHEN METABOLIC PANEL: CPT

## 2024-02-29 PROCEDURE — 85025 COMPLETE CBC W/AUTO DIFF WBC: CPT

## 2024-02-29 PROCEDURE — 99285 EMERGENCY DEPT VISIT HI MDM: CPT

## 2024-02-29 PROCEDURE — 6360000002 HC RX W HCPCS

## 2024-02-29 PROCEDURE — 6360000002 HC RX W HCPCS: Performed by: STUDENT IN AN ORGANIZED HEALTH CARE EDUCATION/TRAINING PROGRAM

## 2024-02-29 PROCEDURE — 93005 ELECTROCARDIOGRAM TRACING: CPT

## 2024-02-29 PROCEDURE — 86900 BLOOD TYPING SEROLOGIC ABO: CPT

## 2024-02-29 PROCEDURE — 96361 HYDRATE IV INFUSION ADD-ON: CPT

## 2024-02-29 PROCEDURE — 96374 THER/PROPH/DIAG INJ IV PUSH: CPT

## 2024-02-29 PROCEDURE — 6370000000 HC RX 637 (ALT 250 FOR IP): Performed by: STUDENT IN AN ORGANIZED HEALTH CARE EDUCATION/TRAINING PROGRAM

## 2024-02-29 PROCEDURE — 96375 TX/PRO/DX INJ NEW DRUG ADDON: CPT

## 2024-02-29 PROCEDURE — 71045 X-RAY EXAM CHEST 1 VIEW: CPT

## 2024-02-29 PROCEDURE — 2580000003 HC RX 258

## 2024-02-29 PROCEDURE — 86850 RBC ANTIBODY SCREEN: CPT

## 2024-02-29 PROCEDURE — 85045 AUTOMATED RETICULOCYTE COUNT: CPT

## 2024-02-29 PROCEDURE — 86901 BLOOD TYPING SEROLOGIC RH(D): CPT

## 2024-02-29 RX ORDER — SODIUM CHLORIDE 450 MG/100ML
INJECTION, SOLUTION INTRAVENOUS CONTINUOUS
Status: DISCONTINUED | OUTPATIENT
Start: 2024-02-29 | End: 2024-03-02

## 2024-02-29 RX ORDER — HYDROMORPHONE HYDROCHLORIDE 1 MG/ML
1 INJECTION, SOLUTION INTRAMUSCULAR; INTRAVENOUS; SUBCUTANEOUS ONCE
Status: COMPLETED | OUTPATIENT
Start: 2024-02-29 | End: 2024-02-29

## 2024-02-29 RX ORDER — KETOROLAC TROMETHAMINE 15 MG/ML
15 INJECTION, SOLUTION INTRAMUSCULAR; INTRAVENOUS ONCE
Status: COMPLETED | OUTPATIENT
Start: 2024-02-29 | End: 2024-02-29

## 2024-02-29 RX ORDER — ACETAMINOPHEN 500 MG
1000 TABLET ORAL
Status: COMPLETED | OUTPATIENT
Start: 2024-02-29 | End: 2024-02-29

## 2024-02-29 RX ORDER — SODIUM CHLORIDE 9 MG/ML
INJECTION, SOLUTION INTRAVENOUS PRN
Status: DISCONTINUED | OUTPATIENT
Start: 2024-02-29 | End: 2024-03-01 | Stop reason: SDUPTHER

## 2024-02-29 RX ADMIN — HYDROMORPHONE HYDROCHLORIDE 1 MG: 1 INJECTION, SOLUTION INTRAMUSCULAR; INTRAVENOUS; SUBCUTANEOUS at 20:57

## 2024-02-29 RX ADMIN — SODIUM CHLORIDE: 4.5 INJECTION, SOLUTION INTRAVENOUS at 20:59

## 2024-02-29 RX ADMIN — ACETAMINOPHEN 1000 MG: 500 TABLET ORAL at 21:37

## 2024-02-29 RX ADMIN — KETOROLAC TROMETHAMINE 15 MG: 15 INJECTION, SOLUTION INTRAMUSCULAR; INTRAVENOUS at 21:37

## 2024-02-29 ASSESSMENT — ENCOUNTER SYMPTOMS
SHORTNESS OF BREATH: 0
COUGH: 1
NAUSEA: 0
DIARRHEA: 0
BACK PAIN: 1
VOMITING: 0

## 2024-02-29 ASSESSMENT — PAIN DESCRIPTION - ORIENTATION
ORIENTATION: RIGHT
ORIENTATION: RIGHT;LEFT

## 2024-02-29 ASSESSMENT — PAIN SCALES - GENERAL
PAINLEVEL_OUTOF10: 8
PAINLEVEL_OUTOF10: 10
PAINLEVEL_OUTOF10: 10

## 2024-02-29 ASSESSMENT — PAIN DESCRIPTION - LOCATION
LOCATION: BACK;HIP;LEG
LOCATION: FLANK

## 2024-02-29 ASSESSMENT — PAIN - FUNCTIONAL ASSESSMENT: PAIN_FUNCTIONAL_ASSESSMENT: 0-10

## 2024-02-29 NOTE — ED TRIAGE NOTES
Pt states he developed bilateral hip pain, low back pain, BLE pain, and bilateral hand pain about 1 week ago, which he attributes to a typical sickle cell crisis. He takes dilaudid for pain relief, but it has not provided relief recently. He states he called his hema-oncologist, who referred him to obtain blood work. He states his hemoglobin was 6.4 and decided to visit this ED. Last blood transfusion was 2/11.

## 2024-02-29 NOTE — ED PROVIDER NOTES
EMERGENCY DEPARTMENT HISTORY AND PHYSICAL EXAM    5:25 PM      Date: 2/29/2024  Patient Name: Kayden Samuel    History of Presenting Illness     Chief Complaint   Patient presents with    Sickle Cell Pain Crisis       History From: Patient  HPI    63-year-old male with past medical history sickle cell disease presenting with 6 days of worsening pain in his bilateral legs, hips, lower back, and bilateral hands.  States this is consistent with his sickle cell crises.  Requested a CBC from his hematologist yesterday and was told his hemoglobin was 6.4.  States he was given a day for transfusion next week but feels that his hemoglobin might drop to low before this appointment.  Notes he was hospitalized at [facility] in early February and required multiple units of blood.    Nursing Notes were all reviewed and agreed with or any disagreements were addressed in the HPI.    PCP: Javid Vegas MD    No current facility-administered medications for this encounter.     Current Outpatient Medications   Medication Sig Dispense Refill    furosemide (LASIX) 20 MG tablet Take 1 tablet by mouth three times a week 12 tablet 0    ammonium lactate (LAC-HYDRIN) 12 % lotion Apply topically as needed. 567 g 0    Elastic Bandages & Supports (MEDICAL COMPRESSION STOCKINGS) MISC 1 each by Does not apply route daily as needed (Remove at night) Knee-high, moderate compression 1 each 0    sacubitril-valsartan (ENTRESTO) 24-26 MG per tablet Take 1 tablet by mouth 2 times daily 60 tablet 5    folic acid (FOLVITE) 1 MG tablet Take 1 tablet by mouth daily 30 tablet 0    pantoprazole (PROTONIX) 40 MG tablet Take 1 tablet by mouth 2 times daily (before meals) 60 tablet 0    HYDROmorphone (DILAUDID) 4 MG tablet Take by mouth as needed.       Facility-Administered Medications Ordered in Other Encounters   Medication Dose Route Frequency Provider Last Rate Last Admin    acetaminophen (TYLENOL) tablet 650 mg  650 mg Oral Once Javid Vegas,  summary of external notes): Nursing Notes    Procedures    Is this patient to be included in the SEP-1 core measure? No Exclusion criteria - the patient is NOT to be included for SEP-1 Core Measure due to: Infection is not suspected      Diagnosis     Clinical Impression: No diagnosis found.    Disposition: Home following transfusion    No follow-up provider specified.     Disclaimer: Sections of this note are dictated using utilizing voice recognition software.  Minor typographical errors may be present. If questions arise, please do not hesitate to contact me or call our department.

## 2024-03-01 VITALS
SYSTOLIC BLOOD PRESSURE: 144 MMHG | HEIGHT: 65 IN | RESPIRATION RATE: 18 BRPM | WEIGHT: 123 LBS | HEART RATE: 85 BPM | TEMPERATURE: 97.9 F | DIASTOLIC BLOOD PRESSURE: 79 MMHG | OXYGEN SATURATION: 98 % | BODY MASS INDEX: 20.49 KG/M2

## 2024-03-01 LAB
BASOPHILS # BLD: 0.1 K/UL (ref 0–0.1)
BASOPHILS NFR BLD: 1 % (ref 0–2)
DIFFERENTIAL METHOD BLD: ABNORMAL
EKG ATRIAL RATE: 59 BPM
EKG DIAGNOSIS: NORMAL
EKG P AXIS: 65 DEGREES
EKG P-R INTERVAL: 156 MS
EKG Q-T INTERVAL: 458 MS
EKG QRS DURATION: 96 MS
EKG QTC CALCULATION (BAZETT): 453 MS
EKG R AXIS: 20 DEGREES
EKG T AXIS: 29 DEGREES
EKG VENTRICULAR RATE: 59 BPM
EOSINOPHIL # BLD: 0.5 K/UL (ref 0–0.4)
EOSINOPHIL NFR BLD: 8 % (ref 0–5)
ERYTHROCYTE [DISTWIDTH] IN BLOOD BY AUTOMATED COUNT: 18.8 % (ref 11.6–14.5)
HCT VFR BLD AUTO: 18.7 % (ref 36–48)
HGB BLD-MCNC: 6.4 G/DL (ref 13–16)
HISTORY CHECK: NORMAL
HISTORY CHECK: NORMAL
IMM GRANULOCYTES # BLD AUTO: 0 K/UL (ref 0–0.04)
IMM GRANULOCYTES NFR BLD AUTO: 1 % (ref 0–0.5)
LYMPHOCYTES # BLD: 1.2 K/UL (ref 0.9–3.6)
LYMPHOCYTES NFR BLD: 22 % (ref 21–52)
MCH RBC QN AUTO: 30.5 PG (ref 24–34)
MCHC RBC AUTO-ENTMCNC: 34.2 G/DL (ref 31–37)
MCV RBC AUTO: 89 FL (ref 78–100)
MONOCYTES # BLD: 0.5 K/UL (ref 0.05–1.2)
MONOCYTES NFR BLD: 9 % (ref 3–10)
NEUTS SEG # BLD: 3.3 K/UL (ref 1.8–8)
NEUTS SEG NFR BLD: 59 % (ref 40–73)
NRBC # BLD: 0.1 K/UL (ref 0–0.01)
NRBC BLD-RTO: 1.8 PER 100 WBC
PLATELET # BLD AUTO: 204 K/UL (ref 135–420)
PMV BLD AUTO: 10.4 FL (ref 9.2–11.8)
RBC # BLD AUTO: 2.1 M/UL (ref 4.35–5.65)
WBC # BLD AUTO: 5.6 K/UL (ref 4.6–13.2)

## 2024-03-01 PROCEDURE — 86921 COMPATIBILITY TEST INCUBATE: CPT

## 2024-03-01 PROCEDURE — 85660 RBC SICKLE CELL TEST: CPT

## 2024-03-01 PROCEDURE — 85014 HEMATOCRIT: CPT

## 2024-03-01 PROCEDURE — 86902 BLOOD TYPE ANTIGEN DONOR EA: CPT

## 2024-03-01 PROCEDURE — 6370000000 HC RX 637 (ALT 250 FOR IP): Performed by: STUDENT IN AN ORGANIZED HEALTH CARE EDUCATION/TRAINING PROGRAM

## 2024-03-01 PROCEDURE — 85025 COMPLETE CBC W/AUTO DIFF WBC: CPT

## 2024-03-01 PROCEDURE — 6370000000 HC RX 637 (ALT 250 FOR IP): Performed by: EMERGENCY MEDICINE

## 2024-03-01 PROCEDURE — 85018 HEMOGLOBIN: CPT

## 2024-03-01 PROCEDURE — 93010 ELECTROCARDIOGRAM REPORT: CPT | Performed by: INTERNAL MEDICINE

## 2024-03-01 PROCEDURE — 6360000002 HC RX W HCPCS: Performed by: EMERGENCY MEDICINE

## 2024-03-01 PROCEDURE — 2580000003 HC RX 258

## 2024-03-01 PROCEDURE — 96376 TX/PRO/DX INJ SAME DRUG ADON: CPT

## 2024-03-01 PROCEDURE — P9040 RBC LEUKOREDUCED IRRADIATED: HCPCS

## 2024-03-01 PROCEDURE — 36430 TRANSFUSION BLD/BLD COMPNT: CPT

## 2024-03-01 PROCEDURE — 86922 COMPATIBILITY TEST ANTIGLOB: CPT

## 2024-03-01 RX ORDER — AZITHROMYCIN 500 MG/1
500 TABLET, FILM COATED ORAL DAILY
Qty: 2 TABLET | Refills: 0 | Status: SHIPPED | OUTPATIENT
Start: 2024-03-02 | End: 2024-03-04

## 2024-03-01 RX ORDER — DIPHENHYDRAMINE HCL 25 MG
25 CAPSULE ORAL ONCE
OUTPATIENT
Start: 2024-03-08

## 2024-03-01 RX ORDER — ACETAMINOPHEN 325 MG/1
650 TABLET ORAL ONCE
OUTPATIENT
Start: 2024-03-08

## 2024-03-01 RX ORDER — ACETAMINOPHEN 500 MG
1000 TABLET ORAL
Status: COMPLETED | OUTPATIENT
Start: 2024-03-01 | End: 2024-03-01

## 2024-03-01 RX ORDER — SODIUM CHLORIDE 9 MG/ML
INJECTION, SOLUTION INTRAVENOUS PRN
Status: DISCONTINUED | OUTPATIENT
Start: 2024-03-01 | End: 2024-03-02 | Stop reason: HOSPADM

## 2024-03-01 RX ORDER — CEFDINIR 300 MG/1
300 CAPSULE ORAL EVERY 12 HOURS SCHEDULED
Status: DISCONTINUED | OUTPATIENT
Start: 2024-03-01 | End: 2024-03-02 | Stop reason: HOSPADM

## 2024-03-01 RX ORDER — CEFDINIR 300 MG/1
300 CAPSULE ORAL 2 TIMES DAILY
Qty: 14 CAPSULE | Refills: 0 | Status: SHIPPED | OUTPATIENT
Start: 2024-03-01 | End: 2024-03-08

## 2024-03-01 RX ORDER — AZITHROMYCIN 250 MG/1
500 TABLET, FILM COATED ORAL DAILY
Status: DISCONTINUED | OUTPATIENT
Start: 2024-03-01 | End: 2024-03-02 | Stop reason: HOSPADM

## 2024-03-01 RX ORDER — HYDROMORPHONE HYDROCHLORIDE 1 MG/ML
1 INJECTION, SOLUTION INTRAMUSCULAR; INTRAVENOUS; SUBCUTANEOUS ONCE
Status: COMPLETED | OUTPATIENT
Start: 2024-03-01 | End: 2024-03-01

## 2024-03-01 RX ORDER — DIPHENHYDRAMINE HCL 25 MG
25 CAPSULE ORAL
Status: COMPLETED | OUTPATIENT
Start: 2024-03-01 | End: 2024-03-01

## 2024-03-01 RX ORDER — HYDROMORPHONE HYDROCHLORIDE 1 MG/ML
1 INJECTION, SOLUTION INTRAMUSCULAR; INTRAVENOUS; SUBCUTANEOUS
Status: DISCONTINUED | OUTPATIENT
Start: 2024-03-01 | End: 2024-03-02

## 2024-03-01 RX ADMIN — CEFDINIR 300 MG: 300 CAPSULE ORAL at 22:01

## 2024-03-01 RX ADMIN — HYDROMORPHONE HYDROCHLORIDE 1 MG: 1 INJECTION, SOLUTION INTRAMUSCULAR; INTRAVENOUS; SUBCUTANEOUS at 06:21

## 2024-03-01 RX ADMIN — HYDROMORPHONE HYDROCHLORIDE 1 MG: 1 INJECTION, SOLUTION INTRAMUSCULAR; INTRAVENOUS; SUBCUTANEOUS at 12:46

## 2024-03-01 RX ADMIN — CEFDINIR 300 MG: 300 CAPSULE ORAL at 08:21

## 2024-03-01 RX ADMIN — DIPHENHYDRAMINE HYDROCHLORIDE 25 MG: 25 CAPSULE ORAL at 08:21

## 2024-03-01 RX ADMIN — SODIUM CHLORIDE: 4.5 INJECTION, SOLUTION INTRAVENOUS at 03:31

## 2024-03-01 RX ADMIN — HYDROMORPHONE HYDROCHLORIDE 1 MG: 1 INJECTION, SOLUTION INTRAMUSCULAR; INTRAVENOUS; SUBCUTANEOUS at 22:01

## 2024-03-01 RX ADMIN — SODIUM CHLORIDE: 4.5 INJECTION, SOLUTION INTRAVENOUS at 11:30

## 2024-03-01 RX ADMIN — HYDROMORPHONE HYDROCHLORIDE 1 MG: 1 INJECTION, SOLUTION INTRAMUSCULAR; INTRAVENOUS; SUBCUTANEOUS at 03:29

## 2024-03-01 RX ADMIN — HYDROMORPHONE HYDROCHLORIDE 1 MG: 1 INJECTION, SOLUTION INTRAMUSCULAR; INTRAVENOUS; SUBCUTANEOUS at 15:30

## 2024-03-01 RX ADMIN — HYDROMORPHONE HYDROCHLORIDE 1 MG: 1 INJECTION, SOLUTION INTRAMUSCULAR; INTRAVENOUS; SUBCUTANEOUS at 09:17

## 2024-03-01 RX ADMIN — AZITHROMYCIN DIHYDRATE 500 MG: 250 TABLET ORAL at 08:22

## 2024-03-01 RX ADMIN — HYDROMORPHONE HYDROCHLORIDE 1 MG: 1 INJECTION, SOLUTION INTRAMUSCULAR; INTRAVENOUS; SUBCUTANEOUS at 18:34

## 2024-03-01 RX ADMIN — ACETAMINOPHEN 1000 MG: 500 TABLET ORAL at 08:21

## 2024-03-01 RX ADMIN — HYDROMORPHONE HYDROCHLORIDE 1 MG: 1 INJECTION, SOLUTION INTRAMUSCULAR; INTRAVENOUS; SUBCUTANEOUS at 00:22

## 2024-03-01 ASSESSMENT — PAIN DESCRIPTION - ORIENTATION
ORIENTATION: RIGHT;LEFT
ORIENTATION: LEFT;RIGHT
ORIENTATION: RIGHT;LEFT
ORIENTATION: RIGHT;LEFT

## 2024-03-01 ASSESSMENT — PAIN SCALES - GENERAL
PAINLEVEL_OUTOF10: 10
PAINLEVEL_OUTOF10: 9
PAINLEVEL_OUTOF10: 6
PAINLEVEL_OUTOF10: 10
PAINLEVEL_OUTOF10: 8
PAINLEVEL_OUTOF10: 9

## 2024-03-01 ASSESSMENT — PAIN DESCRIPTION - LOCATION
LOCATION: BACK;HIP;LEG
LOCATION: BACK;HIP;LEG
LOCATION: ABDOMEN;ARM;BACK
LOCATION: BACK;HIP;LEG
LOCATION: HIP;LEG;BACK
LOCATION: GENERALIZED

## 2024-03-01 ASSESSMENT — PAIN DESCRIPTION - FREQUENCY: FREQUENCY: CONTINUOUS

## 2024-03-01 ASSESSMENT — LIFESTYLE VARIABLES
HOW OFTEN DO YOU HAVE A DRINK CONTAINING ALCOHOL: NEVER
HOW MANY STANDARD DRINKS CONTAINING ALCOHOL DO YOU HAVE ON A TYPICAL DAY: PATIENT DOES NOT DRINK

## 2024-03-01 ASSESSMENT — PAIN DESCRIPTION - PAIN TYPE: TYPE: CHRONIC PAIN

## 2024-03-01 NOTE — PROGRESS NOTES
5:53 AM : Pt care transferred to me from Dr. Medina  ,ED provider. History of patient complaint(s), available diagnostic reports and current treatment plan has been discussed thoroughly.   Bedside rounding on patient occured : No .  Intended disposition of patient : Home  Pending diagnostics reports and/or labs (please list):   ED Course as of 03/01/24 0616   Thu Feb 29, 2024   2203 Sickle cell crisis, chest pain, back, leg pain, Hgb 5.3. Plan to transfuse 2 units pRBC but has several antibodies, pain not severe enough to admit per the pt [JM]   Fri Mar 01, 2024   0601 SIGNED OUT: This patient was signed out from Dr. Gallo. Patient's anticipated disposition, care goals, and potential complications were discussed. Pending transfusion, reeval, likely discharge.    [CM]      ED Course User Index  [CM] Cl Golden MD  [JM] Marv Gallo DO     Check with the blood bank.  Received a unit of blood.  Will run antibody testing.  Patient has received 2 further doses of IV Dilaudid 1 mg for recurrent sickle cell pain. Will give another dose at pt's request. Also noted the RLL opacity, atelectasis vs early PNA? Pt is complaining of cough with mucous production. No fevers. No signs of sepsis based on VS and WBC. Will cover for CAP with Omnicef and Azithromycin given comorbidities. Patient signed out to Dr. Golden at shift change.    Marv Gallo DO  Emergency Physician  [facility]

## 2024-03-01 NOTE — ED NOTES
PATIENT MEDICATED AS PER MAR, PATIENT INFORMED OF EFFECTS OF MEDICATION. ALLERGIES VERIFIED AT THIS TIME BY PATIENT.

## 2024-03-01 NOTE — ED NOTES
Pt medicated per MAR, allergies verified. 2 pink tubes sent to lab with blood band sticker for type and screen.

## 2024-03-01 NOTE — ED NOTES
Pt medicated per MAR, allergies verified. Call bell at the bedside. Pt awaiting blood transfusion.

## 2024-03-01 NOTE — ED NOTES
PATIENT COMMENCED ON SPECIAL PREPARED, BLOOD TRANSFUSION, PATIENT INFORMED OF SIDE EFFECTS OF MEDICATION TO MONITOR FOR. PATIENT VERBALIZES THAT HE WOULD RECEIVED TYLENOL AND BENADRYL FOR TRANSFUSION PROVIDER MADE AWARE, TRANSFUSION CHECKED BY TWO RN AT THIS TIME VITALS DONE PRIOR

## 2024-03-01 NOTE — ED NOTES
PATIENT COMPLETED BLOOD TRANSFUSION, NO REACTION REPORTED BY PATIENT, NIL OBSERVED. PATIENT HAD REPT CBC DONE. PATIENT RESTING IN BED AT THIS TIME.

## 2024-03-01 NOTE — ED NOTES
PATIENT MEDICATED AS PER MAR, PATIENT INFORMED OF EFFECTS OF MEDICATION. ALLERGIES VERIFIED AT THIS TIME BY PATIENT.      PATIENT AWAITS UNIT OF BLOOD

## 2024-03-01 NOTE — ED NOTES
Pt medicated per MAR, allergies verified for pain. Pt still awaiting Blood transfusion. Call bell at the bedside. No needs at this time. Urinal emptied.

## 2024-03-01 NOTE — ED NOTES
RECEIVE PATIENT IN BED,COMPLAINT OF GENERALIZED PAIN DUE TO SICKLE CELL CRISIS. PATIENT IN NIL RESPIRATORY DISTRESS. PATIENT ALERT AND ORIENTED. PATIENT BREATHES FREELY ON ROOM AIR, CHEST EXPANSION EQUAL AND ADEQUATE. PORT-O-CATH NOTED TO RIGHT UPPER CHEST. NAD TO ABDOMEN. MOVEMENT AND SENSATION PRESENT TO ALL EXTREMITIES.     PATIENT FOR BLOOD TRANSFUSION

## 2024-03-02 LAB
ABO + RH BLD: NORMAL
ANTIGENS PRESENT RBC DONR: NORMAL
ANTIGENS PRESENT RBC DONR: NORMAL
BLD PROD TYP BPU: NORMAL
BLD PROD TYP BPU: NORMAL
BLOOD BANK CMNT PATIENT-IMP: NORMAL
BLOOD BANK DISPENSE STATUS: NORMAL
BLOOD BANK DISPENSE STATUS: NORMAL
BLOOD GROUP ANTIBODIES SERPL: NORMAL
BLOOD GROUP ANTIBODIES SERPL: NORMAL
BPU ID: NORMAL
BPU ID: NORMAL
CALLED TO: NORMAL
CALLED TO:: NORMAL
CROSSMATCH RESULT: NORMAL
CROSSMATCH RESULT: NORMAL
HCT VFR BLD AUTO: 23.6 % (ref 36–48)
HGB BLD-MCNC: 7.7 G/DL (ref 13–16)
SPECIMEN EXP DATE BLD: NORMAL
UNIT DIVISION: 0
UNIT DIVISION: 0

## 2024-03-02 PROCEDURE — 6370000000 HC RX 637 (ALT 250 FOR IP): Performed by: STUDENT IN AN ORGANIZED HEALTH CARE EDUCATION/TRAINING PROGRAM

## 2024-03-02 PROCEDURE — 96376 TX/PRO/DX INJ SAME DRUG ADON: CPT

## 2024-03-02 PROCEDURE — 96361 HYDRATE IV INFUSION ADD-ON: CPT

## 2024-03-02 PROCEDURE — 6360000002 HC RX W HCPCS: Performed by: STUDENT IN AN ORGANIZED HEALTH CARE EDUCATION/TRAINING PROGRAM

## 2024-03-02 RX ORDER — HYDROMORPHONE HYDROCHLORIDE 1 MG/ML
1 INJECTION, SOLUTION INTRAMUSCULAR; INTRAVENOUS; SUBCUTANEOUS
Status: COMPLETED | OUTPATIENT
Start: 2024-03-02 | End: 2024-03-02

## 2024-03-02 RX ORDER — HEPARIN SODIUM (PORCINE) LOCK FLUSH IV SOLN 100 UNIT/ML 100 UNIT/ML
500 SOLUTION INTRAVENOUS PRN
Status: DISCONTINUED | OUTPATIENT
Start: 2024-03-02 | End: 2024-03-02 | Stop reason: HOSPADM

## 2024-03-02 RX ORDER — ACETAMINOPHEN 500 MG
1000 TABLET ORAL
Status: COMPLETED | OUTPATIENT
Start: 2024-03-02 | End: 2024-03-02

## 2024-03-02 RX ORDER — KETOROLAC TROMETHAMINE 15 MG/ML
15 INJECTION, SOLUTION INTRAMUSCULAR; INTRAVENOUS
Status: COMPLETED | OUTPATIENT
Start: 2024-03-02 | End: 2024-03-02

## 2024-03-02 RX ADMIN — KETOROLAC TROMETHAMINE 15 MG: 15 INJECTION, SOLUTION INTRAMUSCULAR; INTRAVENOUS at 00:58

## 2024-03-02 RX ADMIN — HEPARIN SODIUM (PORCINE) LOCK FLUSH IV SOLN 100 UNIT/ML 500 UNITS: 100 SOLUTION at 01:20

## 2024-03-02 RX ADMIN — ACETAMINOPHEN 1000 MG: 500 TABLET ORAL at 00:57

## 2024-03-02 RX ADMIN — HYDROMORPHONE HYDROCHLORIDE 1 MG: 1 INJECTION, SOLUTION INTRAMUSCULAR; INTRAVENOUS; SUBCUTANEOUS at 00:58

## 2024-03-02 NOTE — ED PROVIDER NOTES
(TORADOL) injection 15 mg (15 mg IntraVENous Given 2/29/24 2137)   HYDROmorphone HCl PF (DILAUDID) injection 1 mg (1 mg IntraVENous Given 3/1/24 0022)   acetaminophen (TYLENOL) tablet 1,000 mg (1,000 mg Oral Given 3/1/24 0821)   diphenhydrAMINE (BENADRYL) capsule 25 mg (25 mg Oral Given 3/1/24 0821)       CONSULTS: (Who and What was discussed)  None      Chronic Conditions: sickle cell disease    Social Determinants affecting Dx or Tx: None    Records Reviewed (source and summary of external notes): Nursing Notes, Old Medical Records, Previous Radiology Studies, and Previous Laboratory Studies    CC/HPI Summary, DDx, ED Course, and Reassessment: 62yo male patient with sickle cell disease presenting with b/l LE and lbp typical of his vaso-occlusive pain crisis. Patient noted to have a hgb of 5.3 with appropriately elevated retic count. Started on a pain regimen and ordered for 2u pRBC. CXR showing Right basilar opacity could be atelectasis or early acute chest. Similar scarring at the left lung base. That appears similar on past CXRs. After 2u patient had appropriate increase in hgb to 7.7. Patient reports that his pain is manageable. I offered him admission for ongoing pain control with IV medication and for Obs of anemia but patient is adamant that he can manage pain at home with home meds and is requesting discharge. All questions answered and patient understands when to return to the ED.    ED Course as of 03/02/24 0036   Thu Feb 29, 2024   2203 Sickle cell crisis, chest pain, back, leg pain, Hgb 5.3. Plan to transfuse 2 units pRBC but has several antibodies, pain not severe enough to admit per the pt [JM]   Fri Mar 01, 2024   0601 SIGNED OUT: This patient was signed out from Dr. Gallo. Patient's anticipated disposition, care goals, and potential complications were discussed. Pending transfusion, reeval, likely discharge.    [CM]   1100 Informed by nursing staff that only 1 unit was prepped and crossmatched.   CBC will be done to confirm hemoglobin.,  Additional unit we ordered if necessary. [CM]   1203 Hemoglobin Quant(!): 6.4 [CM]   1215 Additional unit ordered for crossmatch [CM]   8711 SIGNED OUT: This patient was signed out to Dr. Riggins. Patient's anticipated disposition, care goals, and potential complications were discussed. Pending 2nd transfusion, likely discharge.    [CM]      ED Course User Index  [CM] Cl Golden MD  [JM] Marv Gallo DO       Disposition Considerations (Tests not done, Shared Decision Making, Pt Expectation of Test or Tx.): none     FINAL IMPRESSION     1. Sickle cell pain crisis (HCC)    2. Opacity of lung on imaging study    3. Anemia of chronic disease    4. Acute kidney injury superimposed on CKD (HCC)          DISPOSITION/PLAN   DISPOSITION Decision To Discharge 03/02/2024 12:32:51 AM      Discharge Note: The patient is stable for discharge home. The signs, symptoms, diagnosis, and discharge instructions have been discussed, understanding conveyed, and agreed upon. The patient is to follow up as recommended or return to ER should their symptoms worsen.      PATIENT REFERRED TO:  @FUD@        DISCHARGE MEDICATIONS:     Medication List        START taking these medications      azithromycin 500 MG tablet  Commonly known as: ZITHROMAX  Take 1 tablet by mouth daily for 2 days     cefdinir 300 MG capsule  Commonly known as: OMNICEF  Take 1 capsule by mouth 2 times daily for 7 days            CONTINUE taking these medications      Medical Compression Stockings Misc  1 each by Does not apply route daily as needed (Remove at night) Knee-high, moderate compression            ASK your doctor about these medications      ammonium lactate 12 % lotion  Commonly known as: LAC-HYDRIN  Apply topically as needed.     folic acid 1 MG tablet  Commonly known as: FOLVITE  Take 1 tablet by mouth daily     furosemide 20 MG tablet  Commonly known as: LASIX  Take 1 tablet by mouth three times a

## 2024-03-02 NOTE — ED NOTES
PATIENT COMMENCED ON UNIT PACKED RED BLOOD CELL, PATIENT INFORMED OF SIDE EFFECTS, VITAL SIGN ONE PRIOR.

## 2024-03-02 NOTE — ED NOTES
PATIENT LEFT LYING IN BED, BREATHING EVIDENT, NO COMPLAINT VOICED. BLOOD TRANSFUSION PROGRESSING, NIL REACTION NOTED HAND OVER REPORT GIVEN TO ZEINAB MOREAU, CARE CONTINUES. PATIENT FOR REVIEW AFTER TRANSFUSION

## 2024-03-02 NOTE — ED NOTES
Discharge instructions reviewed with patient. Patient verbalized understanding. Port de-accessed. Patient able to safely transfer to wheelchair and POV in no acute distress.

## 2024-03-05 RX ORDER — ACETAMINOPHEN 325 MG/1
650 TABLET ORAL ONCE
Status: COMPLETED | OUTPATIENT
Start: 2024-03-08 | End: 2024-03-08

## 2024-03-05 RX ORDER — DIPHENHYDRAMINE HCL 25 MG
25 CAPSULE ORAL ONCE
Status: COMPLETED | OUTPATIENT
Start: 2024-03-08 | End: 2024-03-08

## 2024-03-06 ENCOUNTER — HOSPITAL ENCOUNTER (OUTPATIENT)
Facility: HOSPITAL | Age: 64
Setting detail: INFUSION SERIES
Discharge: HOME | End: 2024-03-09
Payer: COMMERCIAL

## 2024-03-06 VITALS
TEMPERATURE: 97.7 F | SYSTOLIC BLOOD PRESSURE: 153 MMHG | RESPIRATION RATE: 18 BRPM | HEART RATE: 81 BPM | DIASTOLIC BLOOD PRESSURE: 70 MMHG

## 2024-03-06 PROCEDURE — 86902 BLOOD TYPE ANTIGEN DONOR EA: CPT

## 2024-03-06 PROCEDURE — 85660 RBC SICKLE CELL TEST: CPT

## 2024-03-06 PROCEDURE — 36591 DRAW BLOOD OFF VENOUS DEVICE: CPT

## 2024-03-06 PROCEDURE — 86870 RBC ANTIBODY IDENTIFICATION: CPT

## 2024-03-06 PROCEDURE — 6360000002 HC RX W HCPCS: Performed by: INTERNAL MEDICINE

## 2024-03-06 PROCEDURE — 2580000003 HC RX 258: Performed by: INTERNAL MEDICINE

## 2024-03-06 PROCEDURE — 86920 COMPATIBILITY TEST SPIN: CPT

## 2024-03-06 PROCEDURE — 86900 BLOOD TYPING SEROLOGIC ABO: CPT

## 2024-03-06 PROCEDURE — 86850 RBC ANTIBODY SCREEN: CPT

## 2024-03-06 PROCEDURE — 86901 BLOOD TYPING SEROLOGIC RH(D): CPT

## 2024-03-06 PROCEDURE — 86922 COMPATIBILITY TEST ANTIGLOB: CPT

## 2024-03-06 PROCEDURE — 86921 COMPATIBILITY TEST INCUBATE: CPT

## 2024-03-06 RX ORDER — HEPARIN SODIUM (PORCINE) LOCK FLUSH IV SOLN 100 UNIT/ML 100 UNIT/ML
500 SOLUTION INTRAVENOUS PRN
Status: DISCONTINUED | OUTPATIENT
Start: 2024-03-06 | End: 2024-03-10 | Stop reason: HOSPADM

## 2024-03-06 RX ORDER — SODIUM CHLORIDE 0.9 % (FLUSH) 0.9 %
5-40 SYRINGE (ML) INJECTION PRN
Status: DISCONTINUED | OUTPATIENT
Start: 2024-03-06 | End: 2024-03-10 | Stop reason: HOSPADM

## 2024-03-06 RX ADMIN — SODIUM CHLORIDE, PRESERVATIVE FREE 10 ML: 5 INJECTION INTRAVENOUS at 13:47

## 2024-03-06 RX ADMIN — HEPARIN 500 UNITS: 100 SYRINGE at 13:47

## 2024-03-06 ASSESSMENT — PAIN SCALES - GENERAL: PAINLEVEL_OUTOF10: 10

## 2024-03-06 ASSESSMENT — PAIN DESCRIPTION - LOCATION: LOCATION: GENERALIZED

## 2024-03-06 NOTE — PROGRESS NOTES
[facility] Progress Note    Date: 2024    Name: Kayden Samuel    MRN: [id number]         : 1960    Mr. Samuel arrived in the [facility] today at 1330, in stable condition, here for his TYPE & SCREEN FOR 2 UNITs PRBC. He was assessed and education was provided.     Mr. Samuel's vitals were reviewed.  Vitals:    24 1335   BP: (!) 153/70   Pulse: 81   Resp: 18   Temp: 97.7 °F (36.5 °C)     His RIGHT CHEST SINGLE LUMEN PORT was accessed without incident, and blood for the ordered Type & Screen (3 Pink Top Tubes) was drawn. Then, the port was flushed well per policy with NS & Heparin, and then the breaux needle was removed and gauze//bandaid was applied.     The blood was sent out by  to the [facility] blood bank for processing.     The required Blood Bank armband was securely applied to his LEFT WRIST, and he was reminded to keep the armband completely dry and intact on his arm until after the completion of the blood transfusion on friday, and he verbalized understanding.     Mr. Samuel tolerated well and voiced no complaints.     [facility] in [facility] condition at 1350.     He is to return on 3/8/24 at 0800 for his next appointment to receive 2 Units PRBC.     Chari Mujica RN  2024  1:58 PM

## 2024-03-07 LAB — HISTORY CHECK: NORMAL

## 2024-03-08 ENCOUNTER — HOSPITAL ENCOUNTER (OUTPATIENT)
Facility: HOSPITAL | Age: 64
Setting detail: INFUSION SERIES
End: 2024-03-08
Payer: COMMERCIAL

## 2024-03-08 VITALS
SYSTOLIC BLOOD PRESSURE: 165 MMHG | TEMPERATURE: 97.6 F | OXYGEN SATURATION: 100 % | HEART RATE: 52 BPM | DIASTOLIC BLOOD PRESSURE: 80 MMHG | RESPIRATION RATE: 16 BRPM

## 2024-03-08 PROCEDURE — 6370000000 HC RX 637 (ALT 250 FOR IP): Performed by: INTERNAL MEDICINE

## 2024-03-08 PROCEDURE — 2580000003 HC RX 258: Performed by: INTERNAL MEDICINE

## 2024-03-08 PROCEDURE — 6360000002 HC RX W HCPCS: Performed by: INTERNAL MEDICINE

## 2024-03-08 PROCEDURE — 36430 TRANSFUSION BLD/BLD COMPNT: CPT

## 2024-03-08 PROCEDURE — P9040 RBC LEUKOREDUCED IRRADIATED: HCPCS

## 2024-03-08 RX ORDER — HEPARIN SODIUM (PORCINE) LOCK FLUSH IV SOLN 100 UNIT/ML 100 UNIT/ML
500 SOLUTION INTRAVENOUS PRN
Status: ACTIVE | OUTPATIENT
Start: 2024-03-08

## 2024-03-08 RX ORDER — SODIUM CHLORIDE 0.9 % (FLUSH) 0.9 %
5-40 SYRINGE (ML) INJECTION PRN
Status: ACTIVE | OUTPATIENT
Start: 2024-03-08

## 2024-03-08 RX ADMIN — ACETAMINOPHEN 650 MG: 325 TABLET ORAL at 08:38

## 2024-03-08 RX ADMIN — HEPARIN 500 UNITS: 100 SYRINGE at 13:53

## 2024-03-08 RX ADMIN — SODIUM CHLORIDE, PRESERVATIVE FREE 20 ML: 5 INJECTION INTRAVENOUS at 13:53

## 2024-03-08 RX ADMIN — DIPHENHYDRAMINE HYDROCHLORIDE 25 MG: 25 CAPSULE ORAL at 08:38

## 2024-03-08 ASSESSMENT — PAIN DESCRIPTION - PAIN TYPE: TYPE: ACUTE PAIN;CHRONIC PAIN

## 2024-03-08 ASSESSMENT — PAIN DESCRIPTION - LOCATION: LOCATION: GENERALIZED

## 2024-03-08 ASSESSMENT — PAIN SCALES - GENERAL: PAINLEVEL_OUTOF10: 10

## 2024-03-08 NOTE — PROGRESS NOTES
[facility] Progress Note    Date: 2024    Name: Kayden Samuel    MRN: [id number]         : 1960      Mr. Samuel arrived to [facility] at 0819.    Mr. Samuel was assessed and education was provided. Risks and benefits of blood transfusion were discussed with patient, including the risk of transfusion reaction and disease transmission. Patient verbalized understanding and signed consent placed on chart.    Mr. Samuel's vitals were reviewed.    Vitals:    24 0942 24 1128 24 1204 24 1351   BP: 115/68 137/79 (!) 146/76 (!) 165/80   Pulse: 57 55 54 52   Resp: 18 16 18 16   Temp: 97.8 °F (36.6 °C) 97.9 °F (36.6 °C) 97.8 °F (36.6 °C) 97.6 °F (36.4 °C)   TempSrc: Temporal Oral Oral Oral   SpO2: 97% 100% 99% 100%         Patient's right chest single implantable lumen port that was positive for blood return and flushed without difficulty.    Normal saline infusion was started at 25 mL/hr as KVO.    Pre-medications (Tylenol 650 mg and Benadryl 25 mg) were administered as ordered thirty minutes before the start of today's transfusions.     First unit of two ordered units of PRBCs initiated at a rate of 75 ml/hr at 0925. Fifteen minutes into infusion, Vital signs are stable and patient has denied complaints of shortness of breath, itching, hives, lip/tongue/facial swelling, chest pain or other symptoms; infusion rate increased to 150 ml/hr for the remainder of the transfusion at 0943. Unit finished @ 1128. Vital signs stable and no transfusion reaction suspected.    Second unit of two ordered units of PRBCs initiated at a rate of 75 ml/hr at 1143. Fifteen minutes into infusion, Vital signs are stable and patient has denied complaints of shortness of breath, itching, hives, lip/tongue/facial swelling, chest pain or other symptoms; infusion rate increased to 150 ml/hr for the remainder of the transfusion. Unit finished @ 1351. Vital signs stable and no transfusion reaction suspected.Vital signs  stable and no transfusion reaction suspected.      Mr. Samuel tolerated infusion without complaints.    Patient politely declined to remain in hospitals for post transfusion for observation.     Mr. Samuel tolerated infusion without complaints. Patient's port was flushed with 20 mL of 0.9% normal saline followed by a 5 mL heparin flush. Patient's single lumen right subclavian port was de-accessed at 1328; site was absent of irritation, bleeding, or noted hematoma. Site was dressed with gauze and a band-aid .       Discharge instructions reviewed with patient  who was instructed to report shortness of breath, chest pain, elevated temperature, back pain, or other symptoms of transfusion reaction to medical provider or to be seen at nearest emergency department.  Patient verbalized understanding of information received and confirmed with teach back.     Patient armbands were removed ad shredded.    [facility] condition at 1358.  He is to follow up with Dr. Vegas.  Sheila Faust RN  March 8, 2024

## 2024-03-09 LAB
ABO + RH BLD: NORMAL
ANTIGENS PRESENT RBC DONR: NORMAL
ANTIGENS PRESENT RBC DONR: NORMAL
BLD PROD TYP BPU: NORMAL
BLD PROD TYP BPU: NORMAL
BLOOD BANK BLOOD PRODUCT EXPIRATION DATE: NORMAL
BLOOD BANK BLOOD PRODUCT EXPIRATION DATE: NORMAL
BLOOD BANK DISPENSE STATUS: NORMAL
BLOOD BANK DISPENSE STATUS: NORMAL
BLOOD BANK ISBT PRODUCT BLOOD TYPE: 6200
BLOOD BANK ISBT PRODUCT BLOOD TYPE: 6200
BLOOD BANK PRODUCT CODE: NORMAL
BLOOD BANK PRODUCT CODE: NORMAL
BLOOD BANK UNIT TYPE AND RH: NORMAL
BLOOD BANK UNIT TYPE AND RH: NORMAL
BLOOD GROUP ANTIBODIES SERPL: NORMAL
BLOOD GROUP ANTIBODIES SERPL: NORMAL
BPU ID: NORMAL
BPU ID: NORMAL
CROSSMATCH RESULT: NORMAL
CROSSMATCH RESULT: NORMAL
SPECIMEN EXP DATE BLD: NORMAL
UNIT DIVISION: 0
UNIT DIVISION: 0
UNIT ISSUE DATE/TIME: NORMAL
UNIT ISSUE DATE/TIME: NORMAL

## 2024-03-15 ENCOUNTER — HOSPITAL ENCOUNTER (EMERGENCY)
Facility: HOSPITAL | Age: 64
Discharge: HOME | End: 2024-03-16
Payer: COMMERCIAL

## 2024-03-15 DIAGNOSIS — D57.00 SICKLE CELL PAIN CRISIS (HCC): Primary | ICD-10-CM

## 2024-03-15 LAB — RETICS/RBC NFR AUTO: 0.5 % (ref 0.5–2.5)

## 2024-03-15 PROCEDURE — 85025 COMPLETE CBC W/AUTO DIFF WBC: CPT

## 2024-03-15 PROCEDURE — 2580000003 HC RX 258: Performed by: EMERGENCY MEDICINE

## 2024-03-15 PROCEDURE — 6360000002 HC RX W HCPCS: Performed by: EMERGENCY MEDICINE

## 2024-03-15 PROCEDURE — 99284 EMERGENCY DEPT VISIT MOD MDM: CPT

## 2024-03-15 PROCEDURE — 80053 COMPREHEN METABOLIC PANEL: CPT

## 2024-03-15 PROCEDURE — 96374 THER/PROPH/DIAG INJ IV PUSH: CPT

## 2024-03-15 PROCEDURE — 85045 AUTOMATED RETICULOCYTE COUNT: CPT

## 2024-03-15 RX ORDER — HYDROMORPHONE HYDROCHLORIDE 1 MG/ML
1 INJECTION, SOLUTION INTRAMUSCULAR; INTRAVENOUS; SUBCUTANEOUS
Status: COMPLETED | OUTPATIENT
Start: 2024-03-15 | End: 2024-03-15

## 2024-03-15 RX ORDER — 0.9 % SODIUM CHLORIDE 0.9 %
1000 INTRAVENOUS SOLUTION INTRAVENOUS ONCE
Status: COMPLETED | OUTPATIENT
Start: 2024-03-15 | End: 2024-03-16

## 2024-03-15 RX ADMIN — HYDROMORPHONE HYDROCHLORIDE 1 MG: 1 INJECTION, SOLUTION INTRAMUSCULAR; INTRAVENOUS; SUBCUTANEOUS at 23:28

## 2024-03-15 RX ADMIN — SODIUM CHLORIDE 1000 ML: 9 INJECTION, SOLUTION INTRAVENOUS at 23:28

## 2024-03-15 ASSESSMENT — ENCOUNTER SYMPTOMS
GASTROINTESTINAL NEGATIVE: 1
SHORTNESS OF BREATH: 0
BACK PAIN: 1
EYES NEGATIVE: 1

## 2024-03-15 ASSESSMENT — PAIN DESCRIPTION - LOCATION: LOCATION: BACK;LEG

## 2024-03-15 ASSESSMENT — PAIN SCALES - GENERAL: PAINLEVEL_OUTOF10: 10

## 2024-03-15 ASSESSMENT — PAIN DESCRIPTION - ORIENTATION: ORIENTATION: LEFT;RIGHT

## 2024-03-16 VITALS
RESPIRATION RATE: 18 BRPM | WEIGHT: 128 LBS | SYSTOLIC BLOOD PRESSURE: 140 MMHG | HEIGHT: 64 IN | BODY MASS INDEX: 21.85 KG/M2 | OXYGEN SATURATION: 100 % | HEART RATE: 87 BPM | TEMPERATURE: 98 F | DIASTOLIC BLOOD PRESSURE: 68 MMHG

## 2024-03-16 LAB
ALBUMIN SERPL-MCNC: 3.5 G/DL (ref 3.4–5)
ALBUMIN/GLOB SERPL: 0.9 (ref 0.8–1.7)
ALP SERPL-CCNC: 136 U/L (ref 45–117)
ALT SERPL-CCNC: 65 U/L (ref 16–61)
ANION GAP SERPL CALC-SCNC: 4 MMOL/L (ref 3–18)
AST SERPL-CCNC: 76 U/L (ref 10–38)
BASOPHILS # BLD: 0.1 K/UL (ref 0–0.1)
BASOPHILS NFR BLD: 1 % (ref 0–2)
BILIRUB SERPL-MCNC: 1.9 MG/DL (ref 0.2–1)
BUN SERPL-MCNC: 35 MG/DL (ref 7–18)
BUN/CREAT SERPL: 22 (ref 12–20)
CALCIUM SERPL-MCNC: 8.9 MG/DL (ref 8.5–10.1)
CHLORIDE SERPL-SCNC: 107 MMOL/L (ref 100–111)
CO2 SERPL-SCNC: 24 MMOL/L (ref 21–32)
CREAT SERPL-MCNC: 1.58 MG/DL (ref 0.6–1.3)
DIFFERENTIAL METHOD BLD: ABNORMAL
EOSINOPHIL # BLD: 0.3 K/UL (ref 0–0.4)
EOSINOPHIL NFR BLD: 5 % (ref 0–5)
ERYTHROCYTE [DISTWIDTH] IN BLOOD BY AUTOMATED COUNT: 18.3 % (ref 11.6–14.5)
GLOBULIN SER CALC-MCNC: 4.1 G/DL (ref 2–4)
GLUCOSE SERPL-MCNC: 91 MG/DL (ref 74–99)
HCT VFR BLD AUTO: 24.8 % (ref 36–48)
HGB BLD-MCNC: 8.2 G/DL (ref 13–16)
IMM GRANULOCYTES # BLD AUTO: 0 K/UL (ref 0–0.04)
IMM GRANULOCYTES NFR BLD AUTO: 0 % (ref 0–0.5)
LYMPHOCYTES # BLD: 2 K/UL (ref 0.9–3.6)
LYMPHOCYTES NFR BLD: 35 % (ref 21–52)
MCH RBC QN AUTO: 29 PG (ref 24–34)
MCHC RBC AUTO-ENTMCNC: 33.1 G/DL (ref 31–37)
MCV RBC AUTO: 87.6 FL (ref 78–100)
MONOCYTES # BLD: 0.6 K/UL (ref 0.05–1.2)
MONOCYTES NFR BLD: 10 % (ref 3–10)
NEUTS SEG # BLD: 2.8 K/UL (ref 1.8–8)
NEUTS SEG NFR BLD: 49 % (ref 40–73)
NRBC # BLD: 0 K/UL (ref 0–0.01)
NRBC BLD-RTO: 0 PER 100 WBC
PLATELET # BLD AUTO: 116 K/UL (ref 135–420)
PLATELET COMMENT: ABNORMAL
PMV BLD AUTO: 10.8 FL (ref 9.2–11.8)
POTASSIUM SERPL-SCNC: 5.1 MMOL/L (ref 3.5–5.5)
PROT SERPL-MCNC: 7.6 G/DL (ref 6.4–8.2)
RBC # BLD AUTO: 2.83 M/UL (ref 4.35–5.65)
RBC MORPH BLD: ABNORMAL
RBC MORPH BLD: ABNORMAL
SODIUM SERPL-SCNC: 135 MMOL/L (ref 136–145)
WBC # BLD AUTO: 5.8 K/UL (ref 4.6–13.2)

## 2024-03-16 PROCEDURE — 6360000002 HC RX W HCPCS: Performed by: EMERGENCY MEDICINE

## 2024-03-16 PROCEDURE — 96376 TX/PRO/DX INJ SAME DRUG ADON: CPT

## 2024-03-16 RX ORDER — HYDROMORPHONE HYDROCHLORIDE 1 MG/ML
1 INJECTION, SOLUTION INTRAMUSCULAR; INTRAVENOUS; SUBCUTANEOUS
Status: COMPLETED | OUTPATIENT
Start: 2024-03-16 | End: 2024-03-16

## 2024-03-16 RX ADMIN — HYDROMORPHONE HYDROCHLORIDE 1 MG: 1 INJECTION, SOLUTION INTRAMUSCULAR; INTRAVENOUS; SUBCUTANEOUS at 01:30

## 2024-03-16 ASSESSMENT — PAIN SCALES - GENERAL
PAINLEVEL_OUTOF10: 6
PAINLEVEL_OUTOF10: 10

## 2024-03-16 ASSESSMENT — PAIN DESCRIPTION - LOCATION
LOCATION: BACK;LEG
LOCATION: BACK;LEG

## 2024-03-16 ASSESSMENT — PAIN - FUNCTIONAL ASSESSMENT: PAIN_FUNCTIONAL_ASSESSMENT: 0-10

## 2024-03-16 ASSESSMENT — PAIN DESCRIPTION - ORIENTATION
ORIENTATION: RIGHT;LEFT
ORIENTATION: RIGHT;LEFT

## 2024-03-16 NOTE — ED NOTES
RN attempted to obtain blood specimen from patient , patient informed patient that he was a hard stick and that he has a port.

## 2024-03-16 NOTE — ED PROVIDER NOTES
ordered.    Risk  Prescription drug management.            REASSESSMENT          FINAL IMPRESSION      1. Sickle cell pain crisis (HCC)          DISPOSITION/PLAN   DISPOSITION Decision To Discharge 03/16/2024 12:19:56 AM      PATIENT REFERRED TO:  Javid Vegas MD  [address]  [address]  [facility] [address]  [phone]          [facility] EMERGENCY DEPT  [address] [place] [address]  [phone]    If symptoms worsen or unable to obtain follow up, return immediately      DISCHARGE MEDICATIONS:  New Prescriptions    No medications on file     Controlled Substances Monitoring:          No data to display                (Please note that portions of this note were completed with a voice recognition program.  Efforts were made to edit the dictations but occasionally words are mis-transcribed.)    MAGO Roach (electronically signed)  Attending Emergency Physician           Roxanne Arias PA  03/16/24 0021

## 2024-03-16 NOTE — ED NOTES
System Downtime Recovery  The EMR experienced a system downtime.  This downtime occurred on March 16 at 0100 AM for a duration of 2 hour(s).  During this downtime paper charting was completed by me, Provider had discharge AVS printed prior to downtime.  The following was documented on paper during the downtime and is now being back-entered: medication dilaudid given at 0130, dc'ing of access to implantable port.after fushed with Heparin 100 u/ml, 5ml flushed port before dcing..  The following is remaining on paper and will be scanned into Epic: nothing.

## 2024-03-25 ENCOUNTER — HOSPITAL ENCOUNTER (OUTPATIENT)
Facility: HOSPITAL | Age: 64
Setting detail: INFUSION SERIES
Discharge: HOME | End: 2024-03-28
Payer: COMMERCIAL

## 2024-03-25 VITALS
HEART RATE: 84 BPM | OXYGEN SATURATION: 96 % | TEMPERATURE: 98.5 F | DIASTOLIC BLOOD PRESSURE: 63 MMHG | RESPIRATION RATE: 16 BRPM | SYSTOLIC BLOOD PRESSURE: 137 MMHG

## 2024-03-25 LAB — HISTORY CHECK: NORMAL

## 2024-03-25 PROCEDURE — 86902 BLOOD TYPE ANTIGEN DONOR EA: CPT

## 2024-03-25 PROCEDURE — 36591 DRAW BLOOD OFF VENOUS DEVICE: CPT

## 2024-03-25 PROCEDURE — 86901 BLOOD TYPING SEROLOGIC RH(D): CPT

## 2024-03-25 PROCEDURE — 86850 RBC ANTIBODY SCREEN: CPT

## 2024-03-25 PROCEDURE — 86921 COMPATIBILITY TEST INCUBATE: CPT

## 2024-03-25 PROCEDURE — 86920 COMPATIBILITY TEST SPIN: CPT

## 2024-03-25 PROCEDURE — 86922 COMPATIBILITY TEST ANTIGLOB: CPT

## 2024-03-25 PROCEDURE — 6360000002 HC RX W HCPCS

## 2024-03-25 PROCEDURE — 2580000003 HC RX 258: Performed by: INTERNAL MEDICINE

## 2024-03-25 PROCEDURE — 86870 RBC ANTIBODY IDENTIFICATION: CPT

## 2024-03-25 PROCEDURE — 86900 BLOOD TYPING SEROLOGIC ABO: CPT

## 2024-03-25 PROCEDURE — 85660 RBC SICKLE CELL TEST: CPT

## 2024-03-25 RX ORDER — SODIUM CHLORIDE 0.9 % (FLUSH) 0.9 %
5-40 SYRINGE (ML) INJECTION PRN
Status: DISCONTINUED | OUTPATIENT
Start: 2024-03-25 | End: 2024-03-29 | Stop reason: HOSPADM

## 2024-03-25 RX ORDER — HEPARIN 100 UNIT/ML
SYRINGE INTRAVENOUS
Status: COMPLETED
Start: 2024-03-25 | End: 2024-03-25

## 2024-03-25 RX ORDER — HEPARIN SODIUM (PORCINE) LOCK FLUSH IV SOLN 100 UNIT/ML 100 UNIT/ML
500 SOLUTION INTRAVENOUS PRN
Status: DISCONTINUED | OUTPATIENT
Start: 2024-03-25 | End: 2024-03-29 | Stop reason: HOSPADM

## 2024-03-25 RX ADMIN — HEPARIN SODIUM (PORCINE) LOCK FLUSH IV SOLN 100 UNIT/ML 500 UNITS: 100 SOLUTION at 14:40

## 2024-03-25 RX ADMIN — SODIUM CHLORIDE, PRESERVATIVE FREE 30 ML: 5 INJECTION INTRAVENOUS at 14:40

## 2024-03-25 RX ADMIN — HEPARIN 500 UNITS: 100 SYRINGE at 14:40

## 2024-03-25 ASSESSMENT — PAIN - FUNCTIONAL ASSESSMENT: PAIN_FUNCTIONAL_ASSESSMENT: 0-10

## 2024-03-25 ASSESSMENT — PAIN DESCRIPTION - DESCRIPTORS: DESCRIPTORS: ACHING;THROBBING

## 2024-03-25 NOTE — PROGRESS NOTES
[facility] Progress Note    Date: 2024    Name: Kayden Samuel    MRN: [id number]         : 1960    Mr. Samuel arrived in the [facility] today at 1425, in stable condition, here for his TYPE & CROSSMATCH + TYPE & SCREEN FOR ONE UNIT PRBC. He was assessed and education was provided.     Mr. Samuel's vitals were reviewed.  Vitals:    24 1425   BP: 137/63   Pulse: 84   Resp: 16   Temp: 98.5 °F (36.9 °C)   SpO2: 96%         Mr. Samuel presented to the infusion center today stating that he was in a lot of generalized pain (chronic sickle cell pain), which he rated @ # 10 on the pain scale. Otherwise though, he stated that he was doing OK.           His RIGHT CHEST SINGLE LUMEN PORT was accessed without incident at 1440, and blood for the ordered Type & Crossmatch + Type & Screen (3 Pink Top Tubes) was drawn. Then, the port was flushed well per policy with NS & Heparin, and then the breaux needle was removed and gauze//bandaid was applied.     The blood was sent out by  to the [facility] blood bank for processing.     The required Blood Bank armband was securely applied to his LEFT WRIST, and he was reminded to keep the armband completely dry and intact on his arm until after the completion of the blood transfusion on Wednesday, and he verbalized understanding.       Mr. Samuel tolerated well and voiced no complaints.     [facility] in stable condition at 1450.     He is to return on Wednesday, 3-27-24 at 0900, for his next appointment, to receive 1 Unit PRBC.     Car Amin RN  2024  2:34 PM

## 2024-03-27 ENCOUNTER — HOSPITAL ENCOUNTER (OUTPATIENT)
Facility: HOSPITAL | Age: 64
Setting detail: INFUSION SERIES
Discharge: HOME | End: 2024-03-30
Payer: COMMERCIAL

## 2024-03-27 VITALS
RESPIRATION RATE: 20 BRPM | HEART RATE: 69 BPM | OXYGEN SATURATION: 100 % | SYSTOLIC BLOOD PRESSURE: 121 MMHG | TEMPERATURE: 98.4 F | DIASTOLIC BLOOD PRESSURE: 68 MMHG

## 2024-03-27 PROCEDURE — 36430 TRANSFUSION BLD/BLD COMPNT: CPT

## 2024-03-27 PROCEDURE — 6360000002 HC RX W HCPCS: Performed by: INTERNAL MEDICINE

## 2024-03-27 PROCEDURE — P9040 RBC LEUKOREDUCED IRRADIATED: HCPCS

## 2024-03-27 PROCEDURE — 2580000003 HC RX 258: Performed by: INTERNAL MEDICINE

## 2024-03-27 PROCEDURE — 6370000000 HC RX 637 (ALT 250 FOR IP): Performed by: INTERNAL MEDICINE

## 2024-03-27 RX ORDER — SODIUM CHLORIDE 0.9 % (FLUSH) 0.9 %
5-40 SYRINGE (ML) INJECTION 2 TIMES DAILY
Status: DISCONTINUED | OUTPATIENT
Start: 2024-03-27 | End: 2024-03-31 | Stop reason: HOSPADM

## 2024-03-27 RX ORDER — HEPARIN SODIUM (PORCINE) LOCK FLUSH IV SOLN 100 UNIT/ML 100 UNIT/ML
500 SOLUTION INTRAVENOUS PRN
Status: DISCONTINUED | OUTPATIENT
Start: 2024-03-27 | End: 2024-03-31 | Stop reason: HOSPADM

## 2024-03-27 RX ORDER — ACETAMINOPHEN 325 MG/1
650 TABLET ORAL EVERY 4 HOURS PRN
Status: DISCONTINUED | OUTPATIENT
Start: 2024-03-27 | End: 2024-03-31 | Stop reason: HOSPADM

## 2024-03-27 RX ORDER — SODIUM CHLORIDE 9 MG/ML
INJECTION, SOLUTION INTRAVENOUS PRN
Status: DISCONTINUED | OUTPATIENT
Start: 2024-03-27 | End: 2024-03-31 | Stop reason: HOSPADM

## 2024-03-27 RX ORDER — DIPHENHYDRAMINE HCL 25 MG
25 CAPSULE ORAL EVERY 8 HOURS PRN
Status: DISCONTINUED | OUTPATIENT
Start: 2024-03-27 | End: 2024-03-31 | Stop reason: HOSPADM

## 2024-03-27 RX ADMIN — HEPARIN 500 UNITS: 100 SYRINGE at 14:30

## 2024-03-27 RX ADMIN — SODIUM CHLORIDE, PRESERVATIVE FREE 10 ML: 5 INJECTION INTRAVENOUS at 10:25

## 2024-03-27 RX ADMIN — ACETAMINOPHEN 650 MG: 325 TABLET ORAL at 10:00

## 2024-03-27 RX ADMIN — DIPHENHYDRAMINE HYDROCHLORIDE 25 MG: 25 CAPSULE ORAL at 10:00

## 2024-03-27 RX ADMIN — SODIUM CHLORIDE: 9 INJECTION, SOLUTION INTRAVENOUS at 10:25

## 2024-03-27 ASSESSMENT — PAIN DESCRIPTION - DESCRIPTORS: DESCRIPTORS: SHARP;ACHING

## 2024-03-27 ASSESSMENT — PAIN SCALES - GENERAL: PAINLEVEL_OUTOF10: 10

## 2024-03-27 ASSESSMENT — PAIN DESCRIPTION - LOCATION: LOCATION: HIP;LEG

## 2024-03-27 ASSESSMENT — PAIN DESCRIPTION - ORIENTATION: ORIENTATION: RIGHT;LEFT

## 2024-03-27 ASSESSMENT — PAIN DESCRIPTION - PAIN TYPE: TYPE: CHRONIC PAIN

## 2024-03-27 NOTE — PROGRESS NOTES
[facility] Progress Note    Date: 2024    Name: Kayden Samuel    MRN: [id number]         : 1960    Mr. Samuel arrived in the [facility] today at 0940, in stable condition, here to receive 1 UNIT PRBC. Patient is ambulating with the assistance of a walker~ he is moving slowly due to tremendous pain in is legs and hips, as well as weakness in both legs. States he has had no pain medication since 4 AM, as he had to drive to [facility]. He was assessed and education was provided.     Mr. Samuel's vitals were reviewed.  Vitals:    24 1435   BP: 121/68   Pulse: 69   Resp: 20   Temp: 98.4 °F (36.9 °C)   SpO2: 100%            SIGNED BLOOD TRANSFUSION CONSENT NOTE WAS VIEWED IN HIS ELECTRONIC RECORD. (Previously completed on 3/22/24)     WRITTEN BLOOD TRANSFUSION CONSENT WAS VIEWED IN HIS ELECTRONIC RECORD. (Previously completed and signed on 3/08/24)      His most recent CBC results from 3/22/24 revealed a low HGB of 7.7.    Verbal and written patient education on Blood Transfusion and associated potential side effects and adverse reactions, as well as discharge instructions were reviewed, and Mr. Samuel verbalized understanding. Patient education sheet previously signed on 3/08/24.       His RIGHT CHEST SINGLE LUMEN PORT was accessed without incident at 1025, and brisk blood return was obtained.       ml IV BAG was initiated to infuse @ KVO PRN throughout treatment today.       The following pre-medications were administered per order, approximately 30 minutes prior to starting the blood transfusion: TYLENOL 650 MG PO & BENADRYL 25 MG PO.      1 UNIT OF PRBC was administered over approximately 2 hours, per order and without incident.       Mr. Samuel was observed for approximately 60 minutes after completion of the blood transfusion.      After completion of the observation period, his port was flushed well per policy with NS & Heparin, and then the breaux needle was removed and gauze//bandaid was applied.       Mr. Samuel tolerated the transfusion well today, and voiced no complaints.     Discharge instructions reviewed with patient and he expressed understanding. Patient's armbands were removed and shredded.     [facility], ambulatory, with assistance of a walker and in stable condition at 1440.      He has no further [facility] appointments scheduled at this time, and was instructed to follow up with his referring provider.       Razia Azul RN  March 27, 2024  7:03 PM

## 2024-03-28 LAB
ABO + RH BLD: NORMAL
ANTIGENS PRESENT RBC DONR: NORMAL
BLD PROD TYP BPU: NORMAL
BLOOD BANK BLOOD PRODUCT EXPIRATION DATE: NORMAL
BLOOD BANK DISPENSE STATUS: NORMAL
BLOOD BANK ISBT PRODUCT BLOOD TYPE: 6200
BLOOD BANK PRODUCT CODE: NORMAL
BLOOD BANK UNIT TYPE AND RH: NORMAL
BLOOD GROUP ANTIBODIES SERPL: NORMAL
BLOOD GROUP ANTIBODIES SERPL: NORMAL
BPU ID: NORMAL
CROSSMATCH RESULT: NORMAL
SPECIMEN EXP DATE BLD: NORMAL
UNIT DIVISION: 0
UNIT ISSUE DATE/TIME: NORMAL

## 2024-04-12 RX ORDER — DIPHENHYDRAMINE HCL 25 MG
25 CAPSULE ORAL ONCE
Status: COMPLETED | OUTPATIENT
Start: 2024-04-17 | End: 2024-04-17

## 2024-04-12 RX ORDER — ACETAMINOPHEN 325 MG/1
650 TABLET ORAL ONCE
Status: COMPLETED | OUTPATIENT
Start: 2024-04-17 | End: 2024-04-17

## 2024-04-15 ENCOUNTER — HOSPITAL ENCOUNTER (OUTPATIENT)
Facility: HOSPITAL | Age: 64
Setting detail: INFUSION SERIES
Discharge: HOME | End: 2024-04-18
Payer: COMMERCIAL

## 2024-04-15 VITALS
DIASTOLIC BLOOD PRESSURE: 66 MMHG | TEMPERATURE: 98.2 F | OXYGEN SATURATION: 96 % | SYSTOLIC BLOOD PRESSURE: 131 MMHG | RESPIRATION RATE: 20 BRPM | HEART RATE: 75 BPM

## 2024-04-15 LAB — HISTORY CHECK: NORMAL

## 2024-04-15 PROCEDURE — 86922 COMPATIBILITY TEST ANTIGLOB: CPT

## 2024-04-15 PROCEDURE — 86904 BLOOD TYPING PATIENT SERUM: CPT

## 2024-04-15 PROCEDURE — 86850 RBC ANTIBODY SCREEN: CPT

## 2024-04-15 PROCEDURE — 85660 RBC SICKLE CELL TEST: CPT

## 2024-04-15 PROCEDURE — 86920 COMPATIBILITY TEST SPIN: CPT

## 2024-04-15 PROCEDURE — 86900 BLOOD TYPING SEROLOGIC ABO: CPT

## 2024-04-15 PROCEDURE — 86870 RBC ANTIBODY IDENTIFICATION: CPT

## 2024-04-15 PROCEDURE — 36591 DRAW BLOOD OFF VENOUS DEVICE: CPT

## 2024-04-15 PROCEDURE — 86901 BLOOD TYPING SEROLOGIC RH(D): CPT

## 2024-04-15 PROCEDURE — 6360000002 HC RX W HCPCS

## 2024-04-15 PROCEDURE — 86880 COOMBS TEST DIRECT: CPT

## 2024-04-15 PROCEDURE — 86921 COMPATIBILITY TEST INCUBATE: CPT

## 2024-04-15 PROCEDURE — 36415 COLL VENOUS BLD VENIPUNCTURE: CPT

## 2024-04-15 PROCEDURE — 86902 BLOOD TYPE ANTIGEN DONOR EA: CPT

## 2024-04-15 PROCEDURE — 2580000003 HC RX 258: Performed by: INTERNAL MEDICINE

## 2024-04-15 RX ORDER — HEPARIN 100 UNIT/ML
SYRINGE INTRAVENOUS
Status: COMPLETED
Start: 2024-04-15 | End: 2024-04-15

## 2024-04-15 RX ORDER — HEPARIN SODIUM (PORCINE) LOCK FLUSH IV SOLN 100 UNIT/ML 100 UNIT/ML
500 SOLUTION INTRAVENOUS PRN
Status: DISCONTINUED | OUTPATIENT
Start: 2024-04-15 | End: 2024-04-19 | Stop reason: HOSPADM

## 2024-04-15 RX ORDER — SODIUM CHLORIDE 0.9 % (FLUSH) 0.9 %
5-40 SYRINGE (ML) INJECTION PRN
Status: DISCONTINUED | OUTPATIENT
Start: 2024-04-15 | End: 2024-04-19 | Stop reason: HOSPADM

## 2024-04-15 RX ADMIN — HEPARIN SODIUM (PORCINE) LOCK FLUSH IV SOLN 100 UNIT/ML 500 UNITS: 100 SOLUTION at 14:10

## 2024-04-15 RX ADMIN — HEPARIN 500 UNITS: 100 SYRINGE at 14:10

## 2024-04-15 RX ADMIN — SODIUM CHLORIDE, PRESERVATIVE FREE 30 ML: 5 INJECTION INTRAVENOUS at 14:10

## 2024-04-15 ASSESSMENT — PAIN DESCRIPTION - DESCRIPTORS: DESCRIPTORS: ACHING;THROBBING

## 2024-04-15 ASSESSMENT — PAIN - FUNCTIONAL ASSESSMENT: PAIN_FUNCTIONAL_ASSESSMENT: 0-10

## 2024-04-15 NOTE — PROGRESS NOTES
[facility] Progress Note    Date: April 15, 2024    Name: Kayden Samuel    MRN: [id number]         : 1960    Mr. Samuel arrived in the [facility] today at 1350, in stable condition, here for his TYPE & CROSSMATCH + TYPE & SCREEN FOR 2 UNITS PRBC. He was assessed and education was provided.     Mr. Samuel's vitals were reviewed.  Vitals:    04/15/24 1350   BP: 131/66   Pulse: 75   Resp: 20   Temp: 98.2 °F (36.8 °C)   SpO2: 96%         Mr. Samuel presented to the infusion center today stating that he was in a lot of generalized pain, especially in his legs and hips (chronic sickle cell pain), which he rated @ # 10 on the pain scale. Otherwise though, he stated that he was doing OK, but was having a lot of difficulty ambulating today, and was walking with a walker.           His RIGHT CHEST SINGLE LUMEN PORT was accessed without incident at 1410, and blood for the ordered Type & Crossmatch + Type & Screen (3 Pink Top Tubes) was drawn. Then, the port was flushed well per policy with NS & Heparin, and then the breaux needle was removed and gauze//bandaid was applied.     The blood was sent out by  to the [facility] blood bank for processing.     The required Blood Bank armband was securely applied to his LEFT WRIST, and he was reminded to keep the armband completely dry and intact on his arm until after the completion of the blood transfusion on Wednesday, and he verbalized understanding.       Mr. Samuel tolerated well and voiced no complaints.     [facility]. [facility] was [facility] in stable condition at 1420.     He is to return on Wednesday, 24 at 0800, for his next appointment, to receive 2 Units PRBC.     Car Amin RN  April 15, 2024  2:02 PM

## 2024-04-17 ENCOUNTER — HOSPITAL ENCOUNTER (OUTPATIENT)
Facility: HOSPITAL | Age: 64
Setting detail: INFUSION SERIES
Discharge: HOME | End: 2024-04-20
Payer: COMMERCIAL

## 2024-04-17 VITALS
OXYGEN SATURATION: 100 % | DIASTOLIC BLOOD PRESSURE: 79 MMHG | TEMPERATURE: 97.9 F | RESPIRATION RATE: 16 BRPM | HEART RATE: 62 BPM | SYSTOLIC BLOOD PRESSURE: 167 MMHG

## 2024-04-17 PROCEDURE — 2580000003 HC RX 258: Performed by: INTERNAL MEDICINE

## 2024-04-17 PROCEDURE — P9040 RBC LEUKOREDUCED IRRADIATED: HCPCS

## 2024-04-17 PROCEDURE — 6360000002 HC RX W HCPCS

## 2024-04-17 PROCEDURE — 6370000000 HC RX 637 (ALT 250 FOR IP): Performed by: INTERNAL MEDICINE

## 2024-04-17 PROCEDURE — 36430 TRANSFUSION BLD/BLD COMPNT: CPT

## 2024-04-17 RX ORDER — HEPARIN 100 UNIT/ML
SYRINGE INTRAVENOUS
Status: COMPLETED
Start: 2024-04-17 | End: 2024-04-17

## 2024-04-17 RX ORDER — SODIUM CHLORIDE 0.9 % (FLUSH) 0.9 %
5-40 SYRINGE (ML) INJECTION PRN
Status: DISCONTINUED | OUTPATIENT
Start: 2024-04-17 | End: 2024-04-21 | Stop reason: HOSPADM

## 2024-04-17 RX ORDER — SODIUM CHLORIDE 9 MG/ML
INJECTION, SOLUTION INTRAVENOUS PRN
Status: COMPLETED | OUTPATIENT
Start: 2024-04-17 | End: 2024-04-17

## 2024-04-17 RX ORDER — HEPARIN SODIUM (PORCINE) LOCK FLUSH IV SOLN 100 UNIT/ML 100 UNIT/ML
500 SOLUTION INTRAVENOUS PRN
Status: DISCONTINUED | OUTPATIENT
Start: 2024-04-17 | End: 2024-04-21 | Stop reason: HOSPADM

## 2024-04-17 RX ADMIN — SODIUM CHLORIDE, PRESERVATIVE FREE 20 ML: 5 INJECTION INTRAVENOUS at 15:35

## 2024-04-17 RX ADMIN — ACETAMINOPHEN 650 MG: 325 TABLET ORAL at 08:26

## 2024-04-17 RX ADMIN — DIPHENHYDRAMINE HYDROCHLORIDE 25 MG: 25 CAPSULE ORAL at 08:26

## 2024-04-17 RX ADMIN — SODIUM CHLORIDE, PRESERVATIVE FREE 10 ML: 5 INJECTION INTRAVENOUS at 08:45

## 2024-04-17 RX ADMIN — HEPARIN 500 UNITS: 100 SYRINGE at 15:35

## 2024-04-17 RX ADMIN — SODIUM CHLORIDE: 9 INJECTION, SOLUTION INTRAVENOUS at 08:50

## 2024-04-17 RX ADMIN — HEPARIN SODIUM (PORCINE) LOCK FLUSH IV SOLN 100 UNIT/ML 500 UNITS: 100 SOLUTION at 15:35

## 2024-04-17 ASSESSMENT — PAIN DESCRIPTION - DESCRIPTORS: DESCRIPTORS: ACHING;THROBBING

## 2024-04-17 ASSESSMENT — PAIN - FUNCTIONAL ASSESSMENT: PAIN_FUNCTIONAL_ASSESSMENT: 0-10

## 2024-04-17 NOTE — PROGRESS NOTES
[facility] Progress Note    Date: 2024    Name: Kayden Samuel    MRN: [id number]         : 1960    Mr. Samuel arrived in the [facility] today at 0815, in stable condition, here to receive 2 UNITS PRBC. He was assessed and education was provided.     Mr. Samuel's vitals were reviewed.  Vitals:    24 0815   BP: (!) 164/57   Pulse: 71   Resp: 20   Temp: 97.9 °F (36.6 °C)   SpO2: 99%       Mr. Samuel presented to the infusion center today stating that he was doing fairly well, and with his major complaints being his Chronic Sickle Cell Pain (generalized pain today) which he rated at # 10 on the pain scale, and a lot of fatigue.                SIGNED BLOOD TRANSFUSION CONSENT NOTE WAS VIEWED IN HIS ELECTRONIC RECORD.      WRITTEN BLOOD TRANSFUSION CONSENT WAS OBTAINED HERE IN THE INFUSION CENTER TODAY.         His most recent CBC results from 24 revealed a low HGB of 6.8.            Verbal  and written patient education on Blood Transfusion and associated potential side effects and adverse reactions, as well as discharge instructions was provided, and Mr. Samuel verbalized understanding.           His RIGHT CHEST SINGLE LUMEN PORT was accessed without incident at 0845, and brisk blood return was obtained.       ml IV BAG was initiated to infuse @ KVO PRN throughout treatment today.         The following pre-medications were administered per order, approximately 30 minutes prior to starting the blood transfusion: TYLENOL 650 MG PO & BENADRYL 25 MG PO.         2 UNITS OF PRBC were administered per order, each unit over approximately 2 hours. Each unit was initiated at 75 ml/hr for the first 15 minutes, and then increased to 155 ml/hr until completion.         Mr. Samuel was observed for approximately 60 minutes after completion of both units of blood, just as a precaution. NS was infused at 155 ml/hr throughout the observation period.         After completion of the observation period, his port  was flushed well per policy with NS & Heparin, and then the breaux needle was removed and gauze//bandaid was applied.            Mr. Samuel tolerated the transfusion very well today, and voiced no complaints.      [facility]. [facility] was [facility] in stable condition at 1540.      He has no further [facility] appointments scheduled at this time. However, he was reminded to follow up with his referring provider, Dr. Vegas, as instructed, and he verbalized understanding.            Car Amin RN  April 17, 2024  8:40 AM

## 2024-04-18 LAB
ABO + RH BLD: NORMAL
ANTIGENS PRESENT RBC DONR: NORMAL
ANTIGENS PRESENT RBC DONR: NORMAL
BLD PROD TYP BPU: NORMAL
BLD PROD TYP BPU: NORMAL
BLOOD BANK BLOOD PRODUCT EXPIRATION DATE: NORMAL
BLOOD BANK BLOOD PRODUCT EXPIRATION DATE: NORMAL
BLOOD BANK DISPENSE STATUS: NORMAL
BLOOD BANK DISPENSE STATUS: NORMAL
BLOOD BANK ISBT PRODUCT BLOOD TYPE: 5100
BLOOD BANK ISBT PRODUCT BLOOD TYPE: 6200
BLOOD BANK PRODUCT CODE: NORMAL
BLOOD BANK PRODUCT CODE: NORMAL
BLOOD BANK UNIT TYPE AND RH: NORMAL
BLOOD BANK UNIT TYPE AND RH: NORMAL
BLOOD GROUP ANTIBODIES SERPL: NORMAL
BLOOD GROUP ANTIBODIES SERPL: NORMAL
BPU ID: NORMAL
BPU ID: NORMAL
CROSSMATCH RESULT: NORMAL
CROSSMATCH RESULT: NORMAL
SPECIMEN EXP DATE BLD: NORMAL
UNIT DIVISION: 0
UNIT DIVISION: 0
UNIT ISSUE DATE/TIME: NORMAL
UNIT ISSUE DATE/TIME: NORMAL

## 2024-05-13 RX ORDER — DIPHENHYDRAMINE HCL 25 MG
25 CAPSULE ORAL ONCE
Status: COMPLETED | OUTPATIENT
Start: 2024-05-16 | End: 2024-05-16

## 2024-05-13 RX ORDER — ACETAMINOPHEN 325 MG/1
650 TABLET ORAL ONCE
Status: COMPLETED | OUTPATIENT
Start: 2024-05-16 | End: 2024-05-16

## 2024-05-14 ENCOUNTER — HOSPITAL ENCOUNTER (OUTPATIENT)
Facility: HOSPITAL | Age: 64
Setting detail: INFUSION SERIES
Discharge: HOME | End: 2024-05-17
Payer: COMMERCIAL

## 2024-05-14 VITALS
DIASTOLIC BLOOD PRESSURE: 53 MMHG | RESPIRATION RATE: 16 BRPM | HEART RATE: 66 BPM | TEMPERATURE: 98.4 F | SYSTOLIC BLOOD PRESSURE: 100 MMHG | OXYGEN SATURATION: 97 %

## 2024-05-14 PROCEDURE — 6360000002 HC RX W HCPCS

## 2024-05-14 PROCEDURE — 86920 COMPATIBILITY TEST SPIN: CPT

## 2024-05-14 PROCEDURE — 86922 COMPATIBILITY TEST ANTIGLOB: CPT

## 2024-05-14 PROCEDURE — 86850 RBC ANTIBODY SCREEN: CPT

## 2024-05-14 PROCEDURE — 36591 DRAW BLOOD OFF VENOUS DEVICE: CPT

## 2024-05-14 PROCEDURE — 86902 BLOOD TYPE ANTIGEN DONOR EA: CPT

## 2024-05-14 PROCEDURE — 86870 RBC ANTIBODY IDENTIFICATION: CPT

## 2024-05-14 PROCEDURE — 2580000003 HC RX 258: Performed by: INTERNAL MEDICINE

## 2024-05-14 PROCEDURE — 85660 RBC SICKLE CELL TEST: CPT

## 2024-05-14 PROCEDURE — 86901 BLOOD TYPING SEROLOGIC RH(D): CPT

## 2024-05-14 PROCEDURE — 86900 BLOOD TYPING SEROLOGIC ABO: CPT

## 2024-05-14 PROCEDURE — 86921 COMPATIBILITY TEST INCUBATE: CPT

## 2024-05-14 RX ORDER — HEPARIN SODIUM (PORCINE) LOCK FLUSH IV SOLN 100 UNIT/ML 100 UNIT/ML
500 SOLUTION INTRAVENOUS PRN
Status: DISCONTINUED | OUTPATIENT
Start: 2024-05-14 | End: 2024-05-18 | Stop reason: HOSPADM

## 2024-05-14 RX ORDER — HEPARIN 100 UNIT/ML
SYRINGE INTRAVENOUS
Status: COMPLETED
Start: 2024-05-14 | End: 2024-05-14

## 2024-05-14 RX ORDER — SODIUM CHLORIDE 0.9 % (FLUSH) 0.9 %
5-40 SYRINGE (ML) INJECTION PRN
Status: DISCONTINUED | OUTPATIENT
Start: 2024-05-14 | End: 2024-05-18 | Stop reason: HOSPADM

## 2024-05-14 RX ADMIN — HEPARIN SODIUM (PORCINE) LOCK FLUSH IV SOLN 100 UNIT/ML 500 UNITS: 100 SOLUTION at 15:30

## 2024-05-14 RX ADMIN — SODIUM CHLORIDE, PRESERVATIVE FREE 30 ML: 5 INJECTION INTRAVENOUS at 15:30

## 2024-05-14 RX ADMIN — HEPARIN 500 UNITS: 100 SYRINGE at 15:30

## 2024-05-14 ASSESSMENT — PAIN DESCRIPTION - DESCRIPTORS: DESCRIPTORS: ACHING

## 2024-05-14 ASSESSMENT — PAIN - FUNCTIONAL ASSESSMENT: PAIN_FUNCTIONAL_ASSESSMENT: 0-10

## 2024-05-14 NOTE — PROGRESS NOTES
[facility] Progress Note    Date: May 14, 2024    Name: Kayden Samuel    MRN: [id number]         : 1960    Mr. Samuel arrived in the [facility] today at 1510, in stable condition, here for his TYPE & CROSSMATCH + TYPE & SCREEN FOR 2 UNITS PRBC. He was assessed and education was provided.     Mr. Samuel's vitals were reviewed.  Vitals:    24 1510   BP: (!) 100/53   Pulse: 66   Resp: 16   Temp: 98.4 °F (36.9 °C)   SpO2: 97%         Mr. Samuel presented to the infusion center today stating that he was in a lot of generalized pain, especially in his legs and hips (chronic sickle cell pain), which he rated @ # 10 on the pain scale. Otherwise though, he stated that he was doing OK, but was having a lot of difficulty ambulating today, and was walking with cane.           His RIGHT CHEST SINGLE LUMEN PORT was accessed without incident at 1530, and blood for the ordered Type & Crossmatch + Type & Screen (3 Pink Top Tubes) was drawn. Then, the port was flushed well per policy with NS & Heparin, and then the breaux needle was removed and gauze//bandaid was applied.     The blood was sent out by  to the [facility] blood bank for processing.     The required Blood Bank armband was securely applied to his LEFT WRIST, and he was reminded to keep the armband completely dry and intact on his arm until after the completion of the blood transfusion on Wednesday, and he verbalized understanding.       Mr. Samuel tolerated well and voiced no complaints.     [facility]. [facility] in stable condition at 1545.     He is to return on Thursday, 24 at 0800, for his next appointment, to receive 2 Units PRBC.     Car Amin RN  May 14, 2024  3:26 PM

## 2024-05-15 LAB — HISTORY CHECK: NORMAL

## 2024-05-16 ENCOUNTER — HOSPITAL ENCOUNTER (OUTPATIENT)
Facility: HOSPITAL | Age: 64
Setting detail: INFUSION SERIES
End: 2024-05-16
Payer: COMMERCIAL

## 2024-05-16 VITALS
TEMPERATURE: 98.6 F | DIASTOLIC BLOOD PRESSURE: 75 MMHG | OXYGEN SATURATION: 100 % | SYSTOLIC BLOOD PRESSURE: 142 MMHG | HEART RATE: 63 BPM | RESPIRATION RATE: 18 BRPM

## 2024-05-16 PROCEDURE — P9040 RBC LEUKOREDUCED IRRADIATED: HCPCS

## 2024-05-16 PROCEDURE — 2580000003 HC RX 258: Performed by: INTERNAL MEDICINE

## 2024-05-16 PROCEDURE — 36430 TRANSFUSION BLD/BLD COMPNT: CPT

## 2024-05-16 PROCEDURE — 6370000000 HC RX 637 (ALT 250 FOR IP): Performed by: INTERNAL MEDICINE

## 2024-05-16 PROCEDURE — 6360000002 HC RX W HCPCS: Performed by: INTERNAL MEDICINE

## 2024-05-16 RX ORDER — SODIUM CHLORIDE 9 MG/ML
INJECTION, SOLUTION INTRAVENOUS PRN
Status: COMPLETED | OUTPATIENT
Start: 2024-05-16 | End: 2024-05-16

## 2024-05-16 RX ORDER — HEPARIN SODIUM (PORCINE) LOCK FLUSH IV SOLN 100 UNIT/ML 100 UNIT/ML
500 SOLUTION INTRAVENOUS PRN
Status: DISCONTINUED | OUTPATIENT
Start: 2024-05-16 | End: 2024-05-20 | Stop reason: HOSPADM

## 2024-05-16 RX ORDER — SODIUM CHLORIDE 0.9 % (FLUSH) 0.9 %
5-40 SYRINGE (ML) INJECTION PRN
Status: DISCONTINUED | OUTPATIENT
Start: 2024-05-16 | End: 2024-05-20 | Stop reason: HOSPADM

## 2024-05-16 RX ADMIN — SODIUM CHLORIDE, PRESERVATIVE FREE 10 ML: 5 INJECTION INTRAVENOUS at 09:08

## 2024-05-16 RX ADMIN — ACETAMINOPHEN 650 MG: 325 TABLET ORAL at 09:14

## 2024-05-16 RX ADMIN — HEPARIN 500 UNITS: 100 SYRINGE at 15:11

## 2024-05-16 RX ADMIN — SODIUM CHLORIDE: 9 INJECTION, SOLUTION INTRAVENOUS at 09:09

## 2024-05-16 RX ADMIN — DIPHENHYDRAMINE HYDROCHLORIDE 25 MG: 25 CAPSULE ORAL at 09:14

## 2024-05-16 RX ADMIN — SODIUM CHLORIDE, PRESERVATIVE FREE 10 ML: 5 INJECTION INTRAVENOUS at 15:11

## 2024-05-16 ASSESSMENT — PAIN DESCRIPTION - LOCATION: LOCATION: GENERALIZED

## 2024-05-16 ASSESSMENT — PAIN SCALES - GENERAL: PAINLEVEL_OUTOF10: 10

## 2024-05-16 NOTE — PROGRESS NOTES
[facility]    Date: May 16, 2024    Name: Kayden Samuel    MRN: [id number]         : 1960    Blood Transfusion    Mr. Samuel arrived to [facility] at 0850 ambulatory w/ rolling walker    Mr. Samuel was assessed and education was provided. Discussed risks and benefits of blood transfusion with patient, including risk of transfusion reaction and disease transmission. Patient verbalized understanding and signed consent in chart.    Mr. Samuel's vitals were reviewed.  Vitals:    24 1510   BP: (!) 142/75   Pulse: 63   Resp: 18   Temp: 98.6 °F (37 °C)   SpO2: 100%       Single lumen R chest medi port accessed w/ 20G 1 in breaux needle. Positive for blood return and flushes without difficulty.    Normal saline initiated at KVO.    Pre-medications (Tylenol 650 mg and Benadryl 25 mg) were administered as ordered.     First unit of two ordered units of PRBCs initiated @ 75 ml/hr at 0955. Fifteen minutes into infusion, VS stable and pt denied c/o SOB, itching/hives, lip/tongue/facial swelling, CP or other complaints. Infusion rate increased to 155 ml/hr for the remainder of the transfusion. Unit finished @ 1215. VS stable and no transfusion reaction suspected.    Second unit of two ordered units of PRBCs initiated @ 75 ml/hr at 1240. Fifteen minutes into infusion, VS stable and pt denied c/o SOB, itching/hives, lip/tongue/facial swelling, CP or other complaints. Infusion rate increased to 155 ml/hr for the remainder of the transfusion. Unit finished @ 1440. VS stable and no transfusion reaction suspected.      Mr. Samuel tolerated infusion without complaints.    Patient Vitals for the past 12 hrs:   Temp Pulse Resp BP SpO2   24 1510 98.6 °F (37 °C) 63 18 (!) 142/75 100 %   24 1440 97.9 °F (36.6 °C) 56 16 134/81 100 %   24 1255 98 °F (36.7 °C) 57 16 120/69 100 %   24 1240 98.1 °F (36.7 °C) 60 16 117/66 96 %   24 1215 97.9 °F (36.6 °C) 59 16 122/67 96 %   24 1010 98.5  °F (36.9 °C) 63 16 (!) 107/57 97 %   05/16/24 0955 98.5 °F (36.9 °C) 67 16 106/65 97 %   05/16/24 0853 99.2 °F (37.3 °C) 78 18 124/62 96 %         Patient remained in [facility] for 30 minutes (per pts request) for observation. No signs of allergic reaction noted.    Port flushed w/ NS, brisk blood return, port heparin locked, and de accessed. Band aid and gauze applied.     Discharge instructions reviewed with pt. Pt instructed to report SOB, CP, elevated temp, back pain, or other symptoms of transfusion reaction to MD or ED.  Pt verbalized understanding.     Patient armband removed and shredded.    [facility] in stable condition at 1515.  He is to follow up as needed.    Pennie Castro RN  May 16, 2024

## 2024-05-17 LAB
ABO + RH BLD: NORMAL
ANTIGENS PRESENT RBC DONR: NORMAL
ANTIGENS PRESENT RBC DONR: NORMAL
BLD PROD TYP BPU: NORMAL
BLD PROD TYP BPU: NORMAL
BLOOD BANK BLOOD PRODUCT EXPIRATION DATE: NORMAL
BLOOD BANK BLOOD PRODUCT EXPIRATION DATE: NORMAL
BLOOD BANK DISPENSE STATUS: NORMAL
BLOOD BANK DISPENSE STATUS: NORMAL
BLOOD BANK ISBT PRODUCT BLOOD TYPE: 5100
BLOOD BANK ISBT PRODUCT BLOOD TYPE: 5100
BLOOD BANK PRODUCT CODE: NORMAL
BLOOD BANK PRODUCT CODE: NORMAL
BLOOD BANK UNIT TYPE AND RH: NORMAL
BLOOD BANK UNIT TYPE AND RH: NORMAL
BLOOD GROUP ANTIBODIES SERPL: NORMAL
BLOOD GROUP ANTIBODIES SERPL: NORMAL
BPU ID: NORMAL
BPU ID: NORMAL
CROSSMATCH RESULT: NORMAL
CROSSMATCH RESULT: NORMAL
SPECIMEN EXP DATE BLD: NORMAL
UNIT DIVISION: 0
UNIT DIVISION: 0
UNIT ISSUE DATE/TIME: NORMAL
UNIT ISSUE DATE/TIME: NORMAL

## 2024-06-10 ENCOUNTER — APPOINTMENT (OUTPATIENT)
Facility: HOSPITAL | Age: 64
End: 2024-06-10
Payer: COMMERCIAL

## 2024-06-10 ENCOUNTER — HOSPITAL ENCOUNTER (INPATIENT)
Facility: HOSPITAL | Age: 64
LOS: 3 days | Discharge: HOME | End: 2024-06-14
Attending: STUDENT IN AN ORGANIZED HEALTH CARE EDUCATION/TRAINING PROGRAM | Admitting: HOSPITALIST
Payer: COMMERCIAL

## 2024-06-10 DIAGNOSIS — D64.9 SYMPTOMATIC ANEMIA: ICD-10-CM

## 2024-06-10 DIAGNOSIS — D57.00 SICKLE CELL CRISIS (HCC): ICD-10-CM

## 2024-06-10 DIAGNOSIS — I42.9 CARDIOMYOPATHY, UNSPECIFIED TYPE (HCC): ICD-10-CM

## 2024-06-10 DIAGNOSIS — D57.00 SICKLE CELL PAIN CRISIS (HCC): Primary | ICD-10-CM

## 2024-06-10 PROCEDURE — 83880 ASSAY OF NATRIURETIC PEPTIDE: CPT

## 2024-06-10 PROCEDURE — 85025 COMPLETE CBC W/AUTO DIFF WBC: CPT

## 2024-06-10 PROCEDURE — 84484 ASSAY OF TROPONIN QUANT: CPT

## 2024-06-10 PROCEDURE — 70450 CT HEAD/BRAIN W/O DYE: CPT

## 2024-06-10 PROCEDURE — 96374 THER/PROPH/DIAG INJ IV PUSH: CPT

## 2024-06-10 PROCEDURE — 93005 ELECTROCARDIOGRAM TRACING: CPT | Performed by: STUDENT IN AN ORGANIZED HEALTH CARE EDUCATION/TRAINING PROGRAM

## 2024-06-10 PROCEDURE — 80048 BASIC METABOLIC PNL TOTAL CA: CPT

## 2024-06-10 PROCEDURE — 85045 AUTOMATED RETICULOCYTE COUNT: CPT

## 2024-06-10 PROCEDURE — 71045 X-RAY EXAM CHEST 1 VIEW: CPT

## 2024-06-10 PROCEDURE — 99285 EMERGENCY DEPT VISIT HI MDM: CPT

## 2024-06-10 PROCEDURE — 74176 CT ABD & PELVIS W/O CONTRAST: CPT

## 2024-06-10 PROCEDURE — 83735 ASSAY OF MAGNESIUM: CPT

## 2024-06-10 PROCEDURE — 6360000002 HC RX W HCPCS: Performed by: STUDENT IN AN ORGANIZED HEALTH CARE EDUCATION/TRAINING PROGRAM

## 2024-06-10 RX ADMIN — HYDROMORPHONE HYDROCHLORIDE 0.5 MG: 1 INJECTION, SOLUTION INTRAMUSCULAR; INTRAVENOUS; SUBCUTANEOUS at 23:46

## 2024-06-10 ASSESSMENT — PAIN DESCRIPTION - LOCATION: LOCATION: GENERALIZED

## 2024-06-10 ASSESSMENT — PAIN SCALES - GENERAL: PAINLEVEL_OUTOF10: 10

## 2024-06-11 PROBLEM — D64.9 SYMPTOMATIC ANEMIA: Status: ACTIVE | Noted: 2024-06-11

## 2024-06-11 LAB
ANION GAP SERPL CALC-SCNC: 4 MMOL/L (ref 3–18)
BASOPHILS # BLD: 0.1 K/UL (ref 0–0.1)
BASOPHILS NFR BLD: 1 % (ref 0–2)
BUN SERPL-MCNC: 29 MG/DL (ref 7–18)
BUN/CREAT SERPL: 18 (ref 12–20)
CALCIUM SERPL-MCNC: 8.9 MG/DL (ref 8.5–10.1)
CHLORIDE SERPL-SCNC: 108 MMOL/L (ref 100–111)
CO2 SERPL-SCNC: 24 MMOL/L (ref 21–32)
CREAT SERPL-MCNC: 1.62 MG/DL (ref 0.6–1.3)
DIFFERENTIAL METHOD BLD: ABNORMAL
EKG ATRIAL RATE: 85 BPM
EKG DIAGNOSIS: NORMAL
EKG P AXIS: 70 DEGREES
EKG P-R INTERVAL: 150 MS
EKG Q-T INTERVAL: 388 MS
EKG QRS DURATION: 96 MS
EKG QTC CALCULATION (BAZETT): 461 MS
EKG R AXIS: 2 DEGREES
EKG T AXIS: 68 DEGREES
EKG VENTRICULAR RATE: 85 BPM
EOSINOPHIL # BLD: 0.1 K/UL (ref 0–0.4)
EOSINOPHIL NFR BLD: 2 % (ref 0–5)
ERYTHROCYTE [DISTWIDTH] IN BLOOD BY AUTOMATED COUNT: 17.6 % (ref 11.6–14.5)
GLUCOSE SERPL-MCNC: 95 MG/DL (ref 74–99)
HCT VFR BLD AUTO: 16.2 % (ref 36–48)
HCT VFR BLD AUTO: 22.6 % (ref 36–48)
HGB BLD-MCNC: 5.6 G/DL (ref 13–16)
HGB BLD-MCNC: 7.8 G/DL (ref 13–16)
HISTORY CHECK: NORMAL
IMM GRANULOCYTES # BLD AUTO: 0 K/UL (ref 0–0.04)
IMM GRANULOCYTES NFR BLD AUTO: 0 % (ref 0–0.5)
LYMPHOCYTES # BLD: 1.9 K/UL (ref 0.9–3.6)
LYMPHOCYTES NFR BLD: 25 % (ref 21–52)
MAGNESIUM SERPL-MCNC: 1.5 MG/DL (ref 1.6–2.6)
MCH RBC QN AUTO: 28.4 PG (ref 24–34)
MCHC RBC AUTO-ENTMCNC: 34.6 G/DL (ref 31–37)
MCV RBC AUTO: 82.2 FL (ref 78–100)
MONOCYTES # BLD: 1 K/UL (ref 0.05–1.2)
MONOCYTES NFR BLD: 14 % (ref 3–10)
NEUTS SEG # BLD: 4.5 K/UL (ref 1.8–8)
NEUTS SEG NFR BLD: 59 % (ref 40–73)
NRBC # BLD: 0.07 K/UL (ref 0–0.01)
NRBC BLD-RTO: 0.9 PER 100 WBC
NT PRO BNP: 1620 PG/ML (ref 0–900)
PLATELET # BLD AUTO: 166 K/UL (ref 135–420)
PMV BLD AUTO: 9.1 FL (ref 9.2–11.8)
POTASSIUM SERPL-SCNC: 4.6 MMOL/L (ref 3.5–5.5)
RBC # BLD AUTO: 1.97 M/UL (ref 4.35–5.65)
RETICS/RBC NFR AUTO: 3.6 % (ref 0.5–2.5)
SODIUM SERPL-SCNC: 136 MMOL/L (ref 136–145)
TROPONIN I SERPL HS-MCNC: 42 NG/L (ref 0–78)
WBC # BLD AUTO: 7.6 K/UL (ref 4.6–13.2)

## 2024-06-11 PROCEDURE — 93010 ELECTROCARDIOGRAM REPORT: CPT | Performed by: INTERNAL MEDICINE

## 2024-06-11 PROCEDURE — 6360000002 HC RX W HCPCS: Performed by: HOSPITALIST

## 2024-06-11 PROCEDURE — 86850 RBC ANTIBODY SCREEN: CPT

## 2024-06-11 PROCEDURE — 30233N1 TRANSFUSION OF NONAUTOLOGOUS RED BLOOD CELLS INTO PERIPHERAL VEIN, PERCUTANEOUS APPROACH: ICD-10-PCS | Performed by: STUDENT IN AN ORGANIZED HEALTH CARE EDUCATION/TRAINING PROGRAM

## 2024-06-11 PROCEDURE — 1100000000 HC RM PRIVATE

## 2024-06-11 PROCEDURE — 86901 BLOOD TYPING SEROLOGIC RH(D): CPT

## 2024-06-11 PROCEDURE — 36430 TRANSFUSION BLD/BLD COMPNT: CPT

## 2024-06-11 PROCEDURE — 85014 HEMATOCRIT: CPT

## 2024-06-11 PROCEDURE — 6370000000 HC RX 637 (ALT 250 FOR IP): Performed by: HOSPITALIST

## 2024-06-11 PROCEDURE — 85018 HEMOGLOBIN: CPT

## 2024-06-11 PROCEDURE — 6370000000 HC RX 637 (ALT 250 FOR IP): Performed by: STUDENT IN AN ORGANIZED HEALTH CARE EDUCATION/TRAINING PROGRAM

## 2024-06-11 PROCEDURE — P9040 RBC LEUKOREDUCED IRRADIATED: HCPCS

## 2024-06-11 PROCEDURE — 86900 BLOOD TYPING SEROLOGIC ABO: CPT

## 2024-06-11 PROCEDURE — 85660 RBC SICKLE CELL TEST: CPT

## 2024-06-11 PROCEDURE — 86922 COMPATIBILITY TEST ANTIGLOB: CPT

## 2024-06-11 PROCEDURE — 94761 N-INVAS EAR/PLS OXIMETRY MLT: CPT

## 2024-06-11 PROCEDURE — 2580000003 HC RX 258: Performed by: HOSPITALIST

## 2024-06-11 PROCEDURE — 86921 COMPATIBILITY TEST INCUBATE: CPT

## 2024-06-11 PROCEDURE — 86920 COMPATIBILITY TEST SPIN: CPT

## 2024-06-11 PROCEDURE — 6360000002 HC RX W HCPCS: Performed by: INTERNAL MEDICINE

## 2024-06-11 PROCEDURE — 86902 BLOOD TYPE ANTIGEN DONOR EA: CPT

## 2024-06-11 PROCEDURE — 86870 RBC ANTIBODY IDENTIFICATION: CPT

## 2024-06-11 RX ORDER — ACETAMINOPHEN 325 MG/1
650 TABLET ORAL EVERY 6 HOURS PRN
Status: DISCONTINUED | OUTPATIENT
Start: 2024-06-11 | End: 2024-06-14 | Stop reason: HOSPADM

## 2024-06-11 RX ORDER — LANOLIN ALCOHOL/MO/W.PET/CERES
400 CREAM (GRAM) TOPICAL ONCE
Status: COMPLETED | OUTPATIENT
Start: 2024-06-11 | End: 2024-06-11

## 2024-06-11 RX ORDER — FOLIC ACID 1 MG/1
1 TABLET ORAL DAILY
Status: DISCONTINUED | OUTPATIENT
Start: 2024-06-11 | End: 2024-06-14 | Stop reason: HOSPADM

## 2024-06-11 RX ORDER — ACETAMINOPHEN 650 MG/1
650 SUPPOSITORY RECTAL EVERY 6 HOURS PRN
Status: DISCONTINUED | OUTPATIENT
Start: 2024-06-11 | End: 2024-06-14 | Stop reason: HOSPADM

## 2024-06-11 RX ORDER — SODIUM CHLORIDE 0.9 % (FLUSH) 0.9 %
5-40 SYRINGE (ML) INJECTION EVERY 12 HOURS SCHEDULED
Status: DISCONTINUED | OUTPATIENT
Start: 2024-06-11 | End: 2024-06-14 | Stop reason: HOSPADM

## 2024-06-11 RX ORDER — ONDANSETRON 2 MG/ML
4 INJECTION INTRAMUSCULAR; INTRAVENOUS EVERY 6 HOURS PRN
Status: DISCONTINUED | OUTPATIENT
Start: 2024-06-11 | End: 2024-06-14 | Stop reason: HOSPADM

## 2024-06-11 RX ORDER — MAGNESIUM SULFATE IN WATER 40 MG/ML
2000 INJECTION, SOLUTION INTRAVENOUS PRN
Status: DISCONTINUED | OUTPATIENT
Start: 2024-06-11 | End: 2024-06-14 | Stop reason: HOSPADM

## 2024-06-11 RX ORDER — ONDANSETRON 4 MG/1
4 TABLET, ORALLY DISINTEGRATING ORAL EVERY 8 HOURS PRN
Status: DISCONTINUED | OUTPATIENT
Start: 2024-06-11 | End: 2024-06-14 | Stop reason: HOSPADM

## 2024-06-11 RX ORDER — PANTOPRAZOLE SODIUM 40 MG/1
40 TABLET, DELAYED RELEASE ORAL
Status: DISCONTINUED | OUTPATIENT
Start: 2024-06-11 | End: 2024-06-14 | Stop reason: HOSPADM

## 2024-06-11 RX ORDER — POTASSIUM CHLORIDE 20 MEQ/1
40 TABLET, EXTENDED RELEASE ORAL PRN
Status: DISCONTINUED | OUTPATIENT
Start: 2024-06-11 | End: 2024-06-14 | Stop reason: HOSPADM

## 2024-06-11 RX ORDER — SODIUM CHLORIDE 9 MG/ML
INJECTION, SOLUTION INTRAVENOUS PRN
Status: DISCONTINUED | OUTPATIENT
Start: 2024-06-11 | End: 2024-06-14

## 2024-06-11 RX ORDER — SODIUM CHLORIDE 9 MG/ML
INJECTION, SOLUTION INTRAVENOUS PRN
Status: DISCONTINUED | OUTPATIENT
Start: 2024-06-11 | End: 2024-06-14 | Stop reason: HOSPADM

## 2024-06-11 RX ORDER — SODIUM CHLORIDE 0.9 % (FLUSH) 0.9 %
5-40 SYRINGE (ML) INJECTION PRN
Status: DISCONTINUED | OUTPATIENT
Start: 2024-06-11 | End: 2024-06-14 | Stop reason: HOSPADM

## 2024-06-11 RX ORDER — POTASSIUM CHLORIDE 7.45 MG/ML
10 INJECTION INTRAVENOUS PRN
Status: DISCONTINUED | OUTPATIENT
Start: 2024-06-11 | End: 2024-06-14 | Stop reason: HOSPADM

## 2024-06-11 RX ORDER — POLYETHYLENE GLYCOL 3350 17 G/17G
17 POWDER, FOR SOLUTION ORAL DAILY PRN
Status: DISCONTINUED | OUTPATIENT
Start: 2024-06-11 | End: 2024-06-14 | Stop reason: HOSPADM

## 2024-06-11 RX ADMIN — PANTOPRAZOLE SODIUM 40 MG: 40 TABLET, DELAYED RELEASE ORAL at 16:03

## 2024-06-11 RX ADMIN — HYDROMORPHONE HYDROCHLORIDE 0.5 MG: 1 INJECTION, SOLUTION INTRAMUSCULAR; INTRAVENOUS; SUBCUTANEOUS at 02:54

## 2024-06-11 RX ADMIN — HYDROMORPHONE HYDROCHLORIDE 0.5 MG: 1 INJECTION, SOLUTION INTRAMUSCULAR; INTRAVENOUS; SUBCUTANEOUS at 06:52

## 2024-06-11 RX ADMIN — PANTOPRAZOLE SODIUM 40 MG: 40 TABLET, DELAYED RELEASE ORAL at 06:52

## 2024-06-11 RX ADMIN — HYDROMORPHONE HYDROCHLORIDE 0.5 MG: 1 INJECTION, SOLUTION INTRAMUSCULAR; INTRAVENOUS; SUBCUTANEOUS at 09:51

## 2024-06-11 RX ADMIN — HYDROMORPHONE HYDROCHLORIDE 0.5 MG: 1 INJECTION, SOLUTION INTRAMUSCULAR; INTRAVENOUS; SUBCUTANEOUS at 22:02

## 2024-06-11 RX ADMIN — SACUBITRIL AND VALSARTAN 1 TABLET: 24; 26 TABLET, FILM COATED ORAL at 08:41

## 2024-06-11 RX ADMIN — FOLIC ACID 1 MG: 1 TABLET ORAL at 08:41

## 2024-06-11 RX ADMIN — HYDROMORPHONE HYDROCHLORIDE 0.5 MG: 1 INJECTION, SOLUTION INTRAMUSCULAR; INTRAVENOUS; SUBCUTANEOUS at 18:50

## 2024-06-11 RX ADMIN — SODIUM CHLORIDE, PRESERVATIVE FREE 10 ML: 5 INJECTION INTRAVENOUS at 20:44

## 2024-06-11 RX ADMIN — SODIUM CHLORIDE, PRESERVATIVE FREE 10 ML: 5 INJECTION INTRAVENOUS at 08:42

## 2024-06-11 RX ADMIN — HYDROMORPHONE HYDROCHLORIDE 0.5 MG: 1 INJECTION, SOLUTION INTRAMUSCULAR; INTRAVENOUS; SUBCUTANEOUS at 12:54

## 2024-06-11 RX ADMIN — HYDROMORPHONE HYDROCHLORIDE 0.5 MG: 1 INJECTION, SOLUTION INTRAMUSCULAR; INTRAVENOUS; SUBCUTANEOUS at 16:00

## 2024-06-11 RX ADMIN — SACUBITRIL AND VALSARTAN 1 TABLET: 24; 26 TABLET, FILM COATED ORAL at 20:43

## 2024-06-11 RX ADMIN — Medication 400 MG: at 02:04

## 2024-06-11 ASSESSMENT — PAIN DESCRIPTION - FREQUENCY
FREQUENCY: CONTINUOUS

## 2024-06-11 ASSESSMENT — PAIN - FUNCTIONAL ASSESSMENT
PAIN_FUNCTIONAL_ASSESSMENT: ACTIVITIES ARE NOT PREVENTED
PAIN_FUNCTIONAL_ASSESSMENT: PREVENTS OR INTERFERES SOME ACTIVE ACTIVITIES AND ADLS

## 2024-06-11 ASSESSMENT — PAIN DESCRIPTION - LOCATION
LOCATION: LEG
LOCATION: GENERALIZED
LOCATION: LEG
LOCATION: LEG
LOCATION: LEG;BACK
LOCATION: LEG

## 2024-06-11 ASSESSMENT — PAIN SCALES - GENERAL
PAINLEVEL_OUTOF10: 10
PAINLEVEL_OUTOF10: 10
PAINLEVEL_OUTOF10: 7
PAINLEVEL_OUTOF10: 7
PAINLEVEL_OUTOF10: 10
PAINLEVEL_OUTOF10: 8
PAINLEVEL_OUTOF10: 7
PAINLEVEL_OUTOF10: 7
PAINLEVEL_OUTOF10: 0
PAINLEVEL_OUTOF10: 10

## 2024-06-11 ASSESSMENT — PAIN DESCRIPTION - ORIENTATION
ORIENTATION: RIGHT;LEFT
ORIENTATION: LEFT;RIGHT
ORIENTATION: RIGHT;LEFT

## 2024-06-11 ASSESSMENT — PAIN DESCRIPTION - DESCRIPTORS
DESCRIPTORS: ACHING
DESCRIPTORS: THROBBING;SHARP
DESCRIPTORS: ACHING

## 2024-06-11 ASSESSMENT — PAIN DESCRIPTION - ONSET
ONSET: ON-GOING

## 2024-06-11 ASSESSMENT — PAIN DESCRIPTION - PAIN TYPE
TYPE: CHRONIC PAIN

## 2024-06-11 NOTE — PROGRESS NOTES
4 Eyes Skin Assessment     NAME:  Kayden Samuel  YOB: 1960  MEDICAL RECORD NUMBER:  [id number]    The patient is being assessed for  Admission    I agree that at least one RN has performed a thorough Head to Toe Skin Assessment on the patient. ALL assessment sites listed below have been assessed.      Areas assessed by both nurses:    Head, Face, Ears, Shoulders, Back, Chest, Arms, Elbows, Hands, Sacrum. Buttock, Coccyx, Ischium, and Legs. Feet and Heels        Does the Patient have a Wound? No noted wound(s)       Jose Prevention initiated by RN: No  Wound Care Orders initiated by RN: No    Pressure Injury (Stage 3,4, Unstageable, DTI, NWPT, and Complex wounds) if present, place Wound referral order by RN under : No    New Ostomies, if present place, Ostomy referral order under : No     Nurse 1 eSignature: Electronically signed by BHUPENDRA ALONSO RN on 6/11/24 at 7:52 AM EDT    **SHARE this note so that the co-signing nurse can place an eSignature**    Nurse 2 eSignature: Electronically signed by Galindo Edwards RN on 6/11/24 at 7:53 AM EDT

## 2024-06-11 NOTE — ED TRIAGE NOTES
Received patient in triage after being sent over from [facility] for a reported Hemoglobin of 6.4. Pt reports dizziness and shortness of breath with hx of Sickle Cell Disease.

## 2024-06-11 NOTE — PROGRESS NOTES
Patient admitted this morning, seen for follow-up.  Patient feeling better, denies any shortness of breath, no chest pain  Currently pain well-controlled, patient received pain medicine and lower back.    /62   Pulse 61   Temp 97.5 °F (36.4 °C) (Oral)   Resp 18   Ht 1.626 m (5' 4\")   Wt 53.1 kg (117 lb)   SpO2 98%   BMI 20.08 kg/m²     Exam  General:  Alert, cooperative, no acute distress    Pulmonary:  CTA Bilaterally. No Wheezing/Rales.  Cardiovascular: Regular rate and Rhythm.  GI:  Soft, Non distended, Non tender. + Bowel sounds.  Extremities:  No edema. No calf tenderness.   Psych: Good insight. Not anxious or agitated.  Neurologic: Alert and oriented X 3. No acute neuro deficits.     Labs, chart, and vitals noted    Agree with blood transfusion  No need for Lasix, will monitor closely  Hematology input noted  If develop fluid overload, will consider Lasix discussed with RN      Discussed with patient.      Disclaimer: Sections of this note are dictated using utilizing voice recognition software. Minor typographical errors may be present. If questions arise, please do not hesitate to contact me or call our department.

## 2024-06-11 NOTE — H&P
History and Physical      Chief complaint SOB    Subjective     HPI: Kayden Samuel is a 63 y.o. male with a past medical history significant for sickle cell disease, chronic anemia requiring transfusions, hypertension, cardiomyopathy, pulmonary hypertension who presented to the ED with the above chief complaint.  History is gathered from the patient and emergency room provider.  Patient states that he started having generalized body weakness and fatigue since Thursday.  He is a recent lab work showed hemoglobin of 6.4.  He follows with hematology and gets transfusions every 2 to 3 weeks. He also complains of bilateral inguinal pain.  He lost his balance and fell 2 days ago.  Denies hitting his head or passing out.  He has shortness of breath and dyspnea on exertion.  No cough fever or other symptoms upper respiratory tract infection urinary symptoms.  No diarrhea or complaint.  He has chronic bilateral extremity edema.  Denies smoking, alcohol or illicit drug use.    In the emergency room vital signs stable.  Saturating well on room air.  Workup showed serum sodium 136 potassium 4.6 bicarb 24 BUN 29 creatinine 1.62.  Magnesium 1.5.  NT proBNP 1620.  Troponin 42.  Serum glucose 95.  WBC 7.6.  Hgb 5.8 and 16.2 MCV 82.2 platelet 166.  Reticulocyte count percentage 3.6.  CT of the head and CT of the abdomen and pelvis without contrast done.  Patient treated with 0.5 mg IV Dilaudid, 400 mg magnesium oxide.  Case discussed with hematology recommended blood transfusion.  Patient admitted for management of symptomatic anemia due to sickle cell crisis.    PMHx:  Past Medical History:   Diagnosis Date    WILFRIDO (acute kidney injury) (HCC) 09/01/2023    Anemia     Cardiomyopathy (HCC)     Duodenal ulcer     GERD (gastroesophageal reflux disease)     History of blood transfusion     last transfusion 7/10/2023    Hypertension 05/12/2023    Other ill-defined conditions(799.89) Sickle cell    Pulmonary HTN (HCC)     mild per

## 2024-06-11 NOTE — CONSENT
Informed Consent for Blood Component Transfusion Note    I have discussed with the patient the rationale for blood component transfusion; its benefits in treating or preventing fatigue, organ damage, or death; and its risk which includes mild transfusion reactions, rare risk of blood borne infection, or more serious but rare reactions. I have discussed the alternatives to transfusion, including the risk and consequences of not receiving transfusion. The patient had an opportunity to ask questions and had agreed to proceed with transfusion of blood components.    Electronically signed by Christo Ross Jr, DO on 6/11/24 at 12:13 AM EDT

## 2024-06-11 NOTE — PROGRESS NOTES
Hematology / Oncology Progress Note  [facility]      Patient ID:  Kayden Samuel  63 y.o.  1960    Admit Date: 6/10/2024    Assessment:     Principal Problem:    Symptomatic anemia  Active Problems:    Sickle cell anemia with crisis (HCC)    Hypertension    Chronic diastolic congestive heart failure (HCC)  Resolved Problems:    * No resolved hospital problems. *    Symptomatic sickle cell anemia  Fall   pain  Plan:     Track cbc, lytes, retics  Prbc to get hemoglobin >7  Schedule pain meds  Access mediport    Subjective:   At office Monday and found to have hemoglobin 6.4.  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  Biliary stricture requiring stent in the past    Denies fevers, cough, sob or chest pain  Hurts all over    Objective:     /73   Pulse 62   Temp 97.7 °F (36.5 °C) (Oral)   Resp 20   Ht 1.626 m (5' 4\")   Wt 53.1 kg (117 lb)   SpO2 98%   BMI 20.08 kg/m²           Temp (24hrs), Av.3 °F (36.8 °C), Min:97.7 °F (36.5 °C), Max:98.7 °F (37.1 °C)        Intake/Output Summary (Last 24 hours) at 2024 0840  Last data filed at 2024 0835  Gross per 24 hour   Intake 160 ml   Output 575 ml   Net -415 ml       Review of Systems:   Constitutional:  No Fever; No chills; No weight loss    Skin:  No rash; No itching   HEENT:  No changes in vision or hearing   Cardiovascular:  No palpitations, chest pain, angina   Respiratory:  No shortness of breath, no wheezing, no pleurisy, no cough, no  hemoptysis   Gastrointestinal:  No nausea or vomiting; No diarrhea, no dyspepsis   Genitourinary:  No dysuria; No increase in urinary frequency   Musculoskeletal:  No weakness or muscle pains   Endo:  No polyuria; no symptoms of thyroid dysfunction   Heme:  No bruising; No bleeding, no adenopathy   Neurological:  No Seizures; No focal weakness or sensory changes

## 2024-06-11 NOTE — ED NOTES
TRANSFER - OUT REPORT:    Verbal report given to Venessa on Kayden Samuel  being transferred to [address] [place] [address] for routine progression of patient care       Report consisted of patient's Situation, Background, Assessment and   Recommendations(SBAR).     Information from the following report(s) Nurse Handoff Report, ED SBAR, and MAR was reviewed with the receiving nurse.    [place] Fall Assessment:                           Lines:   Implantable Port Right Chest (Active)        Opportunity for questions and clarification was provided.      Patient transported with:  Registered Nurse

## 2024-06-11 NOTE — PLAN OF CARE
Problem: Pain  Goal: Verbalizes/displays adequate comfort level or baseline comfort level  Outcome: Progressing     Problem: ABCDS Injury Assessment  Goal: Absence of physical injury  Outcome: Progressing  Flowsheets (Taken 6/11/2024 0753 by Venessa Viera RN)  Absence of Physical Injury: Implement safety measures based on patient assessment     Problem: Safety - Adult  Goal: Free from fall injury  Outcome: Progressing

## 2024-06-11 NOTE — CARE COORDINATION
06/11/24 0921   Service Assessment   Patient Orientation Alert and Oriented   Cognition Alert   History Provided By Patient   Primary Caregiver Self   Accompanied By/Relationship None   Support Systems Family Members   Patient's Healthcare Decision Maker is: Legal Next of Kin   PCP Verified by CM Yes   Last Visit to PCP Within last 3 months   Prior Functional Level Independent in ADLs/IADLs   Current Functional Level Independent in ADLs/IADLs   Can patient return to prior living arrangement Yes   Ability to make needs known: Good   Family able to assist with home care needs: Yes   Would you like for me to discuss the discharge plan with any other family members/significant others, and if so, who? Yes  (Sister Laurie)   Financial Resources Other (Comment)   Community Resources None   Social/Functional History   Lives With Alone   Type of Home House   Home Layout One level   Home Access Stairs to enter with rails   Entrance Stairs - Number of Steps 3   Entrance Stairs - Rails Both   Bathroom Shower/Tub Tub/Shower unit   Bathroom Toilet Standard   Bathroom Equipment Toilet raiser   Bathroom Accessibility Accessible   Home Equipment Walker - Rolling   Receives Help From Family   ADL Assistance Independent   Homemaking Assistance Independent   Homemaking Responsibilities Yes   Ambulation Assistance Independent   Transfer Assistance Independent   Active  Yes   Mode of Transportation Car   Occupation Retired   Discharge Planning   Type of Residence House   Living Arrangements Alone   Current Services Prior To Admission None   Potential Assistance Needed N/A   DME Ordered? No   Potential Assistance Purchasing Medications No   Type of Home Care Services None   Patient expects to be discharged to: House   One/Two Story Residence One story   History of falls? 1   Services At/After Discharge   Transition of Care Consult (CM Consult) Discharge Planning   Services At/After Discharge None    Resource Information

## 2024-06-11 NOTE — PROGRESS NOTES
conducted an initial consultation and Spiritual Assessment for Kayden Samuel, who is a 63 y.o.,male. Patient's Primary Language is: English.   According to the patient's EMR Pent[employer]alism Affiliation is: Oriental orthodox.     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   Chart reviewed.    Assessment:  Patient declined  visit.    Plan:  Chaplains will continue to follow and will provide pastoral care on an as needed/requested basis.   recommends bedside caregivers page  on duty if patient shows signs of acute spiritual or emotional distress.    Chaplain Tamiko Vick  Spiritual Care   [phone]

## 2024-06-11 NOTE — PROGRESS NOTES
Physical Therapy  Pt not seen for skilled PT due to:    []  Nausea/vomiting  []  Eating  []  Pain  []  Pt lethargic  []  Off Unit  Other: Nurse requesting to hold PT due to symptomatic anemia (414); pt declining participation at this time (8605)    Will f/u later as schedule allows. Thank you.  Yolette Lr PT, DPT

## 2024-06-11 NOTE — ED PROVIDER NOTES
IntraVENous PRN Concetta Leavitt MD        magnesium sulfate 2000 mg in 50 mL IVPB premix  2,000 mg IntraVENous PRN Concetta Leavitt MD        ondansetron (ZOFRAN-ODT) disintegrating tablet 4 mg  4 mg Oral Q8H PRN Concetta Leavitt MD        Or    ondansetron (ZOFRAN) injection 4 mg  4 mg IntraVENous Q6H PRN Concetta Leavitt MD        polyethylene glycol (GLYCOLAX) packet 17 g  17 g Oral Daily PRN Concetta Leavitt MD        acetaminophen (TYLENOL) tablet 650 mg  650 mg Oral Q6H PRN Concetta Leavitt MD        Or    acetaminophen (TYLENOL) suppository 650 mg  650 mg Rectal Q6H PRN Concetta Leavitt MD        HYDROmorphone (DILAUDID) injection 0.5 mg  0.5 mg IntraVENous Q4H PRN Concetta Leavitt MD   0.5 mg at 06/11/24 0254     Current Outpatient Medications   Medication Sig Dispense Refill    furosemide (LASIX) 20 MG tablet Take 1 tablet by mouth three times a week 12 tablet 0    ammonium lactate (LAC-HYDRIN) 12 % lotion Apply topically as needed. 567 g 0    Elastic Bandages & Supports (MEDICAL COMPRESSION STOCKINGS) MISC 1 each by Does not apply route daily as needed (Remove at night) Knee-high, moderate compression 1 each 0    sacubitril-valsartan (ENTRESTO) 24-26 MG per tablet Take 1 tablet by mouth 2 times daily 60 tablet 5    folic acid (FOLVITE) 1 MG tablet Take 1 tablet by mouth daily 30 tablet 0    pantoprazole (PROTONIX) 40 MG tablet Take 1 tablet by mouth 2 times daily (before meals) 60 tablet 0    HYDROmorphone (DILAUDID) 4 MG tablet Take by mouth as needed.         Past History     Past Medical History:   Past Medical History:   Diagnosis Date    WILFRIDO (acute kidney injury) (HCC) 09/01/2023    Anemia     Cardiomyopathy (HCC)     Duodenal ulcer     GERD (gastroesophageal reflux disease)     History of blood transfusion     last transfusion 7/10/2023    Hypertension 05/12/2023    Other ill-defined conditions(799.89) Sickle cell    Pulmonary HTN (HCC)     mild per echo 5/11/23 -36 mmHG RVSP    Sickle cell  Head W/O Contrast    CT ABDOMEN PELVIS WO CONTRAST Additional Contrast? None    BMP    CBC with Auto Differential    Magnesium    Troponin    Brain Natriuretic Peptide    Reticulocytes    Hemoglobin and Hematocrit    Basic Metabolic Panel w/ Reflex to MG    CBC with Auto Differential    ADULT DIET; Regular; Low Fat/Low Chol/High Fiber/2 gm Na    Orthostatic blood pressure and pulse    Verify hospital blood product consent form has been signed and witnessed    Vital Signs For Blood Product Transfusion    Transfusion Reaction Management    Vital signs per unit routine    Initiate Hypoglycemia Treatment    Telemetry monitoring - 48 hour duration    Notify physician    Up as tolerated    Place intermittent pneumatic compression device    Full code    OT eval and treat    PT evaluation and treat    Initiate Oxygen Therapy Protocol    EKG 12 Lead (SOB)    TYPE AND SCREEN    PREPARE RBC (CROSSMATCH), 1 Units    ADMIT TO INPATIENT          ED Course:   ED Course as of 06/11/24 0351   Mon Shimon 10, 2024   2342 Patient's chest x-ray on my read does not show evidence of acute focal consolidation.  Will continue to monitor patient. [DV]   Tue Jun 11, 2024   0013 Patient's lab work significant for hemoglobin 5.6, creatinine 1.60 which is around his baseline, magnesium 1.5, will replete, BNP 1600 which is downtrending when compared to previous, elevated reticulocyte count, otherwise labs grossly within normal limits at this time.  Will consent and transfuse 1 unit PRBC for further evaluation of his symptoms.  Will continue to monitor patient. [DV]   0041 Spoke with hospitalist who agrees to admit patient pending CT scans.  Will continue to monitor patient. [DV]   0213 Patients CT scan of the head on my read without ICH. Patient admitted to hospitalist at this time.  [DV]      ED Course User Index  [DV] Christo Ross Jr., DO           Procedures:  Procedures      Rhythm interpretation from monitor: Sinus rhythm      Social

## 2024-06-11 NOTE — PROGRESS NOTES
Patient arrived on floor from ED via stretcher. Alert and oriented X 4. Patient complaining it's cold on this floor and received 8 blankets with yelling out while plaining blankets on body. Complain of entire body aching as soon as arrival to floor. Last medication in ED. Patient upset about needing pain medication and it's every 4 hours as informed by writer. Also informed what time medication was due. Telemetry applied with patient running bijan sinus rhythm. Urinal used with patient needing assistance to stand for use with  unsteady gait. Voiding vlad urine. Dual skin assessment with bilateral lower extremities noted with dry scaly flaky skin with medication [place] hydrin not applied more than a week ago. Patient 's hemoglobin 5.6 and Hct 16.2, awaiting call arrival from [place]. Patient has sickle cell anemia and will communicate to oncoming shift.

## 2024-06-11 NOTE — PROGRESS NOTES
Occupational Therapy  OT order received and chart reviewed.  Spoke with patient in his room and he declined need for skilled OT at this time.  Patient is modified independent with basic self care tasks and functional mobility. He has assistance and AE needed at home.  No skilled OT indicated at this time; will complete the order.      Thank you for the referral.   Pennie Marquez OTD, OTR/L

## 2024-06-12 ENCOUNTER — APPOINTMENT (OUTPATIENT)
Facility: HOSPITAL | Age: 64
End: 2024-06-12
Attending: HOSPITALIST
Payer: COMMERCIAL

## 2024-06-12 LAB
ALBUMIN SERPL-MCNC: 3.1 G/DL (ref 3.4–5)
ALBUMIN/GLOB SERPL: 0.7 (ref 0.8–1.7)
ALP SERPL-CCNC: 117 U/L (ref 45–117)
ALT SERPL-CCNC: 41 U/L (ref 16–61)
ANION GAP SERPL CALC-SCNC: 5 MMOL/L (ref 3–18)
AST SERPL-CCNC: 62 U/L (ref 10–38)
BASOPHILS # BLD: 0.1 K/UL (ref 0–0.1)
BASOPHILS NFR BLD: 1 % (ref 0–2)
BILIRUB SERPL-MCNC: 2.5 MG/DL (ref 0.2–1)
BUN SERPL-MCNC: 22 MG/DL (ref 7–18)
BUN/CREAT SERPL: 19 (ref 12–20)
CALCIUM SERPL-MCNC: 9.1 MG/DL (ref 8.5–10.1)
CHLORIDE SERPL-SCNC: 111 MMOL/L (ref 100–111)
CO2 SERPL-SCNC: 24 MMOL/L (ref 21–32)
CREAT SERPL-MCNC: 1.15 MG/DL (ref 0.6–1.3)
DIFFERENTIAL METHOD BLD: ABNORMAL
ECHO AO ASC DIAM: 3.8 CM
ECHO AO ASCENDING AORTA INDEX: 2.44 CM/M2
ECHO AO ROOT DIAM: 3.2 CM
ECHO AO ROOT INDEX: 2.05 CM/M2
ECHO AV AREA PEAK VELOCITY: 2 CM2
ECHO AV AREA VTI: 2.3 CM2
ECHO AV AREA/BSA PEAK VELOCITY: 1.3 CM2/M2
ECHO AV AREA/BSA VTI: 1.5 CM2/M2
ECHO AV MEAN GRADIENT: 7 MMHG
ECHO AV MEAN VELOCITY: 1.2 M/S
ECHO AV PEAK GRADIENT: 16 MMHG
ECHO AV PEAK VELOCITY: 2 M/S
ECHO AV VELOCITY RATIO: 0.45
ECHO AV VTI: 43.1 CM
ECHO BSA: 1.55 M2
ECHO EST RA PRESSURE: 8 MMHG
ECHO LA DIAMETER INDEX: 3.14 CM/M2
ECHO LA DIAMETER: 4.9 CM
ECHO LA TO AORTIC ROOT RATIO: 1.53
ECHO LA VOL A-L A2C: 155 ML (ref 18–58)
ECHO LA VOL A-L A4C: 104 ML (ref 18–58)
ECHO LA VOL BP: 122 ML (ref 18–58)
ECHO LA VOL MOD A2C: 147 ML (ref 18–58)
ECHO LA VOL MOD A4C: 95 ML (ref 18–58)
ECHO LA VOL/BSA BIPLANE: 78 ML/M2 (ref 16–34)
ECHO LA VOLUME AREA LENGTH: 133 ML
ECHO LA VOLUME INDEX A-L A2C: 99 ML/M2 (ref 16–34)
ECHO LA VOLUME INDEX A-L A4C: 67 ML/M2 (ref 16–34)
ECHO LA VOLUME INDEX AREA LENGTH: 85 ML/M2 (ref 16–34)
ECHO LA VOLUME INDEX MOD A2C: 94 ML/M2 (ref 16–34)
ECHO LA VOLUME INDEX MOD A4C: 61 ML/M2 (ref 16–34)
ECHO LV E' LATERAL VELOCITY: 9 CM/S
ECHO LV E' SEPTAL VELOCITY: 6 CM/S
ECHO LV EDV A2C: 195 ML
ECHO LV EDV A4C: 110 ML
ECHO LV EDV BP: 163 ML (ref 67–155)
ECHO LV EDV INDEX A4C: 71 ML/M2
ECHO LV EDV INDEX BP: 104 ML/M2
ECHO LV EDV NDEX A2C: 125 ML/M2
ECHO LV EJECTION FRACTION A2C: 73 %
ECHO LV EJECTION FRACTION A4C: 60 %
ECHO LV ESV A2C: 53 ML
ECHO LV ESV A4C: 45 ML
ECHO LV ESV BP: 53 ML (ref 22–58)
ECHO LV ESV INDEX A2C: 34 ML/M2
ECHO LV ESV INDEX A4C: 29 ML/M2
ECHO LV ESV INDEX BP: 34 ML/M2
ECHO LV FRACTIONAL SHORTENING: 25 % (ref 28–44)
ECHO LV INTERNAL DIMENSION DIASTOLE INDEX: 3.33 CM/M2
ECHO LV INTERNAL DIMENSION DIASTOLIC: 5.2 CM (ref 4.2–5.9)
ECHO LV INTERNAL DIMENSION SYSTOLIC INDEX: 2.5 CM/M2
ECHO LV INTERNAL DIMENSION SYSTOLIC: 3.9 CM
ECHO LV IVSD: 1 CM (ref 0.6–1)
ECHO LV MASS 2D: 194.2 G (ref 88–224)
ECHO LV MASS INDEX 2D: 124.5 G/M2 (ref 49–115)
ECHO LV POSTERIOR WALL DIASTOLIC: 1 CM (ref 0.6–1)
ECHO LV RELATIVE WALL THICKNESS RATIO: 0.38
ECHO LVOT AREA: 4.5 CM2
ECHO LVOT AV VTI INDEX: 0.53
ECHO LVOT DIAM: 2.4 CM
ECHO LVOT MEAN GRADIENT: 2 MMHG
ECHO LVOT PEAK GRADIENT: 3 MMHG
ECHO LVOT PEAK VELOCITY: 0.9 M/S
ECHO LVOT STROKE VOLUME INDEX: 65.8 ML/M2
ECHO LVOT SV: 102.6 ML
ECHO LVOT VTI: 22.7 CM
ECHO MV A VELOCITY: 1.04 M/S
ECHO MV AREA VTI: 2.6 CM2
ECHO MV E DECELERATION TIME (DT): 158.4 MS
ECHO MV E VELOCITY: 1.18 M/S
ECHO MV E/A RATIO: 1.13
ECHO MV E/E' LATERAL: 13.11
ECHO MV E/E' RATIO (AVERAGED): 16.39
ECHO MV E/E' SEPTAL: 19.67
ECHO MV LVOT VTI INDEX: 1.74
ECHO MV MAX VELOCITY: 1.3 M/S
ECHO MV MEAN GRADIENT: 3 MMHG
ECHO MV MEAN VELOCITY: 0.8 M/S
ECHO MV PEAK GRADIENT: 6 MMHG
ECHO MV VTI: 39.6 CM
ECHO PV MAX VELOCITY: 1.1 M/S
ECHO PV MEAN GRADIENT: 3 MMHG
ECHO PV MEAN VELOCITY: 0.8 M/S
ECHO PV PEAK GRADIENT: 5 MMHG
ECHO PV VTI: 27.7 CM
ECHO RA AREA 4C: 22 CM2
ECHO RIGHT VENTRICULAR SYSTOLIC PRESSURE (RVSP): 39 MMHG
ECHO RV FREE WALL PEAK S': 20 CM/S
ECHO RV TAPSE: 2.3 CM (ref 1.7–?)
ECHO TV REGURGITANT MAX VELOCITY: 2.79 M/S
ECHO TV REGURGITANT PEAK GRADIENT: 31 MMHG
EOSINOPHIL # BLD: 0.4 K/UL (ref 0–0.4)
EOSINOPHIL NFR BLD: 6 % (ref 0–5)
ERYTHROCYTE [DISTWIDTH] IN BLOOD BY AUTOMATED COUNT: 17.5 % (ref 11.6–14.5)
GLOBULIN SER CALC-MCNC: 4.5 G/DL (ref 2–4)
GLUCOSE SERPL-MCNC: 105 MG/DL (ref 74–99)
HCT VFR BLD AUTO: 21.3 % (ref 36–48)
HGB BLD-MCNC: 7.1 G/DL (ref 13–16)
IMM GRANULOCYTES # BLD AUTO: 0 K/UL (ref 0–0.04)
IMM GRANULOCYTES NFR BLD AUTO: 0 % (ref 0–0.5)
LYMPHOCYTES # BLD: 1.2 K/UL (ref 0.9–3.6)
LYMPHOCYTES NFR BLD: 17 % (ref 21–52)
MCH RBC QN AUTO: 28.6 PG (ref 24–34)
MCHC RBC AUTO-ENTMCNC: 33.3 G/DL (ref 31–37)
MCV RBC AUTO: 85.9 FL (ref 78–100)
MONOCYTES # BLD: 0.8 K/UL (ref 0.05–1.2)
MONOCYTES NFR BLD: 11 % (ref 3–10)
NEUTS SEG # BLD: 4.5 K/UL (ref 1.8–8)
NEUTS SEG NFR BLD: 65 % (ref 40–73)
NRBC # BLD: 0.09 K/UL (ref 0–0.01)
NRBC BLD-RTO: 1.3 PER 100 WBC
PLATELET # BLD AUTO: 192 K/UL (ref 135–420)
PMV BLD AUTO: 10.7 FL (ref 9.2–11.8)
POTASSIUM SERPL-SCNC: 4.4 MMOL/L (ref 3.5–5.5)
PROT SERPL-MCNC: 7.6 G/DL (ref 6.4–8.2)
RBC # BLD AUTO: 2.48 M/UL (ref 4.35–5.65)
SODIUM SERPL-SCNC: 140 MMOL/L (ref 136–145)
WBC # BLD AUTO: 6.9 K/UL (ref 4.6–13.2)

## 2024-06-12 PROCEDURE — 2580000003 HC RX 258: Performed by: HOSPITALIST

## 2024-06-12 PROCEDURE — 6360000002 HC RX W HCPCS: Performed by: STUDENT IN AN ORGANIZED HEALTH CARE EDUCATION/TRAINING PROGRAM

## 2024-06-12 PROCEDURE — 6370000000 HC RX 637 (ALT 250 FOR IP): Performed by: HOSPITALIST

## 2024-06-12 PROCEDURE — 93306 TTE W/DOPPLER COMPLETE: CPT | Performed by: INTERNAL MEDICINE

## 2024-06-12 PROCEDURE — 99232 SBSQ HOSP IP/OBS MODERATE 35: CPT | Performed by: STUDENT IN AN ORGANIZED HEALTH CARE EDUCATION/TRAINING PROGRAM

## 2024-06-12 PROCEDURE — 85025 COMPLETE CBC W/AUTO DIFF WBC: CPT

## 2024-06-12 PROCEDURE — 6370000000 HC RX 637 (ALT 250 FOR IP): Performed by: INTERNAL MEDICINE

## 2024-06-12 PROCEDURE — 94761 N-INVAS EAR/PLS OXIMETRY MLT: CPT

## 2024-06-12 PROCEDURE — 80053 COMPREHEN METABOLIC PANEL: CPT

## 2024-06-12 PROCEDURE — 1100000000 HC RM PRIVATE

## 2024-06-12 PROCEDURE — 93306 TTE W/DOPPLER COMPLETE: CPT

## 2024-06-12 PROCEDURE — 97162 PT EVAL MOD COMPLEX 30 MIN: CPT

## 2024-06-12 PROCEDURE — 6360000002 HC RX W HCPCS: Performed by: HOSPITALIST

## 2024-06-12 PROCEDURE — 6360000002 HC RX W HCPCS: Performed by: INTERNAL MEDICINE

## 2024-06-12 RX ORDER — IBUPROFEN 400 MG/1
800 TABLET ORAL
Status: COMPLETED | OUTPATIENT
Start: 2024-06-12 | End: 2024-06-13

## 2024-06-12 RX ORDER — OXYCODONE HYDROCHLORIDE AND ACETAMINOPHEN 5; 325 MG/1; MG/1
1 TABLET ORAL EVERY 4 HOURS PRN
Status: DISCONTINUED | OUTPATIENT
Start: 2024-06-12 | End: 2024-06-14 | Stop reason: HOSPADM

## 2024-06-12 RX ADMIN — SODIUM CHLORIDE, PRESERVATIVE FREE 10 ML: 5 INJECTION INTRAVENOUS at 20:52

## 2024-06-12 RX ADMIN — IBUPROFEN 800 MG: 400 TABLET, FILM COATED ORAL at 18:05

## 2024-06-12 RX ADMIN — PANTOPRAZOLE SODIUM 40 MG: 40 TABLET, DELAYED RELEASE ORAL at 15:29

## 2024-06-12 RX ADMIN — HYDROMORPHONE HYDROCHLORIDE 0.5 MG: 1 INJECTION, SOLUTION INTRAMUSCULAR; INTRAVENOUS; SUBCUTANEOUS at 02:12

## 2024-06-12 RX ADMIN — SACUBITRIL AND VALSARTAN 1 TABLET: 24; 26 TABLET, FILM COATED ORAL at 20:52

## 2024-06-12 RX ADMIN — FOLIC ACID 1 MG: 1 TABLET ORAL at 08:52

## 2024-06-12 RX ADMIN — SACUBITRIL AND VALSARTAN 1 TABLET: 24; 26 TABLET, FILM COATED ORAL at 08:52

## 2024-06-12 RX ADMIN — HYDROMORPHONE HYDROCHLORIDE 0.5 MG: 1 INJECTION, SOLUTION INTRAMUSCULAR; INTRAVENOUS; SUBCUTANEOUS at 20:52

## 2024-06-12 RX ADMIN — HYDROMORPHONE HYDROCHLORIDE 0.5 MG: 1 INJECTION, SOLUTION INTRAMUSCULAR; INTRAVENOUS; SUBCUTANEOUS at 15:29

## 2024-06-12 RX ADMIN — HYDROMORPHONE HYDROCHLORIDE 0.5 MG: 1 INJECTION, SOLUTION INTRAMUSCULAR; INTRAVENOUS; SUBCUTANEOUS at 18:05

## 2024-06-12 RX ADMIN — HYDROMORPHONE HYDROCHLORIDE 0.5 MG: 1 INJECTION, SOLUTION INTRAMUSCULAR; INTRAVENOUS; SUBCUTANEOUS at 05:13

## 2024-06-12 RX ADMIN — IBUPROFEN 800 MG: 400 TABLET, FILM COATED ORAL at 12:03

## 2024-06-12 RX ADMIN — PANTOPRAZOLE SODIUM 40 MG: 40 TABLET, DELAYED RELEASE ORAL at 08:10

## 2024-06-12 RX ADMIN — HYDROMORPHONE HYDROCHLORIDE 0.5 MG: 1 INJECTION, SOLUTION INTRAMUSCULAR; INTRAVENOUS; SUBCUTANEOUS at 12:11

## 2024-06-12 RX ADMIN — SODIUM CHLORIDE, PRESERVATIVE FREE 10 ML: 5 INJECTION INTRAVENOUS at 08:53

## 2024-06-12 RX ADMIN — HYDROMORPHONE HYDROCHLORIDE 0.5 MG: 1 INJECTION, SOLUTION INTRAMUSCULAR; INTRAVENOUS; SUBCUTANEOUS at 08:52

## 2024-06-12 ASSESSMENT — PAIN DESCRIPTION - ORIENTATION
ORIENTATION: RIGHT;LEFT
ORIENTATION: LEFT;RIGHT
ORIENTATION: RIGHT;LEFT

## 2024-06-12 ASSESSMENT — PAIN SCALES - GENERAL
PAINLEVEL_OUTOF10: 7
PAINLEVEL_OUTOF10: 10
PAINLEVEL_OUTOF10: 8
PAINLEVEL_OUTOF10: 8
PAINLEVEL_OUTOF10: 10

## 2024-06-12 ASSESSMENT — PAIN DESCRIPTION - LOCATION
LOCATION: LEG
LOCATION: LEG;HIP
LOCATION: LEG
LOCATION: ABDOMEN;LEG
LOCATION: LEG;GROIN

## 2024-06-12 ASSESSMENT — PAIN DESCRIPTION - FREQUENCY
FREQUENCY: CONTINUOUS

## 2024-06-12 ASSESSMENT — PAIN DESCRIPTION - DESCRIPTORS
DESCRIPTORS: ACHING;CRAMPING
DESCRIPTORS: ACHING
DESCRIPTORS: SHARP;THROBBING
DESCRIPTORS: SHARP
DESCRIPTORS: THROBBING;ACHING
DESCRIPTORS: ACHING;THROBBING
DESCRIPTORS: ACHING

## 2024-06-12 ASSESSMENT — PAIN - FUNCTIONAL ASSESSMENT

## 2024-06-12 ASSESSMENT — PAIN DESCRIPTION - PAIN TYPE
TYPE: ACUTE PAIN
TYPE: CHRONIC PAIN
TYPE: ACUTE PAIN
TYPE: ACUTE PAIN

## 2024-06-12 ASSESSMENT — PAIN DESCRIPTION - ONSET
ONSET: ON-GOING

## 2024-06-12 NOTE — PLAN OF CARE
Problem: Physical Therapy - Adult  Goal: By Discharge: Performs mobility at highest level of function for planned discharge setting.  See evaluation for individualized goals.  Description: Physical Therapy Goals:  Initiated 6/12/2024 to be met within 7-10 days.    1.  Patient will move from supine to sit and sit to supine  in bed with modified independence.    2.  Patient will transfer from bed to chair and chair to bed with modified independence using the least restrictive device.  3.  Patient will perform sit to stand with modified independence.  4.  Patient will ambulate with modified independence for 100 feet with the least restrictive device.   5.  Patient will ascend/descend 3 stairs with handrail(s) with modified independence.    PLOF: Patient was independent with functional mobility and ADLs. Has cane and RW. Lives alone in s1st floor apartment.      Outcome: Progressing     PHYSICAL THERAPY EVALUATION    Patient: Kayden Samuel (63 y.o. male)  Date: 6/12/2024  Primary Diagnosis: Sickle cell crisis (HCC) [D57.00]  Symptomatic anemia [D64.9]       Precautions: Fall Risk,  ,  ,  ,  ,  ,  ,      ASSESSMENT :  Patient resting in bed upon entry and reports 10/10 BLE pain. Generally decreased ROM/strength. Patient declines further participation in mobility 2/2 pain. He rolls to the right with supervision for change in position. Educated on role of PT and the importance of mobility during hospitalization. Patient resting in bed with all needs in reach.      DEFICITS/IMPAIRMENTS:    , Body Structures, Functions, Activity Limitations Requiring Skilled Therapeutic Intervention: Decreased functional mobility ;Decreased strength;Decreased balance;Increased pain    Patient will benefit from skilled intervention to address the above impairments.  Patient's rehabilitation potential/Therapy Prognosis: Good.  Factors which may influence rehabilitation potential include:  []         None noted  []         Mental  ability/status  [x]         Medical condition  [x]         Home/family situation and support systems  [x]         Safety awareness  [x]         Pain tolerance/management  []         Other:      PLAN :  Recommendations and Planned Interventions:   [x]           Bed Mobility Training             [x]    Neuromuscular Re-Education  [x]           Transfer Training                   []    Orthotic/Prosthetic Training  [x]           Gait Training                          []    Modalities  [x]           Therapeutic Exercises           []    Edema Management/Control  [x]           Therapeutic Activities            [x]    Family Training/Education  [x]           Patient Education  []           Other (comment):    Frequency/Duration: Patient will be followed by physical therapy to address goals, 1-2 times per day/3-5 days per week to address goals.    Further Equipment Recommendations for Discharge: [facility]: AM-PAC Inpatient Mobility Raw Score : 17      Current research shows that an AM-PAC score of 17 (13 without stairs) or less is not associated with a discharge to the patient's home setting.    This [facility] score should be considered in conjunction with interdisciplinary team recommendations to determine the most appropriate discharge setting. Patient's social support, diagnosis, medical stability, and prior level of function should also be taken into consideration.     SUBJECTIVE:   Patient stated “I didn't sleep last night.”    OBJECTIVE DATA SUMMARY:     Past Medical History:   Diagnosis Date    WILFRIDO (acute kidney injury) (HCC) 09/01/2023    Anemia     Cardiomyopathy (HCC)     Duodenal ulcer     GERD (gastroesophageal reflux disease)     History of blood transfusion     last transfusion 7/10/2023    Hypertension 05/12/2023    Other ill-defined conditions(799.89) Sickle cell    Pulmonary HTN (HCC)     mild per echo 5/11/23 -36 mmHG RVSP    Sickle cell anemia (HCC)      Past Surgical History:   Procedure Laterality Date

## 2024-06-12 NOTE — PROGRESS NOTES
1   Hematology / Oncology Progress Note  [facility]      Patient ID:  Kayden Samuel  63 y.o.  1960    Admit Date: 6/10/2024    Assessment:     Principal Problem:    Symptomatic anemia  Active Problems:    Sickle cell anemia with crisis (HCC)    Hypertension    Chronic diastolic congestive heart failure (HCC)  Resolved Problems:    * No resolved hospital problems. *    Lower abdominal strain  Sickle cell anemia  Pain crisis  CKD  Biliary stricture  Plan:     Cbc, lytes, retic  Await results of todays cbc  Prbc to get hemoglobin above 7gm  Schedule dilaudid today  Add motrin 4 doses with meals  Heating pad    Reviewed ct abdomen with patient.  No obvious intra abd pathology   Suspect lower abd pain kim due to strain    Subjective:   Lower abdominal pain above groin osman worse with movemennt.  Concerned about ct scan results  Rest of pain acceptable    Objective:     BP (!) 150/72   Pulse 58   Temp 98.2 °F (36.8 °C) (Oral)   Resp 18   Ht 1.626 m (5' 4\")   Wt 53.1 kg (117 lb)   SpO2 97%   BMI 20.08 kg/m²           Temp (24hrs), Av.9 °F (36.6 °C), Min:97.5 °F (36.4 °C), Max:98.2 °F (36.8 °C)        Intake/Output Summary (Last 24 hours) at 2024 0815  Last data filed at 2024 0215  Gross per 24 hour   Intake 1430 ml   Output 1625 ml   Net -195 ml       Review of Systems:   Constitutional:  No Fever; No chills; No weight loss    Skin:  No rash; No itching   HEENT:  No changes in vision or hearing   Cardiovascular:  No palpitations, chest pain, angina   Respiratory:  No shortness of breath, no wheezing, no pleurisy, no cough, no  hemoptysis   Gastrointestinal:  No nausea or vomiting; No diarrhea, no dyspepsis   Genitourinary:  No dysuria; No increase in urinary frequency   Musculoskeletal:  No weakness or muscle pains   Endo:  No polyuria; no symptoms of thyroid dysfunction   Heme:  No bruising; No bleeding, no adenopathy   Neurological:  No Seizures; No focal weakness or sensory

## 2024-06-13 LAB
ANION GAP SERPL CALC-SCNC: 6 MMOL/L (ref 3–18)
BUN SERPL-MCNC: 21 MG/DL (ref 7–18)
BUN/CREAT SERPL: 16 (ref 12–20)
CALCIUM SERPL-MCNC: 8.7 MG/DL (ref 8.5–10.1)
CHLORIDE SERPL-SCNC: 110 MMOL/L (ref 100–111)
CO2 SERPL-SCNC: 24 MMOL/L (ref 21–32)
CREAT SERPL-MCNC: 1.31 MG/DL (ref 0.6–1.3)
ERYTHROCYTE [DISTWIDTH] IN BLOOD BY AUTOMATED COUNT: 18.4 % (ref 11.6–14.5)
GLUCOSE SERPL-MCNC: 112 MG/DL (ref 74–99)
HCT VFR BLD AUTO: 20.5 % (ref 36–48)
HCT VFR BLD AUTO: 28 % (ref 36–48)
HGB BLD-MCNC: 6.8 G/DL (ref 13–16)
HGB BLD-MCNC: 9.3 G/DL (ref 13–16)
HISTORY CHECK: NORMAL
MCH RBC QN AUTO: 28.6 PG (ref 24–34)
MCHC RBC AUTO-ENTMCNC: 33.2 G/DL (ref 31–37)
MCV RBC AUTO: 86.1 FL (ref 78–100)
NRBC # BLD: 0.09 K/UL (ref 0–0.01)
NRBC BLD-RTO: 1 PER 100 WBC
PLATELET # BLD AUTO: 173 K/UL (ref 135–420)
PMV BLD AUTO: 10.4 FL (ref 9.2–11.8)
POTASSIUM SERPL-SCNC: 4.2 MMOL/L (ref 3.5–5.5)
RBC # BLD AUTO: 2.38 M/UL (ref 4.35–5.65)
SODIUM SERPL-SCNC: 140 MMOL/L (ref 136–145)
WBC # BLD AUTO: 8.7 K/UL (ref 4.6–13.2)

## 2024-06-13 PROCEDURE — 6370000000 HC RX 637 (ALT 250 FOR IP): Performed by: STUDENT IN AN ORGANIZED HEALTH CARE EDUCATION/TRAINING PROGRAM

## 2024-06-13 PROCEDURE — 94761 N-INVAS EAR/PLS OXIMETRY MLT: CPT

## 2024-06-13 PROCEDURE — 85014 HEMATOCRIT: CPT

## 2024-06-13 PROCEDURE — 6360000002 HC RX W HCPCS: Performed by: INTERNAL MEDICINE

## 2024-06-13 PROCEDURE — 6370000000 HC RX 637 (ALT 250 FOR IP): Performed by: HOSPITALIST

## 2024-06-13 PROCEDURE — 85027 COMPLETE CBC AUTOMATED: CPT

## 2024-06-13 PROCEDURE — 80048 BASIC METABOLIC PNL TOTAL CA: CPT

## 2024-06-13 PROCEDURE — 85018 HEMOGLOBIN: CPT

## 2024-06-13 PROCEDURE — P9040 RBC LEUKOREDUCED IRRADIATED: HCPCS

## 2024-06-13 PROCEDURE — 6370000000 HC RX 637 (ALT 250 FOR IP): Performed by: INTERNAL MEDICINE

## 2024-06-13 PROCEDURE — 6360000002 HC RX W HCPCS: Performed by: STUDENT IN AN ORGANIZED HEALTH CARE EDUCATION/TRAINING PROGRAM

## 2024-06-13 PROCEDURE — 1100000000 HC RM PRIVATE

## 2024-06-13 PROCEDURE — 2580000003 HC RX 258: Performed by: HOSPITALIST

## 2024-06-13 PROCEDURE — 36430 TRANSFUSION BLD/BLD COMPNT: CPT

## 2024-06-13 PROCEDURE — 36415 COLL VENOUS BLD VENIPUNCTURE: CPT

## 2024-06-13 PROCEDURE — 99232 SBSQ HOSP IP/OBS MODERATE 35: CPT | Performed by: STUDENT IN AN ORGANIZED HEALTH CARE EDUCATION/TRAINING PROGRAM

## 2024-06-13 PROCEDURE — 97116 GAIT TRAINING THERAPY: CPT

## 2024-06-13 RX ORDER — DIPHENHYDRAMINE HYDROCHLORIDE 50 MG/ML
25 INJECTION INTRAMUSCULAR; INTRAVENOUS SEE ADMIN INSTRUCTIONS
Status: COMPLETED | OUTPATIENT
Start: 2024-06-13 | End: 2024-06-13

## 2024-06-13 RX ORDER — ACETAMINOPHEN 325 MG/1
650 TABLET ORAL SEE ADMIN INSTRUCTIONS
Status: COMPLETED | OUTPATIENT
Start: 2024-06-13 | End: 2024-06-13

## 2024-06-13 RX ORDER — DEXAMETHASONE SODIUM PHOSPHATE 4 MG/ML
4 INJECTION, SOLUTION INTRA-ARTICULAR; INTRALESIONAL; INTRAMUSCULAR; INTRAVENOUS; SOFT TISSUE SEE ADMIN INSTRUCTIONS
Status: COMPLETED | OUTPATIENT
Start: 2024-06-13 | End: 2024-06-13

## 2024-06-13 RX ORDER — SODIUM CHLORIDE 9 MG/ML
INJECTION, SOLUTION INTRAVENOUS PRN
Status: DISCONTINUED | OUTPATIENT
Start: 2024-06-13 | End: 2024-06-14 | Stop reason: HOSPADM

## 2024-06-13 RX ADMIN — DEXAMETHASONE SODIUM PHOSPHATE 4 MG: 4 INJECTION INTRA-ARTICULAR; INTRALESIONAL; INTRAMUSCULAR; INTRAVENOUS; SOFT TISSUE at 09:05

## 2024-06-13 RX ADMIN — IBUPROFEN 800 MG: 400 TABLET, FILM COATED ORAL at 12:02

## 2024-06-13 RX ADMIN — HYDROMORPHONE HYDROCHLORIDE 0.5 MG: 1 INJECTION, SOLUTION INTRAMUSCULAR; INTRAVENOUS; SUBCUTANEOUS at 05:22

## 2024-06-13 RX ADMIN — HYDROMORPHONE HYDROCHLORIDE 0.5 MG: 1 INJECTION, SOLUTION INTRAMUSCULAR; INTRAVENOUS; SUBCUTANEOUS at 20:33

## 2024-06-13 RX ADMIN — PANTOPRAZOLE SODIUM 40 MG: 40 TABLET, DELAYED RELEASE ORAL at 05:22

## 2024-06-13 RX ADMIN — HYDROMORPHONE HYDROCHLORIDE 0.5 MG: 1 INJECTION, SOLUTION INTRAMUSCULAR; INTRAVENOUS; SUBCUTANEOUS at 00:48

## 2024-06-13 RX ADMIN — HYDROMORPHONE HYDROCHLORIDE 0.5 MG: 1 INJECTION, SOLUTION INTRAMUSCULAR; INTRAVENOUS; SUBCUTANEOUS at 11:24

## 2024-06-13 RX ADMIN — SACUBITRIL AND VALSARTAN 1 TABLET: 24; 26 TABLET, FILM COATED ORAL at 20:33

## 2024-06-13 RX ADMIN — DIPHENHYDRAMINE HYDROCHLORIDE 25 MG: 50 INJECTION INTRAMUSCULAR; INTRAVENOUS at 09:05

## 2024-06-13 RX ADMIN — SODIUM CHLORIDE, PRESERVATIVE FREE 10 ML: 5 INJECTION INTRAVENOUS at 08:32

## 2024-06-13 RX ADMIN — HYDROMORPHONE HYDROCHLORIDE 0.5 MG: 1 INJECTION, SOLUTION INTRAMUSCULAR; INTRAVENOUS; SUBCUTANEOUS at 17:51

## 2024-06-13 RX ADMIN — SACUBITRIL AND VALSARTAN 1 TABLET: 24; 26 TABLET, FILM COATED ORAL at 08:31

## 2024-06-13 RX ADMIN — HYDROMORPHONE HYDROCHLORIDE 0.5 MG: 1 INJECTION, SOLUTION INTRAMUSCULAR; INTRAVENOUS; SUBCUTANEOUS at 08:29

## 2024-06-13 RX ADMIN — PANTOPRAZOLE SODIUM 40 MG: 40 TABLET, DELAYED RELEASE ORAL at 16:29

## 2024-06-13 RX ADMIN — ACETAMINOPHEN 650 MG: 325 TABLET ORAL at 09:03

## 2024-06-13 RX ADMIN — OXYCODONE HYDROCHLORIDE AND ACETAMINOPHEN 1 TABLET: 5; 325 TABLET ORAL at 16:32

## 2024-06-13 RX ADMIN — FOLIC ACID 1 MG: 1 TABLET ORAL at 08:31

## 2024-06-13 RX ADMIN — HYDROMORPHONE HYDROCHLORIDE 0.5 MG: 1 INJECTION, SOLUTION INTRAMUSCULAR; INTRAVENOUS; SUBCUTANEOUS at 23:39

## 2024-06-13 RX ADMIN — IBUPROFEN 800 MG: 400 TABLET, FILM COATED ORAL at 08:32

## 2024-06-13 RX ADMIN — SODIUM CHLORIDE, PRESERVATIVE FREE 10 ML: 5 INJECTION INTRAVENOUS at 20:47

## 2024-06-13 RX ADMIN — HYDROMORPHONE HYDROCHLORIDE 0.5 MG: 1 INJECTION, SOLUTION INTRAMUSCULAR; INTRAVENOUS; SUBCUTANEOUS at 13:57

## 2024-06-13 ASSESSMENT — PAIN DESCRIPTION - ONSET
ONSET: ON-GOING

## 2024-06-13 ASSESSMENT — PAIN DESCRIPTION - PAIN TYPE
TYPE: CHRONIC PAIN

## 2024-06-13 ASSESSMENT — PAIN DESCRIPTION - DESCRIPTORS
DESCRIPTORS: ACHING;SHARP
DESCRIPTORS: ACHING;THROBBING
DESCRIPTORS: THROBBING;ACHING
DESCRIPTORS: ACHING;THROBBING
DESCRIPTORS: ACHING;THROBBING
DESCRIPTORS: SHARP;THROBBING
DESCRIPTORS: ACHING;THROBBING
DESCRIPTORS: SHARP;THROBBING
DESCRIPTORS: ACHING;THROBBING
DESCRIPTORS: ACHING;SHARP

## 2024-06-13 ASSESSMENT — PAIN SCALES - GENERAL
PAINLEVEL_OUTOF10: 10
PAINLEVEL_OUTOF10: 9
PAINLEVEL_OUTOF10: 6
PAINLEVEL_OUTOF10: 10
PAINLEVEL_OUTOF10: 9
PAINLEVEL_OUTOF10: 6
PAINLEVEL_OUTOF10: 7
PAINLEVEL_OUTOF10: 5
PAINLEVEL_OUTOF10: 7
PAINLEVEL_OUTOF10: 7
PAINLEVEL_OUTOF10: 9
PAINLEVEL_OUTOF10: 8
PAINLEVEL_OUTOF10: 6
PAINLEVEL_OUTOF10: 10
PAINLEVEL_OUTOF10: 10
PAINLEVEL_OUTOF10: 9
PAINLEVEL_OUTOF10: 5
PAINLEVEL_OUTOF10: 7
PAINLEVEL_OUTOF10: 9
PAINLEVEL_OUTOF10: 9
PAINLEVEL_OUTOF10: 8

## 2024-06-13 ASSESSMENT — PAIN DESCRIPTION - FREQUENCY
FREQUENCY: CONTINUOUS

## 2024-06-13 ASSESSMENT — PAIN DESCRIPTION - LOCATION
LOCATION: LEG

## 2024-06-13 ASSESSMENT — PAIN DESCRIPTION - ORIENTATION
ORIENTATION: RIGHT;LEFT
ORIENTATION: LEFT;RIGHT
ORIENTATION: RIGHT;LEFT

## 2024-06-13 ASSESSMENT — PAIN - FUNCTIONAL ASSESSMENT
PAIN_FUNCTIONAL_ASSESSMENT: ACTIVITIES ARE NOT PREVENTED

## 2024-06-13 NOTE — PLAN OF CARE
Problem: Physical Therapy - Adult  Goal: By Discharge: Performs mobility at highest level of function for planned discharge setting.  See evaluation for individualized goals.  Description: Physical Therapy Goals:  Initiated 6/12/2024 to be met within 7-10 days.    1.  Patient will move from supine to sit and sit to supine  in bed with modified independence.    2.  Patient will transfer from bed to chair and chair to bed with modified independence using the least restrictive device.  3.  Patient will perform sit to stand with modified independence.  4.  Patient will ambulate with modified independence for 100 feet with the least restrictive device.   5.  Patient will ascend/descend 3 stairs with handrail(s) with modified independence.    PLOF: Patient was independent with functional mobility and ADLs. Has cane and RW. Lives alone in s1st floor apartment.      Outcome: Adequate for Discharge     PHYSICAL THERAPY TREATMENT/DISCHARGE    Patient: Kayden Samuel (63 y.o. male)  Date: 6/13/2024  Diagnosis: Sickle cell crisis (HCC) [D57.00]  Symptomatic anemia [D64.9] Symptomatic anemia      Precautions: Fall Risk,  ,  ,  ,  ,  ,  ,      ASSESSMENT:  Patient sitting EOB upon entry, in NAD, and agreeable to PT treatment. He has been ambulating to the restroom on his own using SPC. Cane noted to be missing rubber tips on the end and educated on safety to reduce falls risk. States he has extra cane at home and has recently ordered himself a rollator. Patient ambulates around the room with fairly steady gait. Patient remains sitting EOB upon end of session. Call bell left with reach.        PLAN:  Maximum therapeutic gains met at current level of care and patient will be discharged from physical therapy at this time.  Rationale for discharge:  [x]     Goals Achieved  []     Plateau Reached  []     Patient not participating in therapy  []     Other:    Further Equipment Recommendations for Discharge: n/a; pt has canes and

## 2024-06-13 NOTE — PROGRESS NOTES
Progress Note  Hospitalist Service    Patient: Kayden Samuel MRN: [id number]   SSN: [id number]  YOB: 1960   Age: 63 y.o.  Sex: male      Admit Date: 6/10/2024    LOS: 2 days   Chief Complaint   Patient presents with    Shortness of Breath       Subjective:     Patient seen and examined.   Patient needed to be aggressively shaken to be awoken - states he is in 10/10 pain.      Yesterday, patient exhibited some paranoia. States he feels he is being poisoned. He lives alone and does not come into contact with many people. Feels one of his blood transfusions was swapped with chemotherapy agents. Patient asking to have bone density scan and a toxicology report. Patient also states he does not not wish me to discuss his care with other physicians on his care team (Dr. Vegas); I have told patient that it is necessary for all his physicians to communicate in order to provide him the best care possible.     Objective:     Vitals:  /73   Pulse 58   Temp 98 °F (36.7 °C) (Oral)   Resp 18   Ht 1.626 m (5' 4\")   Wt 53.1 kg (117 lb)   SpO2 99%   BMI 20.08 kg/m²     Physical Exam:   General appearance: alert, appears stated age, and cooperative  Lungs: clear to auscultation bilaterally  Heart: regular rate and rhythm, S1, S2 normal, no murmur, click, rub or gallop  Abdomen: soft, non-tender; bowel sounds normal; no masses,  no organomegaly  Pulses: 2+ and symmetric  Skin: Skin color, texture, turgor normal. No rashes or lesions  Neuro:  normal without focal findings, mental status, speech normal, alert and oriented x3, JOYCELYN, and reflexes normal and symmetric    Intake and Output:  Current Shift: 06/13 0701 - 06/13 1900  In: 360 [P.O.:360]  Out: -   Last three shifts: 06/11 1901 - 06/13 0700  In: 680 [P.O.:680]  Out: 1100 [Urine:1100]    Lab/Data Review:  Recent Results (from the past 12 hour(s))   Basic Metabolic Panel    Collection Time: 06/13/24  6:30 AM   Result Value Ref Range    Sodium 140 136  telemetry  Monitor strict input output and daily a.m. weight     6. Hypomagnesemia  Will replace per protocol     Anticipated Discharge: tomorrow        Madeline Bobo DO, hospitalist   June 13, 2024

## 2024-06-13 NOTE — PLAN OF CARE
Problem: Pain  Goal: Verbalizes/displays adequate comfort level or baseline comfort level  Outcome: Progressing     Problem: ABCDS Injury Assessment  Goal: Absence of physical injury  Outcome: Progressing     Problem: Safety - Adult  Goal: Free from fall injury  Outcome: Progressing     Problem: Chronic Conditions and Co-morbidities  Goal: Patient's chronic conditions and co-morbidity symptoms are monitored and maintained or improved  Outcome: Progressing

## 2024-06-13 NOTE — PROGRESS NOTES
Hematology / Oncology Progress Note  [facility]      Patient ID:  Kayden Samuel  63 y.o.  1960    Admit Date: 6/10/2024    Assessment:     Principal Problem:    Symptomatic anemia  Active Problems:    Sickle cell anemia with crisis (HCC)    Hypertension    Chronic diastolic congestive heart failure (HCC)  Resolved Problems:    * No resolved hospital problems. *    Sickle cell anemia  Pain crisis  Symptomatic anemia  pulm hypertension  Abdominal muscle strain  Plan:     Track cbc, lytes, retic  Watch creatinine  Complete ibuprofen  Cont heating pad prn  PRBC today  Cont current pain mx:   Dilaudid 0.5mg q3h scheduled   Oxycodone prn    Subjective:   Pain is coming under control abdomen less painful, bones still ache  Denies chest pain, sob    Objective:     /62   Pulse (!) 48   Temp 98.4 °F (36.9 °C) (Oral)   Resp 16   Ht 1.626 m (5' 4\")   Wt 53.1 kg (117 lb)   SpO2 99%   BMI 20.08 kg/m²           Temp (24hrs), Av.1 °F (36.7 °C), Min:97.7 °F (36.5 °C), Max:98.4 °F (36.9 °C)        Intake/Output Summary (Last 24 hours) at 2024 0815  Last data filed at 2024 0430  Gross per 24 hour   Intake 620 ml   Output 450 ml   Net 170 ml       Review of Systems:   Constitutional:  No Fever; No chills; No weight loss    Skin:  No rash; No itching   HEENT:  No changes in vision or hearing   Cardiovascular:  No palpitations, chest pain, angina   Respiratory:  No shortness of breath, no wheezing, no pleurisy, no cough, no  hemoptysis   Gastrointestinal:  No nausea or vomiting; No diarrhea, no dyspepsis   Genitourinary:  No dysuria; No increase in urinary frequency   Musculoskeletal:  No weakness or muscle pains   Endo:  No polyuria; no symptoms of thyroid dysfunction   Heme:  No bruising; No bleeding, no adenopathy   Neurological:  No Seizures; No focal weakness or sensory changes   Psychiatric:  No mood changes; no suicidal ideation       Physical Exam:  General appearance -

## 2024-06-13 NOTE — PROGRESS NOTES
Progress Note  Hospitalist Service    Patient: Kayden Samuel MRN: [id number]   SSN: [id number]  YOB: 1960   Age: 63 y.o.  Sex: male      Admit Date: 6/10/2024    LOS: 2 days   Chief Complaint   Patient presents with    Shortness of Breath       Subjective:     Patient seen and examined.   Patient lethargic and required vigorous shaking to awaken.     Patient exhibiting some paranoia. States he feels he is being poisoned. He lives alone and does not come into contact with many people. Feels one of his blood transfusions was swapped with chemotherapy agents. Patient asking to have bone density scan and a toxicology report.     Objective:     Vitals:  /73   Pulse 58   Temp 98 °F (36.7 °C) (Oral)   Resp 18   Ht 1.626 m (5' 4\")   Wt 53.1 kg (117 lb)   SpO2 99%   BMI 20.08 kg/m²     Physical Exam:   General appearance: alert, appears stated age, and cooperative  Lungs: clear to auscultation bilaterally  Heart: regular rate and rhythm, S1, S2 normal, no murmur, click, rub or gallop  Abdomen: soft, non-tender; bowel sounds normal; no masses,  no organomegaly  Pulses: 2+ and symmetric  Skin: Skin color, texture, turgor normal. No rashes or lesions  Neuro:  normal without focal findings, mental status, speech normal, alert and oriented x3, JOYCELYN, and reflexes normal and symmetric    Intake and Output:  Current Shift: 06/13 0701 - 06/13 1900  In: 360 [P.O.:360]  Out: -   Last three shifts: 06/11 1901 - 06/13 0700  In: 680 [P.O.:680]  Out: 1100 [Urine:1100]    Lab/Data Review:  Recent Results (from the past 12 hour(s))   Basic Metabolic Panel    Collection Time: 06/13/24  6:30 AM   Result Value Ref Range    Sodium 140 136 - 145 mmol/L    Potassium 4.2 3.5 - 5.5 mmol/L    Chloride 110 100 - 111 mmol/L    CO2 24 21 - 32 mmol/L    Anion Gap 6 3.0 - 18 mmol/L    Glucose 112 (H) 74 - 99 mg/dL    BUN 21 (H) 7.0 - 18 MG/DL    Creatinine 1.31 (H) 0.6 - 1.3 MG/DL    BUN/Creatinine Ratio 16 12 - 20      Est,

## 2024-06-13 NOTE — PROGRESS NOTES
0625 H&H resulted. Hgb 6.8, hct 20.5    0715 On call hospitalist MD thea paged    0308 MD [place] aware. Holding off on blood transfusion as of now. Per MD will speak to day team

## 2024-06-14 VITALS
WEIGHT: 117 LBS | BODY MASS INDEX: 19.97 KG/M2 | SYSTOLIC BLOOD PRESSURE: 115 MMHG | DIASTOLIC BLOOD PRESSURE: 66 MMHG | TEMPERATURE: 97.4 F | RESPIRATION RATE: 18 BRPM | HEIGHT: 64 IN | OXYGEN SATURATION: 100 % | HEART RATE: 69 BPM

## 2024-06-14 LAB
ABO + RH BLD: NORMAL
ANTIGENS PRESENT RBC DONR: NORMAL
ANTIGENS PRESENT RBC DONR: NORMAL
BLD PROD TYP BPU: NORMAL
BLD PROD TYP BPU: NORMAL
BLOOD BANK BLOOD PRODUCT EXPIRATION DATE: NORMAL
BLOOD BANK BLOOD PRODUCT EXPIRATION DATE: NORMAL
BLOOD BANK DISPENSE STATUS: NORMAL
BLOOD BANK DISPENSE STATUS: NORMAL
BLOOD BANK ISBT PRODUCT BLOOD TYPE: 5100
BLOOD BANK ISBT PRODUCT BLOOD TYPE: 5100
BLOOD BANK PRODUCT CODE: NORMAL
BLOOD BANK PRODUCT CODE: NORMAL
BLOOD BANK UNIT TYPE AND RH: NORMAL
BLOOD BANK UNIT TYPE AND RH: NORMAL
BLOOD GROUP ANTIBODIES SERPL: NORMAL
BLOOD GROUP ANTIBODIES SERPL: NORMAL
BPU ID: NORMAL
BPU ID: NORMAL
CALLED TO: NORMAL
CALLED TO:: NORMAL
CROSSMATCH RESULT: NORMAL
CROSSMATCH RESULT: NORMAL
SPECIMEN EXP DATE BLD: NORMAL
UNIT DIVISION: 0
UNIT DIVISION: 0
UNIT ISSUE DATE/TIME: NORMAL
UNIT ISSUE DATE/TIME: NORMAL

## 2024-06-14 PROCEDURE — 6370000000 HC RX 637 (ALT 250 FOR IP): Performed by: HOSPITALIST

## 2024-06-14 PROCEDURE — 99239 HOSP IP/OBS DSCHRG MGMT >30: CPT | Performed by: STUDENT IN AN ORGANIZED HEALTH CARE EDUCATION/TRAINING PROGRAM

## 2024-06-14 PROCEDURE — 6360000002 HC RX W HCPCS: Performed by: INTERNAL MEDICINE

## 2024-06-14 PROCEDURE — 6360000002 HC RX W HCPCS: Performed by: STUDENT IN AN ORGANIZED HEALTH CARE EDUCATION/TRAINING PROGRAM

## 2024-06-14 PROCEDURE — 2580000003 HC RX 258: Performed by: HOSPITALIST

## 2024-06-14 PROCEDURE — 94761 N-INVAS EAR/PLS OXIMETRY MLT: CPT

## 2024-06-14 RX ORDER — HEPARIN SODIUM 1000 [USP'U]/ML
1000 INJECTION, SOLUTION INTRAVENOUS; SUBCUTANEOUS ONCE
Status: COMPLETED | OUTPATIENT
Start: 2024-06-14 | End: 2024-06-14

## 2024-06-14 RX ORDER — OXYCODONE HYDROCHLORIDE AND ACETAMINOPHEN 5; 325 MG/1; MG/1
1 TABLET ORAL EVERY 6 HOURS PRN
Qty: 12 TABLET | Refills: 0 | Status: SHIPPED | OUTPATIENT
Start: 2024-06-14 | End: 2024-06-17

## 2024-06-14 RX ADMIN — SODIUM CHLORIDE, PRESERVATIVE FREE 10 ML: 5 INJECTION INTRAVENOUS at 09:40

## 2024-06-14 RX ADMIN — HYDROMORPHONE HYDROCHLORIDE 0.5 MG: 1 INJECTION, SOLUTION INTRAMUSCULAR; INTRAVENOUS; SUBCUTANEOUS at 08:47

## 2024-06-14 RX ADMIN — FOLIC ACID 1 MG: 1 TABLET ORAL at 08:49

## 2024-06-14 RX ADMIN — SACUBITRIL AND VALSARTAN 1 TABLET: 24; 26 TABLET, FILM COATED ORAL at 08:48

## 2024-06-14 RX ADMIN — PANTOPRAZOLE SODIUM 40 MG: 40 TABLET, DELAYED RELEASE ORAL at 05:33

## 2024-06-14 RX ADMIN — HYDROMORPHONE HYDROCHLORIDE 0.5 MG: 1 INJECTION, SOLUTION INTRAMUSCULAR; INTRAVENOUS; SUBCUTANEOUS at 05:31

## 2024-06-14 RX ADMIN — HEPARIN SODIUM 1000 UNITS: 1000 INJECTION INTRAVENOUS; SUBCUTANEOUS at 12:58

## 2024-06-14 RX ADMIN — HYDROMORPHONE HYDROCHLORIDE 0.5 MG: 1 INJECTION, SOLUTION INTRAMUSCULAR; INTRAVENOUS; SUBCUTANEOUS at 02:32

## 2024-06-14 ASSESSMENT — PAIN DESCRIPTION - LOCATION
LOCATION: LEG

## 2024-06-14 ASSESSMENT — PAIN DESCRIPTION - PAIN TYPE
TYPE: CHRONIC PAIN
TYPE: CHRONIC PAIN

## 2024-06-14 ASSESSMENT — PAIN DESCRIPTION - ONSET
ONSET: ON-GOING
ONSET: ON-GOING

## 2024-06-14 ASSESSMENT — PAIN SCALES - GENERAL
PAINLEVEL_OUTOF10: 7
PAINLEVEL_OUTOF10: 7
PAINLEVEL_OUTOF10: 9
PAINLEVEL_OUTOF10: 7
PAINLEVEL_OUTOF10: 9
PAINLEVEL_OUTOF10: 9
PAINLEVEL_OUTOF10: 8

## 2024-06-14 ASSESSMENT — PAIN DESCRIPTION - FREQUENCY
FREQUENCY: CONTINUOUS
FREQUENCY: CONTINUOUS

## 2024-06-14 ASSESSMENT — PAIN DESCRIPTION - DESCRIPTORS
DESCRIPTORS: THROBBING;ACHING
DESCRIPTORS: ACHING;SHARP
DESCRIPTORS: ACHING;SHARP

## 2024-06-14 ASSESSMENT — PAIN - FUNCTIONAL ASSESSMENT
PAIN_FUNCTIONAL_ASSESSMENT: ACTIVITIES ARE NOT PREVENTED

## 2024-06-14 ASSESSMENT — PAIN DESCRIPTION - ORIENTATION
ORIENTATION: RIGHT;LEFT

## 2024-06-14 NOTE — PLAN OF CARE
Problem: Pain  Goal: Verbalizes/displays adequate comfort level or baseline comfort level  6/14/2024 1115 by Carin Olguin RN  Outcome: Progressing  6/13/2024 2206 by Yasemin Faria, RN  Outcome: Progressing     Problem: ABCDS Injury Assessment  Goal: Absence of physical injury  6/14/2024 1115 by Carin Olguin RN  Outcome: Progressing  6/13/2024 2206 by Yasemin Faria, RN  Outcome: Progressing     Problem: Safety - Adult  Goal: Free from fall injury  6/14/2024 1115 by Carin Olguin RN  Outcome: Progressing  6/13/2024 2206 by Yasemin Faria RN  Outcome: Progressing     Problem: Chronic Conditions and Co-morbidities  Goal: Patient's chronic conditions and co-morbidity symptoms are monitored and maintained or improved  6/14/2024 1115 by Carin Olguin RN  Outcome: Progressing  6/13/2024 2206 by Yasemin Faria, RN  Outcome: Progressing  Flowsheets (Taken 6/13/2024 2047)  Care Plan - Patient's Chronic Conditions and Co-Morbidity Symptoms are Monitored and Maintained or Improved: Monitor and assess patient's chronic conditions and comorbid symptoms for stability, deterioration, or improvement

## 2024-06-14 NOTE — PROGRESS NOTES
Patient: Kayden Samuel   MRN: [id number]         CSN: [id number]    YOB: 1960      Admit Date: 6/10/2024    Assessment:     Principal Problem:    Symptomatic anemia  Active Problems:    Sickle cell anemia with crisis (HCC)    Hypertension    Chronic diastolic congestive heart failure (HCC)  Resolved Problems:    * No resolved hospital problems. *      Plan:     Pain crisis improved. D/c planning  Pt has followup with JAV[facility] Faustin next week  Continue out pt pain meds percocet 5/325 prn with folic acid po daily    Sonja Lopes MD  [facility]  Office [phone]      Subjective:     Sitting up, feeling better    Objective:     /67   Pulse (!) 49   Temp 98 °F (36.7 °C) (Oral)   Resp 18   Ht 1.626 m (5' 4\")   Wt 53.1 kg (117 lb)   SpO2 99%   BMI 20.08 kg/m²           Temp (24hrs), Av.3 °F (36.8 °C), Min:97.6 °F (36.4 °C), Max:99 °F (37.2 °C)        Intake/Output Summary (Last 24 hours) at 2024 0852  Last data filed at 2024 2047  Gross per 24 hour   Intake 1007 ml   Output --   Net 1007 ml     Review of Systems:   Constitutional: negative for fevers, chills, sweats and fatigue  Eyes: negative for visual disturbance,  icterus  Ears, Nose, Mouth, Throat, and Face: negative for tinnitus, epistaxis, sore mouth and hoarseness  Respiratory: negative for cough, sputum, hemoptysis, pleurisy/chest pain or wheezing  Cardiovascular: negative for chest pain, , palpitations,  syncope, paroxysmal nocturnal dyspnea  Gastrointestinal: negative for reflux symptoms, nausea, vomiting, melena, diarrhea, constipation and abdominal pain  Genitourinary:negative for dysuria, nocturia, urinary incontinence, hesitancy and hematuria  Endocrine: no polydipsia, no goitre  Integument: negative for rash, skin lesion(s) and pruritus  Hematologic/Lymphatic: negative for easy bruising, bleeding and lymphadenopathy  Musculoskeletal:negative for myalgias, arthralgias and bone

## 2024-06-14 NOTE — CARE COORDINATION
Discharge order noted for today. Orders received. No needs identified at this time. Case management remains available as needed.  Sister or brother in law to transport.    Ophelia Villegas BSN RN  Case Management  [phone]

## 2024-06-14 NOTE — DISCHARGE SUMMARY
Discharge Summary    Patient: Kayden Samuel MRN: [id number]  CSN: [id number]    YOB: 1960  Age: 63 y.o.  Sex: male    DOA: 6/10/2024 LOS:  LOS: 3 days   Discharge Date:  6/14/24     Admission Diagnosis: Sickle cell crisis (HCC) [D57.00]  Symptomatic anemia [D64.9]    Discharge Diagnosis:    Principal Problem:    Symptomatic anemia  Active Problems:    Sickle cell anemia with crisis (HCC)    Hypertension    Chronic diastolic congestive heart failure (HCC)  Resolved Problems:    * No resolved hospital problems. *       Discharge Condition: Stable  Discharge Disposition: home     PHYSICAL EXAM  Visit Vitals  /66   Pulse 69   Temp 97.4 °F (36.3 °C) (Oral)   Resp 18   Ht 1.626 m (5' 4\")   Wt 53.1 kg (117 lb)   SpO2 100%   BMI 20.08 kg/m²       General: Alert, cooperative, no acute distress    HEENT: NC, Atraumatic.  PERRLA, EOMI. Anicteric sclerae.  Lungs:  CTA Bilaterally. No Wheezing/Rhonchi/Rales.  Heart:  Regular  rhythm,  No murmur, No Rubs, No Gallops  Abdomen: Soft, Non distended, Non tender.  +Bowel sounds, no HSM  Extremities: No c/c/e  Psych:   Good insight. Not anxious or agitated.  Neurologic:  CN 2-12 grossly intact, oriented X 3.  No acute neurological                                 Deficits,     Hospital Course By Problem:   Symptomatic anemia Secondary to sickle cell crisis  Patient presented with generalized body weakness, fatigue shortness of breath and dyspnea on exertion.  Hemoglobin of 5.6. with Retic count 3.6. upon admission.   Pain managed with PRN dilaudid and percocet. Discharged on home percocet.   S/p 3 units of PRBCs. Discharged with hemoglobin of 9.3.   Hematology consulted - Dr. Vegas/[facility] followed.     2. Sickle cell with pain crisis  No evidence of acute chest syndrome  Patient denies chest pain and cough.  Saturating well on room air  Continue pain medication and IV fluid as above  Encourage IS.     3. Chronic kidney disease stage III  Creatinine at

## 2024-07-01 RX ORDER — DIPHENHYDRAMINE HCL 25 MG
25 CAPSULE ORAL ONCE
Status: COMPLETED | OUTPATIENT
Start: 2024-07-05 | End: 2024-07-05

## 2024-07-01 RX ORDER — ACETAMINOPHEN 325 MG/1
650 TABLET ORAL ONCE
Status: COMPLETED | OUTPATIENT
Start: 2024-07-05 | End: 2024-07-05

## 2024-07-03 ENCOUNTER — HOSPITAL ENCOUNTER (OUTPATIENT)
Facility: HOSPITAL | Age: 64
Setting detail: INFUSION SERIES
Discharge: HOME | End: 2024-07-06
Payer: MEDICARE

## 2024-07-03 VITALS
RESPIRATION RATE: 18 BRPM | HEART RATE: 89 BPM | OXYGEN SATURATION: 98 % | DIASTOLIC BLOOD PRESSURE: 74 MMHG | TEMPERATURE: 99 F | SYSTOLIC BLOOD PRESSURE: 140 MMHG

## 2024-07-03 LAB — HISTORY CHECK: NORMAL

## 2024-07-03 PROCEDURE — 86902 BLOOD TYPE ANTIGEN DONOR EA: CPT

## 2024-07-03 PROCEDURE — 86900 BLOOD TYPING SEROLOGIC ABO: CPT

## 2024-07-03 PROCEDURE — 85660 RBC SICKLE CELL TEST: CPT

## 2024-07-03 PROCEDURE — 86921 COMPATIBILITY TEST INCUBATE: CPT

## 2024-07-03 PROCEDURE — 86850 RBC ANTIBODY SCREEN: CPT

## 2024-07-03 PROCEDURE — 86922 COMPATIBILITY TEST ANTIGLOB: CPT

## 2024-07-03 PROCEDURE — 6360000002 HC RX W HCPCS: Performed by: INTERNAL MEDICINE

## 2024-07-03 PROCEDURE — 86920 COMPATIBILITY TEST SPIN: CPT

## 2024-07-03 PROCEDURE — 86870 RBC ANTIBODY IDENTIFICATION: CPT

## 2024-07-03 PROCEDURE — 2580000003 HC RX 258: Performed by: INTERNAL MEDICINE

## 2024-07-03 PROCEDURE — 86901 BLOOD TYPING SEROLOGIC RH(D): CPT

## 2024-07-03 PROCEDURE — 36591 DRAW BLOOD OFF VENOUS DEVICE: CPT

## 2024-07-03 RX ORDER — SODIUM CHLORIDE 0.9 % (FLUSH) 0.9 %
5-40 SYRINGE (ML) INJECTION PRN
Status: DISCONTINUED | OUTPATIENT
Start: 2024-07-03 | End: 2024-07-07 | Stop reason: HOSPADM

## 2024-07-03 RX ORDER — HEPARIN SODIUM (PORCINE) LOCK FLUSH IV SOLN 100 UNIT/ML 100 UNIT/ML
500 SOLUTION INTRAVENOUS PRN
Status: DISCONTINUED | OUTPATIENT
Start: 2024-07-03 | End: 2024-07-07 | Stop reason: HOSPADM

## 2024-07-03 RX ADMIN — SODIUM CHLORIDE, PRESERVATIVE FREE 10 ML: 5 INJECTION INTRAVENOUS at 10:47

## 2024-07-03 RX ADMIN — SODIUM CHLORIDE, PRESERVATIVE FREE 10 ML: 5 INJECTION INTRAVENOUS at 10:40

## 2024-07-03 RX ADMIN — HEPARIN 500 UNITS: 100 SYRINGE at 10:47

## 2024-07-03 ASSESSMENT — PAIN DESCRIPTION - DESCRIPTORS: DESCRIPTORS: ACHING;THROBBING

## 2024-07-03 ASSESSMENT — PAIN DESCRIPTION - LOCATION: LOCATION: HIP;LEG

## 2024-07-03 ASSESSMENT — PAIN DESCRIPTION - ORIENTATION: ORIENTATION: LEFT

## 2024-07-03 ASSESSMENT — PAIN SCALES - GENERAL: PAINLEVEL_OUTOF10: 10

## 2024-07-03 NOTE — PROGRESS NOTES
[facility] Progress Note    Date: July 3, 2024    Name: Kayden Samuel    MRN: [id number]         : 1960    Type and Screen Labs       Mr. Samuel arrived in the [facility] today at 1030, in stable condition, here for his TYPE & CROSSMATCH + TYPE & SCREEN FOR 1 UNIT PRBC. He was assessed and education was provided.     Mr. Samuel's vitals were reviewed.  Vitals:    24 1030   BP: (!) 140/74   Pulse: 89   Resp: 18   Temp: 99 °F (37.2 °C)   SpO2: 98%         Mr. Samuel presented to the infusion center today stating that he was in a lot of generalized pain, especially in his legs and hips (chronic sickle cell pain), which he rated @ # 10 on the pain scale. Otherwise though, he stated that he was doing OK, but was having difficulty ambulating today, and was walking with cane.        His RIGHT CHEST SINGLE LUMEN PORT was accessed without incident and blood for the ordered Type & Crossmatch + Type & Screen (3 Pink Top Tubes) was drawn. Then, the port was flushed well per policy with NS & Heparin, and then the breaux needle was removed and gauze//bandaid was applied.      The blood was sent out by  to the [facility] blood bank for processing.      The required Blood Bank armband was securely applied to his LEFT WRIST, and he was reminded to keep the armband completely dry and intact on his arm until after the completion of the blood transfusion on Wednesday, and he verbalized understanding.        Mr. Samuel tolerated well and voiced no complaints.      Mr. Samuel was [facility] in stable condition at 1100. He is to return on 24 at 1000, for his next appointment, to receive 1 Unit PRBC.       Karuna Moreno RN  July 3, 2024  11:48 AM

## 2024-07-05 ENCOUNTER — HOSPITAL ENCOUNTER (OUTPATIENT)
Facility: HOSPITAL | Age: 64
Setting detail: INFUSION SERIES
End: 2024-07-05
Payer: MEDICARE

## 2024-07-05 VITALS
RESPIRATION RATE: 16 BRPM | HEART RATE: 67 BPM | DIASTOLIC BLOOD PRESSURE: 72 MMHG | SYSTOLIC BLOOD PRESSURE: 151 MMHG | OXYGEN SATURATION: 97 % | TEMPERATURE: 98.6 F

## 2024-07-05 DIAGNOSIS — D57.1 SICKLE CELL DISEASE WITHOUT CRISIS (HCC): Primary | ICD-10-CM

## 2024-07-05 LAB
ABO + RH BLD: NORMAL
ABO/RH(D) PRE-TRANSFUSION: NORMAL
BLD PROD TYP BPU: NORMAL
BLOOD BAG HEMOLYSIS: NORMAL
CLERICAL ERRORS: NORMAL
DAT P TRANSF RBC QL: NORMAL
DAT RBC QL: NORMAL
HEMOLYSIS, POST TRANSFUSION: NORMAL
HEMOLYSIS, PRE TRANSFUSION: NORMAL
PATH REV BLD -IMP: NORMAL
UNIT NUMBER: NORMAL

## 2024-07-05 PROCEDURE — 2500000003 HC RX 250 WO HCPCS: Performed by: INTERNAL MEDICINE

## 2024-07-05 PROCEDURE — 6370000000 HC RX 637 (ALT 250 FOR IP): Performed by: INTERNAL MEDICINE

## 2024-07-05 PROCEDURE — 36415 COLL VENOUS BLD VENIPUNCTURE: CPT

## 2024-07-05 PROCEDURE — 86078 PHYS BLOOD BANK SERV REACTJ: CPT

## 2024-07-05 PROCEDURE — 36430 TRANSFUSION BLD/BLD COMPNT: CPT

## 2024-07-05 PROCEDURE — 99214 OFFICE O/P EST MOD 30 MIN: CPT

## 2024-07-05 PROCEDURE — 96374 THER/PROPH/DIAG INJ IV PUSH: CPT

## 2024-07-05 PROCEDURE — 96375 TX/PRO/DX INJ NEW DRUG ADDON: CPT

## 2024-07-05 PROCEDURE — P9040 RBC LEUKOREDUCED IRRADIATED: HCPCS

## 2024-07-05 PROCEDURE — 6360000002 HC RX W HCPCS: Performed by: INTERNAL MEDICINE

## 2024-07-05 PROCEDURE — 2580000003 HC RX 258: Performed by: INTERNAL MEDICINE

## 2024-07-05 RX ORDER — SODIUM CHLORIDE 9 MG/ML
INJECTION, SOLUTION INTRAVENOUS PRN
Status: COMPLETED | OUTPATIENT
Start: 2024-07-05 | End: 2024-07-05

## 2024-07-05 RX ORDER — ALBUTEROL SULFATE 90 UG/1
4 AEROSOL, METERED RESPIRATORY (INHALATION) PRN
OUTPATIENT
Start: 2024-07-05

## 2024-07-05 RX ORDER — DIPHENHYDRAMINE HYDROCHLORIDE 50 MG/ML
INJECTION INTRAMUSCULAR; INTRAVENOUS
Status: DISPENSED
Start: 2024-07-05 | End: 2024-07-06

## 2024-07-05 RX ORDER — SODIUM CHLORIDE 0.9 % (FLUSH) 0.9 %
5-40 SYRINGE (ML) INJECTION PRN
Status: CANCELLED | OUTPATIENT
Start: 2024-07-05

## 2024-07-05 RX ORDER — EPINEPHRINE 1 MG/ML
0.3 INJECTION, SOLUTION, CONCENTRATE INTRAVENOUS PRN
OUTPATIENT
Start: 2024-07-05

## 2024-07-05 RX ORDER — SODIUM CHLORIDE 9 MG/ML
20 INJECTION, SOLUTION INTRAVENOUS CONTINUOUS
Status: DISCONTINUED | OUTPATIENT
Start: 2024-07-05 | End: 2024-07-05 | Stop reason: SDUPTHER

## 2024-07-05 RX ORDER — ACETAMINOPHEN 325 MG/1
650 TABLET ORAL
OUTPATIENT
Start: 2024-07-05

## 2024-07-05 RX ORDER — ONDANSETRON 2 MG/ML
8 INJECTION INTRAMUSCULAR; INTRAVENOUS
Status: CANCELLED | OUTPATIENT
Start: 2024-07-05

## 2024-07-05 RX ORDER — ALBUTEROL SULFATE 90 UG/1
4 AEROSOL, METERED RESPIRATORY (INHALATION) PRN
Status: CANCELLED | OUTPATIENT
Start: 2024-07-05

## 2024-07-05 RX ORDER — SODIUM CHLORIDE 0.9 % (FLUSH) 0.9 %
5-40 SYRINGE (ML) INJECTION PRN
Status: DISCONTINUED | OUTPATIENT
Start: 2024-07-05 | End: 2024-07-09 | Stop reason: HOSPADM

## 2024-07-05 RX ORDER — ACETAMINOPHEN 325 MG/1
650 TABLET ORAL
Status: CANCELLED | OUTPATIENT
Start: 2024-07-05

## 2024-07-05 RX ORDER — SODIUM CHLORIDE 9 MG/ML
25 INJECTION, SOLUTION INTRAVENOUS PRN
Status: ACTIVE | OUTPATIENT
Start: 2024-07-05 | End: 2024-07-06

## 2024-07-05 RX ORDER — DEXAMETHASONE SODIUM PHOSPHATE 4 MG/ML
4 INJECTION, SOLUTION INTRA-ARTICULAR; INTRALESIONAL; INTRAMUSCULAR; INTRAVENOUS; SOFT TISSUE
Status: ACTIVE | OUTPATIENT
Start: 2024-07-05 | End: 2024-07-06

## 2024-07-05 RX ORDER — ONDANSETRON 2 MG/ML
8 INJECTION INTRAMUSCULAR; INTRAVENOUS
OUTPATIENT
Start: 2024-07-05

## 2024-07-05 RX ORDER — SODIUM CHLORIDE 9 MG/ML
INJECTION, SOLUTION INTRAVENOUS CONTINUOUS
Status: DISCONTINUED | OUTPATIENT
Start: 2024-07-05 | End: 2024-07-09 | Stop reason: HOSPADM

## 2024-07-05 RX ORDER — SODIUM CHLORIDE 9 MG/ML
25 INJECTION, SOLUTION INTRAVENOUS PRN
Status: CANCELLED | OUTPATIENT
Start: 2024-07-05

## 2024-07-05 RX ORDER — HEPARIN SODIUM (PORCINE) LOCK FLUSH IV SOLN 100 UNIT/ML 100 UNIT/ML
500 SOLUTION INTRAVENOUS PRN
Status: DISCONTINUED | OUTPATIENT
Start: 2024-07-05 | End: 2024-07-09 | Stop reason: HOSPADM

## 2024-07-05 RX ORDER — EPINEPHRINE 1 MG/ML
0.3 INJECTION, SOLUTION, CONCENTRATE INTRAVENOUS PRN
Status: CANCELLED | OUTPATIENT
Start: 2024-07-05

## 2024-07-05 RX ORDER — SODIUM CHLORIDE 9 MG/ML
20 INJECTION, SOLUTION INTRAVENOUS CONTINUOUS
Status: CANCELLED | OUTPATIENT
Start: 2024-07-05

## 2024-07-05 RX ORDER — SODIUM CHLORIDE 9 MG/ML
INJECTION, SOLUTION INTRAVENOUS CONTINUOUS
OUTPATIENT
Start: 2024-07-05

## 2024-07-05 RX ORDER — SODIUM CHLORIDE 0.9 % (FLUSH) 0.9 %
5-40 SYRINGE (ML) INJECTION PRN
Status: ACTIVE | OUTPATIENT
Start: 2024-07-05 | End: 2024-07-06

## 2024-07-05 RX ORDER — FAMOTIDINE 10 MG/ML
INJECTION, SOLUTION INTRAVENOUS
Status: DISPENSED
Start: 2024-07-05 | End: 2024-07-06

## 2024-07-05 RX ORDER — DIPHENHYDRAMINE HYDROCHLORIDE 50 MG/ML
50 INJECTION INTRAMUSCULAR; INTRAVENOUS
Status: COMPLETED | OUTPATIENT
Start: 2024-07-05 | End: 2024-07-05

## 2024-07-05 RX ORDER — DIPHENHYDRAMINE HYDROCHLORIDE 50 MG/ML
50 INJECTION INTRAMUSCULAR; INTRAVENOUS
OUTPATIENT
Start: 2024-07-05

## 2024-07-05 RX ADMIN — HYDROCORTISONE SODIUM SUCCINATE 100 MG: 100 INJECTION, POWDER, FOR SOLUTION INTRAMUSCULAR; INTRAVENOUS at 11:01

## 2024-07-05 RX ADMIN — SODIUM CHLORIDE, PRESERVATIVE FREE 20 ML: 5 INJECTION INTRAVENOUS at 16:27

## 2024-07-05 RX ADMIN — DIPHENHYDRAMINE HYDROCHLORIDE 25 MG: 50 INJECTION INTRAMUSCULAR; INTRAVENOUS at 13:30

## 2024-07-05 RX ADMIN — ACETAMINOPHEN 650 MG: 325 TABLET ORAL at 10:59

## 2024-07-05 RX ADMIN — SODIUM CHLORIDE: 9 INJECTION, SOLUTION INTRAVENOUS at 13:25

## 2024-07-05 RX ADMIN — HEPARIN 500 UNITS: 100 SYRINGE at 16:29

## 2024-07-05 RX ADMIN — SODIUM CHLORIDE: 9 INJECTION, SOLUTION INTRAVENOUS at 11:08

## 2024-07-05 RX ADMIN — SODIUM CHLORIDE, PRESERVATIVE FREE 10 ML: 5 INJECTION INTRAVENOUS at 10:45

## 2024-07-05 RX ADMIN — DIPHENHYDRAMINE HYDROCHLORIDE 25 MG: 25 CAPSULE ORAL at 10:59

## 2024-07-05 RX ADMIN — SODIUM CHLORIDE, PRESERVATIVE FREE 20 MG: 5 INJECTION INTRAVENOUS at 13:35

## 2024-07-05 ASSESSMENT — PAIN DESCRIPTION - LOCATION: LOCATION: HIP;LEG

## 2024-07-05 ASSESSMENT — PAIN SCALES - GENERAL: PAINLEVEL_OUTOF10: 10

## 2024-07-05 NOTE — PROGRESS NOTES
[facility] OPI Progress Note    Date: 2024    Name: Kayden Samuel    MRN: [id number]         : 1960      BLOOD TRANSFUSION (1 UNIT PRBCS)      Mr. Samuel arrived to [facility] at 1030.    Mr. Samuel was assessed and education was provided.     Mr. Samuel's vitals were reviewed.  Vitals:    24 1033   BP: 134/69   Pulse: 85   Resp: 18   Temp: 99 °F (37.2 °C)   SpO2: 99%     Port accessed using sterile technique x1 attempt without incident. Blood return brisk and flushed without difficulty.    Pre-medications were administered as ordered approximately 30 minutes prior to transfusion initiated.    Previously signed [facility] consent and Blood Product Consent Note reviewed with patient and noted to be current. Discussed adverse reaction symptoms with patient and instructed to inform RN if occurrence.    Normal saline initiated at KVO per order.     1 unit of PRBCs administered as ordered followed by normal saline flush.    Approximately 1.5 hours after transfusion started, patient started complaining of a \"severe itch\" to his head and upper back. VSS, noted hives beginning on patient's bilateral arms, head, neck and upper back. Transfusion stopped, transfusion reaction protocol initiated with the following medications given:    0.9% NS @ 100ml/hr  Benadryl 25mg IV  Pepcid 20mg IV    2 pink top tubes drawn from LAC via butterfly  Urine sample collected    Patient reports itching is beginning to subside and hives noted to be disappearing/fading approximately 20 minutes after medications administered.    Dr. Vegas contacted via phone at 1350 and made aware of patient's pre-medications, reaction symptoms and reaction protocol medications administered, orders received via telephone with readback at 1355 to proceed with remainder of blood transfusion only if patient feels comfortable, and to administer dexamethasone 4mg IV PRN if patient continues to have symptoms. Discussed continuing transfusion with patient and

## 2024-07-09 LAB
ABO + RH BLD: NORMAL
ABO/RH(D) PRE-TRANSFUSION: NORMAL
BLD PROD TYP BPU: NORMAL
BLOOD BAG HEMOLYSIS: NORMAL
CLERICAL ERRORS: NORMAL
DAT P TRANSF RBC QL: NORMAL
DAT RBC QL: NORMAL
HEMOLYSIS, POST TRANSFUSION: NORMAL
HEMOLYSIS, PRE TRANSFUSION: NORMAL
PATH REV BLD -IMP: NORMAL
TRXN WORKUP COMMENT,TRCOM: NORMAL
UNIT NUMBER: NORMAL

## 2024-07-23 ENCOUNTER — HOSPITAL ENCOUNTER (OUTPATIENT)
Facility: HOSPITAL | Age: 64
Setting detail: INFUSION SERIES
Discharge: HOME | End: 2024-07-26
Payer: MEDICARE

## 2024-07-23 VITALS
TEMPERATURE: 98.5 F | OXYGEN SATURATION: 95 % | RESPIRATION RATE: 16 BRPM | SYSTOLIC BLOOD PRESSURE: 147 MMHG | HEART RATE: 80 BPM | DIASTOLIC BLOOD PRESSURE: 62 MMHG

## 2024-07-23 LAB — HISTORY CHECK: NORMAL

## 2024-07-23 PROCEDURE — 36591 DRAW BLOOD OFF VENOUS DEVICE: CPT

## 2024-07-23 PROCEDURE — 2580000003 HC RX 258: Performed by: INTERNAL MEDICINE

## 2024-07-23 PROCEDURE — 36415 COLL VENOUS BLD VENIPUNCTURE: CPT

## 2024-07-23 PROCEDURE — 86902 BLOOD TYPE ANTIGEN DONOR EA: CPT

## 2024-07-23 PROCEDURE — 86900 BLOOD TYPING SEROLOGIC ABO: CPT

## 2024-07-23 PROCEDURE — 86870 RBC ANTIBODY IDENTIFICATION: CPT

## 2024-07-23 PROCEDURE — 86922 COMPATIBILITY TEST ANTIGLOB: CPT

## 2024-07-23 PROCEDURE — 86901 BLOOD TYPING SEROLOGIC RH(D): CPT

## 2024-07-23 PROCEDURE — 85660 RBC SICKLE CELL TEST: CPT

## 2024-07-23 PROCEDURE — 86920 COMPATIBILITY TEST SPIN: CPT

## 2024-07-23 PROCEDURE — 6360000002 HC RX W HCPCS: Performed by: INTERNAL MEDICINE

## 2024-07-23 PROCEDURE — 86850 RBC ANTIBODY SCREEN: CPT

## 2024-07-23 PROCEDURE — 86921 COMPATIBILITY TEST INCUBATE: CPT

## 2024-07-23 RX ORDER — HEPARIN SODIUM (PORCINE) LOCK FLUSH IV SOLN 100 UNIT/ML 100 UNIT/ML
500 SOLUTION INTRAVENOUS PRN
Status: DISCONTINUED | OUTPATIENT
Start: 2024-07-23 | End: 2024-07-27 | Stop reason: HOSPADM

## 2024-07-23 RX ORDER — ACETAMINOPHEN 325 MG/1
650 TABLET ORAL ONCE
Status: COMPLETED | OUTPATIENT
Start: 2024-07-25 | End: 2024-07-25

## 2024-07-23 RX ORDER — SODIUM CHLORIDE 0.9 % (FLUSH) 0.9 %
5-40 SYRINGE (ML) INJECTION PRN
Status: DISCONTINUED | OUTPATIENT
Start: 2024-07-23 | End: 2024-07-27 | Stop reason: HOSPADM

## 2024-07-23 RX ORDER — DIPHENHYDRAMINE HCL 25 MG
25 CAPSULE ORAL ONCE
Status: COMPLETED | OUTPATIENT
Start: 2024-07-25 | End: 2024-07-25

## 2024-07-23 RX ADMIN — SODIUM CHLORIDE, PRESERVATIVE FREE 10 ML: 5 INJECTION INTRAVENOUS at 13:31

## 2024-07-23 RX ADMIN — SODIUM CHLORIDE, PRESERVATIVE FREE 10 ML: 5 INJECTION INTRAVENOUS at 13:33

## 2024-07-23 RX ADMIN — HEPARIN 500 UNITS: 100 SYRINGE at 13:34

## 2024-07-23 ASSESSMENT — PAIN DESCRIPTION - LOCATION: LOCATION: HIP

## 2024-07-23 ASSESSMENT — PAIN SCALES - GENERAL: PAINLEVEL_OUTOF10: 9

## 2024-07-23 NOTE — PROGRESS NOTES
[facility] Progress Note    Date: 2024    Name: Kayden Samuel    MRN: [id number]         : 1960    Type and Screen Labs     Mr. Samuel arrived in the [facility] today at 1324, in stable condition, here for his TYPE & CROSSMATCH + TYPE & SCREEN FOR 1 UNIT PRBC. He was assessed and education was provided.     Mr. Samuel's vitals were reviewed.  Vitals:    24 1324   BP: (!) 147/62   Pulse: 80   Resp: 16   Temp: 98.5 °F (36.9 °C)   SpO2: 95%     His RIGHT CHEST SINGLE LUMEN PORT was accessed and blood for the ordered Type & Crossmatch + Type & Screen (3 Pink Top Tubes) was drawn. Then the port was flushed well per policy with NS & Heparin, and breaux needle was removed and gauze//bandaid was applied.      The blood was sent out by  to the [facility] blood bank for processing.      The required Blood Bank armband was securely applied to his LEFT WRIST, and he was reminded to keep the armband completely dry and intact on his arm until after the completion of the blood transfusion on Thursday, and he verbalized understanding.      Mr. Samuel tolerated well and voiced no complaints.      Mr. Samuel was discharged from [facility] in stable condition at 1335. He is to return on 24 at 1000 for his next appointment to receive 1 Unit PRBC.       Chari Mujica RN  2024  1:53 PM

## 2024-07-25 ENCOUNTER — HOSPITAL ENCOUNTER (OUTPATIENT)
Facility: HOSPITAL | Age: 64
Setting detail: INFUSION SERIES
End: 2024-07-25
Payer: MEDICARE

## 2024-07-25 VITALS
OXYGEN SATURATION: 100 % | DIASTOLIC BLOOD PRESSURE: 77 MMHG | HEART RATE: 63 BPM | TEMPERATURE: 98.6 F | RESPIRATION RATE: 16 BRPM | SYSTOLIC BLOOD PRESSURE: 148 MMHG

## 2024-07-25 DIAGNOSIS — D57.1 SICKLE CELL DISEASE WITHOUT CRISIS (HCC): Primary | ICD-10-CM

## 2024-07-25 PROCEDURE — 6360000002 HC RX W HCPCS: Performed by: INTERNAL MEDICINE

## 2024-07-25 PROCEDURE — 6360000002 HC RX W HCPCS

## 2024-07-25 PROCEDURE — 6370000000 HC RX 637 (ALT 250 FOR IP): Performed by: INTERNAL MEDICINE

## 2024-07-25 PROCEDURE — P9040 RBC LEUKOREDUCED IRRADIATED: HCPCS

## 2024-07-25 PROCEDURE — 2580000003 HC RX 258: Performed by: INTERNAL MEDICINE

## 2024-07-25 PROCEDURE — 36430 TRANSFUSION BLD/BLD COMPNT: CPT

## 2024-07-25 PROCEDURE — 96374 THER/PROPH/DIAG INJ IV PUSH: CPT

## 2024-07-25 RX ORDER — ALBUTEROL SULFATE 90 UG/1
4 AEROSOL, METERED RESPIRATORY (INHALATION) PRN
OUTPATIENT
Start: 2024-07-25

## 2024-07-25 RX ORDER — ONDANSETRON 2 MG/ML
8 INJECTION INTRAMUSCULAR; INTRAVENOUS
OUTPATIENT
Start: 2024-07-25

## 2024-07-25 RX ORDER — SODIUM CHLORIDE 9 MG/ML
INJECTION, SOLUTION INTRAVENOUS PRN
Status: COMPLETED | OUTPATIENT
Start: 2024-07-25 | End: 2024-07-25

## 2024-07-25 RX ORDER — ACETAMINOPHEN 325 MG/1
650 TABLET ORAL
OUTPATIENT
Start: 2024-07-25

## 2024-07-25 RX ORDER — SODIUM CHLORIDE 9 MG/ML
INJECTION, SOLUTION INTRAVENOUS PRN
Status: CANCELLED | OUTPATIENT
Start: 2024-07-25

## 2024-07-25 RX ORDER — HEPARIN 100 UNIT/ML
SYRINGE INTRAVENOUS
Status: COMPLETED
Start: 2024-07-25 | End: 2024-07-25

## 2024-07-25 RX ORDER — EPINEPHRINE 1 MG/ML
0.3 INJECTION, SOLUTION, CONCENTRATE INTRAVENOUS PRN
OUTPATIENT
Start: 2024-07-25

## 2024-07-25 RX ORDER — HEPARIN SODIUM (PORCINE) LOCK FLUSH IV SOLN 100 UNIT/ML 100 UNIT/ML
500 SOLUTION INTRAVENOUS PRN
Status: DISCONTINUED | OUTPATIENT
Start: 2024-07-25 | End: 2024-07-29 | Stop reason: HOSPADM

## 2024-07-25 RX ORDER — DIPHENHYDRAMINE HYDROCHLORIDE 50 MG/ML
50 INJECTION INTRAMUSCULAR; INTRAVENOUS
OUTPATIENT
Start: 2024-07-25

## 2024-07-25 RX ORDER — SODIUM CHLORIDE 0.9 % (FLUSH) 0.9 %
5-40 SYRINGE (ML) INJECTION PRN
Status: DISCONTINUED | OUTPATIENT
Start: 2024-07-25 | End: 2024-07-29 | Stop reason: HOSPADM

## 2024-07-25 RX ORDER — SODIUM CHLORIDE 9 MG/ML
INJECTION, SOLUTION INTRAVENOUS CONTINUOUS
OUTPATIENT
Start: 2024-07-25

## 2024-07-25 RX ADMIN — ACETAMINOPHEN 650 MG: 325 TABLET ORAL at 10:41

## 2024-07-25 RX ADMIN — HEPARIN SODIUM (PORCINE) LOCK FLUSH IV SOLN 100 UNIT/ML 500 UNITS: 100 SOLUTION at 14:51

## 2024-07-25 RX ADMIN — HEPARIN 500 UNITS: 100 SYRINGE at 14:51

## 2024-07-25 RX ADMIN — SODIUM CHLORIDE, PRESERVATIVE FREE 10 ML: 5 INJECTION INTRAVENOUS at 10:29

## 2024-07-25 RX ADMIN — HYDROCORTISONE SODIUM SUCCINATE 100 MG: 100 INJECTION, POWDER, FOR SOLUTION INTRAMUSCULAR; INTRAVENOUS at 10:42

## 2024-07-25 RX ADMIN — DIPHENHYDRAMINE HYDROCHLORIDE 25 MG: 25 CAPSULE ORAL at 10:41

## 2024-07-25 RX ADMIN — SODIUM CHLORIDE: 9 INJECTION, SOLUTION INTRAVENOUS at 11:22

## 2024-07-25 RX ADMIN — SODIUM CHLORIDE, PRESERVATIVE FREE 20 ML: 5 INJECTION INTRAVENOUS at 14:51

## 2024-07-25 ASSESSMENT — PAIN - FUNCTIONAL ASSESSMENT
PAIN_FUNCTIONAL_ASSESSMENT: 0-10
PAIN_FUNCTIONAL_ASSESSMENT: 0-10

## 2024-07-25 NOTE — PROGRESS NOTES
\[facility]\"" Progress Note    Date: 2024    Name: Kayden Samuel    MRN: [id number]         : 1960    Mr. Samuel arrived in the \[facility]\"" today at 1020, in stable condition, here to receive 1 UNIT PRBC. He was assessed and education was provided.     Mr. Samuel's vitals were reviewed.  Vitals:    24 1117   BP: 126/69   Pulse: 63   Resp: 16   Temp: 98 °F (36.7 °C)   SpO2: 96%       Mr. Samuel presented to the infusion center today stating that he was doing okay, with his major complaint being his chronic sickle cell pain (hip and leg pain today) which he rated 10 out of 10 on the pain scale.       SIGNED BLOOD TRANSFUSION CONSENT NOTE WAS REVIEWED IN EMR.     BLOOD TRANSFUSION ORDER REVIEWED IN EMR.     WRITTEN BLOOD TRANSFUSION CONSENT WAS OBTAINED  HERE IN THE INFUSION CENTER TODAY.         His RIGHT CHEST SINGLE LUMEN PORT was accessed x1 attempt without incident, brisk blood return was obtained and flushes without difficulty.     The following pre-medications were administered per order, approximately 30 minutes prior to starting the blood transfusion: TYLENOL 650 MG PO, BENADRYL 25 MG PO, SOLU-CORTEF 100MG IV.        1 UNIT OF PRBC was administered per order. Unit was initiated @ 75 ml/hr for the first 15 minutes, and then increased to 155 ml/hr until completion. Patient tolerated very well without any c/o reaction symptoms.     Mr. Samuel was observed for approximately 60 minutes after completion of blood transfusion. NS was infused at 155 ml/hr throughout the observation period.        After completion of the observation period, patient denies any reaction sx. His port was flushed per policy with NS & Heparin, and then the breaux needle was removed and gauze/bandaid was applied.     Mr. Samuel tolerated the transfusion very well today, and voiced no complaints. Instructed pt to follow up with referring provider, he verbalizes understanding.        Mr. Samuel was discharged from Outpatient

## 2024-08-02 ENCOUNTER — HOSPITAL ENCOUNTER (OUTPATIENT)
Facility: HOSPITAL | Age: 64
Setting detail: INFUSION SERIES
End: 2024-08-02
Payer: MEDICARE

## 2024-08-02 VITALS
HEART RATE: 79 BPM | SYSTOLIC BLOOD PRESSURE: 138 MMHG | DIASTOLIC BLOOD PRESSURE: 65 MMHG | TEMPERATURE: 99.3 F | OXYGEN SATURATION: 96 % | RESPIRATION RATE: 16 BRPM

## 2024-08-02 LAB — HISTORY CHECK: NORMAL

## 2024-08-02 PROCEDURE — 86902 BLOOD TYPE ANTIGEN DONOR EA: CPT

## 2024-08-02 PROCEDURE — 85660 RBC SICKLE CELL TEST: CPT

## 2024-08-02 PROCEDURE — 86920 COMPATIBILITY TEST SPIN: CPT

## 2024-08-02 PROCEDURE — 86922 COMPATIBILITY TEST ANTIGLOB: CPT

## 2024-08-02 PROCEDURE — 86850 RBC ANTIBODY SCREEN: CPT

## 2024-08-02 PROCEDURE — 86900 BLOOD TYPING SEROLOGIC ABO: CPT

## 2024-08-02 PROCEDURE — 36591 DRAW BLOOD OFF VENOUS DEVICE: CPT

## 2024-08-02 PROCEDURE — 86870 RBC ANTIBODY IDENTIFICATION: CPT

## 2024-08-02 PROCEDURE — 86901 BLOOD TYPING SEROLOGIC RH(D): CPT

## 2024-08-02 PROCEDURE — 86921 COMPATIBILITY TEST INCUBATE: CPT

## 2024-08-02 RX ORDER — ACETAMINOPHEN 325 MG/1
650 TABLET ORAL ONCE
Status: COMPLETED | OUTPATIENT
Start: 2024-08-05 | End: 2024-08-05

## 2024-08-02 RX ORDER — DIPHENHYDRAMINE HYDROCHLORIDE 50 MG/ML
25 INJECTION INTRAMUSCULAR; INTRAVENOUS ONCE
Status: COMPLETED | OUTPATIENT
Start: 2024-08-05 | End: 2024-08-05

## 2024-08-02 ASSESSMENT — PAIN DESCRIPTION - PAIN TYPE: TYPE: CHRONIC PAIN

## 2024-08-02 ASSESSMENT — PAIN DESCRIPTION - FREQUENCY: FREQUENCY: CONTINUOUS

## 2024-08-02 ASSESSMENT — PAIN SCALES - GENERAL: PAINLEVEL_OUTOF10: 10

## 2024-08-02 ASSESSMENT — PAIN DESCRIPTION - ORIENTATION: ORIENTATION: LEFT

## 2024-08-02 ASSESSMENT — PAIN DESCRIPTION - DESCRIPTORS: DESCRIPTORS: GNAWING;SHARP

## 2024-08-02 ASSESSMENT — PAIN DESCRIPTION - LOCATION: LOCATION: HIP;LEG

## 2024-08-02 NOTE — PROGRESS NOTES
[facility] Progress Note    Date: 2024    Name: Kayden Samuel    MRN: [id number]         : 1960    Type and Screen Labs       Mr. Samuel [facility] in the [facility] today at 1420, ambulatory with assistance of a cane and in stable condition, here for his TYPE & CROSSMATCH + TYPE & SCREEN FOR 2 UNIT PRBCs. He was assessed and education was provided.     Mr. Samuel's vitals were reviewed.  Vitals:    24 1420   BP: 138/65   Pulse: 79   Resp: 16   Temp: 99.3 °F (37.4 °C)   SpO2: 96%         Mr. Samuel presented to the infusion center today stating that he was in a lot of generalized pain, especially in his LEFT leg and hip (chronic sickle cell pain), which he rated @ # 10 on the pain scale. Otherwise though, he stated that he was doing OK, but was having difficulty ambulating today, and was walking with cane.        His RIGHT CHEST SINGLE LUMEN PORT was accessed without incident and blood for the ordered Type & Crossmatch + Type & Screen (3 Pink Top Tubes) was drawn. Then, the port was flushed well per policy with NS & Heparin, and then the breaux needle was removed and a gauze//bandaid was applied.      The blood was sent out by  to the [facility] blood bank for processing.      The required Blood Bank armband was securely applied to his LEFT WRIST, and he was reminded to keep the armband completely dry and intact on his arm until after the completion of the blood transfusion on Monday. He verbalized understanding.        Mr. Samuel tolerated well and voiced no complaints.      Mr. Samuel was discharged from [facility] in stable condition at 1455. He is to return on 24 at 0800, for his next appointment, to receive 2 Units PRBCs.       Razia Azul RN  2024  5:26 PM   [Edema] : edema [Seasonal Allergies] : seasonal allergies [Anemia] : anemia [Diabetes] : diabetes [Thyroid Problem] : thyroid problem [Negative] : Psychiatric [TextBox_30] : see HPI [TextBox_69] : gastroparesis followed with GI [TextBox_134] : Dementia

## 2024-08-04 LAB
ABO + RH BLD: NORMAL
ANTIGENS PRESENT RBC DONR: NORMAL
ANTIGENS PRESENT RBC DONR: NORMAL
BLD PROD TYP BPU: NORMAL
BLD PROD TYP BPU: NORMAL
BLOOD BANK DISPENSE STATUS: NORMAL
BLOOD BANK DISPENSE STATUS: NORMAL
BLOOD GROUP ANTIBODIES SERPL: NORMAL
BLOOD GROUP ANTIBODIES SERPL: NORMAL
BPU ID: NORMAL
BPU ID: NORMAL
CROSSMATCH RESULT: NORMAL
CROSSMATCH RESULT: NORMAL
SPECIMEN EXP DATE BLD: NORMAL
UNIT DIVISION: 0
UNIT DIVISION: 0

## 2024-08-05 ENCOUNTER — HOSPITAL ENCOUNTER (OUTPATIENT)
Facility: HOSPITAL | Age: 64
Setting detail: INFUSION SERIES
Discharge: HOME | End: 2024-08-08
Payer: MEDICARE

## 2024-08-05 VITALS
TEMPERATURE: 98.3 F | DIASTOLIC BLOOD PRESSURE: 70 MMHG | OXYGEN SATURATION: 100 % | SYSTOLIC BLOOD PRESSURE: 155 MMHG | HEART RATE: 62 BPM | RESPIRATION RATE: 16 BRPM

## 2024-08-05 PROCEDURE — 36430 TRANSFUSION BLD/BLD COMPNT: CPT

## 2024-08-05 PROCEDURE — P9040 RBC LEUKOREDUCED IRRADIATED: HCPCS

## 2024-08-05 PROCEDURE — 96375 TX/PRO/DX INJ NEW DRUG ADDON: CPT

## 2024-08-05 PROCEDURE — 6360000002 HC RX W HCPCS

## 2024-08-05 PROCEDURE — 2580000003 HC RX 258: Performed by: INTERNAL MEDICINE

## 2024-08-05 PROCEDURE — 6370000000 HC RX 637 (ALT 250 FOR IP): Performed by: INTERNAL MEDICINE

## 2024-08-05 PROCEDURE — 96374 THER/PROPH/DIAG INJ IV PUSH: CPT

## 2024-08-05 PROCEDURE — 6360000002 HC RX W HCPCS: Performed by: INTERNAL MEDICINE

## 2024-08-05 RX ORDER — HEPARIN 100 UNIT/ML
SYRINGE INTRAVENOUS
Status: COMPLETED
Start: 2024-08-05 | End: 2024-08-05

## 2024-08-05 RX ORDER — SODIUM CHLORIDE 9 MG/ML
INJECTION, SOLUTION INTRAVENOUS PRN
Status: COMPLETED | OUTPATIENT
Start: 2024-08-05 | End: 2024-08-05

## 2024-08-05 RX ORDER — HEPARIN SODIUM (PORCINE) LOCK FLUSH IV SOLN 100 UNIT/ML 100 UNIT/ML
500 SOLUTION INTRAVENOUS PRN
Status: DISCONTINUED | OUTPATIENT
Start: 2024-08-05 | End: 2024-08-09 | Stop reason: HOSPADM

## 2024-08-05 RX ORDER — SODIUM CHLORIDE 0.9 % (FLUSH) 0.9 %
5-40 SYRINGE (ML) INJECTION PRN
Status: DISCONTINUED | OUTPATIENT
Start: 2024-08-05 | End: 2024-08-09 | Stop reason: HOSPADM

## 2024-08-05 RX ADMIN — SODIUM CHLORIDE, PRESERVATIVE FREE 10 ML: 5 INJECTION INTRAVENOUS at 08:40

## 2024-08-05 RX ADMIN — SODIUM CHLORIDE: 9 INJECTION, SOLUTION INTRAVENOUS at 09:12

## 2024-08-05 RX ADMIN — SODIUM CHLORIDE, PRESERVATIVE FREE 20 ML: 5 INJECTION INTRAVENOUS at 16:20

## 2024-08-05 RX ADMIN — ACETAMINOPHEN 650 MG: 325 TABLET ORAL at 08:54

## 2024-08-05 RX ADMIN — HYDROCORTISONE SODIUM SUCCINATE 100 MG: 100 INJECTION, POWDER, FOR SOLUTION INTRAMUSCULAR; INTRAVENOUS at 09:03

## 2024-08-05 RX ADMIN — SODIUM CHLORIDE, PRESERVATIVE FREE 20 ML: 5 INJECTION INTRAVENOUS at 08:55

## 2024-08-05 RX ADMIN — HEPARIN 500 UNITS: 100 SYRINGE at 16:20

## 2024-08-05 RX ADMIN — HEPARIN SODIUM (PORCINE) LOCK FLUSH IV SOLN 100 UNIT/ML 500 UNITS: 100 SOLUTION at 16:20

## 2024-08-05 RX ADMIN — SODIUM CHLORIDE, PRESERVATIVE FREE 20 ML: 5 INJECTION INTRAVENOUS at 09:03

## 2024-08-05 RX ADMIN — DIPHENHYDRAMINE HYDROCHLORIDE 25 MG: 50 INJECTION INTRAMUSCULAR; INTRAVENOUS at 08:55

## 2024-08-05 ASSESSMENT — PAIN DESCRIPTION - DESCRIPTORS: DESCRIPTORS: ACHING;SHARP;SHOOTING

## 2024-08-05 ASSESSMENT — PAIN - FUNCTIONAL ASSESSMENT: PAIN_FUNCTIONAL_ASSESSMENT: 0-10

## 2024-08-05 NOTE — PROGRESS NOTES
[facility] Progress Note    Date: 2024    Name: Kayden Samuel    MRN: [id number]         : 1960    Mr. Samuel arrived in the [facility] today at 0815, in stable condition, ambulatory with a walker, here to receive 2 UNITS PRBC. He was assessed and education was provided.     Mr. Samuel's vitals were reviewed.  Vitals:    24 0815   BP: (!) 126/55   Pulse: 78   Resp: 16   Temp: 98.1 °F (36.7 °C)   SpO2: 97%       Mr. Samuel presented to the infusion center today, complaining of generalized sickle cell pain @ # 10 on the pain scale, but otherwise, he had no new or major complaints.    His most recent HGB was noted to be 6.4 from 24.       CURRENT BLOOD TRANSFUSION CONSENT NOTE WAS VIEWED IN HIS ELECTRONIC RECORD.     WRITTEN BLOOD TRANSFUSION CONSENT WAS OBTAINED HERE IN THE INFUSION CENTER TODAY      Written and verbal Blood Transfusion patient education regarding potential side effects and adverse reactions, as well as discharge instructions was provided, and Mr. Samuel verbalized understanding.         His RIGHT CHEST SINGLE LUMEN PORT was accessed without incident at 0840, and brisk blood return was obtained.         The following pre-medications were administered per order, approximately 30-45 minutes prior to starting the blood transfusion: TYLENOL 650 MG PO, BENADRYL 25 MG IV, & HYDROCORTISONE 100 MG IV.        ml BAG was initiated to infuse @ KVO PRN throughout treatment today.       2 UNITS PRBC were administered per order, each unit over approximately 2 hours, without incident. Each unit was infused @ 75 ml/hr for the 1st 15 minutes, then increased to 155 ml/hr for the duration of the transfusion.         After completion of both units of PRBC, he was kept for approximately 60 minutes of observation, just as a precaution. The NS was infused @ 155 ml/hr throughout the observation period.       After completion of the observation period, his port was flushed well per policy with NS &

## 2024-08-13 ENCOUNTER — APPOINTMENT (OUTPATIENT)
Facility: HOSPITAL | Age: 64
End: 2024-08-13
Payer: MEDICARE

## 2024-08-13 ENCOUNTER — HOSPITAL ENCOUNTER (EMERGENCY)
Facility: HOSPITAL | Age: 64
Discharge: HOME | End: 2024-08-14
Payer: MEDICARE

## 2024-08-13 DIAGNOSIS — D57.00 SICKLE CELL DISEASE WITH CRISIS (HCC): Primary | ICD-10-CM

## 2024-08-13 LAB
ALBUMIN SERPL-MCNC: 3.7 G/DL (ref 3.4–5)
ALBUMIN/GLOB SERPL: 0.9 (ref 0.8–1.7)
ALP SERPL-CCNC: 138 U/L (ref 45–117)
ALT SERPL-CCNC: 44 U/L (ref 16–61)
ANION GAP SERPL CALC-SCNC: 5 MMOL/L (ref 3–18)
AST SERPL-CCNC: 55 U/L (ref 10–38)
BILIRUB SERPL-MCNC: 3.2 MG/DL (ref 0.2–1)
BUN SERPL-MCNC: 31 MG/DL (ref 7–18)
BUN/CREAT SERPL: 28 (ref 12–20)
CALCIUM SERPL-MCNC: 8.9 MG/DL (ref 8.5–10.1)
CHLORIDE SERPL-SCNC: 106 MMOL/L (ref 100–111)
CO2 SERPL-SCNC: 26 MMOL/L (ref 21–32)
CREAT SERPL-MCNC: 1.09 MG/DL (ref 0.6–1.3)
FLUAV RNA SPEC QL NAA+PROBE: NOT DETECTED
FLUBV RNA SPEC QL NAA+PROBE: NOT DETECTED
GLOBULIN SER CALC-MCNC: 3.9 G/DL (ref 2–4)
GLUCOSE SERPL-MCNC: 105 MG/DL (ref 74–99)
NT PRO BNP: 2708 PG/ML (ref 0–900)
POTASSIUM SERPL-SCNC: 4.9 MMOL/L (ref 3.5–5.5)
PROT SERPL-MCNC: 7.6 G/DL (ref 6.4–8.2)
RETICS/RBC NFR AUTO: 1.2 % (ref 0.5–2.5)
SARS-COV-2 RNA RESP QL NAA+PROBE: NOT DETECTED
SODIUM SERPL-SCNC: 137 MMOL/L (ref 136–145)
TROPONIN I SERPL HS-MCNC: 47 NG/L (ref 0–78)

## 2024-08-13 PROCEDURE — 99285 EMERGENCY DEPT VISIT HI MDM: CPT

## 2024-08-13 PROCEDURE — 96374 THER/PROPH/DIAG INJ IV PUSH: CPT

## 2024-08-13 PROCEDURE — 86870 RBC ANTIBODY IDENTIFICATION: CPT

## 2024-08-13 PROCEDURE — 84484 ASSAY OF TROPONIN QUANT: CPT

## 2024-08-13 PROCEDURE — 86900 BLOOD TYPING SEROLOGIC ABO: CPT

## 2024-08-13 PROCEDURE — 86850 RBC ANTIBODY SCREEN: CPT

## 2024-08-13 PROCEDURE — 71045 X-RAY EXAM CHEST 1 VIEW: CPT

## 2024-08-13 PROCEDURE — 80053 COMPREHEN METABOLIC PANEL: CPT

## 2024-08-13 PROCEDURE — 83880 ASSAY OF NATRIURETIC PEPTIDE: CPT

## 2024-08-13 PROCEDURE — 87636 SARSCOV2 & INF A&B AMP PRB: CPT

## 2024-08-13 PROCEDURE — 96375 TX/PRO/DX INJ NEW DRUG ADDON: CPT

## 2024-08-13 PROCEDURE — 85045 AUTOMATED RETICULOCYTE COUNT: CPT

## 2024-08-13 PROCEDURE — 6360000002 HC RX W HCPCS

## 2024-08-13 PROCEDURE — 81001 URINALYSIS AUTO W/SCOPE: CPT

## 2024-08-13 PROCEDURE — 93005 ELECTROCARDIOGRAM TRACING: CPT | Performed by: EMERGENCY MEDICINE

## 2024-08-13 PROCEDURE — 85025 COMPLETE CBC W/AUTO DIFF WBC: CPT

## 2024-08-13 PROCEDURE — 86901 BLOOD TYPING SEROLOGIC RH(D): CPT

## 2024-08-13 RX ORDER — ONDANSETRON 2 MG/ML
4 INJECTION INTRAMUSCULAR; INTRAVENOUS
Status: COMPLETED | OUTPATIENT
Start: 2024-08-13 | End: 2024-08-13

## 2024-08-13 RX ORDER — HYDROMORPHONE HYDROCHLORIDE 1 MG/ML
1 INJECTION, SOLUTION INTRAMUSCULAR; INTRAVENOUS; SUBCUTANEOUS
Status: COMPLETED | OUTPATIENT
Start: 2024-08-13 | End: 2024-08-13

## 2024-08-13 RX ADMIN — ONDANSETRON 4 MG: 2 INJECTION INTRAMUSCULAR; INTRAVENOUS at 22:39

## 2024-08-13 RX ADMIN — HYDROMORPHONE HYDROCHLORIDE 1 MG: 1 INJECTION, SOLUTION INTRAMUSCULAR; INTRAVENOUS; SUBCUTANEOUS at 22:37

## 2024-08-13 ASSESSMENT — PAIN - FUNCTIONAL ASSESSMENT
PAIN_FUNCTIONAL_ASSESSMENT: 0-10
PAIN_FUNCTIONAL_ASSESSMENT: PREVENTS OR INTERFERES SOME ACTIVE ACTIVITIES AND ADLS

## 2024-08-13 ASSESSMENT — ENCOUNTER SYMPTOMS
SHORTNESS OF BREATH: 1
VOICE CHANGE: 0
COUGH: 0
BACK PAIN: 0
NAUSEA: 0
DIARRHEA: 0
ABDOMINAL PAIN: 0
CHEST TIGHTNESS: 0
RHINORRHEA: 0
WHEEZING: 0

## 2024-08-13 ASSESSMENT — PAIN DESCRIPTION - LOCATION: LOCATION: CHEST;BACK

## 2024-08-13 ASSESSMENT — PAIN SCALES - GENERAL
PAINLEVEL_OUTOF10: 10
PAINLEVEL_OUTOF10: 10

## 2024-08-13 ASSESSMENT — PAIN DESCRIPTION - ORIENTATION
ORIENTATION: UPPER;MID
ORIENTATION: RIGHT;LEFT

## 2024-08-13 ASSESSMENT — PAIN DESCRIPTION - DESCRIPTORS
DESCRIPTORS: ACHING;SHARP;SHOOTING
DESCRIPTORS: STABBING

## 2024-08-13 NOTE — ED TRIAGE NOTES
Chest pain and back pain started last night. Pt states it did not go away, but got worse. Pt states he has a cardiac hx but does not know what it is. Pt has Sickle Cell.

## 2024-08-13 NOTE — ED PROVIDER NOTES
[facility] EMERGENCY DEPT  EMERGENCY DEPARTMENT ENCOUNTER      Pt Name: Kayden Samuel  MRN: [id number]  Birthdate 1960  Date of evaluation: 8/13/2024  Provider: NAT Aquino  6:02 PM    CHIEF COMPLAINT       Chief Complaint   Patient presents with    Chest Pain    Back Pain         HISTORY OF PRESENT ILLNESS    Kayden Samuel is a 64 y.o. male who presents to the emergency department with chest pain, back pain and leg pain x 2 days with hx of sickle cell.     65 y/o m pmhx of sickle cell, pulmonary HTN, Cardiomyopathy, GERD and anemia presents to the emergency department with chest pain, back pain and leg pain that started yesterday. He is unable to describe his pain. He reports some shortness of breath. He denies cough, fever, chills, dizziness or weakness. He has been taking his home dilaudid with no relief in pain. He reports history of similar pain with crisis. He has bilateral lower leg edema that is not different from base line. He denies any calf pain, nausea, vomiting or diarrhea. Patient also reports scrotal pain. He denies swelling, warmness or injury to same. He denies any urinary symptoms.     The history is provided by the patient.       Nursing Notes were reviewed.    REVIEW OF SYSTEMS       Review of Systems   Constitutional:  Negative for chills, fatigue and fever.   HENT:  Negative for congestion, ear pain, rhinorrhea and voice change.    Eyes:  Negative for visual disturbance.   Respiratory:  Positive for shortness of breath. Negative for cough, chest tightness and wheezing.    Cardiovascular:  Positive for chest pain and leg swelling. Negative for palpitations.        Chest pain, swelling to bilateral legs     Gastrointestinal:  Negative for abdominal pain, diarrhea, nausea and vomiting.   Endocrine: Negative for polydipsia, polyphagia and polyuria.   Genitourinary:  Negative for decreased urine volume, difficulty urinating, dysuria, flank pain, frequency, penile discharge, scrotal

## 2024-08-14 VITALS
BODY MASS INDEX: 20.83 KG/M2 | TEMPERATURE: 99.1 F | OXYGEN SATURATION: 97 % | HEART RATE: 69 BPM | HEIGHT: 64 IN | WEIGHT: 122 LBS | DIASTOLIC BLOOD PRESSURE: 81 MMHG | SYSTOLIC BLOOD PRESSURE: 167 MMHG | RESPIRATION RATE: 10 BRPM

## 2024-08-14 LAB
ABO + RH BLD: NORMAL
APPEARANCE UR: CLEAR
BACTERIA URNS QL MICRO: NEGATIVE /HPF
BASOPHILS # BLD: 0.1 K/UL (ref 0–0.1)
BASOPHILS NFR BLD: 2 % (ref 0–2)
BILIRUB UR QL: NEGATIVE
BLOOD GROUP ANTIBODIES SERPL: NORMAL
BLOOD GROUP ANTIBODIES SERPL: NORMAL
COLOR UR: YELLOW
DIFFERENTIAL METHOD BLD: ABNORMAL
EKG ATRIAL RATE: 81 BPM
EKG DIAGNOSIS: NORMAL
EKG P AXIS: 48 DEGREES
EKG P-R INTERVAL: 150 MS
EKG Q-T INTERVAL: 372 MS
EKG QRS DURATION: 88 MS
EKG QTC CALCULATION (BAZETT): 432 MS
EKG R AXIS: 17 DEGREES
EKG T AXIS: 39 DEGREES
EKG VENTRICULAR RATE: 81 BPM
EOSINOPHIL # BLD: 0.1 K/UL (ref 0–0.4)
EOSINOPHIL NFR BLD: 1 % (ref 0–5)
EPITH CASTS URNS QL MICRO: NORMAL /LPF (ref 0–5)
ERYTHROCYTE [DISTWIDTH] IN BLOOD BY AUTOMATED COUNT: 20.1 % (ref 11.6–14.5)
GLUCOSE UR STRIP.AUTO-MCNC: NEGATIVE MG/DL
HCT VFR BLD AUTO: 24.2 % (ref 36–48)
HGB BLD-MCNC: 7.9 G/DL (ref 13–16)
HGB UR QL STRIP: ABNORMAL
IMM GRANULOCYTES # BLD AUTO: 0 K/UL (ref 0–0.04)
IMM GRANULOCYTES NFR BLD AUTO: 0 % (ref 0–0.5)
KETONES UR QL STRIP.AUTO: NEGATIVE MG/DL
LEUKOCYTE ESTERASE UR QL STRIP.AUTO: NEGATIVE
LYMPHOCYTES # BLD: 1.5 K/UL (ref 0.9–3.6)
LYMPHOCYTES NFR BLD: 22 % (ref 21–52)
MCH RBC QN AUTO: 28.3 PG (ref 24–34)
MCHC RBC AUTO-ENTMCNC: 32.6 G/DL (ref 31–37)
MCV RBC AUTO: 86.7 FL (ref 78–100)
MONOCYTES # BLD: 0.4 K/UL (ref 0.05–1.2)
MONOCYTES NFR BLD: 6 % (ref 3–10)
MUCOUS THREADS URNS QL MICRO: NEGATIVE /LPF
NEUTS SEG # BLD: 4.7 K/UL (ref 1.8–8)
NEUTS SEG NFR BLD: 69 % (ref 40–73)
NITRITE UR QL STRIP.AUTO: NEGATIVE
NRBC # BLD: 0 K/UL (ref 0–0.01)
NRBC BLD-RTO: 0 PER 100 WBC
PH UR STRIP: 6.5 (ref 5–8)
PLATELET # BLD AUTO: 151 K/UL (ref 135–420)
PLATELET COMMENT: ABNORMAL
PMV BLD AUTO: 10.4 FL (ref 9.2–11.8)
PROT UR STRIP-MCNC: 100 MG/DL
RBC # BLD AUTO: 2.79 M/UL (ref 4.35–5.65)
RBC #/AREA URNS HPF: NORMAL /HPF (ref 0–5)
RBC MORPH BLD: ABNORMAL
SP GR UR REFRACTOMETRY: 1.01 (ref 1–1.03)
SPECIMEN EXP DATE BLD: NORMAL
UROBILINOGEN UR QL STRIP.AUTO: 1 EU/DL (ref 0.2–1)
WBC # BLD AUTO: 6.8 K/UL (ref 4.6–13.2)
WBC URNS QL MICRO: NORMAL /HPF (ref 0–4)

## 2024-08-14 PROCEDURE — 96376 TX/PRO/DX INJ SAME DRUG ADON: CPT

## 2024-08-14 PROCEDURE — 6360000002 HC RX W HCPCS: Performed by: PHYSICIAN ASSISTANT

## 2024-08-14 PROCEDURE — 93010 ELECTROCARDIOGRAM REPORT: CPT | Performed by: INTERNAL MEDICINE

## 2024-08-14 RX ORDER — HYDROMORPHONE HYDROCHLORIDE 1 MG/ML
1 INJECTION, SOLUTION INTRAMUSCULAR; INTRAVENOUS; SUBCUTANEOUS
Status: COMPLETED | OUTPATIENT
Start: 2024-08-14 | End: 2024-08-14

## 2024-08-14 RX ADMIN — HYDROMORPHONE HYDROCHLORIDE 1 MG: 1 INJECTION, SOLUTION INTRAMUSCULAR; INTRAVENOUS; SUBCUTANEOUS at 00:51

## 2024-08-14 ASSESSMENT — PAIN - FUNCTIONAL ASSESSMENT: PAIN_FUNCTIONAL_ASSESSMENT: PREVENTS OR INTERFERES SOME ACTIVE ACTIVITIES AND ADLS

## 2024-08-14 ASSESSMENT — PAIN SCALES - GENERAL: PAINLEVEL_OUTOF10: 10

## 2024-08-14 ASSESSMENT — PAIN DESCRIPTION - ORIENTATION: ORIENTATION: RIGHT;LEFT

## 2024-08-14 ASSESSMENT — PAIN DESCRIPTION - DESCRIPTORS: DESCRIPTORS: ACHING;SHARP;SHOOTING

## 2024-08-14 NOTE — ED NOTES
Pt's Mediport accessed using sterile technique, blood drawn and sent to the lab. Blood band applied. Pt tolerated well.

## 2024-08-14 NOTE — ED NOTES
11:01 PM :Pt care assumed from   Elham DUTTON  , ED provider. Pt complaint(s), current treatment plan, progression and available diagnostic results have been discussed thoroughly. The patient was seen and evaluated on my shift.   Rounding occurred: No  Intended Disposition: TBD  Pending diagnostic reports and/or labs (please list): labs     1:36 AM  Patient's is greatly improved and labs are overall reassuring.  Discussed case briefly with Dr. Curry who agrees with plan for discharge home and close hematology follow-up.  Patient also agreeable.  Return precautions given.  Will discharge home.         Belen Mallory, PA  08/14/24 0137

## 2024-08-14 NOTE — ED NOTES
Mediport removed by LIZZETH Encinas. Pt provided with discharge paperwork. Awaiting ride from sister

## 2024-08-20 ENCOUNTER — HOSPITAL ENCOUNTER (OUTPATIENT)
Facility: HOSPITAL | Age: 64
Setting detail: INFUSION SERIES
Discharge: HOME | End: 2024-08-23
Payer: MEDICARE

## 2024-08-20 VITALS
RESPIRATION RATE: 16 BRPM | HEART RATE: 82 BPM | TEMPERATURE: 99.3 F | OXYGEN SATURATION: 95 % | DIASTOLIC BLOOD PRESSURE: 65 MMHG | SYSTOLIC BLOOD PRESSURE: 134 MMHG

## 2024-08-20 LAB — HISTORY CHECK: NORMAL

## 2024-08-20 PROCEDURE — 36415 COLL VENOUS BLD VENIPUNCTURE: CPT

## 2024-08-20 PROCEDURE — 6360000002 HC RX W HCPCS

## 2024-08-20 PROCEDURE — 86902 BLOOD TYPE ANTIGEN DONOR EA: CPT

## 2024-08-20 PROCEDURE — 86901 BLOOD TYPING SEROLOGIC RH(D): CPT

## 2024-08-20 PROCEDURE — 86922 COMPATIBILITY TEST ANTIGLOB: CPT

## 2024-08-20 PROCEDURE — 2580000003 HC RX 258: Performed by: INTERNAL MEDICINE

## 2024-08-20 PROCEDURE — 86850 RBC ANTIBODY SCREEN: CPT

## 2024-08-20 PROCEDURE — 86921 COMPATIBILITY TEST INCUBATE: CPT

## 2024-08-20 PROCEDURE — 86900 BLOOD TYPING SEROLOGIC ABO: CPT

## 2024-08-20 PROCEDURE — 86920 COMPATIBILITY TEST SPIN: CPT

## 2024-08-20 PROCEDURE — 86870 RBC ANTIBODY IDENTIFICATION: CPT

## 2024-08-20 PROCEDURE — 36591 DRAW BLOOD OFF VENOUS DEVICE: CPT

## 2024-08-20 RX ORDER — SODIUM CHLORIDE 0.9 % (FLUSH) 0.9 %
5-40 SYRINGE (ML) INJECTION PRN
Status: DISCONTINUED | OUTPATIENT
Start: 2024-08-20 | End: 2024-08-24 | Stop reason: HOSPADM

## 2024-08-20 RX ORDER — HEPARIN 100 UNIT/ML
SYRINGE INTRAVENOUS
Status: COMPLETED
Start: 2024-08-20 | End: 2024-08-20

## 2024-08-20 RX ORDER — HEPARIN SODIUM (PORCINE) LOCK FLUSH IV SOLN 100 UNIT/ML 100 UNIT/ML
500 SOLUTION INTRAVENOUS PRN
Status: DISCONTINUED | OUTPATIENT
Start: 2024-08-20 | End: 2024-08-24 | Stop reason: HOSPADM

## 2024-08-20 RX ADMIN — HEPARIN 500 UNITS: 100 SYRINGE at 14:35

## 2024-08-20 RX ADMIN — SODIUM CHLORIDE, PRESERVATIVE FREE 30 ML: 5 INJECTION INTRAVENOUS at 14:35

## 2024-08-20 RX ADMIN — HEPARIN SODIUM (PORCINE) LOCK FLUSH IV SOLN 100 UNIT/ML 500 UNITS: 100 SOLUTION at 14:35

## 2024-08-20 ASSESSMENT — PAIN DESCRIPTION - DESCRIPTORS: DESCRIPTORS: ACHING;THROBBING

## 2024-08-20 ASSESSMENT — PAIN - FUNCTIONAL ASSESSMENT: PAIN_FUNCTIONAL_ASSESSMENT: 0-10

## 2024-08-20 NOTE — PROGRESS NOTES
[facility] Progress Note    Date: 2024    Name: Kayden Samuel    MRN: [id number]         : 1960    [facility]. Samuel [facility] in the [facility] today at 1420, in stable condition, here for his TYPE & CROSSMATCH + TYPE & SCREEN FOR ONE UNIT PRBC. He was assessed and education was provided.     Mr. Samuel's vitals were reviewed.  Vitals:    24 1420   BP: 134/65   Pulse: 82   Resp: 16   Temp: 99.3 °F (37.4 °C)   SpO2: 95%         Mr. Samuel presented to the infusion center today stating that he was in a lot of generalized pain, especially in his hips and legs (chronic sickle cell pain), which he rated @ # 8 on the pain scale. Otherwise though, he stated that he was doing OK.           His RIGHT CHEST SINGLE LUMEN PORT was accessed without incident at 1435, and blood for the ordered Type & Crossmatch + Type & Screen (3 Pink Top Tubes) was drawn. Then, the port was flushed well per policy with NS & Heparin, and then the breaux needle was removed and gauze//bandaid was applied.     The blood was sent out by  to the [facility] blood bank for processing.     The required Blood Bank armband was securely applied to his LEFT WRIST, and he was reminded to keep the armband completely dry and intact on his arm until after the completion of the blood transfusion on Thursday, and he verbalized understanding.       Mr. Samuel tolerated well and voiced no complaints.     [facility]. [facility] in stable condition at 1450.     He is to return on Thursday, 24 at 1000, for his next appointment, to receive 1 Unit PRBC.     Car Amin RN  2024  4:56 PM

## 2024-08-22 ENCOUNTER — HOSPITAL ENCOUNTER (OUTPATIENT)
Facility: HOSPITAL | Age: 64
Setting detail: INFUSION SERIES
End: 2024-08-22
Payer: MEDICARE

## 2024-08-22 VITALS
SYSTOLIC BLOOD PRESSURE: 139 MMHG | DIASTOLIC BLOOD PRESSURE: 73 MMHG | RESPIRATION RATE: 16 BRPM | TEMPERATURE: 98.2 F | HEART RATE: 59 BPM | OXYGEN SATURATION: 100 %

## 2024-08-22 DIAGNOSIS — D57.1 SICKLE CELL DISEASE WITHOUT CRISIS (HCC): Primary | ICD-10-CM

## 2024-08-22 PROCEDURE — 85660 RBC SICKLE CELL TEST: CPT

## 2024-08-22 PROCEDURE — P9040 RBC LEUKOREDUCED IRRADIATED: HCPCS

## 2024-08-22 PROCEDURE — 2580000003 HC RX 258: Performed by: INTERNAL MEDICINE

## 2024-08-22 PROCEDURE — 6370000000 HC RX 637 (ALT 250 FOR IP): Performed by: INTERNAL MEDICINE

## 2024-08-22 PROCEDURE — 36430 TRANSFUSION BLD/BLD COMPNT: CPT

## 2024-08-22 PROCEDURE — 6360000002 HC RX W HCPCS

## 2024-08-22 RX ORDER — ACETAMINOPHEN 325 MG/1
650 TABLET ORAL ONCE
Status: COMPLETED | OUTPATIENT
Start: 2024-08-22 | End: 2024-08-22

## 2024-08-22 RX ORDER — HEPARIN 100 UNIT/ML
SYRINGE INTRAVENOUS
Status: COMPLETED
Start: 2024-08-22 | End: 2024-08-22

## 2024-08-22 RX ORDER — ONDANSETRON 2 MG/ML
8 INJECTION INTRAMUSCULAR; INTRAVENOUS
OUTPATIENT
Start: 2024-08-22

## 2024-08-22 RX ORDER — SODIUM CHLORIDE 9 MG/ML
INJECTION, SOLUTION INTRAVENOUS PRN
Status: COMPLETED | OUTPATIENT
Start: 2024-08-22 | End: 2024-08-22

## 2024-08-22 RX ORDER — SODIUM CHLORIDE 9 MG/ML
INJECTION, SOLUTION INTRAVENOUS CONTINUOUS
OUTPATIENT
Start: 2024-08-22

## 2024-08-22 RX ORDER — ACETAMINOPHEN 325 MG/1
650 TABLET ORAL
OUTPATIENT
Start: 2024-08-22

## 2024-08-22 RX ORDER — ALBUTEROL SULFATE 90 UG/1
4 AEROSOL, METERED RESPIRATORY (INHALATION) PRN
OUTPATIENT
Start: 2024-08-22

## 2024-08-22 RX ORDER — EPINEPHRINE 1 MG/ML
0.3 INJECTION, SOLUTION, CONCENTRATE INTRAVENOUS PRN
OUTPATIENT
Start: 2024-08-22

## 2024-08-22 RX ORDER — SODIUM CHLORIDE 9 MG/ML
INJECTION, SOLUTION INTRAVENOUS CONTINUOUS
Status: DISCONTINUED | OUTPATIENT
Start: 2024-08-22 | End: 2024-08-26 | Stop reason: HOSPADM

## 2024-08-22 RX ORDER — DIPHENHYDRAMINE HYDROCHLORIDE 50 MG/ML
50 INJECTION INTRAMUSCULAR; INTRAVENOUS
OUTPATIENT
Start: 2024-08-22

## 2024-08-22 RX ORDER — ACETAMINOPHEN 325 MG/1
650 TABLET ORAL
Status: ACTIVE | OUTPATIENT
Start: 2024-08-22 | End: 2024-08-23

## 2024-08-22 RX ORDER — DIPHENHYDRAMINE HCL 25 MG
25 CAPSULE ORAL ONCE
Status: COMPLETED | OUTPATIENT
Start: 2024-08-22 | End: 2024-08-22

## 2024-08-22 RX ADMIN — SODIUM CHLORIDE: 9 INJECTION, SOLUTION INTRAVENOUS at 13:40

## 2024-08-22 RX ADMIN — ACETAMINOPHEN 650 MG: 325 TABLET ORAL at 10:55

## 2024-08-22 RX ADMIN — DIPHENHYDRAMINE HYDROCHLORIDE 25 MG: 25 CAPSULE ORAL at 10:56

## 2024-08-22 RX ADMIN — SODIUM CHLORIDE: 9 INJECTION, SOLUTION INTRAVENOUS at 10:46

## 2024-08-22 RX ADMIN — HEPARIN 500 UNITS: 100 SYRINGE at 15:28

## 2024-08-22 ASSESSMENT — PAIN DESCRIPTION - PAIN TYPE
TYPE: CHRONIC PAIN
TYPE: CHRONIC PAIN

## 2024-08-22 ASSESSMENT — PAIN DESCRIPTION - DESCRIPTORS
DESCRIPTORS: ACHING;SHARP
DESCRIPTORS: ACHING;SHARP

## 2024-08-22 ASSESSMENT — PAIN SCALES - GENERAL
PAINLEVEL_OUTOF10: 10
PAINLEVEL_OUTOF10: 10

## 2024-08-22 ASSESSMENT — PAIN DESCRIPTION - LOCATION
LOCATION: HIP;LEG
LOCATION: HIP;LEG

## 2024-08-22 ASSESSMENT — PAIN DESCRIPTION - ORIENTATION
ORIENTATION: LEFT;RIGHT
ORIENTATION: LEFT;RIGHT

## 2024-08-22 NOTE — PROGRESS NOTES
[facility] Progress Note    Date: 2024    Name: Kayden Samuel    MRN: [id number]         : 1960    Mr. Samuel arrived in the [facility] today at 1033, in stable condition, ambulatory with a walker, here to receive 1 UNIT PRBC. He was assessed and education was provided.     Mr. Samuel's vitals were reviewed.  Vitals:    24 1525   BP: 139/73   Pulse: 59   Resp: 16   Temp: 98.2 °F (36.8 °C)   SpO2: 100%       Mr. Samuel presented to the infusion center today, complaining of pain @ # 10 on the pain scale in both legs and his left hip. This has been ongoing, but is much worse today. He basically was not able to get comfortable in the recliner throughout today's visit. States he has an appt with Dr. Vegas next week and will discuss pain management with him at that time. Otherwise, he had no new or major complaints.    His most recent HGB was noted to be 7.4 from 8-15-24.       CURRENT BLOOD TRANSFUSION CONSENT NOTE WAS VIEWED IN HIS ELECTRONIC RECORD.     WRITTEN BLOOD TRANSFUSION CONSENT WAS OBTAINED HERE IN THE INFUSION CENTER TODAY      Written and verbal Blood Transfusion patient education regarding potential side effects and adverse reactions, as well as discharge instructions were provided, and Mr. Samuel verbalized understanding.         His RIGHT CHEST SINGLE LUMEN PORT was accessed without incident at 0840, and brisk blood return was obtained.         The following pre-medications were administered per order, approximately 30-45 minutes prior to starting the blood transfusion: TYLENOL 650 MG PO & BENADRYL 25 MG PO.       ml BAG was initiated to infuse @ KVO PRN throughout treatment today.       1 UNIT PRBC was administered per order,  over approximately 2 1/2 hours, without incident. The unit was infused @ 75 ml/hr for the 1st 15 minutes, then increased to 155 ml/hr for the duration of the transfusion.         After completion of the PRBC, he was kept for approximately 60 minutes of  observation, just as a precaution. The NS was infused @ 155 ml/hr throughout the observation period.       After completion of the observation period, his port was flushed well per policy with NS & Heparin, and then the breaux needle was removed and gauze//bandaid was applied.     Mr. Samuel tolerated treatment well today, and he voiced no complaints, except for his increased pain.     Discharge instructions reviewed with patient and he expressed understanding. Patient's armband was removed and shredded.    [facility]. [facility] was [facility], ambulatory with assistance of a rolling walker and in stable condition at 1530.       He has no further [facility] appointments scheduled at this time. However, he was reminded to follow up with his referring provider, Dr. Javid Vegas as instructed, and he verbalized understanding.     Razia Azul RN  August 22, 2024

## 2024-08-25 LAB
ABO + RH BLD: NORMAL
ANTIGENS PRESENT RBC DONR: NORMAL
BLD PROD TYP BPU: NORMAL
BLOOD BANK BLOOD PRODUCT EXPIRATION DATE: NORMAL
BLOOD BANK DISPENSE STATUS: NORMAL
BLOOD BANK ISBT PRODUCT BLOOD TYPE: 9500
BLOOD BANK PRODUCT CODE: NORMAL
BLOOD BANK UNIT TYPE AND RH: NORMAL
BLOOD GROUP ANTIBODIES SERPL: NORMAL
BLOOD GROUP ANTIBODIES SERPL: NORMAL
BPU ID: NORMAL
CROSSMATCH RESULT: NORMAL
SPECIMEN EXP DATE BLD: NORMAL
UNIT DIVISION: 0
UNIT ISSUE DATE/TIME: NORMAL

## 2024-09-10 DIAGNOSIS — I42.9 CARDIOMYOPATHY, UNSPECIFIED TYPE (HCC): ICD-10-CM

## 2024-09-12 RX ORDER — ACETAMINOPHEN 325 MG/1
650 TABLET ORAL ONCE
Status: COMPLETED | OUTPATIENT
Start: 2024-09-16 | End: 2024-09-16

## 2024-09-13 ENCOUNTER — HOSPITAL ENCOUNTER (OUTPATIENT)
Facility: HOSPITAL | Age: 64
Setting detail: INFUSION SERIES
Discharge: HOME | End: 2024-09-16
Payer: MEDICARE

## 2024-09-13 VITALS
TEMPERATURE: 98.4 F | HEART RATE: 71 BPM | RESPIRATION RATE: 16 BRPM | SYSTOLIC BLOOD PRESSURE: 147 MMHG | OXYGEN SATURATION: 98 % | DIASTOLIC BLOOD PRESSURE: 69 MMHG

## 2024-09-13 PROCEDURE — 86901 BLOOD TYPING SEROLOGIC RH(D): CPT

## 2024-09-13 PROCEDURE — 36591 DRAW BLOOD OFF VENOUS DEVICE: CPT

## 2024-09-13 PROCEDURE — 86920 COMPATIBILITY TEST SPIN: CPT

## 2024-09-13 PROCEDURE — 86870 RBC ANTIBODY IDENTIFICATION: CPT

## 2024-09-13 PROCEDURE — 6360000002 HC RX W HCPCS

## 2024-09-13 PROCEDURE — 86900 BLOOD TYPING SEROLOGIC ABO: CPT

## 2024-09-13 PROCEDURE — 2580000003 HC RX 258: Performed by: INTERNAL MEDICINE

## 2024-09-13 PROCEDURE — 86850 RBC ANTIBODY SCREEN: CPT

## 2024-09-13 RX ORDER — HEPARIN 100 UNIT/ML
SYRINGE INTRAVENOUS
Status: COMPLETED
Start: 2024-09-13 | End: 2024-09-13

## 2024-09-13 RX ORDER — HEPARIN SODIUM (PORCINE) LOCK FLUSH IV SOLN 100 UNIT/ML 100 UNIT/ML
500 SOLUTION INTRAVENOUS PRN
Status: DISCONTINUED | OUTPATIENT
Start: 2024-09-13 | End: 2024-09-17 | Stop reason: HOSPADM

## 2024-09-13 RX ORDER — SODIUM CHLORIDE 0.9 % (FLUSH) 0.9 %
5-40 SYRINGE (ML) INJECTION PRN
Status: DISCONTINUED | OUTPATIENT
Start: 2024-09-13 | End: 2024-09-17 | Stop reason: HOSPADM

## 2024-09-13 RX ADMIN — SODIUM CHLORIDE, PRESERVATIVE FREE 30 ML: 5 INJECTION INTRAVENOUS at 13:35

## 2024-09-13 RX ADMIN — HEPARIN SODIUM (PORCINE) LOCK FLUSH IV SOLN 100 UNIT/ML 500 UNITS: 100 SOLUTION at 13:35

## 2024-09-13 RX ADMIN — HEPARIN 500 UNITS: 100 SYRINGE at 13:35

## 2024-09-13 ASSESSMENT — PAIN DESCRIPTION - DESCRIPTORS: DESCRIPTORS: ACHING;THROBBING

## 2024-09-13 ASSESSMENT — PAIN - FUNCTIONAL ASSESSMENT: PAIN_FUNCTIONAL_ASSESSMENT: 0-10

## 2024-09-15 LAB
ABO + RH BLD: NORMAL
BLOOD GROUP ANTIBODIES SERPL: NORMAL
BLOOD GROUP ANTIBODIES SERPL: NORMAL
SPECIMEN EXP DATE BLD: NORMAL

## 2024-09-16 ENCOUNTER — HOSPITAL ENCOUNTER (OUTPATIENT)
Facility: HOSPITAL | Age: 64
Setting detail: INFUSION SERIES
Discharge: HOME | End: 2024-09-19
Payer: MEDICARE

## 2024-09-16 VITALS
SYSTOLIC BLOOD PRESSURE: 154 MMHG | OXYGEN SATURATION: 99 % | DIASTOLIC BLOOD PRESSURE: 75 MMHG | RESPIRATION RATE: 16 BRPM | HEART RATE: 63 BPM | TEMPERATURE: 98.5 F

## 2024-09-16 DIAGNOSIS — D57.1 SICKLE CELL DISEASE WITHOUT CRISIS (HCC): Primary | ICD-10-CM

## 2024-09-16 LAB — HISTORY CHECK: NORMAL

## 2024-09-16 PROCEDURE — 36430 TRANSFUSION BLD/BLD COMPNT: CPT

## 2024-09-16 PROCEDURE — 86902 BLOOD TYPE ANTIGEN DONOR EA: CPT

## 2024-09-16 PROCEDURE — 2580000003 HC RX 258: Performed by: INTERNAL MEDICINE

## 2024-09-16 PROCEDURE — 6370000000 HC RX 637 (ALT 250 FOR IP): Performed by: INTERNAL MEDICINE

## 2024-09-16 PROCEDURE — 86922 COMPATIBILITY TEST ANTIGLOB: CPT

## 2024-09-16 PROCEDURE — 6360000002 HC RX W HCPCS

## 2024-09-16 PROCEDURE — P9040 RBC LEUKOREDUCED IRRADIATED: HCPCS

## 2024-09-16 PROCEDURE — 86921 COMPATIBILITY TEST INCUBATE: CPT

## 2024-09-16 PROCEDURE — 96374 THER/PROPH/DIAG INJ IV PUSH: CPT

## 2024-09-16 PROCEDURE — 85660 RBC SICKLE CELL TEST: CPT

## 2024-09-16 PROCEDURE — 6360000002 HC RX W HCPCS: Performed by: INTERNAL MEDICINE

## 2024-09-16 RX ORDER — SODIUM CHLORIDE 0.9 % (FLUSH) 0.9 %
5-40 SYRINGE (ML) INJECTION PRN
Status: DISCONTINUED | OUTPATIENT
Start: 2024-09-16 | End: 2024-09-20 | Stop reason: HOSPADM

## 2024-09-16 RX ORDER — HEPARIN SODIUM (PORCINE) LOCK FLUSH IV SOLN 100 UNIT/ML 100 UNIT/ML
500 SOLUTION INTRAVENOUS PRN
Status: DISCONTINUED | OUTPATIENT
Start: 2024-09-16 | End: 2024-09-20 | Stop reason: HOSPADM

## 2024-09-16 RX ORDER — ONDANSETRON 2 MG/ML
8 INJECTION INTRAMUSCULAR; INTRAVENOUS
OUTPATIENT
Start: 2024-09-16

## 2024-09-16 RX ORDER — SODIUM CHLORIDE 9 MG/ML
INJECTION, SOLUTION INTRAVENOUS ONCE
Status: COMPLETED | OUTPATIENT
Start: 2024-09-16 | End: 2024-09-16

## 2024-09-16 RX ORDER — DIPHENHYDRAMINE HYDROCHLORIDE 50 MG/ML
50 INJECTION INTRAMUSCULAR; INTRAVENOUS
OUTPATIENT
Start: 2024-09-16

## 2024-09-16 RX ORDER — SODIUM CHLORIDE 9 MG/ML
INJECTION, SOLUTION INTRAVENOUS CONTINUOUS
OUTPATIENT
Start: 2024-09-16

## 2024-09-16 RX ORDER — HEPARIN 100 UNIT/ML
SYRINGE INTRAVENOUS
Status: COMPLETED
Start: 2024-09-16 | End: 2024-09-16

## 2024-09-16 RX ORDER — ALBUTEROL SULFATE 90 UG/1
4 INHALANT RESPIRATORY (INHALATION) PRN
OUTPATIENT
Start: 2024-09-16

## 2024-09-16 RX ORDER — EPINEPHRINE 1 MG/ML
0.3 INJECTION, SOLUTION, CONCENTRATE INTRAVENOUS PRN
OUTPATIENT
Start: 2024-09-16

## 2024-09-16 RX ORDER — SODIUM CHLORIDE 9 MG/ML
INJECTION, SOLUTION INTRAVENOUS PRN
Status: DISCONTINUED | OUTPATIENT
Start: 2024-09-16 | End: 2024-09-20 | Stop reason: HOSPADM

## 2024-09-16 RX ORDER — ACETAMINOPHEN 325 MG/1
650 TABLET ORAL
OUTPATIENT
Start: 2024-09-16

## 2024-09-16 RX ADMIN — SODIUM CHLORIDE 250 ML: 9 INJECTION, SOLUTION INTRAVENOUS at 09:43

## 2024-09-16 RX ADMIN — HEPARIN SODIUM (PORCINE) LOCK FLUSH IV SOLN 100 UNIT/ML 500 UNITS: 100 SOLUTION at 16:30

## 2024-09-16 RX ADMIN — SODIUM CHLORIDE, PRESERVATIVE FREE 10 ML: 5 INJECTION INTRAVENOUS at 16:29

## 2024-09-16 RX ADMIN — ACETAMINOPHEN 650 MG: 325 TABLET ORAL at 09:37

## 2024-09-16 RX ADMIN — SODIUM CHLORIDE, PRESERVATIVE FREE 10 ML: 5 INJECTION INTRAVENOUS at 09:35

## 2024-09-16 RX ADMIN — HYDROCORTISONE SODIUM SUCCINATE 100 MG: 100 INJECTION, POWDER, FOR SOLUTION INTRAMUSCULAR; INTRAVENOUS at 09:43

## 2024-09-16 RX ADMIN — HEPARIN 500 UNITS: 100 SYRINGE at 16:30

## 2024-09-16 ASSESSMENT — PAIN - FUNCTIONAL ASSESSMENT
PAIN_FUNCTIONAL_ASSESSMENT: 0-10

## 2024-09-16 ASSESSMENT — PAIN DESCRIPTION - DESCRIPTORS
DESCRIPTORS: ACHING;THROBBING

## 2024-10-03 RX ORDER — DIPHENHYDRAMINE HCL 25 MG
25 CAPSULE ORAL ONCE
Status: COMPLETED | OUTPATIENT
Start: 2024-10-09 | End: 2024-10-09

## 2024-10-03 RX ORDER — ACETAMINOPHEN 325 MG/1
650 TABLET ORAL ONCE
Status: COMPLETED | OUTPATIENT
Start: 2024-10-09 | End: 2024-10-09

## 2024-10-07 ENCOUNTER — HOSPITAL ENCOUNTER (OUTPATIENT)
Facility: HOSPITAL | Age: 64
Setting detail: INFUSION SERIES
Discharge: HOME | End: 2024-10-10
Payer: MEDICARE

## 2024-10-07 VITALS
DIASTOLIC BLOOD PRESSURE: 71 MMHG | TEMPERATURE: 98.7 F | SYSTOLIC BLOOD PRESSURE: 149 MMHG | HEART RATE: 74 BPM | RESPIRATION RATE: 16 BRPM | OXYGEN SATURATION: 94 %

## 2024-10-07 PROCEDURE — 86921 COMPATIBILITY TEST INCUBATE: CPT

## 2024-10-07 PROCEDURE — 36591 DRAW BLOOD OFF VENOUS DEVICE: CPT

## 2024-10-07 PROCEDURE — 86901 BLOOD TYPING SEROLOGIC RH(D): CPT

## 2024-10-07 PROCEDURE — 86880 COOMBS TEST DIRECT: CPT

## 2024-10-07 PROCEDURE — 86922 COMPATIBILITY TEST ANTIGLOB: CPT

## 2024-10-07 PROCEDURE — 2580000003 HC RX 258: Performed by: INTERNAL MEDICINE

## 2024-10-07 PROCEDURE — 86870 RBC ANTIBODY IDENTIFICATION: CPT

## 2024-10-07 PROCEDURE — 86920 COMPATIBILITY TEST SPIN: CPT

## 2024-10-07 PROCEDURE — 86850 RBC ANTIBODY SCREEN: CPT

## 2024-10-07 PROCEDURE — 86902 BLOOD TYPE ANTIGEN DONOR EA: CPT

## 2024-10-07 PROCEDURE — 6360000002 HC RX W HCPCS: Performed by: INTERNAL MEDICINE

## 2024-10-07 PROCEDURE — 86900 BLOOD TYPING SEROLOGIC ABO: CPT

## 2024-10-07 PROCEDURE — 85660 RBC SICKLE CELL TEST: CPT

## 2024-10-07 RX ORDER — SODIUM CHLORIDE 0.9 % (FLUSH) 0.9 %
5-40 SYRINGE (ML) INJECTION PRN
Status: DISCONTINUED | OUTPATIENT
Start: 2024-10-07 | End: 2024-10-11 | Stop reason: HOSPADM

## 2024-10-07 RX ORDER — HEPARIN SODIUM (PORCINE) LOCK FLUSH IV SOLN 100 UNIT/ML 100 UNIT/ML
500 SOLUTION INTRAVENOUS PRN
Status: DISCONTINUED | OUTPATIENT
Start: 2024-10-07 | End: 2024-10-11 | Stop reason: HOSPADM

## 2024-10-07 RX ADMIN — HEPARIN 500 UNITS: 100 SYRINGE at 15:15

## 2024-10-07 RX ADMIN — SODIUM CHLORIDE, PRESERVATIVE FREE 20 ML: 5 INJECTION INTRAVENOUS at 15:13

## 2024-10-07 RX ADMIN — SODIUM CHLORIDE, PRESERVATIVE FREE 10 ML: 5 INJECTION INTRAVENOUS at 15:10

## 2024-10-07 ASSESSMENT — PAIN DESCRIPTION - PAIN TYPE: TYPE: CHRONIC PAIN

## 2024-10-07 ASSESSMENT — PAIN SCALES - GENERAL: PAINLEVEL_OUTOF10: 10

## 2024-10-07 ASSESSMENT — PAIN DESCRIPTION - DESCRIPTORS: DESCRIPTORS: ACHING;THROBBING

## 2024-10-07 ASSESSMENT — PAIN DESCRIPTION - LOCATION: LOCATION: GENERALIZED

## 2024-10-07 NOTE — PROGRESS NOTES
[facility] Progress Note    Date: 2024    Name: Kayden Samuel    MRN: [id number]         : 1960      TYPE & CROSS      Mr. Samuel was roomed for his [facility] appt at 1500. Pt ambulatory with walker for assist.    Mr. Samuel was assessed and education was provided.     Mr. Samuel's vitals were reviewed.  Vitals:    10/07/24 1500   BP: (!) 149/71   Pulse: 74   Resp: 16   Temp: 98.7 °F (37.1 °C)   SpO2: 94%       Patient's R upper chest port accessed. Brisk blood return/ flushes without difficulty. Blood drawn for type & cross per order. Flushed with normal saline and heparin per order. De-accessed and gauze/ band-aid applied to site.    Mr. Samuel tolerated well without complaints.    Blood band placed on pt's L wrist.     Pt visit encounter armband removed/ shredded.    Mr. Samuel was discharged [facility] in stable condition at 1520. He is to return on 10/9/24 at 0800 for his next appointment.    Priscila Miles RN  2024  5:10 PM

## 2024-10-08 LAB — HISTORY CHECK: NORMAL

## 2024-10-09 ENCOUNTER — HOSPITAL ENCOUNTER (OUTPATIENT)
Facility: HOSPITAL | Age: 64
Setting detail: INFUSION SERIES
Discharge: HOME | End: 2024-10-12
Payer: MEDICARE

## 2024-10-09 VITALS
RESPIRATION RATE: 18 BRPM | HEART RATE: 61 BPM | SYSTOLIC BLOOD PRESSURE: 155 MMHG | OXYGEN SATURATION: 100 % | TEMPERATURE: 97.6 F | DIASTOLIC BLOOD PRESSURE: 70 MMHG

## 2024-10-09 PROCEDURE — 6360000002 HC RX W HCPCS

## 2024-10-09 PROCEDURE — 36430 TRANSFUSION BLD/BLD COMPNT: CPT

## 2024-10-09 PROCEDURE — 6370000000 HC RX 637 (ALT 250 FOR IP): Performed by: INTERNAL MEDICINE

## 2024-10-09 PROCEDURE — 2580000003 HC RX 258: Performed by: INTERNAL MEDICINE

## 2024-10-09 PROCEDURE — P9040 RBC LEUKOREDUCED IRRADIATED: HCPCS

## 2024-10-09 RX ORDER — SODIUM CHLORIDE 9 MG/ML
INJECTION, SOLUTION INTRAVENOUS PRN
Status: COMPLETED | OUTPATIENT
Start: 2024-10-09 | End: 2024-10-09

## 2024-10-09 RX ORDER — SODIUM CHLORIDE 0.9 % (FLUSH) 0.9 %
5-40 SYRINGE (ML) INJECTION PRN
Status: DISCONTINUED | OUTPATIENT
Start: 2024-10-09 | End: 2024-10-13 | Stop reason: HOSPADM

## 2024-10-09 RX ORDER — ONDANSETRON 2 MG/ML
8 INJECTION INTRAMUSCULAR; INTRAVENOUS
OUTPATIENT
Start: 2024-10-09

## 2024-10-09 RX ORDER — HEPARIN 100 UNIT/ML
SYRINGE INTRAVENOUS
Status: COMPLETED
Start: 2024-10-09 | End: 2024-10-09

## 2024-10-09 RX ORDER — DIPHENHYDRAMINE HYDROCHLORIDE 50 MG/ML
50 INJECTION INTRAMUSCULAR; INTRAVENOUS
OUTPATIENT
Start: 2024-10-09

## 2024-10-09 RX ORDER — EPINEPHRINE 1 MG/ML
0.3 INJECTION, SOLUTION, CONCENTRATE INTRAVENOUS PRN
OUTPATIENT
Start: 2024-10-09

## 2024-10-09 RX ORDER — HEPARIN SODIUM (PORCINE) LOCK FLUSH IV SOLN 100 UNIT/ML 100 UNIT/ML
500 SOLUTION INTRAVENOUS PRN
Status: DISCONTINUED | OUTPATIENT
Start: 2024-10-09 | End: 2024-10-13 | Stop reason: HOSPADM

## 2024-10-09 RX ORDER — ACETAMINOPHEN 325 MG/1
650 TABLET ORAL
OUTPATIENT
Start: 2024-10-09

## 2024-10-09 RX ORDER — ALBUTEROL SULFATE 90 UG/1
4 INHALANT RESPIRATORY (INHALATION) PRN
OUTPATIENT
Start: 2024-10-09

## 2024-10-09 RX ORDER — SODIUM CHLORIDE 9 MG/ML
INJECTION, SOLUTION INTRAVENOUS CONTINUOUS
OUTPATIENT
Start: 2024-10-09

## 2024-10-09 RX ADMIN — SODIUM CHLORIDE: 9 INJECTION, SOLUTION INTRAVENOUS at 08:46

## 2024-10-09 RX ADMIN — ACETAMINOPHEN 650 MG: 325 TABLET ORAL at 08:41

## 2024-10-09 RX ADMIN — HEPARIN 500 UNITS: 100 SYRINGE at 14:51

## 2024-10-09 RX ADMIN — SODIUM CHLORIDE, PRESERVATIVE FREE 10 ML: 5 INJECTION INTRAVENOUS at 08:40

## 2024-10-09 RX ADMIN — SODIUM CHLORIDE, PRESERVATIVE FREE 20 ML: 5 INJECTION INTRAVENOUS at 14:51

## 2024-10-09 RX ADMIN — DIPHENHYDRAMINE HYDROCHLORIDE 25 MG: 25 CAPSULE ORAL at 08:41

## 2024-10-09 ASSESSMENT — PAIN DESCRIPTION - PAIN TYPE: TYPE: CHRONIC PAIN

## 2024-10-09 ASSESSMENT — PAIN DESCRIPTION - DESCRIPTORS: DESCRIPTORS: ACHING

## 2024-10-09 ASSESSMENT — PAIN DESCRIPTION - ORIENTATION: ORIENTATION: LEFT;RIGHT

## 2024-10-09 ASSESSMENT — PAIN SCALES - GENERAL: PAINLEVEL_OUTOF10: 9

## 2024-10-09 ASSESSMENT — PAIN DESCRIPTION - LOCATION: LOCATION: GROIN;HIP

## 2024-10-09 NOTE — PROGRESS NOTES
[facility] Progress Note    Date: 2024    Name: Kayden Samuel    MRN: [id number]         : 1960    Mr. Samuel arrived in the [facility] today at 0825, in stable condition, ambulatory with a walker, here to receive 2 UNITS PRBC. He was assessed and education was provided.     Mr. Samuel's vitals were reviewed.  Vitals:    10/09/24 1430   BP: (!) 156/83   Pulse: 58   Resp: 16   Temp: 97.2 °F (36.2 °C)   SpO2: 98%       Mr. Samuel presented to the infusion center today, complaining of generalized sickle cell pain \"greater than a 9\" but otherwise, he had no new or major complaints.    His most recent HGB was noted to be 6.8 from 10-03-24.     CURRENT BLOOD TRANSFUSION CONSENT NOTE WAS VIEWED IN HIS ELECTRONIC RECORD.     CURRENT [facility] BLOOD TRANSFUSION CONSENT WAS OBTAINED TODAY.    Blood transfusion patient education regarding potential side effects and adverse reactions, as well as discharge instructions was discussed and Mr. Samuel verbalized understanding.     His RIGHT CHEST SINGLE LUMEN PORT was accessed, brisk blood return noted and flushes without difficulty.      ml BAG was initiated to infuse @ KVO PRN throughout treatment today.     The following pre-medications were administered per order prior to starting the blood transfusion: TYLENOL 650 MG PO & BENADRYL 25MG PO.    2 UNITS PRBC were administered per order, each unit over approximately 2 hours, without incident. Each unit was infused @ 75 ml/hr for the first 15 minutes, then increased to 155 ml/hr for the duration of the transfusion.     After completion of both units of PRBCs, patient remained in [facility] for approximately 30 minutes of observation, before politely requesting to be discharged because he was tired. VSS and patient denies any s/s of reaction at this time.    After completion of the observation period, his port was flushed per policy with NS & Heparin, and then the breaux needle was removed and a bandaid was applied.

## 2024-10-10 LAB
ABO + RH BLD: NORMAL
ANTIGENS PRESENT RBC DONR: NORMAL
ANTIGENS PRESENT RBC DONR: NORMAL
BLD PROD TYP BPU: NORMAL
BLD PROD TYP BPU: NORMAL
BLOOD BANK BLOOD PRODUCT EXPIRATION DATE: NORMAL
BLOOD BANK BLOOD PRODUCT EXPIRATION DATE: NORMAL
BLOOD BANK CMNT PATIENT-IMP: NORMAL
BLOOD BANK DISPENSE STATUS: NORMAL
BLOOD BANK DISPENSE STATUS: NORMAL
BLOOD BANK ISBT PRODUCT BLOOD TYPE: 6200
BLOOD BANK ISBT PRODUCT BLOOD TYPE: 6200
BLOOD BANK PRODUCT CODE: NORMAL
BLOOD BANK PRODUCT CODE: NORMAL
BLOOD BANK UNIT TYPE AND RH: NORMAL
BLOOD BANK UNIT TYPE AND RH: NORMAL
BLOOD GROUP ANTIBODIES SERPL: NORMAL
BLOOD GROUP ANTIBODIES SERPL: NORMAL
BPU ID: NORMAL
BPU ID: NORMAL
CROSSMATCH RESULT: NORMAL
CROSSMATCH RESULT: NORMAL
DAT C3B/C3D, C3D: NORMAL
DAT IGG-SP REAG RBC QL: NORMAL
DAT POLY-SP REAG RBC QL: NORMAL
SPECIMEN EXP DATE BLD: NORMAL
UNIT DIVISION: 0
UNIT DIVISION: 0
UNIT ISSUE DATE/TIME: NORMAL
UNIT ISSUE DATE/TIME: NORMAL

## 2025-07-14 NOTE — ED NOTES
PT HAS [facility]-PORT, REQUESTING ALL INTRAVENOUS FLUIDS, MEDS AND BLOOD DRAW FROM PORT ONLY   skin prep done by [facility] Kamara/dipesh

## (undated) DEVICE — CATHETER SUCT TR FL TIP 14FR W/ O CTRL

## (undated) DEVICE — INTENDED FOR TISSUE SEPARATION, AND OTHER PROCEDURES THAT REQUIRE A SHARP SURGICAL BLADE TO PUNCTURE OR CUT.: Brand: BARD-PARKER SAFETY BLADES SIZE 15, STERILE

## (undated) DEVICE — KENDALL SCD EXPRESS SLEEVES, KNEE LENGTH, MEDIUM: Brand: KENDALL SCD

## (undated) DEVICE — STERILE POLYISOPRENE POWDER-FREE SURGICAL GLOVES: Brand: PROTEXIS

## (undated) DEVICE — LINER SUCT CANSTR 3000CC PLAS SFT PRE ASSEMB W/OUT TBNG W/

## (undated) DEVICE — BITE BLOCK ENDOSCP UNIV AD 6 TO 9.4 MM

## (undated) DEVICE — BASIN EMSIS 16OZ GRAPHITE PLAS KID SHP MOLD GRAD FOR ORAL

## (undated) DEVICE — 3M™ BAIR PAWS FLEX™ WARMING GOWN, STANDARD, 20 PER CASE 81003: Brand: BAIR PAWS™

## (undated) DEVICE — SUTURE MCRYL SZ 4-0 L18IN ABSRB UD L19MM PS-2 3/8 CIR PRIM Y496G

## (undated) DEVICE — PACK PROCEDURE SURG MAJ W/ BASIN LF

## (undated) DEVICE — UNDERPAD INCONT W23XL36IN STD BLU POLYPR BK FLUF SFT

## (undated) DEVICE — SOLUTION IV 1000ML 0.9% SOD CHL

## (undated) DEVICE — SUTURE PROL SZ 4-0 L36IN NONABSORBABLE BLU L26MM SH 1/2 CIR 8521H

## (undated) DEVICE — (D)PACK ICE DISP -- DISC BY MFR

## (undated) DEVICE — SYRINGE MED 25GA 3ML L5/8IN SUBQ PLAS W/ DETACH NDL SFTY

## (undated) DEVICE — FLEX ADVANTAGE 3000CC: Brand: FLEX ADVANTAGE

## (undated) DEVICE — GAUZE,SPONGE,4"X4",16PLY,STRL,LF,10/TRAY: Brand: MEDLINE

## (undated) DEVICE — TRAY PREP DRY W/ PREM GLV 2 APPL 6 SPNG 2 UNDPD 1 OVERWRAP

## (undated) DEVICE — SOFT SILICONE HYDROCELLULAR SACRUM DRESSING WITH LOCK AWAY LAYER: Brand: ALLEVYN LIFE SACRUM (LARGE) PACK OF 10

## (undated) DEVICE — SUT PROL 2-0 30IN SH BLU --

## (undated) DEVICE — KIT CLN UP BON SECOURS MARYV

## (undated) DEVICE — 3M™ TEGADERM™ TRANSPARENT FILM DRESSING FRAME STYLE, 1626W, 4 IN X 4-3/4 IN (10 CM X 12 CM), 50/CT 4CT/CASE: Brand: 3M™ TEGADERM™

## (undated) DEVICE — GAUZE SPONGES,USP TYPE VII GAUZE, 12 PLY: Brand: CURITY

## (undated) DEVICE — X-RAY SPONGES,12 PLY: Brand: DERMACEA

## (undated) DEVICE — SOLUTION IRRIG 1000ML H2O STRL BLT

## (undated) DEVICE — CANNULA NSL AD TBNG L14FT STD PVC O2 CRV CONN NONFLARED NSL

## (undated) DEVICE — FORCEPS BX L240CM JAW DIA2.4MM ORNG L CAP W/ NDL DISP RAD

## (undated) DEVICE — PENROSE TUBING RADIOPAQUE: Brand: ARGYLE

## (undated) DEVICE — YANKAUER,SMOOTH HANDLE,HIGH CAPACITY: Brand: MEDLINE INDUSTRIES, INC.

## (undated) DEVICE — MATERNITY KNIT PANTS,SEAMLESS: Brand: WINGS

## (undated) DEVICE — SYRINGE MED 50ML LUERSLIP TIP

## (undated) DEVICE — SUTURE VCRL SZ 3-0 L18IN ABSRB UD POLYGLACTIN 910 BRAID TIE J910T

## (undated) DEVICE — APPLICATOR BNDG 1MM ADH PREMIERPRO EXOFIN

## (undated) DEVICE — SPONGE DISSECT PNUT SM 3/8IN -- 5/PK

## (undated) DEVICE — INTENDED FOR TISSUE SEPARATION, AND OTHER PROCEDURES THAT REQUIRE A SHARP SURGICAL BLADE TO PUNCTURE OR CUT.: Brand: BARD-PARKER SAFETY BLADES SIZE 10, STERILE

## (undated) DEVICE — DRAPE,REIN 53X77,STERILE: Brand: MEDLINE

## (undated) DEVICE — MEDI-VAC NON-CONDUCTIVE SUCTION TUBING: Brand: CARDINAL HEALTH

## (undated) DEVICE — GAUZE SPONGES,16 PLY: Brand: CURITY

## (undated) DEVICE — REM POLYHESIVE ADULT PATIENT RETURN ELECTRODE: Brand: VALLEYLAB

## (undated) DEVICE — SUTURE VCRL SZ 3-0 L27IN ABSRB UD L26MM SH 1/2 CIR J416H

## (undated) DEVICE — ENDOSCOPY PUMP TUBING/ CAP SET: Brand: ERBE

## (undated) DEVICE — SHEET, T, LAPAROTOMY, STERILE: Brand: MEDLINE